# Patient Record
Sex: FEMALE | Race: WHITE | HISPANIC OR LATINO | URBAN - METROPOLITAN AREA
[De-identification: names, ages, dates, MRNs, and addresses within clinical notes are randomized per-mention and may not be internally consistent; named-entity substitution may affect disease eponyms.]

---

## 2016-10-12 RX ORDER — FUROSEMIDE 40 MG
2 TABLET ORAL
Qty: 60 | Refills: 0 | COMMUNITY
Start: 2016-10-12 | End: 2016-11-11

## 2017-01-04 ENCOUNTER — INPATIENT (INPATIENT)
Facility: HOSPITAL | Age: 72
LOS: 4 days | Discharge: HOME HEALTH | End: 2017-01-09
Attending: INTERNAL MEDICINE | Admitting: INTERNAL MEDICINE
Payer: MEDICARE

## 2017-01-04 VITALS — OXYGEN SATURATION: 100 % | HEART RATE: 84 BPM

## 2017-01-04 LAB
ALBUMIN SERPL ELPH-MCNC: 3.4 G/DL — SIGNIFICANT CHANGE UP (ref 3.3–5)
ALP SERPL-CCNC: 156 U/L — HIGH (ref 40–120)
ALT FLD-CCNC: 54 U/L — HIGH (ref 4–33)
AST SERPL-CCNC: 26 U/L — SIGNIFICANT CHANGE UP (ref 4–32)
B PERT DNA SPEC QL NAA+PROBE: SIGNIFICANT CHANGE UP
BASE EXCESS BLDV CALC-SCNC: 3.4 MMOL/L — SIGNIFICANT CHANGE UP
BASOPHILS # BLD AUTO: 0.01 K/UL — SIGNIFICANT CHANGE UP (ref 0–0.2)
BASOPHILS NFR BLD AUTO: 0.1 % — SIGNIFICANT CHANGE UP (ref 0–2)
BILIRUB SERPL-MCNC: 0.4 MG/DL — SIGNIFICANT CHANGE UP (ref 0.2–1.2)
BLOOD GAS VENOUS - CREATININE: 2.82 MG/DL — HIGH (ref 0.5–1.3)
BUN SERPL-MCNC: 58 MG/DL — HIGH (ref 7–23)
C PNEUM DNA SPEC QL NAA+PROBE: NOT DETECTED — SIGNIFICANT CHANGE UP
CALCIUM SERPL-MCNC: 8.4 MG/DL — SIGNIFICANT CHANGE UP (ref 8.4–10.5)
CHLORIDE BLDV-SCNC: 98 MMOL/L — SIGNIFICANT CHANGE UP (ref 96–108)
CHLORIDE SERPL-SCNC: 95 MMOL/L — LOW (ref 98–107)
CO2 SERPL-SCNC: 26 MMOL/L — SIGNIFICANT CHANGE UP (ref 22–31)
CREAT SERPL-MCNC: 2.68 MG/DL — HIGH (ref 0.5–1.3)
EOSINOPHIL # BLD AUTO: 0.04 K/UL — SIGNIFICANT CHANGE UP (ref 0–0.5)
EOSINOPHIL NFR BLD AUTO: 0.4 % — SIGNIFICANT CHANGE UP (ref 0–6)
FLUAV H1 2009 PAND RNA SPEC QL NAA+PROBE: NOT DETECTED — SIGNIFICANT CHANGE UP
FLUAV H1 RNA SPEC QL NAA+PROBE: NOT DETECTED — SIGNIFICANT CHANGE UP
FLUAV H3 RNA SPEC QL NAA+PROBE: NOT DETECTED — SIGNIFICANT CHANGE UP
FLUAV SUBTYP SPEC NAA+PROBE: SIGNIFICANT CHANGE UP
FLUBV RNA SPEC QL NAA+PROBE: NOT DETECTED — SIGNIFICANT CHANGE UP
GAS PNL BLDV: 135 MMOL/L — LOW (ref 136–146)
GLUCOSE BLDV-MCNC: 155 — HIGH (ref 70–99)
GLUCOSE SERPL-MCNC: 165 MG/DL — HIGH (ref 70–99)
HADV DNA SPEC QL NAA+PROBE: NOT DETECTED — SIGNIFICANT CHANGE UP
HCO3 BLDV-SCNC: 27 MMOL/L — SIGNIFICANT CHANGE UP (ref 20–27)
HCOV 229E RNA SPEC QL NAA+PROBE: NOT DETECTED — SIGNIFICANT CHANGE UP
HCOV HKU1 RNA SPEC QL NAA+PROBE: NOT DETECTED — SIGNIFICANT CHANGE UP
HCOV NL63 RNA SPEC QL NAA+PROBE: NOT DETECTED — SIGNIFICANT CHANGE UP
HCOV OC43 RNA SPEC QL NAA+PROBE: NOT DETECTED — SIGNIFICANT CHANGE UP
HCT VFR BLD CALC: 24.9 % — LOW (ref 34.5–45)
HCT VFR BLDV CALC: 23.1 % — LOW (ref 34.5–45)
HGB BLD-MCNC: 7.5 G/DL — LOW (ref 11.5–15.5)
HGB BLDV-MCNC: 7.4 G/DL — LOW (ref 11.5–15.5)
HMPV RNA SPEC QL NAA+PROBE: NOT DETECTED — SIGNIFICANT CHANGE UP
HPIV1 RNA SPEC QL NAA+PROBE: NOT DETECTED — SIGNIFICANT CHANGE UP
HPIV2 RNA SPEC QL NAA+PROBE: NOT DETECTED — SIGNIFICANT CHANGE UP
HPIV3 RNA SPEC QL NAA+PROBE: NOT DETECTED — SIGNIFICANT CHANGE UP
HPIV4 RNA SPEC QL NAA+PROBE: NOT DETECTED — SIGNIFICANT CHANGE UP
IMM GRANULOCYTES NFR BLD AUTO: 0.5 % — SIGNIFICANT CHANGE UP (ref 0–1.5)
LACTATE BLDV-MCNC: 0.9 MMOL/L — SIGNIFICANT CHANGE UP (ref 0.5–2)
LYMPHOCYTES # BLD AUTO: 0.82 K/UL — LOW (ref 1–3.3)
LYMPHOCYTES # BLD AUTO: 8.5 % — LOW (ref 13–44)
M PNEUMO DNA SPEC QL NAA+PROBE: NOT DETECTED — SIGNIFICANT CHANGE UP
MCHC RBC-ENTMCNC: 24.1 PG — LOW (ref 27–34)
MCHC RBC-ENTMCNC: 30.1 % — LOW (ref 32–36)
MCV RBC AUTO: 80.1 FL — SIGNIFICANT CHANGE UP (ref 80–100)
MONOCYTES # BLD AUTO: 0.4 K/UL — SIGNIFICANT CHANGE UP (ref 0–0.9)
MONOCYTES NFR BLD AUTO: 4.1 % — SIGNIFICANT CHANGE UP (ref 2–14)
NEUTROPHILS # BLD AUTO: 8.32 K/UL — HIGH (ref 1.8–7.4)
NEUTROPHILS NFR BLD AUTO: 86.4 % — HIGH (ref 43–77)
NT-PROBNP SERPL-SCNC: 9385 PG/ML — SIGNIFICANT CHANGE UP
PCO2 BLDV: 60 MMHG — HIGH (ref 41–51)
PH BLDV: 7.31 PH — LOW (ref 7.32–7.43)
PLATELET # BLD AUTO: 294 K/UL — SIGNIFICANT CHANGE UP (ref 150–400)
PMV BLD: 10 FL — SIGNIFICANT CHANGE UP (ref 7–13)
PO2 BLDV: 58 MMHG — HIGH (ref 35–40)
POTASSIUM BLDV-SCNC: 4 MMOL/L — SIGNIFICANT CHANGE UP (ref 3.4–4.5)
POTASSIUM SERPL-MCNC: 4.3 MMOL/L — SIGNIFICANT CHANGE UP (ref 3.5–5.3)
POTASSIUM SERPL-SCNC: 4.3 MMOL/L — SIGNIFICANT CHANGE UP (ref 3.5–5.3)
PROT SERPL-MCNC: 6.6 G/DL — SIGNIFICANT CHANGE UP (ref 6–8.3)
RBC # BLD: 3.11 M/UL — LOW (ref 3.8–5.2)
RBC # FLD: 16.5 % — HIGH (ref 10.3–14.5)
RSV RNA SPEC QL NAA+PROBE: NOT DETECTED — SIGNIFICANT CHANGE UP
RV+EV RNA SPEC QL NAA+PROBE: POSITIVE — HIGH
SAO2 % BLDV: 87.4 % — HIGH (ref 60–85)
SODIUM SERPL-SCNC: 138 MMOL/L — SIGNIFICANT CHANGE UP (ref 135–145)
TROPONIN T SERPL-MCNC: < 0.06 NG/ML — SIGNIFICANT CHANGE UP (ref 0–0.06)
WBC # BLD: 9.64 K/UL — SIGNIFICANT CHANGE UP (ref 3.8–10.5)
WBC # FLD AUTO: 9.64 K/UL — SIGNIFICANT CHANGE UP (ref 3.8–10.5)

## 2017-01-04 PROCEDURE — 71010: CPT | Mod: 26

## 2017-01-04 PROCEDURE — 99223 1ST HOSP IP/OBS HIGH 75: CPT | Mod: GC

## 2017-01-04 RX ORDER — VANCOMYCIN HCL 1 G
1000 VIAL (EA) INTRAVENOUS ONCE
Qty: 0 | Refills: 0 | Status: COMPLETED | OUTPATIENT
Start: 2017-01-04 | End: 2017-01-04

## 2017-01-04 RX ORDER — FUROSEMIDE 40 MG
40 TABLET ORAL ONCE
Qty: 0 | Refills: 0 | Status: COMPLETED | OUTPATIENT
Start: 2017-01-04 | End: 2017-01-04

## 2017-01-04 RX ORDER — IPRATROPIUM/ALBUTEROL SULFATE 18-103MCG
3 AEROSOL WITH ADAPTER (GRAM) INHALATION ONCE
Qty: 0 | Refills: 0 | Status: COMPLETED | OUTPATIENT
Start: 2017-01-04 | End: 2017-01-04

## 2017-01-04 RX ORDER — CEFEPIME 1 G/1
2000 INJECTION, POWDER, FOR SOLUTION INTRAMUSCULAR; INTRAVENOUS ONCE
Qty: 0 | Refills: 0 | Status: COMPLETED | OUTPATIENT
Start: 2017-01-04 | End: 2017-01-04

## 2017-01-04 RX ADMIN — Medication 3 MILLILITER(S): at 19:43

## 2017-01-04 RX ADMIN — Medication 125 MILLIGRAM(S): at 20:44

## 2017-01-04 RX ADMIN — Medication 3 MILLILITER(S): at 19:25

## 2017-01-04 RX ADMIN — Medication 3 MILLILITER(S): at 20:18

## 2017-01-04 RX ADMIN — CEFEPIME 100 MILLIGRAM(S): 1 INJECTION, POWDER, FOR SOLUTION INTRAMUSCULAR; INTRAVENOUS at 22:21

## 2017-01-04 RX ADMIN — Medication 40 MILLIGRAM(S): at 20:44

## 2017-01-04 RX ADMIN — Medication 250 MILLIGRAM(S): at 23:07

## 2017-01-04 NOTE — ED PROVIDER NOTE - OBJECTIVE STATEMENT
71-year-old female with a history of COPD (on 2 L home O2), CAD, DM II and HTN who presents to the ED with shortness of breath and cough/fevers x 2 days. Pt has been having increasing SOB x months, worsening over the last few weeks, recently went up on lasix dose. Pt also has cough, chills, x 2 days, daughter sick at home. Pt seen few days ago at [facility] ER for anemia, COPD exacerbation, started on prednisone. 71-year-old female with a history of COPD (on 2 L home O2), CAD, DM II and HTN who presents to the ED with shortness of breath and cough/fevers x 2 days. Pt has been having increasing SOB x months, worsening over the last few weeks, recently went up on lasix dose. Pt also has cough, chills, x 2 days, daughter sick at home. Pt seen few days ago at [facility] ER for anemia, COPD exacerbation, started on prednisone. Pt found to be sattin in 70s on presentation for EMS, put on cpap.

## 2017-01-04 NOTE — ED PROVIDER NOTE - PMH
Adult Idiopathic Generalized Osteoporosis    CAD (Coronary Artery Disease)    Chronic obstructive pulmonary disease, unspecified COPD type    DM (Diabetes Mellitus), Secondary    Hypertension

## 2017-01-04 NOTE — ED ADULT NURSE REASSESSMENT NOTE - NS ED NURSE REASSESS COMMENT FT1
Report received from previous shift RN. Pt A&Ox3, VS as noted. PT currently on BIPAP as ordered, respirations non labored.. Pt resting, states, she is breathing more comfortably. Medicated as ordered. Family at bedside, will continue to monitor for safety and comfort.

## 2017-01-04 NOTE — ED ADULT NURSE NOTE - OBJECTIVE STATEMENT
Patient received to trauma room A awake, alert, oriented x3, in respiratory distress.  Patient arrived on cpap machine, respiratory status tachypneic.  Vital signs recorded, MD at bedside evaluating patient.  Patient has bilateral +3 edema at ankles and feet.  Patient respirations labored and dyspneic.  Peripheral IV line in place, flushes well, no pain or erythema at site.  Family at bedside for emotional support.  CAll bell within reach, safety maintained, will continue to monitor.

## 2017-01-04 NOTE — ED PROVIDER NOTE - PROGRESS NOTE DETAILS
Pt stable. Vital signs stable. MICU c/s aprpeciated, rec PCU. Pulm fellow and MAR aware. Per pt, does not have pulmnologist.  Bubba Felton MD PGY-3

## 2017-01-05 DIAGNOSIS — E13.9 OTHER SPECIFIED DIABETES MELLITUS WITHOUT COMPLICATIONS: ICD-10-CM

## 2017-01-05 DIAGNOSIS — I10 ESSENTIAL (PRIMARY) HYPERTENSION: ICD-10-CM

## 2017-01-05 DIAGNOSIS — J44.9 CHRONIC OBSTRUCTIVE PULMONARY DISEASE, UNSPECIFIED: ICD-10-CM

## 2017-01-05 DIAGNOSIS — J18.9 PNEUMONIA, UNSPECIFIED ORGANISM: ICD-10-CM

## 2017-01-05 DIAGNOSIS — J44.1 CHRONIC OBSTRUCTIVE PULMONARY DISEASE WITH (ACUTE) EXACERBATION: ICD-10-CM

## 2017-01-05 DIAGNOSIS — Z29.9 ENCOUNTER FOR PROPHYLACTIC MEASURES, UNSPECIFIED: ICD-10-CM

## 2017-01-05 DIAGNOSIS — I25.10 ATHEROSCLEROTIC HEART DISEASE OF NATIVE CORONARY ARTERY WITHOUT ANGINA PECTORIS: ICD-10-CM

## 2017-01-05 LAB
BASE EXCESS BLDA CALC-SCNC: 2.4 MMOL/L — SIGNIFICANT CHANGE UP
BUN SERPL-MCNC: 64 MG/DL — HIGH (ref 7–23)
BUN SERPL-MCNC: 67 MG/DL — HIGH (ref 7–23)
CALCIUM SERPL-MCNC: 8.7 MG/DL — SIGNIFICANT CHANGE UP (ref 8.4–10.5)
CALCIUM SERPL-MCNC: 8.7 MG/DL — SIGNIFICANT CHANGE UP (ref 8.4–10.5)
CHLORIDE SERPL-SCNC: 95 MMOL/L — LOW (ref 98–107)
CHLORIDE SERPL-SCNC: 95 MMOL/L — LOW (ref 98–107)
CO2 SERPL-SCNC: 21 MMOL/L — LOW (ref 22–31)
CO2 SERPL-SCNC: 25 MMOL/L — SIGNIFICANT CHANGE UP (ref 22–31)
CREAT SERPL-MCNC: 2.9 MG/DL — HIGH (ref 0.5–1.3)
CREAT SERPL-MCNC: 2.93 MG/DL — HIGH (ref 0.5–1.3)
GLUCOSE BLDA-MCNC: 239 MG/DL — HIGH (ref 70–99)
GLUCOSE SERPL-MCNC: 269 MG/DL — HIGH (ref 70–99)
GLUCOSE SERPL-MCNC: 304 MG/DL — HIGH (ref 70–99)
HCO3 BLDA-SCNC: 26 MMOL/L — SIGNIFICANT CHANGE UP (ref 22–26)
HCT VFR BLD CALC: 25.2 % — LOW (ref 34.5–45)
HCT VFR BLDA CALC: 23 % — LOW (ref 34.5–46.5)
HGB BLD-MCNC: 7.7 G/DL — LOW (ref 11.5–15.5)
HGB BLDA-MCNC: 7.4 G/DL — LOW (ref 11.5–15.5)
MAGNESIUM SERPL-MCNC: 2 MG/DL — SIGNIFICANT CHANGE UP (ref 1.6–2.6)
MCHC RBC-ENTMCNC: 24.4 PG — LOW (ref 27–34)
MCHC RBC-ENTMCNC: 30.6 % — LOW (ref 32–36)
MCV RBC AUTO: 79.7 FL — LOW (ref 80–100)
PCO2 BLDA: 58 MMHG — HIGH (ref 32–48)
PH BLDA: 7.31 PH — LOW (ref 7.35–7.45)
PHOSPHATE SERPL-MCNC: 5.6 MG/DL — HIGH (ref 2.5–4.5)
PLATELET # BLD AUTO: 231 K/UL — SIGNIFICANT CHANGE UP (ref 150–400)
PMV BLD: 10.2 FL — SIGNIFICANT CHANGE UP (ref 7–13)
PO2 BLDA: 94 MMHG — SIGNIFICANT CHANGE UP (ref 83–108)
POTASSIUM BLDA-SCNC: 4.2 MMOL/L — SIGNIFICANT CHANGE UP (ref 3.4–4.5)
POTASSIUM SERPL-MCNC: 4.2 MMOL/L — SIGNIFICANT CHANGE UP (ref 3.5–5.3)
POTASSIUM SERPL-MCNC: 5.4 MMOL/L — HIGH (ref 3.5–5.3)
POTASSIUM SERPL-SCNC: 4.2 MMOL/L — SIGNIFICANT CHANGE UP (ref 3.5–5.3)
POTASSIUM SERPL-SCNC: 5.4 MMOL/L — HIGH (ref 3.5–5.3)
RBC # BLD: 3.16 M/UL — LOW (ref 3.8–5.2)
RBC # FLD: 16.3 % — HIGH (ref 10.3–14.5)
SAO2 % BLDA: 97.4 % — SIGNIFICANT CHANGE UP (ref 95–99)
SODIUM BLDA-SCNC: 132 MMOL/L — LOW (ref 136–146)
SODIUM SERPL-SCNC: 134 MMOL/L — LOW (ref 135–145)
SODIUM SERPL-SCNC: 135 MMOL/L — SIGNIFICANT CHANGE UP (ref 135–145)
SPECIMEN SOURCE: SIGNIFICANT CHANGE UP
SPECIMEN SOURCE: SIGNIFICANT CHANGE UP
WBC # BLD: 6.76 K/UL — SIGNIFICANT CHANGE UP (ref 3.8–10.5)
WBC # FLD AUTO: 6.76 K/UL — SIGNIFICANT CHANGE UP (ref 3.8–10.5)

## 2017-01-05 PROCEDURE — 99223 1ST HOSP IP/OBS HIGH 75: CPT | Mod: GC

## 2017-01-05 RX ORDER — CEFTRIAXONE 500 MG/1
1 INJECTION, POWDER, FOR SOLUTION INTRAMUSCULAR; INTRAVENOUS EVERY 24 HOURS
Qty: 0 | Refills: 0 | Status: DISCONTINUED | OUTPATIENT
Start: 2017-01-05 | End: 2017-01-05

## 2017-01-05 RX ORDER — ACETAMINOPHEN 500 MG
650 TABLET ORAL EVERY 6 HOURS
Qty: 0 | Refills: 0 | Status: DISCONTINUED | OUTPATIENT
Start: 2017-01-05 | End: 2017-01-09

## 2017-01-05 RX ORDER — ATORVASTATIN CALCIUM 80 MG/1
40 TABLET, FILM COATED ORAL AT BEDTIME
Qty: 0 | Refills: 0 | Status: DISCONTINUED | OUTPATIENT
Start: 2017-01-05 | End: 2017-01-09

## 2017-01-05 RX ORDER — GABAPENTIN 400 MG/1
300 CAPSULE ORAL
Qty: 0 | Refills: 0 | Status: DISCONTINUED | OUTPATIENT
Start: 2017-01-05 | End: 2017-01-06

## 2017-01-05 RX ORDER — SODIUM CHLORIDE 9 MG/ML
1000 INJECTION, SOLUTION INTRAVENOUS
Qty: 0 | Refills: 0 | Status: DISCONTINUED | OUTPATIENT
Start: 2017-01-05 | End: 2017-01-09

## 2017-01-05 RX ORDER — CARVEDILOL PHOSPHATE 80 MG/1
12.5 CAPSULE, EXTENDED RELEASE ORAL EVERY 12 HOURS
Qty: 0 | Refills: 0 | Status: DISCONTINUED | OUTPATIENT
Start: 2017-01-05 | End: 2017-01-09

## 2017-01-05 RX ORDER — AMLODIPINE BESYLATE 2.5 MG/1
10 TABLET ORAL DAILY
Qty: 0 | Refills: 0 | Status: DISCONTINUED | OUTPATIENT
Start: 2017-01-05 | End: 2017-01-09

## 2017-01-05 RX ORDER — HEPARIN SODIUM 5000 [USP'U]/ML
7500 INJECTION INTRAVENOUS; SUBCUTANEOUS EVERY 8 HOURS
Qty: 0 | Refills: 0 | Status: DISCONTINUED | OUTPATIENT
Start: 2017-01-05 | End: 2017-01-09

## 2017-01-05 RX ORDER — FUROSEMIDE 40 MG
40 TABLET ORAL EVERY 12 HOURS
Qty: 0 | Refills: 0 | Status: DISCONTINUED | OUTPATIENT
Start: 2017-01-05 | End: 2017-01-09

## 2017-01-05 RX ORDER — DEXTROSE 50 % IN WATER 50 %
25 SYRINGE (ML) INTRAVENOUS ONCE
Qty: 0 | Refills: 0 | Status: DISCONTINUED | OUTPATIENT
Start: 2017-01-05 | End: 2017-01-09

## 2017-01-05 RX ORDER — GLUCAGON INJECTION, SOLUTION 0.5 MG/.1ML
1 INJECTION, SOLUTION SUBCUTANEOUS ONCE
Qty: 0 | Refills: 0 | Status: DISCONTINUED | OUTPATIENT
Start: 2017-01-05 | End: 2017-01-09

## 2017-01-05 RX ORDER — INSULIN LISPRO 100/ML
VIAL (ML) SUBCUTANEOUS
Qty: 0 | Refills: 0 | Status: DISCONTINUED | OUTPATIENT
Start: 2017-01-05 | End: 2017-01-09

## 2017-01-05 RX ORDER — INSULIN GLARGINE 100 [IU]/ML
8 INJECTION, SOLUTION SUBCUTANEOUS AT BEDTIME
Qty: 0 | Refills: 0 | Status: DISCONTINUED | OUTPATIENT
Start: 2017-01-05 | End: 2017-01-07

## 2017-01-05 RX ORDER — IPRATROPIUM/ALBUTEROL SULFATE 18-103MCG
3 AEROSOL WITH ADAPTER (GRAM) INHALATION EVERY 6 HOURS
Qty: 0 | Refills: 0 | Status: DISCONTINUED | OUTPATIENT
Start: 2017-01-05 | End: 2017-01-09

## 2017-01-05 RX ORDER — INSULIN LISPRO 100/ML
VIAL (ML) SUBCUTANEOUS AT BEDTIME
Qty: 0 | Refills: 0 | Status: DISCONTINUED | OUTPATIENT
Start: 2017-01-05 | End: 2017-01-09

## 2017-01-05 RX ORDER — LOSARTAN POTASSIUM 100 MG/1
25 TABLET, FILM COATED ORAL DAILY
Qty: 0 | Refills: 0 | Status: DISCONTINUED | OUTPATIENT
Start: 2017-01-05 | End: 2017-01-09

## 2017-01-05 RX ORDER — FERROUS SULFATE 325(65) MG
325 TABLET ORAL DAILY
Qty: 0 | Refills: 0 | Status: DISCONTINUED | OUTPATIENT
Start: 2017-01-05 | End: 2017-01-09

## 2017-01-05 RX ORDER — DEXTROSE 50 % IN WATER 50 %
12.5 SYRINGE (ML) INTRAVENOUS ONCE
Qty: 0 | Refills: 0 | Status: DISCONTINUED | OUTPATIENT
Start: 2017-01-05 | End: 2017-01-09

## 2017-01-05 RX ORDER — CLOPIDOGREL BISULFATE 75 MG/1
75 TABLET, FILM COATED ORAL DAILY
Qty: 0 | Refills: 0 | Status: DISCONTINUED | OUTPATIENT
Start: 2017-01-05 | End: 2017-01-09

## 2017-01-05 RX ORDER — VANCOMYCIN HCL 1 G
1000 VIAL (EA) INTRAVENOUS EVERY 12 HOURS
Qty: 0 | Refills: 0 | Status: DISCONTINUED | OUTPATIENT
Start: 2017-01-05 | End: 2017-01-05

## 2017-01-05 RX ORDER — AZITHROMYCIN 500 MG/1
500 TABLET, FILM COATED ORAL EVERY 24 HOURS
Qty: 0 | Refills: 0 | Status: DISCONTINUED | OUTPATIENT
Start: 2017-01-05 | End: 2017-01-09

## 2017-01-05 RX ORDER — ASPIRIN/CALCIUM CARB/MAGNESIUM 324 MG
81 TABLET ORAL DAILY
Qty: 0 | Refills: 0 | Status: DISCONTINUED | OUTPATIENT
Start: 2017-01-05 | End: 2017-01-09

## 2017-01-05 RX ORDER — HYDRALAZINE HCL 50 MG
100 TABLET ORAL THREE TIMES A DAY
Qty: 0 | Refills: 0 | Status: DISCONTINUED | OUTPATIENT
Start: 2017-01-05 | End: 2017-01-09

## 2017-01-05 RX ORDER — INSULIN LISPRO 100/ML
VIAL (ML) SUBCUTANEOUS
Qty: 0 | Refills: 0 | Status: DISCONTINUED | OUTPATIENT
Start: 2017-01-05 | End: 2017-01-05

## 2017-01-05 RX ORDER — DEXTROSE 50 % IN WATER 50 %
1 SYRINGE (ML) INTRAVENOUS ONCE
Qty: 0 | Refills: 0 | Status: DISCONTINUED | OUTPATIENT
Start: 2017-01-05 | End: 2017-01-09

## 2017-01-05 RX ADMIN — Medication 4: at 09:33

## 2017-01-05 RX ADMIN — GABAPENTIN 300 MILLIGRAM(S): 400 CAPSULE ORAL at 07:47

## 2017-01-05 RX ADMIN — AMLODIPINE BESYLATE 10 MILLIGRAM(S): 2.5 TABLET ORAL at 07:50

## 2017-01-05 RX ADMIN — Medication 8: at 17:33

## 2017-01-05 RX ADMIN — CARVEDILOL PHOSPHATE 12.5 MILLIGRAM(S): 80 CAPSULE, EXTENDED RELEASE ORAL at 17:33

## 2017-01-05 RX ADMIN — HEPARIN SODIUM 7500 UNIT(S): 5000 INJECTION INTRAVENOUS; SUBCUTANEOUS at 13:33

## 2017-01-05 RX ADMIN — CARVEDILOL PHOSPHATE 12.5 MILLIGRAM(S): 80 CAPSULE, EXTENDED RELEASE ORAL at 07:50

## 2017-01-05 RX ADMIN — Medication 250 MILLIGRAM(S): at 07:44

## 2017-01-05 RX ADMIN — Medication 100 MILLIGRAM(S): at 07:45

## 2017-01-05 RX ADMIN — Medication 81 MILLIGRAM(S): at 13:14

## 2017-01-05 RX ADMIN — Medication 3 MILLILITER(S): at 17:39

## 2017-01-05 RX ADMIN — ATORVASTATIN CALCIUM 40 MILLIGRAM(S): 80 TABLET, FILM COATED ORAL at 21:25

## 2017-01-05 RX ADMIN — Medication 3 MILLILITER(S): at 21:49

## 2017-01-05 RX ADMIN — Medication 40 MILLIGRAM(S): at 17:33

## 2017-01-05 RX ADMIN — Medication 20 MILLIGRAM(S): at 13:33

## 2017-01-05 RX ADMIN — Medication 325 MILLIGRAM(S): at 13:15

## 2017-01-05 RX ADMIN — HEPARIN SODIUM 7500 UNIT(S): 5000 INJECTION INTRAVENOUS; SUBCUTANEOUS at 07:50

## 2017-01-05 RX ADMIN — Medication 4: at 13:15

## 2017-01-05 RX ADMIN — Medication 20 MILLIGRAM(S): at 21:27

## 2017-01-05 RX ADMIN — CEFTRIAXONE 100 GRAM(S): 500 INJECTION, POWDER, FOR SOLUTION INTRAMUSCULAR; INTRAVENOUS at 09:34

## 2017-01-05 RX ADMIN — Medication 3 MILLILITER(S): at 10:05

## 2017-01-05 RX ADMIN — LOSARTAN POTASSIUM 25 MILLIGRAM(S): 100 TABLET, FILM COATED ORAL at 07:45

## 2017-01-05 RX ADMIN — AZITHROMYCIN 250 MILLIGRAM(S): 500 TABLET, FILM COATED ORAL at 10:19

## 2017-01-05 RX ADMIN — Medication 3 MILLILITER(S): at 04:22

## 2017-01-05 RX ADMIN — Medication 20 MILLIGRAM(S): at 08:57

## 2017-01-05 RX ADMIN — INSULIN GLARGINE 8 UNIT(S): 100 INJECTION, SOLUTION SUBCUTANEOUS at 21:27

## 2017-01-05 RX ADMIN — Medication 100 MILLIGRAM(S): at 21:25

## 2017-01-05 RX ADMIN — HEPARIN SODIUM 7500 UNIT(S): 5000 INJECTION INTRAVENOUS; SUBCUTANEOUS at 21:25

## 2017-01-05 RX ADMIN — CLOPIDOGREL BISULFATE 75 MILLIGRAM(S): 75 TABLET, FILM COATED ORAL at 13:13

## 2017-01-05 RX ADMIN — Medication 40 MILLIGRAM(S): at 07:47

## 2017-01-05 RX ADMIN — Medication 100 MILLIGRAM(S): at 13:13

## 2017-01-05 RX ADMIN — GABAPENTIN 300 MILLIGRAM(S): 400 CAPSULE ORAL at 17:33

## 2017-01-05 NOTE — H&P ADULT. - ASSESSMENT
71-year-old female with a history of COPD (not on home O2), CAD, T2DM and HTN presents to the ED with acutely worsening shortness of breath and cough/fevers x 2 days.

## 2017-01-05 NOTE — H&P ADULT. - PROBLEM SELECTOR PLAN 1
- likely in setting of RSV  - as per MICU, will continue to cover with broad coverage abx (vanc, azithro, ceftriaxone) - likely in setting of RSV  - as per MICU, will continue to cover with broad coverage abx (vanc, azithro, ceftriaxone) until cultures return  - tylenol PRN  - VS q4  - albuterol 6  - hold off on fluids given patient increasing swelling and Hx of CHF

## 2017-01-05 NOTE — H&P ADULT. - PROBLEM SELECTOR PLAN 2
- azithro, ceftriaxone, vanc  - solumedrol 20 q8  - albuterol q6  - bipap overnight, reassess need in am  - ABG now

## 2017-01-05 NOTE — H&P ADULT. - HISTORY OF PRESENT ILLNESS
71-year-old female with a history of COPD (not on home O2), CAD, T2DM and HTN presents to the ED with shortness of breath and cough/fevers x 2 days.     Pt has been having increasing SOB x 1 months, particularly worsening over the last few weeks. Pt states that "everyone at home is sick with a virus."  She has been to  ED twice now in the last 2 weeks for similar complaints. Pt was started on prednisone 50 x 5 days and her lasix dose was increased from 40 to 80mg BID for increased swelling.     Pt now also has cough, chills, x 2 days and a temp amx of 100F.   She was supposed to start using home O2 but the prescription has "not gone through" as per daughter at the bedside. Today, when patient became acutely SOB, she was found to by hypoxic to the 70s on RA.    Pt then placed on NC with minimal improvement, s/p 3 albuterol treatments. Since patient's work of breathing was increasing she was placed on Bipap and MICU was consulted.    Pt was given 125mg of methylprednisolone 1 dose of each vanc, ceftriaxone and azithro in the ED.

## 2017-01-06 LAB
BUN SERPL-MCNC: 71 MG/DL — HIGH (ref 7–23)
CALCIUM SERPL-MCNC: 8.6 MG/DL — SIGNIFICANT CHANGE UP (ref 8.4–10.5)
CHLORIDE SERPL-SCNC: 96 MMOL/L — LOW (ref 98–107)
CO2 SERPL-SCNC: 26 MMOL/L — SIGNIFICANT CHANGE UP (ref 22–31)
CREAT SERPL-MCNC: 2.95 MG/DL — HIGH (ref 0.5–1.3)
FERRITIN SERPL-MCNC: 155.4 NG/ML — HIGH (ref 15–150)
GLUCOSE SERPL-MCNC: 238 MG/DL — HIGH (ref 70–99)
HCT VFR BLD CALC: 23 % — LOW (ref 34.5–45)
HGB BLD-MCNC: 7.2 G/DL — LOW (ref 11.5–15.5)
IRON SATN MFR SERPL: 18 UG/DL — LOW (ref 30–160)
IRON SATN MFR SERPL: 235 UG/DL — SIGNIFICANT CHANGE UP (ref 140–530)
MCHC RBC-ENTMCNC: 25.1 PG — LOW (ref 27–34)
MCHC RBC-ENTMCNC: 31.3 % — LOW (ref 32–36)
MCV RBC AUTO: 80.1 FL — SIGNIFICANT CHANGE UP (ref 80–100)
PHOSPHATE SERPL-MCNC: 5.8 MG/DL — HIGH (ref 2.5–4.5)
PLATELET # BLD AUTO: 236 K/UL — SIGNIFICANT CHANGE UP (ref 150–400)
PMV BLD: 10.3 FL — SIGNIFICANT CHANGE UP (ref 7–13)
POTASSIUM SERPL-MCNC: 4.7 MMOL/L — SIGNIFICANT CHANGE UP (ref 3.5–5.3)
POTASSIUM SERPL-SCNC: 4.7 MMOL/L — SIGNIFICANT CHANGE UP (ref 3.5–5.3)
PTH-INTACT SERPL-MCNC: 123.7 PG/ML — HIGH (ref 15–65)
RBC # BLD: 2.87 M/UL — LOW (ref 3.8–5.2)
RBC # FLD: 16.5 % — HIGH (ref 10.3–14.5)
SODIUM SERPL-SCNC: 137 MMOL/L — SIGNIFICANT CHANGE UP (ref 135–145)
UIBC SERPL-MCNC: 217 UG/DL — SIGNIFICANT CHANGE UP (ref 110–370)
WBC # BLD: 6.83 K/UL — SIGNIFICANT CHANGE UP (ref 3.8–10.5)
WBC # FLD AUTO: 6.83 K/UL — SIGNIFICANT CHANGE UP (ref 3.8–10.5)

## 2017-01-06 PROCEDURE — 99233 SBSQ HOSP IP/OBS HIGH 50: CPT | Mod: GC

## 2017-01-06 RX ORDER — SODIUM CHLORIDE 0.65 %
1 AEROSOL, SPRAY (ML) NASAL EVERY 6 HOURS
Qty: 0 | Refills: 0 | Status: DISCONTINUED | OUTPATIENT
Start: 2017-01-06 | End: 2017-01-09

## 2017-01-06 RX ORDER — IRON SUCROSE 20 MG/ML
100 INJECTION, SOLUTION INTRAVENOUS
Qty: 0 | Refills: 0 | Status: DISCONTINUED | OUTPATIENT
Start: 2017-01-06 | End: 2017-01-09

## 2017-01-06 RX ADMIN — Medication 6: at 13:05

## 2017-01-06 RX ADMIN — Medication 100 MILLIGRAM(S): at 05:39

## 2017-01-06 RX ADMIN — Medication 40 MILLIGRAM(S): at 18:12

## 2017-01-06 RX ADMIN — CARVEDILOL PHOSPHATE 12.5 MILLIGRAM(S): 80 CAPSULE, EXTENDED RELEASE ORAL at 05:38

## 2017-01-06 RX ADMIN — Medication 3 MILLILITER(S): at 22:32

## 2017-01-06 RX ADMIN — IRON SUCROSE 100 MILLIGRAM(S): 20 INJECTION, SOLUTION INTRAVENOUS at 18:12

## 2017-01-06 RX ADMIN — Medication 8: at 18:12

## 2017-01-06 RX ADMIN — INSULIN GLARGINE 8 UNIT(S): 100 INJECTION, SOLUTION SUBCUTANEOUS at 23:05

## 2017-01-06 RX ADMIN — HEPARIN SODIUM 7500 UNIT(S): 5000 INJECTION INTRAVENOUS; SUBCUTANEOUS at 23:06

## 2017-01-06 RX ADMIN — Medication 100 MILLIGRAM(S): at 13:06

## 2017-01-06 RX ADMIN — ATORVASTATIN CALCIUM 40 MILLIGRAM(S): 80 TABLET, FILM COATED ORAL at 23:07

## 2017-01-06 RX ADMIN — Medication 40 MILLIGRAM(S): at 05:38

## 2017-01-06 RX ADMIN — LOSARTAN POTASSIUM 25 MILLIGRAM(S): 100 TABLET, FILM COATED ORAL at 05:39

## 2017-01-06 RX ADMIN — Medication 4: at 08:56

## 2017-01-06 RX ADMIN — Medication 40 MILLIGRAM(S): at 12:38

## 2017-01-06 RX ADMIN — HEPARIN SODIUM 7500 UNIT(S): 5000 INJECTION INTRAVENOUS; SUBCUTANEOUS at 13:06

## 2017-01-06 RX ADMIN — CLOPIDOGREL BISULFATE 75 MILLIGRAM(S): 75 TABLET, FILM COATED ORAL at 12:38

## 2017-01-06 RX ADMIN — AZITHROMYCIN 250 MILLIGRAM(S): 500 TABLET, FILM COATED ORAL at 09:50

## 2017-01-06 RX ADMIN — Medication 3 MILLILITER(S): at 09:31

## 2017-01-06 RX ADMIN — Medication 81 MILLIGRAM(S): at 12:38

## 2017-01-06 RX ADMIN — Medication 20 MILLIGRAM(S): at 05:40

## 2017-01-06 RX ADMIN — CARVEDILOL PHOSPHATE 12.5 MILLIGRAM(S): 80 CAPSULE, EXTENDED RELEASE ORAL at 18:13

## 2017-01-06 RX ADMIN — Medication 325 MILLIGRAM(S): at 12:38

## 2017-01-06 RX ADMIN — Medication 3 MILLILITER(S): at 15:16

## 2017-01-06 RX ADMIN — GABAPENTIN 300 MILLIGRAM(S): 400 CAPSULE ORAL at 05:39

## 2017-01-06 RX ADMIN — Medication 100 MILLIGRAM(S): at 23:07

## 2017-01-06 RX ADMIN — Medication 3 MILLILITER(S): at 03:53

## 2017-01-06 RX ADMIN — Medication 3: at 23:05

## 2017-01-06 RX ADMIN — AMLODIPINE BESYLATE 10 MILLIGRAM(S): 2.5 TABLET ORAL at 05:38

## 2017-01-06 RX ADMIN — HEPARIN SODIUM 7500 UNIT(S): 5000 INJECTION INTRAVENOUS; SUBCUTANEOUS at 05:40

## 2017-01-06 NOTE — PHYSICAL THERAPY INITIAL EVALUATION ADULT - PERTINENT HX OF CURRENT PROBLEM, REHAB EVAL
Pt has been having increasing SOB x 1 months, particularly worsening over the last few weeks. Pt states that "everyone at home is sick with a virus."  She has been to  ED twice now in the last 2 weeks for similar complaints. Pt was started on prednisone 50 x 5 days and her lasix dose was increased from 40 to 80mg BID for increased swelling.

## 2017-01-07 LAB
BASOPHILS # BLD AUTO: 0.01 K/UL — SIGNIFICANT CHANGE UP (ref 0–0.2)
BASOPHILS NFR BLD AUTO: 0.2 % — SIGNIFICANT CHANGE UP (ref 0–2)
BUN SERPL-MCNC: 74 MG/DL — HIGH (ref 7–23)
CALCIUM SERPL-MCNC: 8.6 MG/DL — SIGNIFICANT CHANGE UP (ref 8.4–10.5)
CHLORIDE SERPL-SCNC: 94 MMOL/L — LOW (ref 98–107)
CO2 SERPL-SCNC: 24 MMOL/L — SIGNIFICANT CHANGE UP (ref 22–31)
CREAT SERPL-MCNC: 2.98 MG/DL — HIGH (ref 0.5–1.3)
EOSINOPHIL # BLD AUTO: 0 K/UL — SIGNIFICANT CHANGE UP (ref 0–0.5)
EOSINOPHIL NFR BLD AUTO: 0 % — SIGNIFICANT CHANGE UP (ref 0–6)
GLUCOSE SERPL-MCNC: 166 MG/DL — HIGH (ref 70–99)
HBA1C BLD-MCNC: 8.7 % — HIGH (ref 4–5.6)
HCT VFR BLD CALC: 23.2 % — LOW (ref 34.5–45)
HGB BLD-MCNC: 7.2 G/DL — LOW (ref 11.5–15.5)
IMM GRANULOCYTES NFR BLD AUTO: 0.6 % — SIGNIFICANT CHANGE UP (ref 0–1.5)
L PNEUMO AG UR QL: NEGATIVE — SIGNIFICANT CHANGE UP
LYMPHOCYTES # BLD AUTO: 0.67 K/UL — LOW (ref 1–3.3)
LYMPHOCYTES # BLD AUTO: 10.1 % — LOW (ref 13–44)
MAGNESIUM SERPL-MCNC: 2 MG/DL — SIGNIFICANT CHANGE UP (ref 1.6–2.6)
MCHC RBC-ENTMCNC: 24.7 PG — LOW (ref 27–34)
MCHC RBC-ENTMCNC: 31 % — LOW (ref 32–36)
MCV RBC AUTO: 79.5 FL — LOW (ref 80–100)
MONOCYTES # BLD AUTO: 0.6 K/UL — SIGNIFICANT CHANGE UP (ref 0–0.9)
MONOCYTES NFR BLD AUTO: 9 % — SIGNIFICANT CHANGE UP (ref 2–14)
NEUTROPHILS # BLD AUTO: 5.34 K/UL — SIGNIFICANT CHANGE UP (ref 1.8–7.4)
NEUTROPHILS NFR BLD AUTO: 80.1 % — HIGH (ref 43–77)
PHOSPHATE SERPL-MCNC: 4.8 MG/DL — HIGH (ref 2.5–4.5)
PLATELET # BLD AUTO: 241 K/UL — SIGNIFICANT CHANGE UP (ref 150–400)
PMV BLD: 10.5 FL — SIGNIFICANT CHANGE UP (ref 7–13)
POTASSIUM SERPL-MCNC: 4.2 MMOL/L — SIGNIFICANT CHANGE UP (ref 3.5–5.3)
POTASSIUM SERPL-SCNC: 4.2 MMOL/L — SIGNIFICANT CHANGE UP (ref 3.5–5.3)
RBC # BLD: 2.92 M/UL — LOW (ref 3.8–5.2)
RBC # FLD: 16.4 % — HIGH (ref 10.3–14.5)
SODIUM SERPL-SCNC: 136 MMOL/L — SIGNIFICANT CHANGE UP (ref 135–145)
WBC # BLD: 6.66 K/UL — SIGNIFICANT CHANGE UP (ref 3.8–10.5)
WBC # FLD AUTO: 6.66 K/UL — SIGNIFICANT CHANGE UP (ref 3.8–10.5)

## 2017-01-07 PROCEDURE — 99233 SBSQ HOSP IP/OBS HIGH 50: CPT | Mod: GC

## 2017-01-07 RX ORDER — INSULIN LISPRO 100/ML
2 VIAL (ML) SUBCUTANEOUS
Qty: 0 | Refills: 0 | Status: DISCONTINUED | OUTPATIENT
Start: 2017-01-07 | End: 2017-01-09

## 2017-01-07 RX ORDER — INSULIN GLARGINE 100 [IU]/ML
12 INJECTION, SOLUTION SUBCUTANEOUS AT BEDTIME
Qty: 0 | Refills: 0 | Status: DISCONTINUED | OUTPATIENT
Start: 2017-01-07 | End: 2017-01-09

## 2017-01-07 RX ADMIN — Medication 1 SPRAY(S): at 07:02

## 2017-01-07 RX ADMIN — AMLODIPINE BESYLATE 10 MILLIGRAM(S): 2.5 TABLET ORAL at 06:55

## 2017-01-07 RX ADMIN — CARVEDILOL PHOSPHATE 12.5 MILLIGRAM(S): 80 CAPSULE, EXTENDED RELEASE ORAL at 17:16

## 2017-01-07 RX ADMIN — Medication 325 MILLIGRAM(S): at 11:41

## 2017-01-07 RX ADMIN — Medication 8: at 13:01

## 2017-01-07 RX ADMIN — ATORVASTATIN CALCIUM 40 MILLIGRAM(S): 80 TABLET, FILM COATED ORAL at 22:22

## 2017-01-07 RX ADMIN — CARVEDILOL PHOSPHATE 12.5 MILLIGRAM(S): 80 CAPSULE, EXTENDED RELEASE ORAL at 06:55

## 2017-01-07 RX ADMIN — Medication 12: at 18:06

## 2017-01-07 RX ADMIN — INSULIN GLARGINE 12 UNIT(S): 100 INJECTION, SOLUTION SUBCUTANEOUS at 22:36

## 2017-01-07 RX ADMIN — LOSARTAN POTASSIUM 25 MILLIGRAM(S): 100 TABLET, FILM COATED ORAL at 06:55

## 2017-01-07 RX ADMIN — Medication 40 MILLIGRAM(S): at 06:54

## 2017-01-07 RX ADMIN — Medication 81 MILLIGRAM(S): at 11:40

## 2017-01-07 RX ADMIN — Medication 1 SPRAY(S): at 11:40

## 2017-01-07 RX ADMIN — Medication 1 SPRAY(S): at 17:15

## 2017-01-07 RX ADMIN — Medication 100 MILLIGRAM(S): at 22:22

## 2017-01-07 RX ADMIN — Medication 3 MILLILITER(S): at 03:41

## 2017-01-07 RX ADMIN — Medication 3 MILLILITER(S): at 21:56

## 2017-01-07 RX ADMIN — CLOPIDOGREL BISULFATE 75 MILLIGRAM(S): 75 TABLET, FILM COATED ORAL at 11:40

## 2017-01-07 RX ADMIN — Medication 3 MILLILITER(S): at 17:26

## 2017-01-07 RX ADMIN — Medication 2: at 08:55

## 2017-01-07 RX ADMIN — HEPARIN SODIUM 7500 UNIT(S): 5000 INJECTION INTRAVENOUS; SUBCUTANEOUS at 06:55

## 2017-01-07 RX ADMIN — Medication 40 MILLIGRAM(S): at 17:16

## 2017-01-07 RX ADMIN — Medication 100 MILLIGRAM(S): at 06:54

## 2017-01-07 RX ADMIN — HEPARIN SODIUM 7500 UNIT(S): 5000 INJECTION INTRAVENOUS; SUBCUTANEOUS at 22:21

## 2017-01-07 RX ADMIN — Medication 100 MILLIGRAM(S): at 13:01

## 2017-01-07 RX ADMIN — Medication 2: at 22:24

## 2017-01-07 RX ADMIN — AZITHROMYCIN 250 MILLIGRAM(S): 500 TABLET, FILM COATED ORAL at 09:59

## 2017-01-08 LAB
ALBUMIN SERPL ELPH-MCNC: 3.2 G/DL — LOW (ref 3.3–5)
ALP SERPL-CCNC: 130 U/L — HIGH (ref 40–120)
ALT FLD-CCNC: 40 U/L — HIGH (ref 4–33)
AST SERPL-CCNC: 21 U/L — SIGNIFICANT CHANGE UP (ref 4–32)
BASOPHILS # BLD AUTO: 0 K/UL — SIGNIFICANT CHANGE UP (ref 0–0.2)
BASOPHILS NFR BLD AUTO: 0 % — SIGNIFICANT CHANGE UP (ref 0–2)
BILIRUB SERPL-MCNC: 0.3 MG/DL — SIGNIFICANT CHANGE UP (ref 0.2–1.2)
BUN SERPL-MCNC: 77 MG/DL — HIGH (ref 7–23)
CALCIUM SERPL-MCNC: 9 MG/DL — SIGNIFICANT CHANGE UP (ref 8.4–10.5)
CHLORIDE SERPL-SCNC: 94 MMOL/L — LOW (ref 98–107)
CO2 SERPL-SCNC: 26 MMOL/L — SIGNIFICANT CHANGE UP (ref 22–31)
CREAT SERPL-MCNC: 2.94 MG/DL — HIGH (ref 0.5–1.3)
EOSINOPHIL # BLD AUTO: 0.01 K/UL — SIGNIFICANT CHANGE UP (ref 0–0.5)
EOSINOPHIL NFR BLD AUTO: 0.2 % — SIGNIFICANT CHANGE UP (ref 0–6)
GLUCOSE SERPL-MCNC: 148 MG/DL — HIGH (ref 70–99)
HCT VFR BLD CALC: 25 % — LOW (ref 34.5–45)
HGB BLD-MCNC: 7.7 G/DL — LOW (ref 11.5–15.5)
IMM GRANULOCYTES NFR BLD AUTO: 0.7 % — SIGNIFICANT CHANGE UP (ref 0–1.5)
LYMPHOCYTES # BLD AUTO: 0.95 K/UL — LOW (ref 1–3.3)
LYMPHOCYTES # BLD AUTO: 16.4 % — SIGNIFICANT CHANGE UP (ref 13–44)
MAGNESIUM SERPL-MCNC: 2 MG/DL — SIGNIFICANT CHANGE UP (ref 1.6–2.6)
MCHC RBC-ENTMCNC: 24.1 PG — LOW (ref 27–34)
MCHC RBC-ENTMCNC: 30.8 % — LOW (ref 32–36)
MCV RBC AUTO: 78.4 FL — LOW (ref 80–100)
MONOCYTES # BLD AUTO: 0.48 K/UL — SIGNIFICANT CHANGE UP (ref 0–0.9)
MONOCYTES NFR BLD AUTO: 8.3 % — SIGNIFICANT CHANGE UP (ref 2–14)
NEUTROPHILS # BLD AUTO: 4.3 K/UL — SIGNIFICANT CHANGE UP (ref 1.8–7.4)
NEUTROPHILS NFR BLD AUTO: 74.4 % — SIGNIFICANT CHANGE UP (ref 43–77)
PHOSPHATE SERPL-MCNC: 4.7 MG/DL — HIGH (ref 2.5–4.5)
PLATELET # BLD AUTO: 262 K/UL — SIGNIFICANT CHANGE UP (ref 150–400)
PMV BLD: 9.6 FL — SIGNIFICANT CHANGE UP (ref 7–13)
POTASSIUM SERPL-MCNC: 3.9 MMOL/L — SIGNIFICANT CHANGE UP (ref 3.5–5.3)
POTASSIUM SERPL-SCNC: 3.9 MMOL/L — SIGNIFICANT CHANGE UP (ref 3.5–5.3)
PROT SERPL-MCNC: 6.4 G/DL — SIGNIFICANT CHANGE UP (ref 6–8.3)
RBC # BLD: 3.19 M/UL — LOW (ref 3.8–5.2)
RBC # FLD: 16.3 % — HIGH (ref 10.3–14.5)
SODIUM SERPL-SCNC: 135 MMOL/L — SIGNIFICANT CHANGE UP (ref 135–145)
WBC # BLD: 5.78 K/UL — SIGNIFICANT CHANGE UP (ref 3.8–10.5)
WBC # FLD AUTO: 5.78 K/UL — SIGNIFICANT CHANGE UP (ref 3.8–10.5)

## 2017-01-08 PROCEDURE — 99233 SBSQ HOSP IP/OBS HIGH 50: CPT | Mod: GC

## 2017-01-08 RX ADMIN — CARVEDILOL PHOSPHATE 12.5 MILLIGRAM(S): 80 CAPSULE, EXTENDED RELEASE ORAL at 06:26

## 2017-01-08 RX ADMIN — HEPARIN SODIUM 7500 UNIT(S): 5000 INJECTION INTRAVENOUS; SUBCUTANEOUS at 13:51

## 2017-01-08 RX ADMIN — Medication 100 MILLIGRAM(S): at 06:26

## 2017-01-08 RX ADMIN — AZITHROMYCIN 250 MILLIGRAM(S): 500 TABLET, FILM COATED ORAL at 09:47

## 2017-01-08 RX ADMIN — Medication 1 SPRAY(S): at 23:19

## 2017-01-08 RX ADMIN — INSULIN GLARGINE 12 UNIT(S): 100 INJECTION, SOLUTION SUBCUTANEOUS at 23:18

## 2017-01-08 RX ADMIN — Medication 100 MILLIGRAM(S): at 23:07

## 2017-01-08 RX ADMIN — HEPARIN SODIUM 7500 UNIT(S): 5000 INJECTION INTRAVENOUS; SUBCUTANEOUS at 06:26

## 2017-01-08 RX ADMIN — Medication 1 SPRAY(S): at 18:24

## 2017-01-08 RX ADMIN — Medication 2: at 08:54

## 2017-01-08 RX ADMIN — Medication 3 MILLILITER(S): at 11:25

## 2017-01-08 RX ADMIN — LOSARTAN POTASSIUM 25 MILLIGRAM(S): 100 TABLET, FILM COATED ORAL at 06:26

## 2017-01-08 RX ADMIN — Medication 2 UNIT(S): at 08:54

## 2017-01-08 RX ADMIN — CARVEDILOL PHOSPHATE 12.5 MILLIGRAM(S): 80 CAPSULE, EXTENDED RELEASE ORAL at 18:24

## 2017-01-08 RX ADMIN — CLOPIDOGREL BISULFATE 75 MILLIGRAM(S): 75 TABLET, FILM COATED ORAL at 12:56

## 2017-01-08 RX ADMIN — Medication 3 MILLILITER(S): at 17:14

## 2017-01-08 RX ADMIN — Medication 1 SPRAY(S): at 00:13

## 2017-01-08 RX ADMIN — HEPARIN SODIUM 7500 UNIT(S): 5000 INJECTION INTRAVENOUS; SUBCUTANEOUS at 23:05

## 2017-01-08 RX ADMIN — Medication 40 MILLIGRAM(S): at 06:26

## 2017-01-08 RX ADMIN — Medication 1 SPRAY(S): at 06:25

## 2017-01-08 RX ADMIN — AMLODIPINE BESYLATE 10 MILLIGRAM(S): 2.5 TABLET ORAL at 06:26

## 2017-01-08 RX ADMIN — Medication 6: at 18:23

## 2017-01-08 RX ADMIN — ATORVASTATIN CALCIUM 40 MILLIGRAM(S): 80 TABLET, FILM COATED ORAL at 23:05

## 2017-01-08 RX ADMIN — Medication 1: at 23:06

## 2017-01-08 RX ADMIN — Medication 81 MILLIGRAM(S): at 12:56

## 2017-01-08 RX ADMIN — Medication 6: at 12:56

## 2017-01-08 RX ADMIN — Medication 100 MILLIGRAM(S): at 13:51

## 2017-01-08 RX ADMIN — Medication 325 MILLIGRAM(S): at 12:56

## 2017-01-08 RX ADMIN — Medication 3 MILLILITER(S): at 22:45

## 2017-01-08 RX ADMIN — Medication 40 MILLIGRAM(S): at 18:24

## 2017-01-08 RX ADMIN — Medication 2 UNIT(S): at 18:23

## 2017-01-08 RX ADMIN — Medication 2 UNIT(S): at 12:57

## 2017-01-08 RX ADMIN — Medication 3 MILLILITER(S): at 04:29

## 2017-01-08 RX ADMIN — Medication 1 SPRAY(S): at 12:56

## 2017-01-08 NOTE — DISCHARGE NOTE ADULT - CARE PROVIDER_API CALL
Lillian Sosa), Internal Medicine  [address] [place] [address]  Phone: [phone]  Fax: [phone]

## 2017-01-08 NOTE — DISCHARGE NOTE ADULT - ADDITIONAL INSTRUCTIONS
Follow up with your primary care provider in 1 week  Follow up with your pulmonologist in 1-2 weeks  Follow up with your endocrinologist in 1-2 weeks. Your Hgb A1C is 8.7

## 2017-01-08 NOTE — DISCHARGE NOTE ADULT - CARE PLAN
Principal Discharge DX:	RSV (respiratory syncytial virus infection)  Goal:	Infection will resolve  Instructions for follow-up, activity and diet:	[facility] diet  Utilize oxygen as directed  Secondary Diagnosis:	COPD exacerbation  Goal:	Maintain optimal respiratory status  Instructions for follow-up, activity and diet:	Follow up with a pulmonologist in 1-2 weeks  Complete course of zithromax  Taper prednisone as directed  Take medication as directed  Secondary Diagnosis:	DM (Diabetes Mellitus), Secondary  Goal:	Maintain blood sugar 100-180  Instructions for follow-up, activity and diet:	Check fingerstick glucose before meals and at bedtime  Follow up with and endocrinologist for management of your Diabetes  Secondary Diagnosis:	CAD (Coronary Artery Disease)  Instructions for follow-up, activity and diet:	Continue your medications for your blood pressure and coronary artery disease Principal Discharge DX:	RSV (respiratory syncytial virus infection)  Goal:	Infection will resolve  Instructions for follow-up, activity and diet:	[facility] diet  Utilize oxygen as directed  Secondary Diagnosis:	COPD exacerbation  Goal:	Maintain optimal respiratory status  Instructions for follow-up, activity and diet:	Follow up with a pulmonologist in 1-2 weeks  Complete course of zithromax  Taper prednisone as directed  Take medication as directed  Secondary Diagnosis:	DM (Diabetes Mellitus), Secondary  Goal:	Maintain blood sugar 100-180  Instructions for follow-up, activity and diet:	Check fingerstick glucose before meals and at bedtime  Follow up with and endocrinologist for management of your Diabetes  Secondary Diagnosis:	CAD (Coronary Artery Disease)  Instructions for follow-up, activity and diet:	Continue your medications for your blood pressure and coronary artery disease Principal Discharge DX:	RSV (respiratory syncytial virus infection)  Goal:	Infection will resolve  Instructions for follow-up, activity and diet:	[facility] diet  Utilize oxygen as directed  Secondary Diagnosis:	COPD exacerbation  Goal:	Maintain optimal respiratory status  Instructions for follow-up, activity and diet:	Follow up with a pulmonologist in 1-2 weeks  Complete course of zithromax  Taper prednisone as directed  Take medication as directed  Secondary Diagnosis:	DM (Diabetes Mellitus), Secondary  Goal:	Maintain blood sugar 100-180  Instructions for follow-up, activity and diet:	Check fingerstick glucose before meals and at bedtime  Follow up with and endocrinologist for management of your Diabetes  Secondary Diagnosis:	CAD (Coronary Artery Disease)  Instructions for follow-up, activity and diet:	Continue your medications for your blood pressure and coronary artery disease Principal Discharge DX:	RSV (respiratory syncytial virus infection)  Goal:	Infection will resolve  Instructions for follow-up, activity and diet:	[facility] diet  Please continue Prednisone for 5 days and follow up with PMD  Secondary Diagnosis:	COPD exacerbation  Goal:	Maintain optimal respiratory status  Instructions for follow-up, activity and diet:	Follow up with a pulmonologist in 1-2 weeks  Complete course of zithromax  Taper prednisone as directed  Take medication as directed  Secondary Diagnosis:	DM (Diabetes Mellitus), Secondary  Goal:	Maintain blood sugar 100-180  Instructions for follow-up, activity and diet:	Check fingerstick glucose before meals and at bedtime  Follow up with and endocrinologist for management of your Diabetes  Secondary Diagnosis:	CAD (Coronary Artery Disease)  Instructions for follow-up, activity and diet:	Continue your medications for your blood pressure and coronary artery disease

## 2017-01-08 NOTE — DISCHARGE NOTE ADULT - HOME CARE AGENCY
[facility] [phone] for Visiting Nurse Services, & the Nurse will evaluate for Home Physical Therapy. The 1st Visiting Nurse Visit is for Tuesday 1/10/17. The Nurse will call to arrange the Visit time.

## 2017-01-08 NOTE — DISCHARGE NOTE ADULT - HOSPITAL COURSE
71-year-old female with a history of COPD (not on home O2), CAD, T2DM and HTN presents to the ED with shortness of breath and cough/fevers x 2 days.  RSV+, Blood cultures and urine legionella were negative. Patient required BIPAP to maintain pulse ox greater than 85% initially. She is being discharged home on 3L nasal cannula oxygen, which is new for her. Rehab was recommended for her by PT but she refuses. Home PT will be arranged. Blood sugars were in the 300-400 range, but were managed with insulin as an inpatient. Her Hgb A1C is 8.7. Patient is advised to see her primary care provider within 1 week, and to see a pulmonologist and an endocrinologist in 1-2 weeks. She is stable for discharge. 71-year-old female with a history of COPD (not on home O2), CAD, T2DM and HTN presents to the ED with shortness of breath and cough/fevers x 2 days.  RSV+, Blood cultures and urine legionella were negative. Patient required BIPAP to maintain pulse ox greater than 85% initially. Patient improved on Prednisone and abx. Rehab was recommended for her by PT but she refuses. Home PT will be arranged. Blood sugars were in the 300-400 range, but were managed with insulin as an inpatient. Her Hgb A1C is 8.7. Patient is advised to see her primary care provider within 1 week, and to see a pulmonologist and an endocrinologist in 1-2 weeks. She is stable for discharge.

## 2017-01-08 NOTE — DISCHARGE NOTE ADULT - PLAN OF CARE
Infection will resolve [facility] diet  Utilize oxygen as directed Maintain optimal respiratory status Follow up with a pulmonologist in 1-2 weeks  Complete course of zithromax  Taper prednisone as directed  Take medication as directed Maintain blood sugar 100-180 Check fingerstick glucose before meals and at bedtime  Follow up with and endocrinologist for management of your Diabetes Continue your medications for your blood pressure and coronary artery disease [facility] diet  Please continue Prednisone for 5 days and follow up with PMD

## 2017-01-08 NOTE — DISCHARGE NOTE ADULT - MEDICATION SUMMARY - MEDICATIONS TO TAKE
I will START or STAY ON the medications listed below when I get home from the hospital:    predniSONE 20 mg oral tablet  -- 2 tab(s) by mouth once a day for 5 days then stop  -- Indication: For RSV (respiratory syncytial virus infection)    aspirin 81 mg oral tablet  -- 1 tab(s) by mouth once a day  -- Indication: For CAD (Coronary Artery Disease)    losartan 25 mg oral tablet  -- 1 tab(s) by mouth once a day  -- Indication: For Hypertension    gabapentin 300 mg oral tablet  -- 1 tab(s) by mouth 2 times a day  -- Indication: For Pain 2/2 diabetic    Levemir FlexPen 100 units/mL subcutaneous solution  -- 10 unit(s) subcutaneous once a day (at bedtime)  -- Indication: For DM (Diabetes Mellitus), Secondary    Lipitor 40 mg oral tablet  -- 1 tab(s) by mouth once a day  -- Indication: For HLD    Plavix 75 mg oral tablet  -- 1 tab(s) by mouth once a day  -- Indication: For CAD (Coronary Artery Disease)    carvedilol 12.5 mg oral tablet  -- 1 tab(s) by mouth every 12 hours  -- Indication: For Hypertension    Ventolin HFA 90 mcg/inh inhalation aerosol  -- 2 puff(s) inhaled 4 times a day, As Needed  -- Indication: For COPD exacerbation    amLODIPine 10 mg oral tablet  -- 1 tab(s) by mouth once a day  -- Indication: For Hypertension    Lasix 40 mg oral tablet  -- 2 tab(s) by mouth 2 times a day  -- Indication: For Hypertension    ferrous sulfate 325 mg (65 mg elemental iron) oral tablet  -- 1 tab(s) by mouth once a day  -- Indication: For Supplement    sodium chloride 0.65% nasal spray  -- 1 spray(s) into nose every 6 hours, As Needed  -- Indication: For nasal congestion    Restasis 0.05% ophthalmic emulsion  -- 1 drop(s) to each affected eye every 12 hours  -- Indication: For eye drops    Dexilant 60 mg oral delayed release capsule  -- 1 cap(s) by mouth once a day  -- Indication: For Prophylactic measure    hydrALAZINE 100 mg oral tablet  -- 1 tab(s) by mouth 3 times a day  -- Indication: For Hypertension

## 2017-01-09 VITALS — RESPIRATION RATE: 19 BRPM | OXYGEN SATURATION: 89 %

## 2017-01-09 LAB
BACTERIA BLD CULT: SIGNIFICANT CHANGE UP
BACTERIA BLD CULT: SIGNIFICANT CHANGE UP
BLD GP AB SCN SERPL QL: NEGATIVE — SIGNIFICANT CHANGE UP
BUN SERPL-MCNC: 77 MG/DL — HIGH (ref 7–23)
CALCIUM SERPL-MCNC: 9.1 MG/DL — SIGNIFICANT CHANGE UP (ref 8.4–10.5)
CHLORIDE SERPL-SCNC: 95 MMOL/L — LOW (ref 98–107)
CO2 SERPL-SCNC: 28 MMOL/L — SIGNIFICANT CHANGE UP (ref 22–31)
CREAT SERPL-MCNC: 2.84 MG/DL — HIGH (ref 0.5–1.3)
GLUCOSE SERPL-MCNC: 177 MG/DL — HIGH (ref 70–99)
HCT VFR BLD CALC: 26.4 % — LOW (ref 34.5–45)
HGB BLD-MCNC: 8.1 G/DL — LOW (ref 11.5–15.5)
MAGNESIUM SERPL-MCNC: 1.9 MG/DL — SIGNIFICANT CHANGE UP (ref 1.6–2.6)
MCHC RBC-ENTMCNC: 24.2 PG — LOW (ref 27–34)
MCHC RBC-ENTMCNC: 30.7 % — LOW (ref 32–36)
MCV RBC AUTO: 78.8 FL — LOW (ref 80–100)
PHOSPHATE SERPL-MCNC: 3.9 MG/DL — SIGNIFICANT CHANGE UP (ref 2.5–4.5)
PLATELET # BLD AUTO: 280 K/UL — SIGNIFICANT CHANGE UP (ref 150–400)
PMV BLD: 10.2 FL — SIGNIFICANT CHANGE UP (ref 7–13)
POTASSIUM SERPL-MCNC: 4.1 MMOL/L — SIGNIFICANT CHANGE UP (ref 3.5–5.3)
POTASSIUM SERPL-SCNC: 4.1 MMOL/L — SIGNIFICANT CHANGE UP (ref 3.5–5.3)
RBC # BLD: 3.35 M/UL — LOW (ref 3.8–5.2)
RBC # FLD: 16.1 % — HIGH (ref 10.3–14.5)
RH IG SCN BLD-IMP: POSITIVE — SIGNIFICANT CHANGE UP
SODIUM SERPL-SCNC: 137 MMOL/L — SIGNIFICANT CHANGE UP (ref 135–145)
WBC # BLD: 6.36 K/UL — SIGNIFICANT CHANGE UP (ref 3.8–10.5)
WBC # FLD AUTO: 6.36 K/UL — SIGNIFICANT CHANGE UP (ref 3.8–10.5)

## 2017-01-09 PROCEDURE — 99238 HOSP IP/OBS DSCHRG MGMT 30/<: CPT

## 2017-01-09 RX ORDER — SODIUM CHLORIDE 0.65 %
1 AEROSOL, SPRAY (ML) NASAL
Qty: 1 | Refills: 0 | OUTPATIENT
Start: 2017-01-09

## 2017-01-09 RX ORDER — ALBUTEROL 90 UG/1
2 AEROSOL, METERED ORAL
Qty: 1 | Refills: 0 | OUTPATIENT
Start: 2017-01-09

## 2017-01-09 RX ADMIN — Medication 2 UNIT(S): at 09:17

## 2017-01-09 RX ADMIN — CARVEDILOL PHOSPHATE 12.5 MILLIGRAM(S): 80 CAPSULE, EXTENDED RELEASE ORAL at 06:50

## 2017-01-09 RX ADMIN — LOSARTAN POTASSIUM 25 MILLIGRAM(S): 100 TABLET, FILM COATED ORAL at 06:50

## 2017-01-09 RX ADMIN — AZITHROMYCIN 250 MILLIGRAM(S): 500 TABLET, FILM COATED ORAL at 09:21

## 2017-01-09 RX ADMIN — Medication 1 SPRAY(S): at 11:54

## 2017-01-09 RX ADMIN — Medication 81 MILLIGRAM(S): at 11:54

## 2017-01-09 RX ADMIN — HEPARIN SODIUM 7500 UNIT(S): 5000 INJECTION INTRAVENOUS; SUBCUTANEOUS at 06:50

## 2017-01-09 RX ADMIN — Medication 325 MILLIGRAM(S): at 11:54

## 2017-01-09 RX ADMIN — Medication 100 MILLIGRAM(S): at 06:50

## 2017-01-09 RX ADMIN — Medication 8: at 13:08

## 2017-01-09 RX ADMIN — Medication 40 MILLIGRAM(S): at 06:50

## 2017-01-09 RX ADMIN — Medication 1 SPRAY(S): at 06:53

## 2017-01-09 RX ADMIN — CLOPIDOGREL BISULFATE 75 MILLIGRAM(S): 75 TABLET, FILM COATED ORAL at 11:54

## 2017-01-09 RX ADMIN — Medication 2 UNIT(S): at 13:08

## 2017-01-09 RX ADMIN — AMLODIPINE BESYLATE 10 MILLIGRAM(S): 2.5 TABLET ORAL at 06:50

## 2017-01-09 RX ADMIN — Medication 3 MILLILITER(S): at 03:48

## 2017-01-09 RX ADMIN — Medication 3 MILLILITER(S): at 10:56

## 2017-01-09 RX ADMIN — Medication 2: at 09:17

## 2018-10-30 ENCOUNTER — INPATIENT (INPATIENT)
Facility: HOSPITAL | Age: 73
LOS: 8 days | Discharge: HOME | DRG: 246 | End: 2018-11-08
Attending: INTERNAL MEDICINE | Admitting: INTERNAL MEDICINE
Payer: MEDICARE

## 2018-10-30 VITALS
HEART RATE: 61 BPM | RESPIRATION RATE: 20 BRPM | SYSTOLIC BLOOD PRESSURE: 123 MMHG | OXYGEN SATURATION: 100 % | DIASTOLIC BLOOD PRESSURE: 55 MMHG

## 2018-10-30 DIAGNOSIS — I50.9 HEART FAILURE, UNSPECIFIED: ICD-10-CM

## 2018-10-30 DIAGNOSIS — I10 ESSENTIAL (PRIMARY) HYPERTENSION: ICD-10-CM

## 2018-10-30 DIAGNOSIS — J44.1 CHRONIC OBSTRUCTIVE PULMONARY DISEASE WITH (ACUTE) EXACERBATION: ICD-10-CM

## 2018-10-30 DIAGNOSIS — J96.01 ACUTE RESPIRATORY FAILURE WITH HYPOXIA: ICD-10-CM

## 2018-10-30 DIAGNOSIS — N18.3 CHRONIC KIDNEY DISEASE, STAGE 3 (MODERATE): ICD-10-CM

## 2018-10-30 DIAGNOSIS — E11.9 TYPE 2 DIABETES MELLITUS WITHOUT COMPLICATIONS: ICD-10-CM

## 2018-10-30 DIAGNOSIS — F17.200 NICOTINE DEPENDENCE, UNSPECIFIED, UNCOMPLICATED: ICD-10-CM

## 2018-10-30 LAB
ALBUMIN SERPL ELPH-MCNC: 3.3 G/DL — SIGNIFICANT CHANGE UP (ref 3.3–5)
ALP SERPL-CCNC: 87 U/L — SIGNIFICANT CHANGE UP (ref 40–120)
ALT FLD-CCNC: 5 U/L — LOW (ref 10–45)
ANION GAP SERPL CALC-SCNC: 12 MMOL/L — SIGNIFICANT CHANGE UP (ref 5–17)
AST SERPL-CCNC: 6 U/L — LOW (ref 10–40)
BASOPHILS # BLD AUTO: 0 K/UL — SIGNIFICANT CHANGE UP (ref 0–0.2)
BASOPHILS NFR BLD AUTO: 0.3 % — SIGNIFICANT CHANGE UP (ref 0–2)
BILIRUB SERPL-MCNC: 0.4 MG/DL — SIGNIFICANT CHANGE UP (ref 0.2–1.2)
BUN SERPL-MCNC: 75 MG/DL — HIGH (ref 7–23)
CALCIUM SERPL-MCNC: 9 MG/DL — SIGNIFICANT CHANGE UP (ref 8.4–10.5)
CHLORIDE SERPL-SCNC: 97 MMOL/L — SIGNIFICANT CHANGE UP (ref 96–108)
CO2 SERPL-SCNC: 27 MMOL/L — SIGNIFICANT CHANGE UP (ref 22–31)
CREAT SERPL-MCNC: 3.93 MG/DL — HIGH (ref 0.5–1.3)
EOSINOPHIL # BLD AUTO: 0.3 K/UL — SIGNIFICANT CHANGE UP (ref 0–0.5)
EOSINOPHIL NFR BLD AUTO: 6.5 % — HIGH (ref 0–6)
GAS PNL BLDV: SIGNIFICANT CHANGE UP
GLUCOSE BLDC GLUCOMTR-MCNC: 97 MG/DL — SIGNIFICANT CHANGE UP (ref 70–99)
GLUCOSE SERPL-MCNC: 96 MG/DL — SIGNIFICANT CHANGE UP (ref 70–99)
HCT VFR BLD CALC: 27.4 % — LOW (ref 34.5–45)
HGB BLD-MCNC: 8.8 G/DL — LOW (ref 11.5–15.5)
LYMPHOCYTES # BLD AUTO: 0.5 K/UL — LOW (ref 1–3.3)
LYMPHOCYTES # BLD AUTO: 11.7 % — LOW (ref 13–44)
MCHC RBC-ENTMCNC: 27.8 PG — SIGNIFICANT CHANGE UP (ref 27–34)
MCHC RBC-ENTMCNC: 32.2 GM/DL — SIGNIFICANT CHANGE UP (ref 32–36)
MCV RBC AUTO: 86.4 FL — SIGNIFICANT CHANGE UP (ref 80–100)
MONOCYTES # BLD AUTO: 0.4 K/UL — SIGNIFICANT CHANGE UP (ref 0–0.9)
MONOCYTES NFR BLD AUTO: 8.1 % — SIGNIFICANT CHANGE UP (ref 2–14)
NEUTROPHILS # BLD AUTO: 3.4 K/UL — SIGNIFICANT CHANGE UP (ref 1.8–7.4)
NEUTROPHILS NFR BLD AUTO: 73.4 % — SIGNIFICANT CHANGE UP (ref 43–77)
NT-PROBNP SERPL-SCNC: HIGH PG/ML (ref 0–300)
PLATELET # BLD AUTO: 205 K/UL — SIGNIFICANT CHANGE UP (ref 150–400)
POTASSIUM SERPL-MCNC: 3.7 MMOL/L — SIGNIFICANT CHANGE UP (ref 3.5–5.3)
POTASSIUM SERPL-SCNC: 3.7 MMOL/L — SIGNIFICANT CHANGE UP (ref 3.5–5.3)
PROT SERPL-MCNC: 6.5 G/DL — SIGNIFICANT CHANGE UP (ref 6–8.3)
RAPID RVP RESULT: SIGNIFICANT CHANGE UP
RBC # BLD: 3.17 M/UL — LOW (ref 3.8–5.2)
RBC # FLD: 15.9 % — HIGH (ref 10.3–14.5)
SODIUM SERPL-SCNC: 136 MMOL/L — SIGNIFICANT CHANGE UP (ref 135–145)
WBC # BLD: 4.6 K/UL — SIGNIFICANT CHANGE UP (ref 3.8–10.5)
WBC # FLD AUTO: 4.6 K/UL — SIGNIFICANT CHANGE UP (ref 3.8–10.5)

## 2018-10-30 PROCEDURE — 93010 ELECTROCARDIOGRAM REPORT: CPT

## 2018-10-30 PROCEDURE — 99291 CRITICAL CARE FIRST HOUR: CPT

## 2018-10-30 PROCEDURE — 71045 X-RAY EXAM CHEST 1 VIEW: CPT | Mod: 26

## 2018-10-30 RX ORDER — HEPARIN SODIUM 5000 [USP'U]/ML
5000 INJECTION INTRAVENOUS; SUBCUTANEOUS EVERY 12 HOURS
Qty: 0 | Refills: 0 | Status: DISCONTINUED | OUTPATIENT
Start: 2018-10-30 | End: 2018-11-08

## 2018-10-30 RX ORDER — GLUCAGON INJECTION, SOLUTION 0.5 MG/.1ML
1 INJECTION, SOLUTION SUBCUTANEOUS ONCE
Qty: 0 | Refills: 0 | Status: DISCONTINUED | OUTPATIENT
Start: 2018-10-30 | End: 2018-11-08

## 2018-10-30 RX ORDER — ACETAMINOPHEN 500 MG
500 TABLET ORAL EVERY 4 HOURS
Qty: 0 | Refills: 0 | Status: DISCONTINUED | OUTPATIENT
Start: 2018-10-30 | End: 2018-11-08

## 2018-10-30 RX ORDER — DEXTROSE 50 % IN WATER 50 %
25 SYRINGE (ML) INTRAVENOUS ONCE
Qty: 0 | Refills: 0 | Status: DISCONTINUED | OUTPATIENT
Start: 2018-10-30 | End: 2018-11-08

## 2018-10-30 RX ORDER — INFLUENZA VIRUS VACCINE 15; 15; 15; 15 UG/.5ML; UG/.5ML; UG/.5ML; UG/.5ML
0.5 SUSPENSION INTRAMUSCULAR ONCE
Qty: 0 | Refills: 0 | Status: COMPLETED | OUTPATIENT
Start: 2018-10-30 | End: 2018-11-08

## 2018-10-30 RX ORDER — CARVEDILOL PHOSPHATE 80 MG/1
12.5 CAPSULE, EXTENDED RELEASE ORAL EVERY 12 HOURS
Qty: 0 | Refills: 0 | Status: DISCONTINUED | OUTPATIENT
Start: 2018-10-30 | End: 2018-11-08

## 2018-10-30 RX ORDER — SODIUM CHLORIDE 9 MG/ML
1000 INJECTION, SOLUTION INTRAVENOUS
Qty: 0 | Refills: 0 | Status: DISCONTINUED | OUTPATIENT
Start: 2018-10-30 | End: 2018-11-08

## 2018-10-30 RX ORDER — IPRATROPIUM/ALBUTEROL SULFATE 18-103MCG
3 AEROSOL WITH ADAPTER (GRAM) INHALATION EVERY 6 HOURS
Qty: 0 | Refills: 0 | Status: DISCONTINUED | OUTPATIENT
Start: 2018-10-30 | End: 2018-11-08

## 2018-10-30 RX ORDER — DEXTROSE 50 % IN WATER 50 %
15 SYRINGE (ML) INTRAVENOUS ONCE
Qty: 0 | Refills: 0 | Status: DISCONTINUED | OUTPATIENT
Start: 2018-10-30 | End: 2018-11-08

## 2018-10-30 RX ORDER — ATORVASTATIN CALCIUM 80 MG/1
40 TABLET, FILM COATED ORAL AT BEDTIME
Qty: 0 | Refills: 0 | Status: DISCONTINUED | OUTPATIENT
Start: 2018-10-30 | End: 2018-11-08

## 2018-10-30 RX ORDER — INSULIN GLARGINE 100 [IU]/ML
15 INJECTION, SOLUTION SUBCUTANEOUS AT BEDTIME
Qty: 0 | Refills: 0 | Status: DISCONTINUED | OUTPATIENT
Start: 2018-10-30 | End: 2018-10-31

## 2018-10-30 RX ORDER — ASPIRIN/CALCIUM CARB/MAGNESIUM 324 MG
81 TABLET ORAL DAILY
Qty: 0 | Refills: 0 | Status: DISCONTINUED | OUTPATIENT
Start: 2018-10-30 | End: 2018-11-08

## 2018-10-30 RX ORDER — FUROSEMIDE 40 MG
80 TABLET ORAL
Qty: 0 | Refills: 0 | Status: DISCONTINUED | OUTPATIENT
Start: 2018-10-30 | End: 2018-11-01

## 2018-10-30 RX ORDER — SODIUM CHLORIDE 0.65 %
1 AEROSOL, SPRAY (ML) NASAL
Qty: 0 | Refills: 0 | Status: DISCONTINUED | OUTPATIENT
Start: 2018-10-30 | End: 2018-11-08

## 2018-10-30 RX ORDER — ALBUTEROL 90 UG/1
2 AEROSOL, METERED ORAL
Qty: 0 | Refills: 0 | COMMUNITY

## 2018-10-30 RX ORDER — FUROSEMIDE 40 MG
40 TABLET ORAL DAILY
Qty: 0 | Refills: 0 | Status: DISCONTINUED | OUTPATIENT
Start: 2018-10-30 | End: 2018-10-30

## 2018-10-30 RX ORDER — IPRATROPIUM/ALBUTEROL SULFATE 18-103MCG
3 AEROSOL WITH ADAPTER (GRAM) INHALATION
Qty: 0 | Refills: 0 | Status: COMPLETED | OUTPATIENT
Start: 2018-10-30 | End: 2018-10-30

## 2018-10-30 RX ORDER — IPRATROPIUM/ALBUTEROL SULFATE 18-103MCG
3 AEROSOL WITH ADAPTER (GRAM) INHALATION
Qty: 0 | Refills: 0 | Status: DISCONTINUED | OUTPATIENT
Start: 2018-10-30 | End: 2018-11-08

## 2018-10-30 RX ORDER — INSULIN LISPRO 100/ML
VIAL (ML) SUBCUTANEOUS
Qty: 0 | Refills: 0 | Status: DISCONTINUED | OUTPATIENT
Start: 2018-10-30 | End: 2018-11-08

## 2018-10-30 RX ORDER — AMLODIPINE BESYLATE 2.5 MG/1
10 TABLET ORAL AT BEDTIME
Qty: 0 | Refills: 0 | Status: DISCONTINUED | OUTPATIENT
Start: 2018-10-30 | End: 2018-11-08

## 2018-10-30 RX ORDER — GABAPENTIN 400 MG/1
300 CAPSULE ORAL
Qty: 0 | Refills: 0 | Status: DISCONTINUED | OUTPATIENT
Start: 2018-10-30 | End: 2018-11-01

## 2018-10-30 RX ORDER — PANTOPRAZOLE SODIUM 20 MG/1
40 TABLET, DELAYED RELEASE ORAL
Qty: 0 | Refills: 0 | Status: DISCONTINUED | OUTPATIENT
Start: 2018-10-30 | End: 2018-11-08

## 2018-10-30 RX ORDER — HYDRALAZINE HCL 50 MG
100 TABLET ORAL THREE TIMES A DAY
Qty: 0 | Refills: 0 | Status: DISCONTINUED | OUTPATIENT
Start: 2018-10-30 | End: 2018-11-08

## 2018-10-30 RX ORDER — DEXTROSE 50 % IN WATER 50 %
12.5 SYRINGE (ML) INTRAVENOUS ONCE
Qty: 0 | Refills: 0 | Status: DISCONTINUED | OUTPATIENT
Start: 2018-10-30 | End: 2018-11-08

## 2018-10-30 RX ORDER — FERROUS SULFATE 325(65) MG
325 TABLET ORAL DAILY
Qty: 0 | Refills: 0 | Status: DISCONTINUED | OUTPATIENT
Start: 2018-10-30 | End: 2018-11-08

## 2018-10-30 RX ORDER — INSULIN LISPRO 100/ML
VIAL (ML) SUBCUTANEOUS AT BEDTIME
Qty: 0 | Refills: 0 | Status: DISCONTINUED | OUTPATIENT
Start: 2018-10-30 | End: 2018-11-08

## 2018-10-30 RX ORDER — CLOPIDOGREL BISULFATE 75 MG/1
75 TABLET, FILM COATED ORAL DAILY
Qty: 0 | Refills: 0 | Status: DISCONTINUED | OUTPATIENT
Start: 2018-10-30 | End: 2018-11-08

## 2018-10-30 RX ADMIN — Medication 3 MILLILITER(S): at 16:35

## 2018-10-30 RX ADMIN — INSULIN GLARGINE 15 UNIT(S): 100 INJECTION, SOLUTION SUBCUTANEOUS at 22:36

## 2018-10-30 RX ADMIN — Medication 100 MILLIGRAM(S): at 22:41

## 2018-10-30 RX ADMIN — Medication 3 MILLILITER(S): at 22:41

## 2018-10-30 RX ADMIN — AMLODIPINE BESYLATE 10 MILLIGRAM(S): 2.5 TABLET ORAL at 22:41

## 2018-10-30 RX ADMIN — ATORVASTATIN CALCIUM 40 MILLIGRAM(S): 80 TABLET, FILM COATED ORAL at 22:41

## 2018-10-30 RX ADMIN — Medication 3 MILLILITER(S): at 16:15

## 2018-10-30 RX ADMIN — Medication 40 MILLIGRAM(S): at 15:22

## 2018-10-30 RX ADMIN — CARVEDILOL PHOSPHATE 12.5 MILLIGRAM(S): 80 CAPSULE, EXTENDED RELEASE ORAL at 22:41

## 2018-10-30 RX ADMIN — Medication 3 MILLILITER(S): at 15:55

## 2018-10-30 NOTE — CONSULT NOTE ADULT - SUBJECTIVE AND OBJECTIVE BOX
HPI: Ms. Lane is a 73 year-old woman with history of multiple medical issues including hypertension, type 2 diabetes mellitus, COPD, diastolic congestive heart failure, and chronic kidney disease. She presented today to the [facility] ER with progressively worsening shortness of breath for 1 month. She had the flu ~2weeks ago with resultant shortness of breath, cough, and congestion. Despite now being rid of the flu, her symptoms worsened. Therefore, she presented to the ER.    Ms. Lane was noted on presentation to have a BUN of 75mg/dL and a creatinine of 3.93mg/dL. Therefore, a renal consultation was requested.        PAST MEDICAL & SURGICAL HISTORY:  Hemorrhoids  GERD (gastroesophageal reflux disease)  Morbid obesity  Cataract  Cerebral infarction  CKD (chronic kidney disease)  Anemia  Diabetes mellitus  CHF (congestive heart failure)  Hyperlipidemia  Hypertension  COPD (chronic obstructive pulmonary disease)  Adult Idiopathic Generalized Osteoporosis  CAD (Coronary Artery Disease)    Allergies  No Known Allergies    SOCIAL HISTORY:  Denies ETOh; (+)60pk year ex-smoker    FAMILY HISTORY:  No pertinent family history in first degree relatives    REVIEW OF SYSTEMS:  CONSTITUTIONAL: No weakness, fevers or chills  EYES/ENT: No visual changes;  No vertigo or throat pain; (+)congestion  NECK: No pain or stiffness  RESPIRATORY: (+)cough, (+)SOB  CARDIOVASCULAR: No chest pain or palpitations  GASTROINTESTINAL: No abdominal or epigastric pain. No nausea, vomiting, or hematemesis; No diarrhea or constipation. No melena or hematochezia.  GENITOURINARY: No dysuria, frequency or hematuria  NEUROLOGICAL: No numbness or weakness  SKIN: No itching, burning, rashes, or lesions   All other review of systems is negative unless indicated above.    VITAL:  T(C): , Max: 36.3 (10-30-18 @ 19:11)  T(F): , Max: 97.3 (10-30-18 @ 19:11)  HR: 64 (10-30-18 @ 19:11)  BP: 125/53 (10-30-18 @ 19:11)  RR: 20 (10-30-18 @ 19:11)  SpO2: 94% (10-30-18 @ 19:11)    PHYSICAL EXAM:  Constitutional: NAD, Alert  HEENT: NCAT, MMM  Neck: Supple, No JVD  Respiratory: CTA-b/l  Cardiovascular: RRR s1s2, no m/r/g  Gastrointestinal: BS+, soft, NT/ND  Extremities: No peripheral edema b/l  Neurological: no focal deficits; strength grossly intact  Back: no CVAT b/l  Skin: No rashes, no nevi    LABS:                        8.8    4.6   )-----------( 205      ( 30 Oct 2018 15:38 )             27.4     Na(136)/K(3.7)/Cl(97)/HCO3(27)/BUN(75)/Cr(3.93)Glu(96)/Ca(9.0)/Mg(--)/PO4(--); BNP 16344  10-30 @ 15:36  (1/9/17) - BUN 77, Creatinine 2.84    IMAGING:  < from: Xray Chest 1 View- PORTABLE-Urgent (10.30.18 @ 16:32) >  Cardiomegaly.  Pulmonary vascular congestive changes with small bilateral pleural effusions.  Bibasilar atelectasis and/or pneumonia.     < from: Transthoracic Echocardiogram (10.10.16 @ 14:28) >  1. Eccentric left ventricular hypertrophy (dilated left  ventricle with normal relative wall thickness).  2. Normal left ventricular systolic function. No segmental  wall motion abnormalities.  3. Moderate diastolic dysfunction (Stage II).  4. Normal right ventricular size and function.    < from: US Renal (10.10.16 @ 12:45) >  Right kidney:  10.5 cm. No renal mass, hydronephrosis or gross calculi.  Left kidney:  10.9 cm. No renal mass, hydronephrosis or gross calculi.  Small bilateral renal cysts and parenchymal renal disease.      ASSESSMENT:  (1)Renal - CKD IV-V - is the creatinine of 3.9mg/dL her baseline at this point?   (2)Lytes - highly acceptable for now  (3)Anemia - CKD-associated?  (4)CV - acute on chronic HFpEF, it appears    RECOMMEND:  (1)Increase Lasix - 80mg iv bid for now  (2)Dose new meds for GFR ~10-15ml/min (present dosing is acceptable)  (3)BMP daily  (4)Check PO4, PTH  (5)Check iron studies  (6)Check urine: protein, creatinine, urinalysis  (7)Will seek out recent past bloodwork to determine if her baseline creatinine is in fact in the high 3s    Thank you for involving [facility] in this patient's care.    With warm regards,    Hernandez Viera MD   [facility]  [phone]            phr HPI: Ms. Lane is a 73 year-old woman with history of multiple medical issues including hypertension, type 2 diabetes mellitus, COPD, diastolic congestive heart failure, and chronic kidney disease. She presented today to the [facility] ER with progressively worsening shortness of breath for 1 month. She had the flu ~2weeks ago with resultant shortness of breath, cough, and congestion. Despite now being rid of the flu, her symptoms worsened. Therefore, she presented to the ER.    Ms. Lane was noted on presentation to have a BUN of 75mg/dL and a creatinine of 3.93mg/dL. Therefore, a renal consultation was requested.    Ms. Lane takes low-dose Losartan at home.      PAST MEDICAL & SURGICAL HISTORY:  Hemorrhoids  GERD (gastroesophageal reflux disease)  Morbid obesity  Cataract  Cerebral infarction  CKD (chronic kidney disease)  Anemia  Diabetes mellitus  CHF (congestive heart failure)  Hyperlipidemia  Hypertension  COPD (chronic obstructive pulmonary disease)  Adult Idiopathic Generalized Osteoporosis  CAD (Coronary Artery Disease)    Allergies  No Known Allergies    SOCIAL HISTORY:  Denies ETOh; (+)60pk year ex-smoker    FAMILY HISTORY:  No pertinent family history in first degree relatives    REVIEW OF SYSTEMS:  CONSTITUTIONAL: No weakness, fevers or chills  EYES/ENT: No visual changes;  No vertigo or throat pain; (+)congestion  NECK: No pain or stiffness  RESPIRATORY: (+)cough, (+)SOB  CARDIOVASCULAR: No chest pain or palpitations  GASTROINTESTINAL: No abdominal or epigastric pain. No nausea, vomiting, or hematemesis; No diarrhea or constipation. No melena or hematochezia.  GENITOURINARY: No dysuria, frequency or hematuria  NEUROLOGICAL: No numbness or weakness  SKIN: No itching, burning, rashes, or lesions   All other review of systems is negative unless indicated above.    VITAL:  T(C): , Max: 36.3 (10-30-18 @ 19:11)  T(F): , Max: 97.3 (10-30-18 @ 19:11)  HR: 64 (10-30-18 @ 19:11)  BP: 125/53 (10-30-18 @ 19:11)  RR: 20 (10-30-18 @ 19:11)  SpO2: 94% (10-30-18 @ 19:11)    PHYSICAL EXAM:  Constitutional: NAD, Alert  HEENT: NCAT, MMM  Neck: Supple, No JVD  Respiratory: CTA-b/l  Cardiovascular: RRR s1s2, no m/r/g  Gastrointestinal: BS+, soft, NT/ND  Extremities: No peripheral edema b/l  Neurological: no focal deficits; strength grossly intact  Back: no CVAT b/l  Skin: No rashes, no nevi    LABS:                        8.8    4.6   )-----------( 205      ( 30 Oct 2018 15:38 )             27.4     Na(136)/K(3.7)/Cl(97)/HCO3(27)/BUN(75)/Cr(3.93)Glu(96)/Ca(9.0)/Mg(--)/PO4(--); BNP 36426  10-30 @ 15:36  (1/9/17) - BUN 77, Creatinine 2.84    IMAGING:  < from: Xray Chest 1 View- PORTABLE-Urgent (10.30.18 @ 16:32) >  Cardiomegaly.  Pulmonary vascular congestive changes with small bilateral pleural effusions.  Bibasilar atelectasis and/or pneumonia.     < from: Transthoracic Echocardiogram (10.10.16 @ 14:28) >  1. Eccentric left ventricular hypertrophy (dilated left  ventricle with normal relative wall thickness).  2. Normal left ventricular systolic function. No segmental  wall motion abnormalities.  3. Moderate diastolic dysfunction (Stage II).  4. Normal right ventricular size and function.    < from: US Renal (10.10.16 @ 12:45) >  Right kidney:  10.5 cm. No renal mass, hydronephrosis or gross calculi.  Left kidney:  10.9 cm. No renal mass, hydronephrosis or gross calculi.  Small bilateral renal cysts and parenchymal renal disease.      ASSESSMENT:  (1)Renal - CKD IV-V - is the creatinine of 3.9mg/dL her baseline at this point?   (2)Lytes - highly acceptable for now  (3)Anemia - CKD-associated?  (4)CV - acute on chronic HFpEF, it appears    RECOMMEND:  (1)Increase Lasix - 80mg iv bid for now  (2)Dose new meds for GFR ~10-15ml/min (present dosing is acceptable; if to be placed back on Neurontin, would not give more than 300mg/24h)  (3)No further ACEI/ARB  (4)BMP daily  (5)Check PO4, PTH  (6)Check iron studies  (7)Check urine: protein, creatinine, urinalysis  (8)Will seek out recent past bloodwork to determine if her baseline creatinine is in fact in the high 3s    Thank you for involving [facility] in this patient's care.    With warm regards,    Hernandez Viera MD   [facility]  [phone]            phr HPI: Ms. Lane is a 73 year-old woman with history of multiple medical issues including hypertension, type 2 diabetes mellitus, COPD, diastolic congestive heart failure, and chronic kidney disease. She presented today to the [facility] ER with progressively worsening shortness of breath for 1 month. She had the flu ~2weeks ago with resultant shortness of breath, cough, and congestion. Despite now being rid of the flu, her symptoms worsened. Therefore, she presented to the ER.    Ms. Lane was noted on presentation to have a BUN of 75mg/dL and a creatinine of 3.93mg/dL. Therefore, a renal consultation was requested.    Ms. Lane takes low-dose Losartan at home. She does not take NSAIDs. She has been voiding well of late. She states that her shortness of breath is worse when she lays flat.      PAST MEDICAL & SURGICAL HISTORY:  Hemorrhoids  GERD (gastroesophageal reflux disease)  Morbid obesity  Cataract  Cerebral infarction  CKD (chronic kidney disease)  Anemia  Diabetes mellitus  CHF (congestive heart failure)  Hyperlipidemia  Hypertension  COPD (chronic obstructive pulmonary disease)  Adult Idiopathic Generalized Osteoporosis  CAD (Coronary Artery Disease)    Allergies  No Known Allergies    SOCIAL HISTORY:  Denies ETOh; (+)60pk year ex-smoker    FAMILY HISTORY:  No pertinent family history in first degree relatives    REVIEW OF SYSTEMS:  CONSTITUTIONAL: No weakness, fevers or chills  EYES/ENT: No visual changes;  No vertigo or throat pain; (+)congestion  NECK: No pain or stiffness  RESPIRATORY: (+)cough, (+)SOB  CARDIOVASCULAR: No chest pain or palpitations  GASTROINTESTINAL: No abdominal or epigastric pain. No nausea, vomiting, or hematemesis; No diarrhea or constipation. No melena or hematochezia.  GENITOURINARY: No dysuria, frequency or hematuria  NEUROLOGICAL: No numbness or weakness  SKIN: No itching, burning, rashes, or lesions   All other review of systems is negative unless indicated above.    VITAL:  T(C): , Max: 36.3 (10-30-18 @ 19:11)  T(F): , Max: 97.3 (10-30-18 @ 19:11)  HR: 64 (10-30-18 @ 19:11)  BP: 125/53 (10-30-18 @ 19:11)  RR: 20 (10-30-18 @ 19:11)  SpO2: 94% (10-30-18 @ 19:11)    PHYSICAL EXAM:  Constitutional: NAD, Alert  HEENT: NCAT, MMM  Neck: Supple, No JVD  Respiratory: CTA-b/l  Cardiovascular: RRR s1s2, no m/r/g  Gastrointestinal: BS+, soft, NT/ND  Extremities: No peripheral edema b/l  Neurological: no focal deficits; strength grossly intact  Back: no CVAT b/l  Skin: No rashes, no nevi    LABS:                        8.8    4.6   )-----------( 205      ( 30 Oct 2018 15:38 )             27.4     Na(136)/K(3.7)/Cl(97)/HCO3(27)/BUN(75)/Cr(3.93)Glu(96)/Ca(9.0)/Mg(--)/PO4(--); BNP 82991  10-30 @ 15:36  (1/9/17) - BUN 77, Creatinine 2.84    IMAGING:  < from: Xray Chest 1 View- PORTABLE-Urgent (10.30.18 @ 16:32) >  Cardiomegaly.  Pulmonary vascular congestive changes with small bilateral pleural effusions.  Bibasilar atelectasis and/or pneumonia.     < from: Transthoracic Echocardiogram (10.10.16 @ 14:28) >  1. Eccentric left ventricular hypertrophy (dilated left  ventricle with normal relative wall thickness).  2. Normal left ventricular systolic function. No segmental  wall motion abnormalities.  3. Moderate diastolic dysfunction (Stage II).  4. Normal right ventricular size and function.    < from: US Renal (10.10.16 @ 12:45) >  Right kidney:  10.5 cm. No renal mass, hydronephrosis or gross calculi.  Left kidney:  10.9 cm. No renal mass, hydronephrosis or gross calculi.  Small bilateral renal cysts and parenchymal renal disease.      ASSESSMENT:  (1)Renal - CKD IV-V - is the creatinine of 3.9mg/dL her baseline at this point?   (2)Lytes - highly acceptable for now  (3)Anemia - CKD-associated?  (4)CV - acute on chronic HFpEF, it appears    RECOMMEND:  (1)Increase Lasix - 80mg iv bid for now  (2)Dose new meds for GFR ~10-15ml/min (present dosing is acceptable; if to be placed back on Neurontin, would not give more than 300mg/24h)  (3)No further ACEI/ARB  (4)BMP daily  (5)Check PO4, PTH  (6)Check iron studies  (7)Check urine: protein, creatinine, urinalysis  (8)Will seek out recent past bloodwork to determine if her baseline creatinine is in fact in the high 3s    Thank you for involving [facility] in this patient's care.    With warm regards,    Hernandez Viera MD   [facility]  [phone]            phr

## 2018-10-30 NOTE — ED PROVIDER NOTE - ATTENDING CONTRIBUTION TO CARE
72 y/o female with the above documented history and HPI who on exam appears comfortable. VSs noted, neck supple, lungs coarse c/ crackles, cardiac sounds s/ audible m/r/g, abdomen soft, NT/ND, extremities s/ asymmetry, skin c/ B/L LE edema and neurologically intact. Screening studies obtained. Presentation and findings appear most c/w an acute exacerbation of her CHF; for which, she will require admission.

## 2018-10-30 NOTE — H&P ADULT - HISTORY OF PRESENT ILLNESS
74 y/o F COPD, CHF, HTN, HLD, DM2, smoking x60+ years, presenting with shortness of breath worsening for one month. Patient was diagnosed with flu x2 weeks ago after her daughter had flu, and had significant shortness of breath with cough and congestion as well as shortness of breath at that time. She reports now that for the last week her shortness of breath is intermittent but has been worsening in severity. She states that she uses nebulizer once daily for her COPD, but yesterday had to use it multiple more times. Reports still feeling congested and intermittently coughing up mucous without any blood. She denies any fevers or chills. Went to her PMD today for increased shortness of breath and was told to come here concerned for fluid on the lungs. 72 y/o F COPD, CHF, HTN, HLD, DM2,Anemia ,CKD ,smoking x60+ years, presenting with shortness of breath worsening for one month. Patient was diagnosed with flu x2 weeks ago after her daughter had flu, and had significant shortness of breath with cough and congestion as well as shortness of breath at that time. She reports now that for the last week her shortness of breath is intermittent but has been worsening in severity. She states that she uses nebulizer once daily for her COPD, but yesterday had to use it multiple more times. Reports still feeling congested and intermittently coughing up mucous without any blood. She denies any fevers or chills. Went to her PMD today for increased shortness of breath and was told to come here concerned for fluid on the lungs.

## 2018-10-30 NOTE — ED PROVIDER NOTE - PHYSICAL EXAMINATION
GEN: Well Appearing, Nontoxic, NAD  HEENT: NC/AT, Symm Facies. PERRL, EOMI, MMM, posterior pharynx clear  CV: No JVD/Bruits or stridor;  +S1S2, RRR w/o m/g/r  RESP: diffuse wheezing, rhonchi and rales bilaterally  ABD: Soft, nt/nd, +BS. No guarding/rebound. No RUQ tender, no CVAT  EXT/MSK: No lower extremity edema or calf tenderness. WWP, palpable pulses. FROMx4  SKIN: No erythema, lesions or rash  Neuro: Grossly intact, AOX3 with normal speech, CN II-XII intact; Sensation intact, motor 5/5 throughout. Gait normal

## 2018-10-30 NOTE — H&P ADULT - GASTROINTESTINAL DETAILS
normal/no masses palpable/bowel sounds normal/nontender/no rebound tenderness/no distention/no bruit/soft

## 2018-10-30 NOTE — ED ADULT NURSE NOTE - CHPI ED NUR SYMPTOMS NEG
no chills/no back pain/no syncope/no vomiting/no chest pain/no diaphoresis/no fever/no nausea/no congestion/no dizziness

## 2018-10-30 NOTE — H&P ADULT - NEGATIVE GASTROINTESTINAL SYMPTOMS
no flatulence/no abdominal pain/no melena/no hematochezia/no steatorrhea/no diarrhea/no vomiting/no nausea/no constipation/no change in bowel habits

## 2018-10-30 NOTE — ED PROVIDER NOTE - OBJECTIVE STATEMENT
74 y/o F COPD, CHF, HTN, HLD, DM2, smoking x60+ years, presenting with shortness of breath worsening for one month. 72 y/o F COPD, CHF, HTN, HLD, DM2, smoking x60+ years, presenting with shortness of breath worsening for one month. Patient was diagnosed with flu x2 weeks ago after her daughter had flu, and had significant shortness of breath with cough and congestion as well as shortness of breath at that time. She reports now that for the last week her shortness of breath is intermittent but has been worsening in severity. She states that she uses nebulizer once daily for her COPD, but yesterday had to use it multiple more times. Reports still feeling congested and intermittently coughing up mucous without any blood. She denies any fevers or chills. Went to her PMD today for increased shortness of breath and was told to come here concerned for fluid on the lungs.

## 2018-10-30 NOTE — ED ADULT NURSE NOTE - OBJECTIVE STATEMENT
64 yr smoker, Hypoxic 88 % 2L CKD, COPD, Anemia, HTN,  fs, Diverticulitis, gerd  Medications ashlie, albuterol, almlo , aspirin, dexillant, Flucatisone, lasix 80 mg daily, insulin hydra,  keflex, plavix, 64 yr BIBEMS C/o of SOB. PMH smoker( for 60 years), CKD, COPD, Anemia, HTN, DM ll Diverticulitis, gerd. EMS reports being Hypoxic 88 % on 2L Sent to ED by PCP.  Medications taken include ashlie, albuterol, almlodipine , aspirin, dexillant, Flucatisone, lasix 80 mg daily, insulin hydrocortisone  keflex, plavix. Pt aaox4, +B/L wheeze on lungs. +b/l leg swelling. Denies Fever or chills. EKG performed. Chest  xray completed. 64 yr BIBEMS C/o of SOB. PMH smoker( for 60 years), CKD, COPD, Anemia, HTN, DM ll Diverticulitis, gerd. EMS reports being Hypoxic 88 % on 2L Sent to ED by PCP.  Medications taken include ashlie, albuterol, amlodipine , aspirin, Dexilant, Fluticasone, Lasix 80 mg daily, insulin hydrocortisone  keflex, plavix. Pt aaox4, +B/L wheeze on lungs. +b/l leg swelling. Denies Fever or chills. EKG performed. Chest  xray completed. Labs drawn and sent.

## 2018-10-30 NOTE — H&P ADULT - ASSESSMENT
74 y/o F COPD, CHF, HTN, HLD, DM2,Anemia ,CKD ,smoking x60+ years, presenting with shortness of breath worsening for one month. Patient was diagnosed with flu x2 weeks ago after her daughter had flu, and had significant shortness of breath with cough and congestion as well as shortness of breath at that time. She reports now that for the last week her shortness of breath is intermittent but has been worsening in severity. She states that she uses nebulizer once daily for her COPD, but yesterday had to use it multiple more times. Reports still feeling congested and intermittently coughing up mucous without any blood. She denies any fevers or chills. Went to her PMD today for increased shortness of breath and was told to come here concerned for fluid on the lungs.

## 2018-10-30 NOTE — ED PROVIDER NOTE - PMH
Anemia    Cataract    Cerebral infarction    CHF (congestive heart failure)    CKD (chronic kidney disease)    COPD (chronic obstructive pulmonary disease)    Diabetes mellitus    Diaphragmatic hernia    GERD (gastroesophageal reflux disease)    Hemorrhoids    Hyperlipidemia    Hypertension    Morbid obesity

## 2018-10-31 DIAGNOSIS — N18.9 CHRONIC KIDNEY DISEASE, UNSPECIFIED: ICD-10-CM

## 2018-10-31 DIAGNOSIS — N17.9 ACUTE KIDNEY FAILURE, UNSPECIFIED: ICD-10-CM

## 2018-10-31 DIAGNOSIS — E87.70 FLUID OVERLOAD, UNSPECIFIED: ICD-10-CM

## 2018-10-31 DIAGNOSIS — E83.39 OTHER DISORDERS OF PHOSPHORUS METABOLISM: ICD-10-CM

## 2018-10-31 LAB
ANION GAP SERPL CALC-SCNC: 12 MMOL/L — SIGNIFICANT CHANGE UP (ref 5–17)
BUN SERPL-MCNC: 72 MG/DL — HIGH (ref 7–23)
CALCIUM SERPL-MCNC: 8.6 MG/DL — SIGNIFICANT CHANGE UP (ref 8.4–10.5)
CALCIUM SERPL-MCNC: 8.7 MG/DL — SIGNIFICANT CHANGE UP (ref 8.4–10.5)
CHLORIDE SERPL-SCNC: 99 MMOL/L — SIGNIFICANT CHANGE UP (ref 96–108)
CO2 SERPL-SCNC: 28 MMOL/L — SIGNIFICANT CHANGE UP (ref 22–31)
CREAT SERPL-MCNC: 3.88 MG/DL — HIGH (ref 0.5–1.3)
FERRITIN SERPL-MCNC: 141 NG/ML — SIGNIFICANT CHANGE UP (ref 15–150)
GAS PNL BLDA: SIGNIFICANT CHANGE UP
GLUCOSE BLDC GLUCOMTR-MCNC: 169 MG/DL — HIGH (ref 70–99)
GLUCOSE BLDC GLUCOMTR-MCNC: 169 MG/DL — HIGH (ref 70–99)
GLUCOSE BLDC GLUCOMTR-MCNC: 75 MG/DL — SIGNIFICANT CHANGE UP (ref 70–99)
GLUCOSE BLDC GLUCOMTR-MCNC: 84 MG/DL — SIGNIFICANT CHANGE UP (ref 70–99)
GLUCOSE BLDC GLUCOMTR-MCNC: 98 MG/DL — SIGNIFICANT CHANGE UP (ref 70–99)
GLUCOSE SERPL-MCNC: 39 MG/DL — CRITICAL LOW (ref 70–99)
HBA1C BLD-MCNC: 5.6 % — SIGNIFICANT CHANGE UP (ref 4–5.6)
HCT VFR BLD CALC: 26.7 % — LOW (ref 34.5–45)
HGB BLD-MCNC: 8 G/DL — LOW (ref 11.5–15.5)
IRON SATN MFR SERPL: 13 % — LOW (ref 14–50)
IRON SATN MFR SERPL: 32 UG/DL — SIGNIFICANT CHANGE UP (ref 30–160)
MAGNESIUM SERPL-MCNC: 2.1 MG/DL — SIGNIFICANT CHANGE UP (ref 1.6–2.6)
MCHC RBC-ENTMCNC: 26.1 PG — LOW (ref 27–34)
MCHC RBC-ENTMCNC: 30 GM/DL — LOW (ref 32–36)
MCV RBC AUTO: 87 FL — SIGNIFICANT CHANGE UP (ref 80–100)
PHOSPHATE SERPL-MCNC: 5.4 MG/DL — HIGH (ref 2.5–4.5)
PLATELET # BLD AUTO: 253 K/UL — SIGNIFICANT CHANGE UP (ref 150–400)
POTASSIUM SERPL-MCNC: 3.8 MMOL/L — SIGNIFICANT CHANGE UP (ref 3.5–5.3)
POTASSIUM SERPL-SCNC: 3.8 MMOL/L — SIGNIFICANT CHANGE UP (ref 3.5–5.3)
PTH-INTACT FLD-MCNC: 269 PG/ML — HIGH (ref 15–65)
RBC # BLD: 3.07 M/UL — LOW (ref 3.8–5.2)
RBC # FLD: 17.2 % — HIGH (ref 10.3–14.5)
SODIUM SERPL-SCNC: 139 MMOL/L — SIGNIFICANT CHANGE UP (ref 135–145)
TIBC SERPL-MCNC: 244 UG/DL — SIGNIFICANT CHANGE UP (ref 220–430)
UIBC SERPL-MCNC: 212 UG/DL — SIGNIFICANT CHANGE UP (ref 110–370)
WBC # BLD: 5.09 K/UL — SIGNIFICANT CHANGE UP (ref 3.8–10.5)
WBC # FLD AUTO: 5.09 K/UL — SIGNIFICANT CHANGE UP (ref 3.8–10.5)

## 2018-10-31 PROCEDURE — 93306 TTE W/DOPPLER COMPLETE: CPT | Mod: 26

## 2018-10-31 PROCEDURE — 71250 CT THORAX DX C-: CPT | Mod: 26

## 2018-10-31 RX ORDER — NICOTINE POLACRILEX 2 MG
1 GUM BUCCAL
Qty: 0 | Refills: 0 | Status: DISCONTINUED | OUTPATIENT
Start: 2018-10-31 | End: 2018-11-08

## 2018-10-31 RX ORDER — BUDESONIDE AND FORMOTEROL FUMARATE DIHYDRATE 160; 4.5 UG/1; UG/1
2 AEROSOL RESPIRATORY (INHALATION)
Qty: 0 | Refills: 0 | Status: DISCONTINUED | OUTPATIENT
Start: 2018-10-31 | End: 2018-11-08

## 2018-10-31 RX ORDER — PETROLATUM,WHITE
1 JELLY (GRAM) TOPICAL
Qty: 0 | Refills: 0 | Status: DISCONTINUED | OUTPATIENT
Start: 2018-10-31 | End: 2018-11-08

## 2018-10-31 RX ORDER — NICOTINE POLACRILEX 2 MG
1 GUM BUCCAL DAILY
Qty: 0 | Refills: 0 | Status: DISCONTINUED | OUTPATIENT
Start: 2018-10-31 | End: 2018-11-08

## 2018-10-31 RX ADMIN — Medication 1 PATCH: at 18:04

## 2018-10-31 RX ADMIN — PANTOPRAZOLE SODIUM 40 MILLIGRAM(S): 20 TABLET, DELAYED RELEASE ORAL at 06:45

## 2018-10-31 RX ADMIN — CARVEDILOL PHOSPHATE 12.5 MILLIGRAM(S): 80 CAPSULE, EXTENDED RELEASE ORAL at 12:25

## 2018-10-31 RX ADMIN — ATORVASTATIN CALCIUM 40 MILLIGRAM(S): 80 TABLET, FILM COATED ORAL at 22:04

## 2018-10-31 RX ADMIN — GABAPENTIN 300 MILLIGRAM(S): 400 CAPSULE ORAL at 06:45

## 2018-10-31 RX ADMIN — CARVEDILOL PHOSPHATE 12.5 MILLIGRAM(S): 80 CAPSULE, EXTENDED RELEASE ORAL at 22:04

## 2018-10-31 RX ADMIN — Medication 2: at 18:02

## 2018-10-31 RX ADMIN — GABAPENTIN 300 MILLIGRAM(S): 400 CAPSULE ORAL at 18:03

## 2018-10-31 RX ADMIN — CLOPIDOGREL BISULFATE 75 MILLIGRAM(S): 75 TABLET, FILM COATED ORAL at 12:25

## 2018-10-31 RX ADMIN — HEPARIN SODIUM 5000 UNIT(S): 5000 INJECTION INTRAVENOUS; SUBCUTANEOUS at 06:46

## 2018-10-31 RX ADMIN — Medication 3 MILLILITER(S): at 22:04

## 2018-10-31 RX ADMIN — Medication 80 MILLIGRAM(S): at 18:03

## 2018-10-31 RX ADMIN — Medication 20 MILLIGRAM(S): at 15:29

## 2018-10-31 RX ADMIN — Medication 80 MILLIGRAM(S): at 06:45

## 2018-10-31 RX ADMIN — Medication 81 MILLIGRAM(S): at 12:25

## 2018-10-31 RX ADMIN — Medication 1 APPLICATION(S): at 18:02

## 2018-10-31 RX ADMIN — AMLODIPINE BESYLATE 10 MILLIGRAM(S): 2.5 TABLET ORAL at 22:04

## 2018-10-31 RX ADMIN — Medication 100 MILLIGRAM(S): at 22:04

## 2018-10-31 RX ADMIN — Medication 20 MILLIGRAM(S): at 22:04

## 2018-10-31 RX ADMIN — Medication 3 MILLILITER(S): at 06:45

## 2018-10-31 RX ADMIN — Medication 1 EACH: at 18:05

## 2018-10-31 RX ADMIN — HEPARIN SODIUM 5000 UNIT(S): 5000 INJECTION INTRAVENOUS; SUBCUTANEOUS at 18:03

## 2018-10-31 RX ADMIN — Medication 325 MILLIGRAM(S): at 12:25

## 2018-10-31 RX ADMIN — Medication 100 MILLIGRAM(S): at 06:45

## 2018-10-31 RX ADMIN — Medication 3 MILLILITER(S): at 12:25

## 2018-10-31 RX ADMIN — Medication 1 PATCH: at 19:19

## 2018-10-31 NOTE — CONSULT NOTE ADULT - PROBLEM SELECTOR RECOMMENDATION 9
PT with MELISSA on CKD  MELISSA likely sec to renal vasocongestion   Pt with Scr 3.9 on admission on 10/30   Renal function improved with diuretics  Continue lasix 80mg IV BID   Monitor BMP, strict I/O  Avoid nephrotoxics, NSAIDs RCA

## 2018-10-31 NOTE — CONSULT NOTE ADULT - ASSESSMENT
EkG : Likely SR with Atypical LBBB.    Echo < from: Transthoracic Echocardiogram (10.31.18 @ 09:40) >  1. Mitral annular calcification and calcified and tethered  mitral leaflets with normal diastolic opening. Moderate  mitral regurgitation.  2. Severely dilated left atrium.  LA volume index = 51  cc/m2.  3. Moderate left ventricular enlargement.  4. Mild global left ventricular systolic dysfunction.  5. The right ventricle is not well visualized; grossly  normal right ventricular systolic function.  6. Estimated pulmonary artery systolic pressure equals 20  mm Hg, assuming right atrial pressure equals 8  mm Hg,  consistent with normal pulmonary pressures.  7. Small circumferential pericardial effusion.        < end of copied text >    Assessement and Plan:    SOB: Likely a combination of COPD exacerbation 2/2 Flu and Volume up on exam, Echo showed Mild global LV dysfunction. Denies CP , C/W lasix 80 IV BID, Will do ischemic work up once resp status improved CT chest non con pending , Trend HST and repeat EKG     COPD: On nebs Pulm on board     MELISSA on CKD: Renal function worse than baseline , renal on board , monitor renal function with diuresis     DVT PPX heparin

## 2018-10-31 NOTE — PROGRESS NOTE ADULT - SUBJECTIVE AND OBJECTIVE BOX
INTERVAL HPI/OVERNIGHT EVENTS: Seen and examined . Daughter in room . Undergoing TTE. Feel better.   Vital Signs Last 24 Hrs  T(C): 36.7 (31 Oct 2018 04:12), Max: 36.7 (31 Oct 2018 04:12)  T(F): 98 (31 Oct 2018 04:12), Max: 98 (31 Oct 2018 04:12)  HR: 65 (31 Oct 2018 04:12) (61 - 72)  BP: 133/54 (31 Oct 2018 04:12) (123/55 - 134/61)  BP(mean): --  RR: 18 (31 Oct 2018 04:12) (18 - 20)  SpO2: 97% (31 Oct 2018 04:12) (93% - 100%)  I&O's Summary    30 Oct 2018 07:01  -  31 Oct 2018 07:00  --------------------------------------------------------  IN: 0 mL / OUT: 450 mL / NET: -450 mL    31 Oct 2018 07:01  -  31 Oct 2018 11:37  --------------------------------------------------------  IN: 360 mL / OUT: 0 mL / NET: 360 mL      MEDICATIONS  (STANDING):  ALBUTerol/ipratropium for Nebulization 3 milliLiter(s) Nebulizer every 6 hours  amLODIPine   Tablet 10 milliGRAM(s) Oral at bedtime  AQUAPHOR (petrolatum Ointment) 1 Application(s) Topical two times a day  aspirin enteric coated 81 milliGRAM(s) Oral daily  atorvastatin 40 milliGRAM(s) Oral at bedtime  buDESOnide 160 MICROgram(s)/formoterol 4.5 MICROgram(s) Inhaler 2 Puff(s) Inhalation two times a day  carvedilol 12.5 milliGRAM(s) Oral every 12 hours  clopidogrel Tablet 75 milliGRAM(s) Oral daily  dextrose 5%. 1000 milliLiter(s) (50 mL/Hr) IV Continuous <Continuous>  dextrose 50% Injectable 12.5 Gram(s) IV Push once  dextrose 50% Injectable 25 Gram(s) IV Push once  dextrose 50% Injectable 25 Gram(s) IV Push once  ferrous    sulfate 325 milliGRAM(s) Oral daily  furosemide   Injectable 80 milliGRAM(s) IV Push two times a day  gabapentin 300 milliGRAM(s) Oral two times a day  heparin  Injectable 5000 Unit(s) SubCutaneous every 12 hours  hydrALAZINE 100 milliGRAM(s) Oral three times a day  influenza   Vaccine 0.5 milliLiter(s) IntraMuscular once  insulin glargine Injectable (LANTUS) 15 Unit(s) SubCutaneous at bedtime  insulin lispro (HumaLOG) corrective regimen sliding scale   SubCutaneous three times a day before meals  insulin lispro (HumaLOG) corrective regimen sliding scale   SubCutaneous at bedtime  methylPREDNISolone sodium succinate Injectable 20 milliGRAM(s) IV Push every 8 hours  pantoprazole    Tablet 40 milliGRAM(s) Oral before breakfast    MEDICATIONS  (PRN):  acetaminophen   Tablet .. 500 milliGRAM(s) Oral every 4 hours PRN Temp greater or equal to 38.5C (101.3F), Moderate Pain (4 - 6)  ALBUTerol/ipratropium for Nebulization 3 milliLiter(s) Nebulizer every 2 hours PRN Shortness of Breath and/or Wheezing  dextrose 40% Gel 15 Gram(s) Oral once PRN Blood Glucose LESS THAN 70 milliGRAM(s)/deciliter  glucagon  Injectable 1 milliGRAM(s) IntraMuscular once PRN Glucose LESS THAN 70 milligrams/deciliter  sodium chloride 0.65% Nasal 1 Spray(s) Both Nostrils two times a day PRN Nasal Congestion    LABS:                        8.0    5.09  )-----------( 253      ( 31 Oct 2018 07:56 )             26.7     10-31    139  |  99  |  72<H>  ----------------------------<  39<LL>  3.8   |  28  |  3.88<H>    Ca    8.6      31 Oct 2018 06:00  Phos  5.4     10-31  Mg     2.1     10-31    TPro  6.5  /  Alb  3.3  /  TBili  0.4  /  DBili  x   /  AST  6<L>  /  ALT  5<L>  /  AlkPhos  87  10-30        CAPILLARY BLOOD GLUCOSE      POCT Blood Glucose.: 75 mg/dL (31 Oct 2018 11:31)  POCT Blood Glucose.: 98 mg/dL (31 Oct 2018 07:29)  POCT Blood Glucose.: 84 mg/dL (31 Oct 2018 07:02)  POCT Blood Glucose.: 97 mg/dL (30 Oct 2018 21:39)      ABG - ( 31 Oct 2018 10:11 )  pH, Arterial: 7.33  pH, Blood: x     /  pCO2: 59    /  pO2: 70    / HCO3: 30    / Base Excess: 3.8   /  SaO2: 93                  REVIEW OF SYSTEMS:  CONSTITUTIONAL: No fever, weight loss, or fatigue  EYES: No eye pain, visual disturbances, or discharge  ENMT:  No difficulty hearing, tinnitus, vertigo; No sinus or throat pain  NECK: No pain or stiffness  RESPIRATORY: No cough, wheezing, chills or hemoptysis; No shortness of breath  CARDIOVASCULAR: No chest pain, palpitations, dizziness, or leg swelling  GASTROINTESTINAL: No abdominal or epigastric pain. No nausea, vomiting, or hematemesis; No diarrhea or constipation. No melena or hematochezia.  GENITOURINARY: No dysuria, frequency, hematuria, or incontinence  NEUROLOGICAL: No headaches, memory loss, loss of strength, numbness, or tremors    Consultant(s) Notes Reviewed:  [x ] YES  [ ] NO    PHYSICAL EXAM:  GENERAL: NAD, well-groomed, well-developed, not in any distress ,  HEAD:  Atraumatic, Normocephalic  EYES: EOMI, PERRLA, conjunctiva and sclera clear  NECK: Supple, No JVD, Normal thyroid  NERVOUS SYSTEM:  Alert & Oriented X3, No focal deficit   CHEST/LUNG: Good air entry bilateral with   rales and  rhonchi,   HEART: Regular rate and rhythm; No murmurs, rubs, or gallops  ABDOMEN: Soft, Nontender, Nondistended; Bowel sounds present  EXTREMITIES:  2+ Peripheral Pulses, No clubbing, cyanosis,but ++ edema    Care Discussed with Consultants/Other Providers [ x] YES  [ ] NO

## 2018-10-31 NOTE — CONSULT NOTE ADULT - SUBJECTIVE AND OBJECTIVE BOX
[facility]   MD CHAPIN MCADAMS MD RUORU WONG, PA    TEL:  OFFICE: [phone]  DR ALCANTAR CELL: [phone]  YAMILET BOATENG CELL: [phone]  DR. ACOSTA CELL: [phone]    -- INITIAL RENAL CONSULT NOTE  --------------------------------------------------------------------------------  HPI:  74 y/o F COPD, CHF, HTN, HLD, DM2,Anemia ,CKD ,smoking x60+ years, presenting with shortness of breath worsening for one month. Nephrology consulted for elevated Scr. Pt with Scr 3.9 on admission on 10/30 improved to 3.8 today  PT has DM and HTN ~ 20 yrs. Scr in 2017 rangign between 2.8-2.9.      PAST HISTORY  --------------------------------------------------------------------------------  PAST MEDICAL & SURGICAL HISTORY:  Hemorrhoids  GERD (gastroesophageal reflux disease)  Morbid obesity  Cataract  Diaphragmatic hernia  Cerebral infarction  CKD (chronic kidney disease)  Anemia  Diabetes mellitus  CHF (congestive heart failure)  Hyperlipidemia  Hypertension  COPD (chronic obstructive pulmonary disease)  Chronic obstructive pulmonary disease, unspecified COPD type  Adult Idiopathic Generalized Osteoporosis  CAD (Coronary Artery Disease)  DM (Diabetes Mellitus), Secondary  Hypertension  No significant past surgical history  No significant past surgical history    FAMILY HISTORY:  No pertinent family history in first degree relatives  No pertinent family history in first degree relatives    PAST SOCIAL HISTORY:    ALLERGIES & MEDICATIONS  --------------------------------------------------------------------------------  Allergies    No Known Allergies    Intolerances      Standing Inpatient Medications  ALBUTerol/ipratropium for Nebulization 3 milliLiter(s) Nebulizer every 6 hours  amLODIPine   Tablet 10 milliGRAM(s) Oral at bedtime  AQUAPHOR (petrolatum Ointment) 1 Application(s) Topical two times a day  aspirin enteric coated 81 milliGRAM(s) Oral daily  atorvastatin 40 milliGRAM(s) Oral at bedtime  buDESOnide 160 MICROgram(s)/formoterol 4.5 MICROgram(s) Inhaler 2 Puff(s) Inhalation two times a day  carvedilol 12.5 milliGRAM(s) Oral every 12 hours  clopidogrel Tablet 75 milliGRAM(s) Oral daily  dextrose 5%. 1000 milliLiter(s) IV Continuous <Continuous>  dextrose 50% Injectable 12.5 Gram(s) IV Push once  dextrose 50% Injectable 25 Gram(s) IV Push once  dextrose 50% Injectable 25 Gram(s) IV Push once  ferrous    sulfate 325 milliGRAM(s) Oral daily  furosemide   Injectable 80 milliGRAM(s) IV Push two times a day  gabapentin 300 milliGRAM(s) Oral two times a day  heparin  Injectable 5000 Unit(s) SubCutaneous every 12 hours  hydrALAZINE 100 milliGRAM(s) Oral three times a day  influenza   Vaccine 0.5 milliLiter(s) IntraMuscular once  insulin glargine Injectable (LANTUS) 15 Unit(s) SubCutaneous at bedtime  insulin lispro (HumaLOG) corrective regimen sliding scale   SubCutaneous three times a day before meals  insulin lispro (HumaLOG) corrective regimen sliding scale   SubCutaneous at bedtime  methylPREDNISolone sodium succinate Injectable 20 milliGRAM(s) IV Push every 8 hours  pantoprazole    Tablet 40 milliGRAM(s) Oral before breakfast    PRN Inpatient Medications  acetaminophen   Tablet .. 500 milliGRAM(s) Oral every 4 hours PRN  ALBUTerol/ipratropium for Nebulization 3 milliLiter(s) Nebulizer every 2 hours PRN  dextrose 40% Gel 15 Gram(s) Oral once PRN  glucagon  Injectable 1 milliGRAM(s) IntraMuscular once PRN  sodium chloride 0.65% Nasal 1 Spray(s) Both Nostrils two times a day PRN      REVIEW OF SYSTEMS  --------------------------------------------------------------------------------  Gen: No fevers/chills  Skin: No rashes  Head/Eyes/Ears: Normal hearing,  Normal vision   Respiratory: + dyspnea, cough  CV: No chest pain  GI: No abdominal pain, diarrhea, constipation, nausea, vomiting  : No dysuria, hematuria  MSK: +  edema  Heme: No easy bruising or bleeding  Psych: No significant depression    All other systems were reviewed and are negative, except as noted.    VITALS/PHYSICAL EXAM  --------------------------------------------------------------------------------  T(C): 36.7 (10-31-18 @ 04:12), Max: 36.7 (10-31-18 @ 04:12)  HR: 65 (10-31-18 @ 04:12) (61 - 72)  BP: 133/54 (10-31-18 @ 04:12) (123/55 - 134/61)  RR: 18 (10-31-18 @ 04:12) (18 - 20)  SpO2: 97% (10-31-18 @ 04:12) (93% - 100%)  Wt(kg): --    Weight (kg): 109.8 (10-31-18 @ 04:12)      10-30-18 @ 07:01  -  10-31-18 @ 07:00  --------------------------------------------------------  IN: 0 mL / OUT: 450 mL / NET: -450 mL    10-31-18 @ 07:01  -  10-31-18 @ 11:10  --------------------------------------------------------  IN: 360 mL / OUT: 0 mL / NET: 360 mL      Physical Exam:  	Gen: NAD  	HEENT: MMM  	Pulm:  B/L wheezing/ crackles  	CV: S1S2  	Abd: Soft, +BS   	Ext: ++ LE edema B/L  	Neuro: Awake  	Skin: Warm and dry  	    LABS/STUDIES  --------------------------------------------------------------------------------              8.0    5.09  >-----------<  253      [10-31-18 @ 07:56]              26.7     139  |  99  |  72  ----------------------------<  39      [10-31-18 @ 06:00]  3.8   |  28  |  3.88        Ca     8.6     [10-31-18 @ 06:00]      Mg     2.1     [10-31-18 @ 06:00]      Phos  5.4     [10-31-18 @ 06:00]    TPro  6.5  /  Alb  3.3  /  TBili  0.4  /  DBili  x   /  AST  6   /  ALT  5   /  AlkPhos  87  [10-30-18 @ 15:36]          Creatinine Trend:  SCr 3.88 [10-31 @ 06:00]  SCr 3.93 [10-30 @ 15:36]        Iron 32, TIBC 244, %sat 13      [10-31-18 @ 09:21]  HbA1c 8.7      [01-07-17 @ 20:22]

## 2018-10-31 NOTE — CONSULT NOTE ADULT - SUBJECTIVE AND OBJECTIVE BOX
Patient is a 73y old  Female who presents with a chief complaint of SOB since 2 weeks (30 Oct 2018 18:02)      HPI:  74 y/o F COPD, CHF, HTN, HLD, DM2,Anemia ,CKD ,smoking x60+ years, presenting with shortness of breath worsening for one month. Patient was diagnosed with flu x2 weeks ago after her daughter had flu, and had significant shortness of breath with cough and congestion as well as shortness of breath at that time. She reports now that for the last week her shortness of breath is intermittent but has been worsening in severity. She states that she uses nebulizer once daily for her COPD, but yesterday had to use it multiple more times. Reports still feeling congested and intermittently coughing up mucous without any blood. She denies any fevers or chills. Went to her PMD today for increased shortness of breath and was told to come here concerned for fluid on the lungs. (30 Oct 2018 18:02)    pt is very sleepy at this time: The daughter is at bedside and she says she still smokes: She is not on any cpap at home and never been diagnosed with sleep apnea: She was not able to sleep last night and currently she is sleepy: She uses nebulizers at home and marily using any regular inhalers at home She has no pulmonologist!!      ?FOLLOWING PRESENT  [ x] Hx of PE/DVT, [y ] Hx COPD, [ x] Hx of Asthma, [x ] Hx of Hospitalization, [ x]  Hx of BiPAP/CPAP use, [x ] Hx of LIANNA    Allergies    No Known Allergies    Intolerances        PAST MEDICAL & SURGICAL HISTORY:  Hemorrhoids  GERD (gastroesophageal reflux disease)  Morbid obesity  Cataract  Diaphragmatic hernia  Cerebral infarction  CKD (chronic kidney disease)  Anemia  Diabetes mellitus  CHF (congestive heart failure)  Hyperlipidemia  Hypertension  COPD (chronic obstructive pulmonary disease)  Chronic obstructive pulmonary disease, unspecified COPD type  Adult Idiopathic Generalized Osteoporosis  CAD (Coronary Artery Disease)  DM (Diabetes Mellitus), Secondary  Hypertension  No significant past surgical history  No significant past surgical history      FAMILY HISTORY:  No pertinent family history in first degree relatives  No pertinent family history in first degree relatives      Social History: [> 60 years active  ] TOBACCO                  [ x ] ETOH                                 [x  ] IVDA/DRUGS    REVIEW OF SYSTEMS      General:	x    Skin/Breast:x  	  Ophthalmologic:x  	  ENMT:	x    Respiratory and Thorax: sob, hollis , wheezing   	  Cardiovascular:	x    Gastrointestinal:	x    Genitourinary:	x    Musculoskeletal:	 pedal edema    Neurological:	sleepy    Psychiatric:	x    Hematology/Lymphatics:	x    Endocrine:	x    Allergic/Immunologic:	x    MEDICATIONS  (STANDING):  ALBUTerol/ipratropium for Nebulization 3 milliLiter(s) Nebulizer every 6 hours  amLODIPine   Tablet 10 milliGRAM(s) Oral at bedtime  aspirin enteric coated 81 milliGRAM(s) Oral daily  atorvastatin 40 milliGRAM(s) Oral at bedtime  carvedilol 12.5 milliGRAM(s) Oral every 12 hours  clopidogrel Tablet 75 milliGRAM(s) Oral daily  dextrose 5%. 1000 milliLiter(s) (50 mL/Hr) IV Continuous <Continuous>  dextrose 50% Injectable 12.5 Gram(s) IV Push once  dextrose 50% Injectable 25 Gram(s) IV Push once  dextrose 50% Injectable 25 Gram(s) IV Push once  ferrous    sulfate 325 milliGRAM(s) Oral daily  furosemide   Injectable 80 milliGRAM(s) IV Push two times a day  gabapentin 300 milliGRAM(s) Oral two times a day  heparin  Injectable 5000 Unit(s) SubCutaneous every 12 hours  hydrALAZINE 100 milliGRAM(s) Oral three times a day  influenza   Vaccine 0.5 milliLiter(s) IntraMuscular once  insulin glargine Injectable (LANTUS) 15 Unit(s) SubCutaneous at bedtime  insulin lispro (HumaLOG) corrective regimen sliding scale   SubCutaneous three times a day before meals  insulin lispro (HumaLOG) corrective regimen sliding scale   SubCutaneous at bedtime  pantoprazole    Tablet 40 milliGRAM(s) Oral before breakfast    MEDICATIONS  (PRN):  acetaminophen   Tablet .. 500 milliGRAM(s) Oral every 4 hours PRN Temp greater or equal to 38.5C (101.3F), Moderate Pain (4 - 6)  ALBUTerol/ipratropium for Nebulization 3 milliLiter(s) Nebulizer every 2 hours PRN Shortness of Breath and/or Wheezing  dextrose 40% Gel 15 Gram(s) Oral once PRN Blood Glucose LESS THAN 70 milliGRAM(s)/deciliter  glucagon  Injectable 1 milliGRAM(s) IntraMuscular once PRN Glucose LESS THAN 70 milligrams/deciliter  sodium chloride 0.65% Nasal 1 Spray(s) Both Nostrils two times a day PRN Nasal Congestion       Vital Signs Last 24 Hrs  T(C): 36.7 (31 Oct 2018 04:12), Max: 36.7 (31 Oct 2018 04:12)  T(F): 98 (31 Oct 2018 04:12), Max: 98 (31 Oct 2018 04:12)  HR: 65 (31 Oct 2018 04:12) (61 - 72)  BP: 133/54 (31 Oct 2018 04:12) (123/55 - 134/61)  BP(mean): --  RR: 18 (31 Oct 2018 04:12) (18 - 20)  SpO2: 97% (31 Oct 2018 04:12) (93% - 100%)        I&O's Summary    30 Oct 2018 07:01  -  31 Oct 2018 07:00  --------------------------------------------------------  IN: 0 mL / OUT: 450 mL / NET: -450 mL        Physical Exam:   GENERAL: Morbidly obese  HEENT: BIRGIT/   Atraumatic, Normocephalic  ENMT: No tonsillar erythema, exudates, or enlargement; Moist mucous membranes, Good dentition, No lesions  NECK: Supple, No JVD, Normal thyroid  CHEST/LUNG: Clear to auscultation bilaterally; No rales, rhonchi, wheezing, or rubs  CVS: Regular rate and rhythm; No murmurs, rubs, or gallops  GI: : Soft, Nontender, Nondistended; Bowel sounds present  NERVOUS SYSTEM:  lethargic but open eyes and tries to respond  EXTREMITIES:  + edema  LYMPH: No lymphadenopathy noted  SKIN: No rashes or lesions  ENDOCRINOLOGY: No Thyromegaly  PSYCH:Lethargic    Labs:  Venous<55<4>>63<<7.365>>Venous<<3><<4><<5<<639>>                            8.0    5.09  )-----------( 253      ( 31 Oct 2018 07:56 )             26.7                         8.8    4.6   )-----------( 205      ( 30 Oct 2018 15:38 )             27.4     10-31    139  |  99  |  72<H>  ----------------------------<  39<LL>  3.8   |  28  |  3.88<H>  10-30    136  |  97  |  75<H>  ----------------------------<  96  3.7   |  27  |  3.93<H>    Ca    8.6      31 Oct 2018 06:00  Ca    9.0      30 Oct 2018 15:36  Phos  5.4     10-31  Mg     2.1     10-31    TPro  6.5  /  Alb  3.3  /  TBili  0.4  /  DBili  x   /  AST  6<L>  /  ALT  5<L>  /  AlkPhos  87  10-30    CAPILLARY BLOOD GLUCOSE  < from: Xray Chest 1 View- PORTABLE-Urgent (10.30.18 @ 16:32) >  CLINICAL INDICATION: 73-yo-female with cough, shortness of breath.    TECHNIQUE: portable chest.    FINDINGS:   There is moderate cardiomegaly.  Prominent bilateral perihilar reticulation/increased vascular marking   seen - consistent with pulmonary vascular congestion.  Small bilateral pleural effusions, left more than right - associated   bibasilar haziness may be due to atelectasis - but underlying pneumonia   not excluded in the right clinical setting.  No pneumothorax.  Degenerative changes of the thoracic spine.    IMPRESSION:   Cardiomegaly.  Pulmonary vascular congestive changes with small bilateral pleural   effusions.  Bibasilar atelectasis and/or pneumonia.   This report to ER via PACs system.                    SHIVANI LEVY M.D., ATTENDING RADIOLOGIST  This document has been electronically signed. Oct 30 2018  4:46PM    < end of copied text >      POCT Blood Glucose.: 98 mg/dL (31 Oct 2018 07:29)  POCT Blood Glucose.: 84 mg/dL (31 Oct 2018 07:02)  POCT Blood Glucose.: 97 mg/dL (30 Oct 2018 21:39)    LIVER FUNCTIONS - ( 30 Oct 2018 15:36 )  Alb: 3.3 g/dL / Pro: 6.5 g/dL / ALK PHOS: 87 U/L / ALT: 5 U/L / AST: 6 U/L / GGT: x               D DImer  Serum Pro-Brain Natriuretic Peptide: 42229 pg/mL (10-30 @ 15:36)      Studies  Chest X-RAY  CT SCAN Chest   CT Abdomen  Venous Dopplers: LE:   Others

## 2018-10-31 NOTE — CONSULT NOTE ADULT - NSPROBSELRECBLANK_6_GEN
The patient had a CT of the spine done that revealed acute on chronic moderate to severe compression fracture of the L3 vertebral body which has progressed compared to previous MRI from 02/23/2018.  Retropulsion is seen into the anterior spinal canal resulting in severe spinal canal stenosis at the L2-3 level.  Stable remote severe L1 vertebral body compression fracture.  Stable remote mild-to-moderate T7 vertebral body compression fracture.  CT of the pelvis was also done which showed complex, mildly displaced fracture of the right acetabulum involving the anterior and posterior columns and medial wall with extension along the superior pubic ramus.  Minimally displaced fracture of the right inferior pubic ramus.  Compression fracture of the L3 vertebral body, progressed when compared to the previous exam noting retropulsion of the posterior-superior corner that contributes to severe spinal canal stenosis.  Moderate bilateral femoroacetabular osteoarthritis.  The patient was seen by Orthopedic surgery and is not a surgical candidate.  They recommend weight bearing of the right lower extremity only.  PT/OT/Speech consulted and the patient should be non-weight bearing on the left with toe touch only for 6 weeks.  The patient was transitioned from IV pain medication to oral with current titration for desired effect.  Speech recommends a modified diet (soft with chopped meats) and assistance with all meals.  He should be given Tylenol preferentially for pain control and Norco 5mg only for severe pain.  The patient will return to NH care with therapy to continue.   DISPLAY PLAN FREE TEXT

## 2018-10-31 NOTE — CONSULT NOTE ADULT - PROBLEM SELECTOR RECOMMENDATION 2
CKD Stage 4/5  CKD sec to longstanding DM/HTN  Monitor BMP, strict I/O  Avoid nephrotoxics, NSAIDs RCA

## 2018-10-31 NOTE — PROGRESS NOTE ADULT - ASSESSMENT
74 y/o F COPD, CHF, HTN, HLD, DM2,Anemia ,CKD ,smoking x60+ years, presenting with shortness of breath worsening for one month. Patient was diagnosed with flu x2 weeks ago after her daughter had flu, and had significant shortness of breath with cough and congestion as well as shortness of breath at that time. She reports now that for the last week her shortness of breath is intermittent but has been worsening in severity. She states that she uses nebulizer once daily for her COPD, but yesterday had to use it multiple more times. Reports still feeling congested and intermittently coughing up mucous without any blood. She denies any fevers or chills. Went to her PMD today for increased shortness of breath and was told to come here concerned for fluid on the lungs.      Problem/Plan - 1:  ·  Problem: Acute respiratory failure with hypoxia.  Plan: Secondary to CHF as well COPD exacerbation. Oxygen to Keep Saturation 92% or above. Pulmonary helping.       Problem/Plan - 2:  ·  Problem: Chronic obstructive pulmonary disease with acute exacerbation.  Plan: Neb Rx and Oxygen . Advised to quit smoking . Pulmonary consult noted.       Problem/Plan - 3:  ·  Problem: Acute on chronic congestive heart failure, unspecified heart failure type.  Plan: IV Lasix and TTE results pending. CT chest pending. Cardiology consulted.      Problem/Plan - 4:  ·  Problem: Stage 4 chronic kidney disease.  Plan: Baseline Creatinine not known . Renal consult noted.      Problem/Plan - 5:  ·  Problem: Diabetes mellitus.  Plan: Lantus and SSI for now. Sugars low so Lantus DCD .      Problem/Plan - 6:  Problem: Hypertension. Plan: BP meds with parameters. BP readings fine.      Problem/Plan - 7:  ·  Problem: Anemia due to chronic kidney disease.  Plan: Chronic .  Watching CBC .      Problem/Plan - 8:  ·  Problem: Smoker.  Plan: Advised to quit.

## 2018-10-31 NOTE — PROVIDER CONTACT NOTE (CRITICAL VALUE NOTIFICATION) - ASSESSMENT
Patient alert and orientedx4. Vital signs stable. Denies SOB or chest pain. Fingerstick taken, glucose 84.

## 2018-10-31 NOTE — CONSULT NOTE ADULT - ASSESSMENT
74 y/o F COPD, CHF, HTN, HLD, DM2,Anemia ,CKD ,smoking x60+ years, presenting with shortness of breath worsening for one month.

## 2018-10-31 NOTE — CONSULT NOTE ADULT - PROBLEM SELECTOR RECOMMENDATION 2
She has chilango wheezing as well as is an active smoker: Start Symbicort as well as low dose steroids for a few days!

## 2018-10-31 NOTE — CONSULT NOTE ADULT - PROBLEM SELECTOR RECOMMENDATION 3
Fluid overload in setting of HF/RF  Follow up ECHO  COntinue lasix 80mg IV BID  Monitor daily weights  Follow up Cardiology

## 2018-10-31 NOTE — CONSULT NOTE ADULT - PROBLEM SELECTOR RECOMMENDATION 9
secondary to chf exacerbation: ABG pending today: She seems to have obesity hypoventilation syndrome. She likely has LIANNA too: Depending upon the ABG , she may need bipap in the daytime as well as in the night time. For now she is on IV lasix!! secondary to chf exacerbation: ABG pending today: She seems to have obesity hypoventilation syndrome. She likely has LIANNA too: Depending upon the ABG , she may need bipap in the daytime as well as in the night time. For now she is on IV lasix!! Do CT chest non Contrast!!

## 2018-10-31 NOTE — CONSULT NOTE ADULT - SUBJECTIVE AND OBJECTIVE BOX
Gregor Barrow MD  Interventional Cardiology   [place] Office : [phone] [place] [address] [place] [address]  Tel:   [place] Office : [phone] [facility] [place] [address]  Tel: [phone]  Cell : [phone]    HISTORY OF PRESENT ILLNESS:  74 y/o F COPD, CHF, HTN, HLD, DM2,Anemia ,CKD ,smoking x60+ years, presenting with shortness of breath worsening for one month. Patient was diagnosed with flu x2 weeks ago after her daughter had flu, and had significant shortness of breath with cough and congestion as well as shortness of breath at that time. She reports now that for the last week her shortness of breath is intermittent but has been worsening in severity. She states that she uses nebulizer once daily for her COPD, but yesterday had to use it multiple more times. Reports still feeling congested and intermittently coughing up mucous without any blood. She denies any fevers or chills. Went to her PMD today for increased shortness of breath and was told to come here concerned for fluid on the lungs.    PAST MEDICAL & SURGICAL HISTORY:  Hemorrhoids  GERD (gastroesophageal reflux disease)  Morbid obesity  Cataract  Diaphragmatic hernia  Cerebral infarction  CKD (chronic kidney disease)  Anemia  Diabetes mellitus  CHF (congestive heart failure)  Hyperlipidemia  Hypertension  COPD (chronic obstructive pulmonary disease)  Chronic obstructive pulmonary disease, unspecified COPD type  Adult Idiopathic Generalized Osteoporosis  CAD (Coronary Artery Disease)  DM (Diabetes Mellitus), Secondary  Hypertension  No significant past surgical history  No significant past surgical history    	    MEDICATIONS:  amLODIPine   Tablet 10 milliGRAM(s) Oral at bedtime  aspirin enteric coated 81 milliGRAM(s) Oral daily  carvedilol 12.5 milliGRAM(s) Oral every 12 hours  clopidogrel Tablet 75 milliGRAM(s) Oral daily  furosemide   Injectable 80 milliGRAM(s) IV Push two times a day  heparin  Injectable 5000 Unit(s) SubCutaneous every 12 hours  hydrALAZINE 100 milliGRAM(s) Oral three times a day      ALBUTerol/ipratropium for Nebulization 3 milliLiter(s) Nebulizer every 6 hours  ALBUTerol/ipratropium for Nebulization 3 milliLiter(s) Nebulizer every 2 hours PRN  buDESOnide 160 MICROgram(s)/formoterol 4.5 MICROgram(s) Inhaler 2 Puff(s) Inhalation two times a day    acetaminophen   Tablet .. 500 milliGRAM(s) Oral every 4 hours PRN  gabapentin 300 milliGRAM(s) Oral two times a day    pantoprazole    Tablet 40 milliGRAM(s) Oral before breakfast    atorvastatin 40 milliGRAM(s) Oral at bedtime  dextrose 40% Gel 15 Gram(s) Oral once PRN  dextrose 50% Injectable 12.5 Gram(s) IV Push once  dextrose 50% Injectable 25 Gram(s) IV Push once  dextrose 50% Injectable 25 Gram(s) IV Push once  glucagon  Injectable 1 milliGRAM(s) IntraMuscular once PRN  insulin lispro (HumaLOG) corrective regimen sliding scale   SubCutaneous three times a day before meals  insulin lispro (HumaLOG) corrective regimen sliding scale   SubCutaneous at bedtime  methylPREDNISolone sodium succinate Injectable 20 milliGRAM(s) IV Push every 8 hours    AQUAPHOR (petrolatum Ointment) 1 Application(s) Topical two times a day  dextrose 5%. 1000 milliLiter(s) IV Continuous <Continuous>  ferrous    sulfate 325 milliGRAM(s) Oral daily  influenza   Vaccine 0.5 milliLiter(s) IntraMuscular once  sodium chloride 0.65% Nasal 1 Spray(s) Both Nostrils two times a day PRN      FAMILY HISTORY:  No pertinent family history in first degree relatives  No pertinent family history in first degree relatives        Allergies    No Known Allergies    Intolerances    	      PHYSICAL EXAM:  T(C): 36.7 (10-31-18 @ 04:12), Max: 36.7 (10-31-18 @ 04:12)  HR: 69 (10-31-18 @ 12:25) (61 - 72)  BP: 125/64 (10-31-18 @ 12:25) (123/55 - 134/61)  RR: 18 (10-31-18 @ 04:12) (18 - 20)  SpO2: 97% (10-31-18 @ 04:12) (93% - 100%)  Wt(kg): --  I&O's Summary    30 Oct 2018 07:01  -  31 Oct 2018 07:00  --------------------------------------------------------  IN: 0 mL / OUT: 450 mL / NET: -450 mL    31 Oct 2018 07:01  -  31 Oct 2018 14:30  --------------------------------------------------------  IN: 360 mL / OUT: 0 mL / NET: 360 mL        Appearance: obese, lethargic    HEENT:   Normal oral mucosa,  Cardiovascular: Normal S1 S2, No JVD, No murmurs, No edema  Respiratory: Coarse ronchi B/L   Gastrointestinal:  Soft, Non-tender, + BS	  Extremities: Edema +   LABS:	 	    CARDIAC MARKERS:                                  8.0    5.09  )-----------( 253      ( 31 Oct 2018 07:56 )             26.7     10-31    139  |  99  |  72<H>  ----------------------------<  39<LL>  3.8   |  28  |  3.88<H>    Ca    8.6      31 Oct 2018 06:00  Phos  5.4     10-31  Mg     2.1     10-31    TPro  6.5  /  Alb  3.3  /  TBili  0.4  /  DBili  x   /  AST  6<L>  /  ALT  5<L>  /  AlkPhos  87  10-30    proBNP: Serum Pro-Brain Natriuretic Peptide: 40467 pg/mL (10-30 @ 15:36)    Lipid Profile:   HgA1c: Hemoglobin A1C, Whole Blood: 5.6 % (10-31 @ 07:56)    TSH:     ASSESSMENT/PLAN:

## 2018-11-01 DIAGNOSIS — D64.9 ANEMIA, UNSPECIFIED: ICD-10-CM

## 2018-11-01 LAB
ANION GAP SERPL CALC-SCNC: 14 MMOL/L — SIGNIFICANT CHANGE UP (ref 5–17)
APTT BLD: 33.7 SEC — SIGNIFICANT CHANGE UP (ref 27.5–36.3)
BUN SERPL-MCNC: 74 MG/DL — HIGH (ref 7–23)
CALCIUM SERPL-MCNC: 8.5 MG/DL — SIGNIFICANT CHANGE UP (ref 8.4–10.5)
CHLORIDE SERPL-SCNC: 95 MMOL/L — LOW (ref 96–108)
CO2 SERPL-SCNC: 27 MMOL/L — SIGNIFICANT CHANGE UP (ref 22–31)
CREAT SERPL-MCNC: 4.24 MG/DL — HIGH (ref 0.5–1.3)
FERRITIN SERPL-MCNC: 142 NG/ML — SIGNIFICANT CHANGE UP (ref 15–150)
GLUCOSE BLDC GLUCOMTR-MCNC: 233 MG/DL — HIGH (ref 70–99)
GLUCOSE BLDC GLUCOMTR-MCNC: 244 MG/DL — HIGH (ref 70–99)
GLUCOSE BLDC GLUCOMTR-MCNC: 246 MG/DL — HIGH (ref 70–99)
GLUCOSE BLDC GLUCOMTR-MCNC: 246 MG/DL — HIGH (ref 70–99)
GLUCOSE SERPL-MCNC: 195 MG/DL — HIGH (ref 70–99)
HAPTOGLOB SERPL-MCNC: 96 MG/DL — SIGNIFICANT CHANGE UP (ref 34–200)
HCT VFR BLD CALC: 26.3 % — LOW (ref 34.5–45)
HCT VFR BLD CALC: 26.4 % — LOW (ref 34.5–45)
HGB BLD-MCNC: 7.9 G/DL — LOW (ref 11.5–15.5)
HGB BLD-MCNC: 8.7 G/DL — LOW (ref 11.5–15.5)
INR BLD: 0.97 RATIO — SIGNIFICANT CHANGE UP (ref 0.88–1.16)
LDH SERPL L TO P-CCNC: 216 U/L — SIGNIFICANT CHANGE UP (ref 50–242)
MAGNESIUM SERPL-MCNC: 2.2 MG/DL — SIGNIFICANT CHANGE UP (ref 1.6–2.6)
MCHC RBC-ENTMCNC: 27.1 PG — SIGNIFICANT CHANGE UP (ref 27–34)
MCHC RBC-ENTMCNC: 28.6 PG — SIGNIFICANT CHANGE UP (ref 27–34)
MCHC RBC-ENTMCNC: 29.9 GM/DL — LOW (ref 32–36)
MCHC RBC-ENTMCNC: 32.9 GM/DL — SIGNIFICANT CHANGE UP (ref 32–36)
MCV RBC AUTO: 86.7 FL — SIGNIFICANT CHANGE UP (ref 80–100)
MCV RBC AUTO: 90.7 FL — SIGNIFICANT CHANGE UP (ref 80–100)
PHOSPHATE SERPL-MCNC: 6.4 MG/DL — HIGH (ref 2.5–4.5)
PLATELET # BLD AUTO: 184 K/UL — SIGNIFICANT CHANGE UP (ref 150–400)
PLATELET # BLD AUTO: 193 K/UL — SIGNIFICANT CHANGE UP (ref 150–400)
POTASSIUM SERPL-MCNC: 4.2 MMOL/L — SIGNIFICANT CHANGE UP (ref 3.5–5.3)
POTASSIUM SERPL-SCNC: 4.2 MMOL/L — SIGNIFICANT CHANGE UP (ref 3.5–5.3)
PROTHROM AB SERPL-ACNC: 10.8 SEC — SIGNIFICANT CHANGE UP (ref 10–13.1)
RBC # BLD: 2.91 M/UL — LOW (ref 3.8–5.2)
RBC # BLD: 3.04 M/UL — LOW (ref 3.8–5.2)
RBC # BLD: 3.06 M/UL — LOW (ref 3.8–5.2)
RBC # FLD: 15.3 % — HIGH (ref 10.3–14.5)
RBC # FLD: 16.4 % — HIGH (ref 10.3–14.5)
RETICS #: 105 K/UL — SIGNIFICANT CHANGE UP (ref 25–125)
RETICS/RBC NFR: 3.4 % — HIGH (ref 0.5–2.5)
SODIUM SERPL-SCNC: 136 MMOL/L — SIGNIFICANT CHANGE UP (ref 135–145)
WBC # BLD: 2.26 K/UL — LOW (ref 3.8–10.5)
WBC # BLD: 2.8 K/UL — LOW (ref 3.8–10.5)
WBC # FLD AUTO: 2.26 K/UL — LOW (ref 3.8–10.5)
WBC # FLD AUTO: 2.8 K/UL — LOW (ref 3.8–10.5)

## 2018-11-01 PROCEDURE — 99223 1ST HOSP IP/OBS HIGH 75: CPT

## 2018-11-01 RX ORDER — GABAPENTIN 400 MG/1
300 CAPSULE ORAL AT BEDTIME
Qty: 0 | Refills: 0 | Status: DISCONTINUED | OUTPATIENT
Start: 2018-11-02 | End: 2018-11-08

## 2018-11-01 RX ORDER — INSULIN GLARGINE 100 [IU]/ML
10 INJECTION, SOLUTION SUBCUTANEOUS AT BEDTIME
Qty: 0 | Refills: 0 | Status: DISCONTINUED | OUTPATIENT
Start: 2018-11-01 | End: 2018-11-08

## 2018-11-01 RX ORDER — CALCIUM ACETATE 667 MG
667 TABLET ORAL
Qty: 0 | Refills: 0 | Status: DISCONTINUED | OUTPATIENT
Start: 2018-11-01 | End: 2018-11-08

## 2018-11-01 RX ORDER — INSULIN LISPRO 100/ML
3 VIAL (ML) SUBCUTANEOUS
Qty: 0 | Refills: 0 | Status: DISCONTINUED | OUTPATIENT
Start: 2018-11-01 | End: 2018-11-08

## 2018-11-01 RX ADMIN — INSULIN GLARGINE 10 UNIT(S): 100 INJECTION, SOLUTION SUBCUTANEOUS at 22:26

## 2018-11-01 RX ADMIN — Medication 1 PATCH: at 18:34

## 2018-11-01 RX ADMIN — Medication 20 MILLIGRAM(S): at 06:19

## 2018-11-01 RX ADMIN — AMLODIPINE BESYLATE 10 MILLIGRAM(S): 2.5 TABLET ORAL at 22:26

## 2018-11-01 RX ADMIN — Medication 3 UNIT(S): at 18:37

## 2018-11-01 RX ADMIN — Medication 40 MILLIGRAM(S): at 11:44

## 2018-11-01 RX ADMIN — Medication 1 APPLICATION(S): at 06:18

## 2018-11-01 RX ADMIN — CLOPIDOGREL BISULFATE 75 MILLIGRAM(S): 75 TABLET, FILM COATED ORAL at 11:44

## 2018-11-01 RX ADMIN — Medication 81 MILLIGRAM(S): at 11:44

## 2018-11-01 RX ADMIN — Medication 4: at 11:44

## 2018-11-01 RX ADMIN — Medication 3 MILLILITER(S): at 12:49

## 2018-11-01 RX ADMIN — ATORVASTATIN CALCIUM 40 MILLIGRAM(S): 80 TABLET, FILM COATED ORAL at 22:26

## 2018-11-01 RX ADMIN — HEPARIN SODIUM 5000 UNIT(S): 5000 INJECTION INTRAVENOUS; SUBCUTANEOUS at 06:18

## 2018-11-01 RX ADMIN — Medication 500 MILLIGRAM(S): at 22:26

## 2018-11-01 RX ADMIN — Medication 1 PATCH: at 11:44

## 2018-11-01 RX ADMIN — Medication 100 MILLIGRAM(S): at 16:09

## 2018-11-01 RX ADMIN — Medication 500 MILLIGRAM(S): at 23:00

## 2018-11-01 RX ADMIN — Medication 100 MILLIGRAM(S): at 06:19

## 2018-11-01 RX ADMIN — Medication 4: at 18:38

## 2018-11-01 RX ADMIN — Medication 1 PATCH: at 07:55

## 2018-11-01 RX ADMIN — Medication 667 MILLIGRAM(S): at 18:37

## 2018-11-01 RX ADMIN — Medication 325 MILLIGRAM(S): at 11:44

## 2018-11-01 RX ADMIN — Medication 667 MILLIGRAM(S): at 11:44

## 2018-11-01 RX ADMIN — CARVEDILOL PHOSPHATE 12.5 MILLIGRAM(S): 80 CAPSULE, EXTENDED RELEASE ORAL at 11:44

## 2018-11-01 RX ADMIN — BUDESONIDE AND FORMOTEROL FUMARATE DIHYDRATE 2 PUFF(S): 160; 4.5 AEROSOL RESPIRATORY (INHALATION) at 06:19

## 2018-11-01 RX ADMIN — PANTOPRAZOLE SODIUM 40 MILLIGRAM(S): 20 TABLET, DELAYED RELEASE ORAL at 06:19

## 2018-11-01 RX ADMIN — Medication 3 MILLILITER(S): at 18:37

## 2018-11-01 RX ADMIN — Medication 1 APPLICATION(S): at 18:37

## 2018-11-01 RX ADMIN — GABAPENTIN 300 MILLIGRAM(S): 400 CAPSULE ORAL at 06:19

## 2018-11-01 RX ADMIN — HEPARIN SODIUM 5000 UNIT(S): 5000 INJECTION INTRAVENOUS; SUBCUTANEOUS at 18:40

## 2018-11-01 RX ADMIN — Medication 3 UNIT(S): at 11:44

## 2018-11-01 RX ADMIN — Medication 4: at 07:54

## 2018-11-01 RX ADMIN — BUDESONIDE AND FORMOTEROL FUMARATE DIHYDRATE 2 PUFF(S): 160; 4.5 AEROSOL RESPIRATORY (INHALATION) at 18:37

## 2018-11-01 RX ADMIN — Medication 3 MILLILITER(S): at 06:19

## 2018-11-01 RX ADMIN — Medication 80 MILLIGRAM(S): at 06:18

## 2018-11-01 RX ADMIN — CARVEDILOL PHOSPHATE 12.5 MILLIGRAM(S): 80 CAPSULE, EXTENDED RELEASE ORAL at 22:26

## 2018-11-01 RX ADMIN — Medication 100 MILLIGRAM(S): at 22:26

## 2018-11-01 RX ADMIN — Medication 3 MILLILITER(S): at 23:10

## 2018-11-01 NOTE — PROGRESS NOTE ADULT - SUBJECTIVE AND OBJECTIVE BOX
Patient is a 73y old  Female who presents with a chief complaint of SOB since 2 weeks (31 Oct 2018 14:29)      Any change in ROS: Doing ok : Much better today : She is alert and awake:       MEDICATIONS  (STANDING):  ALBUTerol/ipratropium for Nebulization 3 milliLiter(s) Nebulizer every 6 hours  amLODIPine   Tablet 10 milliGRAM(s) Oral at bedtime  AQUAPHOR (petrolatum Ointment) 1 Application(s) Topical two times a day  aspirin enteric coated 81 milliGRAM(s) Oral daily  atorvastatin 40 milliGRAM(s) Oral at bedtime  buDESOnide 160 MICROgram(s)/formoterol 4.5 MICROgram(s) Inhaler 2 Puff(s) Inhalation two times a day  carvedilol 12.5 milliGRAM(s) Oral every 12 hours  clopidogrel Tablet 75 milliGRAM(s) Oral daily  dextrose 5%. 1000 milliLiter(s) (50 mL/Hr) IV Continuous <Continuous>  dextrose 50% Injectable 12.5 Gram(s) IV Push once  dextrose 50% Injectable 25 Gram(s) IV Push once  dextrose 50% Injectable 25 Gram(s) IV Push once  ferrous    sulfate 325 milliGRAM(s) Oral daily  furosemide   Injectable 80 milliGRAM(s) IV Push two times a day  gabapentin 300 milliGRAM(s) Oral two times a day  heparin  Injectable 5000 Unit(s) SubCutaneous every 12 hours  hydrALAZINE 100 milliGRAM(s) Oral three times a day  influenza   Vaccine 0.5 milliLiter(s) IntraMuscular once  insulin lispro (HumaLOG) corrective regimen sliding scale   SubCutaneous three times a day before meals  insulin lispro (HumaLOG) corrective regimen sliding scale   SubCutaneous at bedtime  methylPREDNISolone sodium succinate Injectable 20 milliGRAM(s) IV Push every 8 hours  nicotine -  14 mG/24Hr(s) Patch 1 patch Transdermal daily  pantoprazole    Tablet 40 milliGRAM(s) Oral before breakfast    MEDICATIONS  (PRN):  acetaminophen   Tablet .. 500 milliGRAM(s) Oral every 4 hours PRN Temp greater or equal to 38.5C (101.3F), Moderate Pain (4 - 6)  ALBUTerol/ipratropium for Nebulization 3 milliLiter(s) Nebulizer every 2 hours PRN Shortness of Breath and/or Wheezing  dextrose 40% Gel 15 Gram(s) Oral once PRN Blood Glucose LESS THAN 70 milliGRAM(s)/deciliter  glucagon  Injectable 1 milliGRAM(s) IntraMuscular once PRN Glucose LESS THAN 70 milligrams/deciliter  nicotine - Inhaler 1 Each Inhalation every 1 hour PRN smoking cessation  sodium chloride 0.65% Nasal 1 Spray(s) Both Nostrils two times a day PRN Nasal Congestion    Vital Signs Last 24 Hrs  T(C): 36.9 (01 Nov 2018 04:22), Max: 36.9 (01 Nov 2018 04:22)  T(F): 98.5 (01 Nov 2018 04:22), Max: 98.5 (01 Nov 2018 04:22)  HR: 77 (01 Nov 2018 04:22) (55 - 77)  BP: 118/55 (01 Nov 2018 04:22) (114/54 - 127/53)  BP(mean): --  RR: 22 (01 Nov 2018 04:22) (19 - 22)  SpO2: 99% (01 Nov 2018 04:22) (95% - 99%)    I&O's Summary    31 Oct 2018 07:01  -  01 Nov 2018 07:00  --------------------------------------------------------  IN: 1020 mL / OUT: 850 mL / NET: 170 mL    01 Nov 2018 07:01  -  01 Nov 2018 10:27  --------------------------------------------------------  IN: 480 mL / OUT: 0 mL / NET: 480 mL          Physical Exam:   GENERAL: Morbidly Obese+  HEENT: BIRGIT/   Atraumatic, Normocephalic  ENMT: No tonsillar erythema, exudates, or enlargement; Moist mucous membranes, Good dentition, No lesions  NECK: Supple, No JVD, Normal thyroid  CHEST/LUNG: Clear to auscultaion  CVS: Regular rate and rhythm; No murmurs, rubs, or gallops  GI: : Soft, Nontender, Nondistended; Bowel sounds present  NERVOUS SYSTEM:  Alert & Oriented X3  EXTREMITIES:  Obese legs with trace edema  LYMPH: No lymphadenopathy noted  SKIN: No rashes or lesions  ENDOCRINOLOGY: No Thyromegaly  PSYCH: Appropriate    Labs:  ABG - ( 31 Oct 2018 10:11 )  pH, Arterial: 7.33  pH, Blood: x     /  pCO2: 59    /  pO2: 70    / HCO3: 30    / Base Excess: 3.8   /  SaO2: 93              30                            7.9    2.26  )-----------( 184      ( 01 Nov 2018 06:57 )             26.4                         8.0    5.09  )-----------( 253      ( 31 Oct 2018 07:56 )             26.7                         8.8    4.6   )-----------( 205      ( 30 Oct 2018 15:38 )             27.4     11-01    136  |  95<L>  |  74<H>  ----------------------------<  195<H>  4.2   |  27  |  4.24<H>  10-31    139  |  99  |  72<H>  ----------------------------<  39<LL>  3.8   |  28  |  3.88<H>  10-30    136  |  97  |  75<H>  ----------------------------<  96  3.7   |  27  |  3.93<H>    Ca    8.5      01 Nov 2018 05:51  Ca    8.6      31 Oct 2018 06:00  Ca    9.0      30 Oct 2018 15:36  Phos  6.4     11-01  Phos  5.4     10-31  Mg     2.2     11-01  Mg     2.1     10-31    TPro  6.5  /  Alb  3.3  /  TBili  0.4  /  DBili  x   /  AST  6<L>  /  ALT  5<L>  /  AlkPhos  87  10-30    CAPILLARY BLOOD GLUCOSE      POCT Blood Glucose.: 246 mg/dL (01 Nov 2018 07:31)  POCT Blood Glucose.: 169 mg/dL (31 Oct 2018 21:11)  POCT Blood Glucose.: 169 mg/dL (31 Oct 2018 17:36)  POCT Blood Glucose.: 75 mg/dL (31 Oct 2018 11:31)      LIVER FUNCTIONS - ( 30 Oct 2018 15:36 )  Alb: 3.3 g/dL / Pro: 6.5 g/dL / ALK PHOS: 87 U/L / ALT: 5 U/L / AST: 6 U/L / GGT: x           PT/INR - ( 01 Nov 2018 07:48 )   PT: 10.8 sec;   INR: 0.97 ratio         PTT - ( 01 Nov 2018 07:48 )  PTT:33.7 sec    Serum Pro-Brain Natriuretic Peptide: 34074 pg/mL (10-30 @ 15:36)        RECENT CULTURES:      < from: CT Chest No Cont (10.31.18 @ 12:07) >  URA: Small bilateral pleural effusions.  VESSELS: Atherosclerosis of the aorta and coronary arteries.  HEART: Multichamber cardiac enlargement. Small pericardial effusion.   Aortic valvular and mitral annular calcifications.  MEDIASTINUM AND SCOTT: Enlarged mediastinal lymph nodes measuring up to   3.1 x 2.1 cm in a right lower paratracheal node (3:60).  CHEST WALL AND LOWER NECK: Enlarged, heterogeneous thyroid.  VISUALIZED UPPER ABDOMEN: Nodular left adrenal thickening.  BONES: Degenerative changes.    IMPRESSION:     Interlobular septal thickening, bilateral diffuse groundglass opacities,   and small bilateral pleural effusions are consistent with pulmonary edema.    Left sided endobronchial nodularity likely reflect secretions. Follow-up   CT chest is recommended in 1 month to ensure resolution. The   endobronchial nodularity can also be reassessed at that time.                MEAGHAN VÁZQUEZ M.D., RADIOLOGY RESIDENT  This document has been electronically signed.  JORDAN COHEN M.D., ATTENDING RADIOLOGIST  This document has been electronically signed. Oct 31 2018  3:26PM    < end of copied text >    RESPIRATORY CULTURES:          Studies  Chest X-RAY  CT SCAN Chest   Venous Dopplers: LE:   CT Abdomen  Others

## 2018-11-01 NOTE — PROGRESS NOTE ADULT - ASSESSMENT
72 y/o F COPD, CHF, HTN, HLD, DM2,Anemia ,CKD ,smoking x60+ years, presenting with shortness of breath worsening for one month.

## 2018-11-01 NOTE — PROGRESS NOTE ADULT - SUBJECTIVE AND OBJECTIVE BOX
Dr. Morales  Office [phone]  Cell [phone]  Heidi SPAULDING  Cell [phone]      Patient is a 73y old  Female who presents with a chief complaint of SOB since 2 weeks (01 Nov 2018 18:10)      Patient seen and examined at bedside. No chest pain, sob is better    VITALS:  T(F): 98.7 (11-01-18 @ 14:50), Max: 98.7 (11-01-18 @ 14:50)  HR: 72 (11-01-18 @ 14:50)  BP: 157/58 (11-01-18 @ 14:50)  RR: 20 (11-01-18 @ 14:50)  SpO2: 92% (11-01-18 @ 14:50)  Wt(kg): --    10-31 @ 07:01  -  11-01 @ 07:00  --------------------------------------------------------  IN: 1020 mL / OUT: 850 mL / NET: 170 mL    11-01 @ 07:01  -  11-01 @ 18:40  --------------------------------------------------------  IN: 840 mL / OUT: 650 mL / NET: 190 mL          PHYSICAL EXAM:  Constitutional: NAD  Neck: No JVD  Respiratory: Bilateral basal crackles+  Cardiovascular: S1, S2, RRR  Gastrointestinal: BS+, soft, NT/ND  Extremities: 2+ peripheral edema    Hospital Medications:   MEDICATIONS  (STANDING):  ALBUTerol/ipratropium for Nebulization 3 milliLiter(s) Nebulizer every 6 hours  amLODIPine   Tablet 10 milliGRAM(s) Oral at bedtime  AQUAPHOR (petrolatum Ointment) 1 Application(s) Topical two times a day  aspirin enteric coated 81 milliGRAM(s) Oral daily  atorvastatin 40 milliGRAM(s) Oral at bedtime  buDESOnide 160 MICROgram(s)/formoterol 4.5 MICROgram(s) Inhaler 2 Puff(s) Inhalation two times a day  calcium acetate 667 milliGRAM(s) Oral three times a day with meals  carvedilol 12.5 milliGRAM(s) Oral every 12 hours  clopidogrel Tablet 75 milliGRAM(s) Oral daily  dextrose 5%. 1000 milliLiter(s) (50 mL/Hr) IV Continuous <Continuous>  dextrose 50% Injectable 12.5 Gram(s) IV Push once  dextrose 50% Injectable 25 Gram(s) IV Push once  dextrose 50% Injectable 25 Gram(s) IV Push once  ferrous    sulfate 325 milliGRAM(s) Oral daily  heparin  Injectable 5000 Unit(s) SubCutaneous every 12 hours  hydrALAZINE 100 milliGRAM(s) Oral three times a day  influenza   Vaccine 0.5 milliLiter(s) IntraMuscular once  insulin glargine Injectable (LANTUS) 10 Unit(s) SubCutaneous at bedtime  insulin lispro (HumaLOG) corrective regimen sliding scale   SubCutaneous three times a day before meals  insulin lispro (HumaLOG) corrective regimen sliding scale   SubCutaneous at bedtime  insulin lispro Injectable (HumaLOG) 3 Unit(s) SubCutaneous three times a day before meals  nicotine -  14 mG/24Hr(s) Patch 1 patch Transdermal daily  pantoprazole    Tablet 40 milliGRAM(s) Oral before breakfast  predniSONE   Tablet 40 milliGRAM(s) Oral daily      LABS:  11-01    136  |  95<L>  |  74<H>  ----------------------------<  195<H>  4.2   |  27  |  4.24<H>    Ca    8.5      01 Nov 2018 05:51  Phos  6.4     11-01  Mg     2.2     11-01      Creatinine Trend: 4.24 <--, 3.88 <--, 3.93 <--    Phosphorus Level, Serum: 6.4 mg/dL (11-01 @ 05:51)                              8.7    2.8   )-----------( 193      ( 01 Nov 2018 11:41 )             26.3     Urine Studies:      Iron 32, TIBC 244, %sat 13      [10-31-18 @ 09:21]  Ferritin 141      [10-31-18 @ 09:24]  PTH -- (Ca 8.7)      [10-31-18 @ 09:24]   269  HbA1c 5.6      [10-31-18 @ 07:56]        RADIOLOGY & ADDITIONAL STUDIES:

## 2018-11-01 NOTE — CONSULT NOTE ADULT - SUBJECTIVE AND OBJECTIVE BOX
VASCULAR SURGERY CONSULT NOTE  --------------------------------------------------------------------------------------------    HPI: 73 year old female with PMH of COPD, CHF, DM, HTN, and HLD admitted on 10/30/18 with shortness of breath now with worsening renal function. Shortness of breath was likely due to a combination of patient's COPD and CHF. Has been diuresed with improvement in respiratory status. Patient has not yet started dialysis but per nephrology will likely need in the near future. Vascular surgery consulted for AV fistula for HD access.      PAST MEDICAL & SURGICAL HISTORY:  Hemorrhoids  GERD (gastroesophageal reflux disease)  Morbid obesity  Cataract  Diaphragmatic hernia  Cerebral infarction  CKD (chronic kidney disease)  Anemia  Diabetes mellitus  CHF (congestive heart failure)  Hyperlipidemia  Hypertension  COPD (chronic obstructive pulmonary disease)  Chronic obstructive pulmonary disease, unspecified COPD type  Adult Idiopathic Generalized Osteoporosis  CAD (Coronary Artery Disease)  DM (Diabetes Mellitus), Secondary  Hypertension  No significant past surgical history  No significant past surgical history    FAMILY HISTORY:  No pertinent family history in first degree relatives  No pertinent family history in first degree relatives      ALLERGIES: No Known Allergies      CURRENT MEDICATIONS  MEDICATIONS (STANDING): ALBUTerol/ipratropium for Nebulization 3 milliLiter(s) Nebulizer every 6 hours  amLODIPine   Tablet 10 milliGRAM(s) Oral at bedtime  aspirin enteric coated 81 milliGRAM(s) Oral daily  atorvastatin 40 milliGRAM(s) Oral at bedtime  buDESOnide 160 MICROgram(s)/formoterol 4.5 MICROgram(s) Inhaler 2 Puff(s) Inhalation two times a day  calcium acetate 667 milliGRAM(s) Oral three times a day with meals  carvedilol 12.5 milliGRAM(s) Oral every 12 hours  clopidogrel Tablet 75 milliGRAM(s) Oral daily  dextrose 5%. 1000 milliLiter(s) IV Continuous <Continuous>  dextrose 50% Injectable 12.5 Gram(s) IV Push once  dextrose 50% Injectable 25 Gram(s) IV Push once  dextrose 50% Injectable 25 Gram(s) IV Push once  ferrous    sulfate 325 milliGRAM(s) Oral daily  heparin  Injectable 5000 Unit(s) SubCutaneous every 12 hours  hydrALAZINE 100 milliGRAM(s) Oral three times a day  influenza   Vaccine 0.5 milliLiter(s) IntraMuscular once  insulin glargine Injectable (LANTUS) 10 Unit(s) SubCutaneous at bedtime  insulin lispro (HumaLOG) corrective regimen sliding scale   SubCutaneous three times a day before meals  insulin lispro (HumaLOG) corrective regimen sliding scale   SubCutaneous at bedtime  insulin lispro Injectable (HumaLOG) 3 Unit(s) SubCutaneous three times a day before meals  nicotine -  14 mG/24Hr(s) Patch 1 patch Transdermal daily  pantoprazole    Tablet 40 milliGRAM(s) Oral before breakfast  predniSONE   Tablet 40 milliGRAM(s) Oral daily    MEDICATIONS (PRN):acetaminophen   Tablet .. 500 milliGRAM(s) Oral every 4 hours PRN Temp greater or equal to 38.5C (101.3F), Moderate Pain (4 - 6)  ALBUTerol/ipratropium for Nebulization 3 milliLiter(s) Nebulizer every 2 hours PRN Shortness of Breath and/or Wheezing  dextrose 40% Gel 15 Gram(s) Oral once PRN Blood Glucose LESS THAN 70 milliGRAM(s)/deciliter  glucagon  Injectable 1 milliGRAM(s) IntraMuscular once PRN Glucose LESS THAN 70 milligrams/deciliter  nicotine - Inhaler 1 Each Inhalation every 1 hour PRN smoking cessation    --------------------------------------------------------------------------------------------    Vitals:   T(C): 37.1 (11-01-18 @ 14:50), Max: 37.1 (11-01-18 @ 14:50)  HR: 72 (11-01-18 @ 14:50) (61 - 77)  BP: 157/58 (11-01-18 @ 14:50) (118/55 - 157/58)  RR: 20 (11-01-18 @ 14:50) (19 - 22)  SpO2: 92% (11-01-18 @ 14:50) (92% - 99%)  CAPILLARY BLOOD GLUCOSE      POCT Blood Glucose.: 233 mg/dL (01 Nov 2018 17:17)  POCT Blood Glucose.: 246 mg/dL (01 Nov 2018 11:29)  POCT Blood Glucose.: 246 mg/dL (01 Nov 2018 07:31)  POCT Blood Glucose.: 169 mg/dL (31 Oct 2018 21:11)      10-31 @ 07:01  -  11-01 @ 07:00  --------------------------------------------------------  IN:    Oral Fluid: 1020 mL  Total IN: 1020 mL    OUT:    Voided: 850 mL  Total OUT: 850 mL    Total NET: 170 mL      11-01 @ 07:01  -  11-01 @ 19:33  --------------------------------------------------------  IN:    Oral Fluid: 1320 mL  Total IN: 1320 mL    OUT:    Voided: 650 mL  Total OUT: 650 mL    Total NET: 670 mL      Weight (kg): 109.8 (10-31 @ 04:12)    PHYSICAL EXAM:  General: Alert, NAD  Neuro: A+Ox3  HEENT: NC/AT, no asymmetry, no scleral icterus  Cardio: RRR, adequately perfused  Resp: Airway patent, unlabored breathing  Thorax: No chest wall tenderness  GI/Abd: Soft, NT/ND  Vascular: All 4 extremities warm, B/l radial pulses palpable  --------------------------------------------------------------------------------------------    LABS  CBC (11-01 @ 11:41)                              8.7<L>                         2.8<L>  )----------------(  193        --    % Neutrophils, --    % Lymphocytes, ANC: --                                  26.3<L>  CBC (11-01 @ 06:57)                              7.9<L>                         2.26<L>  )----------------(  184        --    % Neutrophils, --    % Lymphocytes, ANC: --                                  26.4<L>    BMP (11-01 @ 05:51)             136     |  95<L>   |  74<H> 		Ca++ --      Ca 8.5                ---------------------------------( 195<H>		Mg 2.2                4.2     |  27      |  4.24<H>			Ph 6.4<H>      Coags (11-01 @ 07:48)  aPTT 33.7 / INR 0.97 / PT 10.8      ABG (10-31 @ 10:11)     7.33<L> / 59<H> / 70<L> / 30<H> / 3.8<H> / 93%     Lactate: VASCULAR SURGERY CONSULT NOTE  --------------------------------------------------------------------------------------------    HPI: 73 year old female with PMH of COPD, CHF, DM, HTN, and HLD admitted on 10/30/18 with shortness of breath now with worsening renal function. Shortness of breath was likely due to a combination of patient's COPD and CHF. Has been diuresed with improvement in respiratory status. Patient has not yet started dialysis but per nephrology will likely need in the near future. Vascular surgery consulted for AV fistula for HD access.    REVIEW OF SYSTEMS:  CONSTITUTIONAL: No weakness, fevers or chills  EYES/ENT: No visual changes;  No vertigo or throat pain   NECK: No pain or stiffness  RESPIRATORY: no shortness of breath  CARDIOVASCULAR: No chest pain or palpitations at present  GASTROINTESTINAL: No abdominal or epigastric pain. No nausea, vomiting, or hematemesis; No diarrhea or constipation. No melena or hematochezia.  GENITOURINARY: No dysuria, frequency, foamy urine, urinary urgency, incontinence or hematuria  NEUROLOGICAL: No numbness or weakness  SKIN: No itching, burning, rashes, or lesions   All other review of systems is negative unless indicated above.    PAST MEDICAL & SURGICAL HISTORY:  Hemorrhoids  GERD (gastroesophageal reflux disease)  Morbid obesity  Cataract  Diaphragmatic hernia  Cerebral infarction  CKD (chronic kidney disease)  Anemia  Diabetes mellitus  CHF (congestive heart failure)  Hyperlipidemia  Hypertension  COPD (chronic obstructive pulmonary disease)  Chronic obstructive pulmonary disease, unspecified COPD type  Adult Idiopathic Generalized Osteoporosis  CAD (Coronary Artery Disease)  DM (Diabetes Mellitus), Secondary  Hypertension  No significant past surgical history  No significant past surgical history    FAMILY HISTORY:  No pertinent family history in first degree relatives  No pertinent family history in first degree relatives      ALLERGIES: No Known Allergies      CURRENT MEDICATIONS  MEDICATIONS (STANDING): ALBUTerol/ipratropium for Nebulization 3 milliLiter(s) Nebulizer every 6 hours  amLODIPine   Tablet 10 milliGRAM(s) Oral at bedtime  aspirin enteric coated 81 milliGRAM(s) Oral daily  atorvastatin 40 milliGRAM(s) Oral at bedtime  buDESOnide 160 MICROgram(s)/formoterol 4.5 MICROgram(s) Inhaler 2 Puff(s) Inhalation two times a day  calcium acetate 667 milliGRAM(s) Oral three times a day with meals  carvedilol 12.5 milliGRAM(s) Oral every 12 hours  clopidogrel Tablet 75 milliGRAM(s) Oral daily  dextrose 5%. 1000 milliLiter(s) IV Continuous <Continuous>  dextrose 50% Injectable 12.5 Gram(s) IV Push once  dextrose 50% Injectable 25 Gram(s) IV Push once  dextrose 50% Injectable 25 Gram(s) IV Push once  ferrous    sulfate 325 milliGRAM(s) Oral daily  heparin  Injectable 5000 Unit(s) SubCutaneous every 12 hours  hydrALAZINE 100 milliGRAM(s) Oral three times a day  influenza   Vaccine 0.5 milliLiter(s) IntraMuscular once  insulin glargine Injectable (LANTUS) 10 Unit(s) SubCutaneous at bedtime  insulin lispro (HumaLOG) corrective regimen sliding scale   SubCutaneous three times a day before meals  insulin lispro (HumaLOG) corrective regimen sliding scale   SubCutaneous at bedtime  insulin lispro Injectable (HumaLOG) 3 Unit(s) SubCutaneous three times a day before meals  nicotine -  14 mG/24Hr(s) Patch 1 patch Transdermal daily  pantoprazole    Tablet 40 milliGRAM(s) Oral before breakfast  predniSONE   Tablet 40 milliGRAM(s) Oral daily    MEDICATIONS (PRN):acetaminophen   Tablet .. 500 milliGRAM(s) Oral every 4 hours PRN Temp greater or equal to 38.5C (101.3F), Moderate Pain (4 - 6)  ALBUTerol/ipratropium for Nebulization 3 milliLiter(s) Nebulizer every 2 hours PRN Shortness of Breath and/or Wheezing  dextrose 40% Gel 15 Gram(s) Oral once PRN Blood Glucose LESS THAN 70 milliGRAM(s)/deciliter  glucagon  Injectable 1 milliGRAM(s) IntraMuscular once PRN Glucose LESS THAN 70 milligrams/deciliter  nicotine - Inhaler 1 Each Inhalation every 1 hour PRN smoking cessation    --------------------------------------------------------------------------------------------    Vitals:   T(C): 37.1 (11-01-18 @ 14:50), Max: 37.1 (11-01-18 @ 14:50)  HR: 72 (11-01-18 @ 14:50) (61 - 77)  BP: 157/58 (11-01-18 @ 14:50) (118/55 - 157/58)  RR: 20 (11-01-18 @ 14:50) (19 - 22)  SpO2: 92% (11-01-18 @ 14:50) (92% - 99%)  CAPILLARY BLOOD GLUCOSE      POCT Blood Glucose.: 233 mg/dL (01 Nov 2018 17:17)  POCT Blood Glucose.: 246 mg/dL (01 Nov 2018 11:29)  POCT Blood Glucose.: 246 mg/dL (01 Nov 2018 07:31)  POCT Blood Glucose.: 169 mg/dL (31 Oct 2018 21:11)      10-31 @ 07:01  -  11-01 @ 07:00  --------------------------------------------------------  IN:    Oral Fluid: 1020 mL  Total IN: 1020 mL    OUT:    Voided: 850 mL  Total OUT: 850 mL    Total NET: 170 mL      11-01 @ 07:01 - 11-01 @ 19:33  --------------------------------------------------------  IN:    Oral Fluid: 1320 mL  Total IN: 1320 mL    OUT:    Voided: 650 mL  Total OUT: 650 mL    Total NET: 670 mL      Weight (kg): 109.8 (10-31 @ 04:12)    PHYSICAL EXAM:  General: Alert, NAD  Neuro: A+Ox3  HEENT: NC/AT, no asymmetry, no scleral icterus  Cardio: RRR, adequately perfused  Resp: Airway patent, unlabored breathing  Thorax: No chest wall tenderness  GI/Abd: Soft, NT/ND  Vascular: All 4 extremities warm, B/l radial pulses palpable  --------------------------------------------------------------------------------------------    LABS  CBC (11-01 @ 11:41)                              8.7<L>                         2.8<L>  )----------------(  193        --    % Neutrophils, --    % Lymphocytes, ANC: --                                  26.3<L>  CBC (11-01 @ 06:57)                              7.9<L>                         2.26<L>  )----------------(  184        --    % Neutrophils, --    % Lymphocytes, ANC: --                                  26.4<L>    BMP (11-01 @ 05:51)             136     |  95<L>   |  74<H> 		Ca++ --      Ca 8.5                ---------------------------------( 195<H>		Mg 2.2                4.2     |  27      |  4.24<H>			Ph 6.4<H>      Coags (11-01 @ 07:48)  aPTT 33.7 / INR 0.97 / PT 10.8      ABG (10-31 @ 10:11)     7.33<L> / 59<H> / 70<L> / 30<H> / 3.8<H> / 93%     Lactate:

## 2018-11-01 NOTE — PROGRESS NOTE ADULT - SUBJECTIVE AND OBJECTIVE BOX
Gregor Barrow MD  Interventional Cardiology  [place] Office : [phone] [place] [address] [place] [address]  Tel:   [place] Office : [phone] [facility] [place] [address]  Tel: [phone]  Cell : [phone]    Subjective : Pt lying in bed comfortable, not in distress, denies any chest pain or SOB  	  MEDICATIONS:  amLODIPine   Tablet 10 milliGRAM(s) Oral at bedtime  aspirin enteric coated 81 milliGRAM(s) Oral daily  carvedilol 12.5 milliGRAM(s) Oral every 12 hours  clopidogrel Tablet 75 milliGRAM(s) Oral daily  heparin  Injectable 5000 Unit(s) SubCutaneous every 12 hours  hydrALAZINE 100 milliGRAM(s) Oral three times a day      ALBUTerol/ipratropium for Nebulization 3 milliLiter(s) Nebulizer every 6 hours  ALBUTerol/ipratropium for Nebulization 3 milliLiter(s) Nebulizer every 2 hours PRN  buDESOnide 160 MICROgram(s)/formoterol 4.5 MICROgram(s) Inhaler 2 Puff(s) Inhalation two times a day    acetaminophen   Tablet .. 500 milliGRAM(s) Oral every 4 hours PRN    pantoprazole    Tablet 40 milliGRAM(s) Oral before breakfast    atorvastatin 40 milliGRAM(s) Oral at bedtime  dextrose 40% Gel 15 Gram(s) Oral once PRN  dextrose 50% Injectable 12.5 Gram(s) IV Push once  dextrose 50% Injectable 25 Gram(s) IV Push once  dextrose 50% Injectable 25 Gram(s) IV Push once  glucagon  Injectable 1 milliGRAM(s) IntraMuscular once PRN  insulin glargine Injectable (LANTUS) 10 Unit(s) SubCutaneous at bedtime  insulin lispro (HumaLOG) corrective regimen sliding scale   SubCutaneous three times a day before meals  insulin lispro (HumaLOG) corrective regimen sliding scale   SubCutaneous at bedtime  insulin lispro Injectable (HumaLOG) 3 Unit(s) SubCutaneous three times a day before meals  predniSONE   Tablet 40 milliGRAM(s) Oral daily    AQUAPHOR (petrolatum Ointment) 1 Application(s) Topical two times a day  calcium acetate 667 milliGRAM(s) Oral three times a day with meals  dextrose 5%. 1000 milliLiter(s) IV Continuous <Continuous>  ferrous    sulfate 325 milliGRAM(s) Oral daily  influenza   Vaccine 0.5 milliLiter(s) IntraMuscular once  sodium chloride 0.65% Nasal 1 Spray(s) Both Nostrils two times a day PRN      PHYSICAL EXAM:  T(C): 37.1 (11-01-18 @ 14:50), Max: 37.1 (11-01-18 @ 14:50)  HR: 72 (11-01-18 @ 14:50) (61 - 77)  BP: 157/58 (11-01-18 @ 14:50) (118/55 - 157/58)  RR: 20 (11-01-18 @ 14:50) (19 - 22)  SpO2: 92% (11-01-18 @ 14:50) (92% - 99%)  Wt(kg): --  I&O's Summary    31 Oct 2018 07:01  -  01 Nov 2018 07:00  --------------------------------------------------------  IN: 1020 mL / OUT: 850 mL / NET: 170 mL    01 Nov 2018 07:01  -  01 Nov 2018 18:11  --------------------------------------------------------  IN: 840 mL / OUT: 650 mL / NET: 190 mL            Appearance: obese, lethargic    HEENT:   Normal oral mucosa,  Cardiovascular: Normal S1 S2, No JVD, No murmurs, No edema  Respiratory: basal creps    Gastrointestinal:  Soft, Non-tender, + BS	  Extremities: Edema + (chroic)                                    8.7    2.8   )-----------( 193      ( 01 Nov 2018 11:41 )             26.3     11-01    136  |  95<L>  |  74<H>  ----------------------------<  195<H>  4.2   |  27  |  4.24<H>    Ca    8.5      01 Nov 2018 05:51  Phos  6.4     11-01  Mg     2.2     11-01      proBNP:   Lipid Profile:   HgA1c:   TSH:

## 2018-11-01 NOTE — CONSULT NOTE ADULT - ASSESSMENT
73F with worsening renal function, vascular surgery consulted for AV fistula for future dialysis.    - Vein mapping ordered  - Left arm precautions - please do not place IV or draw blood (patient is right handed)  - Will follow  - Patient seen with attending, Dr. Johnson    Vascular surgery  Pager [phone] 73F with worsening renal function, vascular surgery consulted for AV fistula for future dialysis.    - Vein mapping ordered  - Left arm precautions - please do not place IV or draw blood (patient is right handed)  - Will follow  - If patient is to be discharged, can follow up outpatient for surgery  - Patient seen with attending, Dr. Johnson    Vascular surgery  Pager [phone]

## 2018-11-01 NOTE — PROGRESS NOTE ADULT - SUBJECTIVE AND OBJECTIVE BOX
INTERVAL HPI/OVERNIGHT EVENTS: seen and examined with daughter in room . I am off Oxygen as feeling much better . I need black coffee.   Vital Signs Last 24 Hrs  T(C): 36.9 (01 Nov 2018 04:22), Max: 36.9 (01 Nov 2018 04:22)  T(F): 98.5 (01 Nov 2018 04:22), Max: 98.5 (01 Nov 2018 04:22)  HR: 77 (01 Nov 2018 04:22) (55 - 77)  BP: 118/55 (01 Nov 2018 04:22) (114/54 - 127/53)  BP(mean): --  RR: 22 (01 Nov 2018 04:22) (19 - 22)  SpO2: 99% (01 Nov 2018 04:22) (95% - 99%)  I&O's Summary    31 Oct 2018 07:01  -  01 Nov 2018 07:00  --------------------------------------------------------  IN: 1020 mL / OUT: 850 mL / NET: 170 mL    01 Nov 2018 07:01  -  01 Nov 2018 11:50  --------------------------------------------------------  IN: 480 mL / OUT: 0 mL / NET: 480 mL      MEDICATIONS  (STANDING):  ALBUTerol/ipratropium for Nebulization 3 milliLiter(s) Nebulizer every 6 hours  amLODIPine   Tablet 10 milliGRAM(s) Oral at bedtime  AQUAPHOR (petrolatum Ointment) 1 Application(s) Topical two times a day  aspirin enteric coated 81 milliGRAM(s) Oral daily  atorvastatin 40 milliGRAM(s) Oral at bedtime  buDESOnide 160 MICROgram(s)/formoterol 4.5 MICROgram(s) Inhaler 2 Puff(s) Inhalation two times a day  calcium acetate 667 milliGRAM(s) Oral three times a day with meals  carvedilol 12.5 milliGRAM(s) Oral every 12 hours  clopidogrel Tablet 75 milliGRAM(s) Oral daily  dextrose 5%. 1000 milliLiter(s) (50 mL/Hr) IV Continuous <Continuous>  dextrose 50% Injectable 12.5 Gram(s) IV Push once  dextrose 50% Injectable 25 Gram(s) IV Push once  dextrose 50% Injectable 25 Gram(s) IV Push once  ferrous    sulfate 325 milliGRAM(s) Oral daily  furosemide   Injectable 80 milliGRAM(s) IV Push two times a day  heparin  Injectable 5000 Unit(s) SubCutaneous every 12 hours  hydrALAZINE 100 milliGRAM(s) Oral three times a day  influenza   Vaccine 0.5 milliLiter(s) IntraMuscular once  insulin glargine Injectable (LANTUS) 10 Unit(s) SubCutaneous at bedtime  insulin lispro (HumaLOG) corrective regimen sliding scale   SubCutaneous three times a day before meals  insulin lispro (HumaLOG) corrective regimen sliding scale   SubCutaneous at bedtime  insulin lispro Injectable (HumaLOG) 3 Unit(s) SubCutaneous three times a day before meals  nicotine -  14 mG/24Hr(s) Patch 1 patch Transdermal daily  pantoprazole    Tablet 40 milliGRAM(s) Oral before breakfast  predniSONE   Tablet 40 milliGRAM(s) Oral daily    MEDICATIONS  (PRN):  acetaminophen   Tablet .. 500 milliGRAM(s) Oral every 4 hours PRN Temp greater or equal to 38.5C (101.3F), Moderate Pain (4 - 6)  ALBUTerol/ipratropium for Nebulization 3 milliLiter(s) Nebulizer every 2 hours PRN Shortness of Breath and/or Wheezing  dextrose 40% Gel 15 Gram(s) Oral once PRN Blood Glucose LESS THAN 70 milliGRAM(s)/deciliter  glucagon  Injectable 1 milliGRAM(s) IntraMuscular once PRN Glucose LESS THAN 70 milligrams/deciliter  nicotine - Inhaler 1 Each Inhalation every 1 hour PRN smoking cessation  sodium chloride 0.65% Nasal 1 Spray(s) Both Nostrils two times a day PRN Nasal Congestion    LABS:                        7.9    2.26  )-----------( 184      ( 01 Nov 2018 06:57 )             26.4     11-01    136  |  95<L>  |  74<H>  ----------------------------<  195<H>  4.2   |  27  |  4.24<H>    Ca    8.5      01 Nov 2018 05:51  Phos  6.4     11-01  Mg     2.2     11-01    TPro  6.5  /  Alb  3.3  /  TBili  0.4  /  DBili  x   /  AST  6<L>  /  ALT  5<L>  /  AlkPhos  87  10-30    PT/INR - ( 01 Nov 2018 07:48 )   PT: 10.8 sec;   INR: 0.97 ratio         PTT - ( 01 Nov 2018 07:48 )  PTT:33.7 sec    CAPILLARY BLOOD GLUCOSE      POCT Blood Glucose.: 246 mg/dL (01 Nov 2018 11:29)  POCT Blood Glucose.: 246 mg/dL (01 Nov 2018 07:31)  POCT Blood Glucose.: 169 mg/dL (31 Oct 2018 21:11)  POCT Blood Glucose.: 169 mg/dL (31 Oct 2018 17:36)      ABG - ( 31 Oct 2018 10:11 )  pH, Arterial: 7.33  pH, Blood: x     /  pCO2: 59    /  pO2: 70    / HCO3: 30    / Base Excess: 3.8   /  SaO2: 93                  REVIEW OF SYSTEMS:  CONSTITUTIONAL: No fever, weight loss, or fatigue  EYES: No eye pain, visual disturbances, or discharge  ENMT:  No difficulty hearing, tinnitus, vertigo; No sinus or throat pain  NECK: No pain or stiffness  BREASTS: No pain, masses, or nipple discharge  RESPIRATORY: No cough, wheezing, chills or hemoptysis; No shortness of breath  CARDIOVASCULAR: No chest pain, palpitations, dizziness, or leg swelling  GASTROINTESTINAL: No abdominal or epigastric pain. No nausea, vomiting, or hematemesis; No diarrhea or constipation. No melena or hematochezia.  GENITOURINARY: No dysuria, frequency, hematuria, or incontinence  NEUROLOGICAL: No headaches, memory loss, loss of strength, numbness, or tremors  SKIN: No itching, burning, rashes, or lesions   LYMPH NODES: No enlarged glands  ENDOCRINE: No heat or cold intolerance; No hair loss  MUSCULOSKELETAL: No joint pain or swelling; No muscle, back, or extremity pain  PSYCHIATRIC: No depression, anxiety, mood swings, or difficulty sleeping  HEME/LYMPH: No easy bruising, or bleeding gums  ALLERY AND IMMUNOLOGIC: No hives or eczema    RADIOLOGY & ADDITIONAL TESTS:    Consultant(s) Notes Reviewed:  [x ] YES  [ ] NO    PHYSICAL EXAM:  GENERAL: NAD, well-groomed, well-developed,not in any distress ,  HEAD:  Atraumatic, Normocephalic  EYES: EOMI, PERRLA, conjunctiva and sclera clear  ENMT: No tonsillar erythema, exudates, or enlargement; Moist mucous membranes, Good dentition, No lesions  NECK: Supple, No JVD, Normal thyroid  NERVOUS SYSTEM:  Alert & Oriented X3, No focal deficit   CHEST/LUNG: Good air entry bilateral with no  rales, rhonchi, wheezing, or rubs  HEART: Regular rate and rhythm; No murmurs, rubs, or gallops  ABDOMEN: Soft, Nontender, Nondistended; Bowel sounds present  EXTREMITIES:  2+ Peripheral Pulses, No clubbing, cyanosis, or edema  SKIN: No rashes or lesions    Care Discussed with Consultants/Other Providers [ x] YES  [ ] NO

## 2018-11-01 NOTE — PROGRESS NOTE ADULT - ASSESSMENT
EkG : Likely SR with Atypical LBBB.    Echo < from: Transthoracic Echocardiogram (10.31.18 @ 09:40) >  1. Mitral annular calcification and calcified and tethered  mitral leaflets with normal diastolic opening. Moderate  mitral regurgitation.  2. Severely dilated left atrium.  LA volume index = 51  cc/m2.  3. Moderate left ventricular enlargement.  4. Mild global left ventricular systolic dysfunction.  5. The right ventricle is not well visualized; grossly  normal right ventricular systolic function.  6. Estimated pulmonary artery systolic pressure equals 20  mm Hg, assuming right atrial pressure equals 8  mm Hg,  consistent with normal pulmonary pressures.  7. Small circumferential pericardial effusion.        < end of copied text >    Assessement and Plan:    SOB: Improved sating 97% on ambulation , D/C lasix and monitor  u/o creat and resp status     New onset LV dysfunction: Given MELISSA would recommend nuclear stress for Ischemic work up     COPD: On nebs Pulm on board     MELISSA on CKD: Renal function worse than baseline , renal on board , monitor renal function , hold diuresis for now      DVT PPX heparin

## 2018-11-01 NOTE — CONSULT NOTE ADULT - ATTENDING COMMENTS
Pt. was seen and examined. Agree with above. Plan d/w the team.  new onset ESRD now requiring HD  pt. needs HD access  will plan for AVF creation as outpt  protect L arm   vein mapping  please document medical clearance for procedure

## 2018-11-01 NOTE — PROGRESS NOTE ADULT - SUBJECTIVE AND OBJECTIVE BOX
INTERVAL HPI/OVERNIGHT EVENTS: I feel better an doff oxygen . Daughter in room.   Vital Signs Last 24 Hrs  T(C): 36.9 (01 Nov 2018 21:04), Max: 37.1 (01 Nov 2018 14:50)  T(F): 98.5 (01 Nov 2018 21:04), Max: 98.7 (01 Nov 2018 14:50)  HR: 78 (01 Nov 2018 21:04) (72 - 78)  BP: 147/62 (01 Nov 2018 21:04) (118/55 - 157/58)  BP(mean): --  RR: 18 (01 Nov 2018 21:04) (18 - 22)  SpO2: 91% (01 Nov 2018 21:04) (91% - 99%)  I&O's Summary    31 Oct 2018 07:01  -  01 Nov 2018 07:00  --------------------------------------------------------  IN: 1020 mL / OUT: 850 mL / NET: 170 mL    01 Nov 2018 07:01  -  01 Nov 2018 21:39  --------------------------------------------------------  IN: 1320 mL / OUT: 650 mL / NET: 670 mL      MEDICATIONS  (STANDING):  ALBUTerol/ipratropium for Nebulization 3 milliLiter(s) Nebulizer every 6 hours  amLODIPine   Tablet 10 milliGRAM(s) Oral at bedtime  AQUAPHOR (petrolatum Ointment) 1 Application(s) Topical two times a day  aspirin enteric coated 81 milliGRAM(s) Oral daily  atorvastatin 40 milliGRAM(s) Oral at bedtime  buDESOnide 160 MICROgram(s)/formoterol 4.5 MICROgram(s) Inhaler 2 Puff(s) Inhalation two times a day  calcium acetate 667 milliGRAM(s) Oral three times a day with meals  carvedilol 12.5 milliGRAM(s) Oral every 12 hours  clopidogrel Tablet 75 milliGRAM(s) Oral daily  dextrose 5%. 1000 milliLiter(s) (50 mL/Hr) IV Continuous <Continuous>  dextrose 50% Injectable 12.5 Gram(s) IV Push once  dextrose 50% Injectable 25 Gram(s) IV Push once  dextrose 50% Injectable 25 Gram(s) IV Push once  ferrous    sulfate 325 milliGRAM(s) Oral daily  heparin  Injectable 5000 Unit(s) SubCutaneous every 12 hours  hydrALAZINE 100 milliGRAM(s) Oral three times a day  influenza   Vaccine 0.5 milliLiter(s) IntraMuscular once  insulin glargine Injectable (LANTUS) 10 Unit(s) SubCutaneous at bedtime  insulin lispro (HumaLOG) corrective regimen sliding scale   SubCutaneous three times a day before meals  insulin lispro (HumaLOG) corrective regimen sliding scale   SubCutaneous at bedtime  insulin lispro Injectable (HumaLOG) 3 Unit(s) SubCutaneous three times a day before meals  nicotine -  14 mG/24Hr(s) Patch 1 patch Transdermal daily  pantoprazole    Tablet 40 milliGRAM(s) Oral before breakfast  predniSONE   Tablet 40 milliGRAM(s) Oral daily    MEDICATIONS  (PRN):  acetaminophen   Tablet .. 500 milliGRAM(s) Oral every 4 hours PRN Temp greater or equal to 38.5C (101.3F), Moderate Pain (4 - 6)  ALBUTerol/ipratropium for Nebulization 3 milliLiter(s) Nebulizer every 2 hours PRN Shortness of Breath and/or Wheezing  dextrose 40% Gel 15 Gram(s) Oral once PRN Blood Glucose LESS THAN 70 milliGRAM(s)/deciliter  glucagon  Injectable 1 milliGRAM(s) IntraMuscular once PRN Glucose LESS THAN 70 milligrams/deciliter  nicotine - Inhaler 1 Each Inhalation every 1 hour PRN smoking cessation  sodium chloride 0.65% Nasal 1 Spray(s) Both Nostrils two times a day PRN Nasal Congestion    LABS:                        8.7    2.8   )-----------( 193      ( 01 Nov 2018 11:41 )             26.3     11-01    136  |  95<L>  |  74<H>  ----------------------------<  195<H>  4.2   |  27  |  4.24<H>    Ca    8.5      01 Nov 2018 05:51  Phos  6.4     11-01  Mg     2.2     11-01      PT/INR - ( 01 Nov 2018 07:48 )   PT: 10.8 sec;   INR: 0.97 ratio         PTT - ( 01 Nov 2018 07:48 )  PTT:33.7 sec    CAPILLARY BLOOD GLUCOSE      POCT Blood Glucose.: 233 mg/dL (01 Nov 2018 17:17)  POCT Blood Glucose.: 246 mg/dL (01 Nov 2018 11:29)  POCT Blood Glucose.: 246 mg/dL (01 Nov 2018 07:31)      ABG - ( 31 Oct 2018 10:11 )  pH, Arterial: 7.33  pH, Blood: x     /  pCO2: 59    /  pO2: 70    / HCO3: 30    / Base Excess: 3.8   /  SaO2: 93                  REVIEW OF SYSTEMS:  CONSTITUTIONAL: No fever, weight loss, or fatigue  EYES: No eye pain, visual disturbances, or discharge  ENMT:  No difficulty hearing, tinnitus, vertigo; No sinus or throat pain  NECK: No pain or stiffness  RESPIRATORY: No cough, wheezing, chills or hemoptysis; No shortness of breath  CARDIOVASCULAR: No chest pain, palpitations, dizziness, or leg swelling  GASTROINTESTINAL: No abdominal or epigastric pain. No nausea, vomiting, or hematemesis; No diarrhea or constipation. No melena or hematochezia.  GENITOURINARY: No dysuria, frequency, hematuria, or incontinence  NEUROLOGICAL: No headaches, memory loss, loss of strength, numbness, or tremors    Consultant(s) Notes Reviewed:  [x ] YES  [ ] NO    PHYSICAL EXAM:  GENERAL: NAD, Obese ,not in any distress ,pale looking   HEAD:  Atraumatic, Normocephalic  EYES: EOMI, PERRLA, conjunctiva and sclera clear  ENMT: No tonsillar erythema, exudates, or enlargement; Moist mucous membranes, Good dentition, No lesions  NECK: Supple, No JVD, Normal thyroid  NERVOUS SYSTEM:  Alert & Oriented X3, No focal deficit   CHEST/LUNG: Good air entry bilateral with B/L  rales,   HEART: Regular rate and rhythm; No murmurs, rubs, or gallops  ABDOMEN: Soft, Nontender, Nondistended; Bowel sounds present  EXTREMITIES:  2+ Peripheral Pulses, No clubbing, cyanosis, or edema    Care Discussed with Consultants/Other Providers [ x] YES  [ ] NO

## 2018-11-01 NOTE — PROGRESS NOTE ADULT - ASSESSMENT
74 y/o F COPD, CHF, HTN, HLD, DM2,Anemia ,CKD ,smoking x60+ years, presenting with shortness of breath worsening for one month. Patient was diagnosed with flu x2 weeks ago after her daughter had flu, and had significant shortness of breath with cough and congestion as well as shortness of breath at that time. She reports now that for the last week her shortness of breath is intermittent but has been worsening in severity. She states that she uses nebulizer once daily for her COPD, but yesterday had to use it multiple more times. Reports still feeling congested and intermittently coughing up mucous without any blood. She denies any fevers or chills. Went to her PMD today for increased shortness of breath and was told to come here concerned for fluid on the lungs.      Problem/Plan - 1:  ·  Problem: Acute respiratory failure with hypoxia.  Plan: Secondary to CHF as well COPD exacerbation. Oxygen to Keep Saturation 92% or above. Pulmonary helping.       Problem/Plan - 2:  ·  Problem: Chronic obstructive pulmonary disease with acute exacerbation.  Plan: Neb Rx and Oxygen . Advised to quit smoking . Pulmonary consult noted.       Problem/Plan - 3:  ·  Problem: Acute on chronic congestive heart failure, unspecified heart failure type.  Plan: IV Lasix and TTE results pending. CT chest pending. Cardiology consulted.      Problem/Plan - 4:  ·  Problem: Stage 4 chronic kidney disease with MELISSA .  Plan: Renal helping and may need HD so getting Outpt AVF.      Problem/Plan - 5:  ·  Problem: Diabetes mellitus.  Plan: Lantus and SSI for now. Sugars are high so Lantus added and also on steroid.      Problem/Plan - 6:  Problem: Hypertension. Plan: BP meds with parameters. BP readings fine.      Problem/Plan - 7:  ·  Problem: Anemia due to chronic kidney disease.  Plan: Chronic .  Watching CBC . Will  check B12 and folate also.       Problem/Plan - 8:  ·  Problem: Smoker.  Plan: Advised to quit.

## 2018-11-02 LAB
ANION GAP SERPL CALC-SCNC: 12 MMOL/L — SIGNIFICANT CHANGE UP (ref 5–17)
BASOPHILS # BLD AUTO: 0 K/UL — SIGNIFICANT CHANGE UP (ref 0–0.2)
BASOPHILS NFR BLD AUTO: 0 % — SIGNIFICANT CHANGE UP (ref 0–2)
BUN SERPL-MCNC: 86 MG/DL — HIGH (ref 7–23)
CALCIUM SERPL-MCNC: 8.7 MG/DL — SIGNIFICANT CHANGE UP (ref 8.4–10.5)
CHLORIDE SERPL-SCNC: 94 MMOL/L — LOW (ref 96–108)
CO2 SERPL-SCNC: 27 MMOL/L — SIGNIFICANT CHANGE UP (ref 22–31)
CREAT SERPL-MCNC: 4.35 MG/DL — HIGH (ref 0.5–1.3)
EOSINOPHIL # BLD AUTO: 0 K/UL — SIGNIFICANT CHANGE UP (ref 0–0.5)
EOSINOPHIL NFR BLD AUTO: 0 % — SIGNIFICANT CHANGE UP (ref 0–6)
FOLATE SERPL-MCNC: 3.7 NG/ML — LOW
GLUCOSE BLDC GLUCOMTR-MCNC: 136 MG/DL — HIGH (ref 70–99)
GLUCOSE BLDC GLUCOMTR-MCNC: 141 MG/DL — HIGH (ref 70–99)
GLUCOSE BLDC GLUCOMTR-MCNC: 247 MG/DL — HIGH (ref 70–99)
GLUCOSE BLDC GLUCOMTR-MCNC: 329 MG/DL — HIGH (ref 70–99)
GLUCOSE SERPL-MCNC: 126 MG/DL — HIGH (ref 70–99)
HCT VFR BLD CALC: 24.2 % — LOW (ref 34.5–45)
HGB BLD-MCNC: 7.7 G/DL — LOW (ref 11.5–15.5)
LYMPHOCYTES # BLD AUTO: 0.43 K/UL — LOW (ref 1–3.3)
LYMPHOCYTES # BLD AUTO: 11.9 % — LOW (ref 13–44)
MCHC RBC-ENTMCNC: 27.4 PG — SIGNIFICANT CHANGE UP (ref 27–34)
MCHC RBC-ENTMCNC: 31.8 GM/DL — LOW (ref 32–36)
MCV RBC AUTO: 86.1 FL — SIGNIFICANT CHANGE UP (ref 80–100)
MONOCYTES # BLD AUTO: 0.12 K/UL — SIGNIFICANT CHANGE UP (ref 0–0.9)
MONOCYTES NFR BLD AUTO: 3.4 % — SIGNIFICANT CHANGE UP (ref 2–14)
NEUTROPHILS # BLD AUTO: 3.05 K/UL — SIGNIFICANT CHANGE UP (ref 1.8–7.4)
NEUTROPHILS NFR BLD AUTO: 84.7 % — HIGH (ref 43–77)
PHOSPHATE SERPL-MCNC: 5.4 MG/DL — HIGH (ref 2.5–4.5)
PLATELET # BLD AUTO: 201 K/UL — SIGNIFICANT CHANGE UP (ref 150–400)
POTASSIUM SERPL-MCNC: 4.5 MMOL/L — SIGNIFICANT CHANGE UP (ref 3.5–5.3)
POTASSIUM SERPL-SCNC: 4.5 MMOL/L — SIGNIFICANT CHANGE UP (ref 3.5–5.3)
RBC # BLD: 2.81 M/UL — LOW (ref 3.8–5.2)
RBC # FLD: 16.8 % — HIGH (ref 10.3–14.5)
SODIUM SERPL-SCNC: 133 MMOL/L — LOW (ref 135–145)
TSH SERPL-MCNC: 0.58 UIU/ML — SIGNIFICANT CHANGE UP (ref 0.27–4.2)
VIT B12 SERPL-MCNC: 345 PG/ML — SIGNIFICANT CHANGE UP (ref 232–1245)
WBC # BLD: 3.6 K/UL — LOW (ref 3.8–10.5)
WBC # FLD AUTO: 3.6 K/UL — LOW (ref 3.8–10.5)

## 2018-11-02 PROCEDURE — 93970 EXTREMITY STUDY: CPT | Mod: 26

## 2018-11-02 RX ORDER — FUROSEMIDE 40 MG
80 TABLET ORAL
Qty: 0 | Refills: 0 | Status: DISCONTINUED | OUTPATIENT
Start: 2018-11-02 | End: 2018-11-08

## 2018-11-02 RX ORDER — ERYTHROPOIETIN 10000 [IU]/ML
10000 INJECTION, SOLUTION INTRAVENOUS; SUBCUTANEOUS
Qty: 0 | Refills: 0 | Status: DISCONTINUED | OUTPATIENT
Start: 2018-11-02 | End: 2018-11-08

## 2018-11-02 RX ORDER — IRON SUCROSE 20 MG/ML
100 INJECTION, SOLUTION INTRAVENOUS EVERY 24 HOURS
Qty: 0 | Refills: 0 | Status: COMPLETED | OUTPATIENT
Start: 2018-11-02 | End: 2018-11-06

## 2018-11-02 RX ADMIN — Medication 100 MILLIGRAM(S): at 05:26

## 2018-11-02 RX ADMIN — PANTOPRAZOLE SODIUM 40 MILLIGRAM(S): 20 TABLET, DELAYED RELEASE ORAL at 05:26

## 2018-11-02 RX ADMIN — Medication 667 MILLIGRAM(S): at 17:22

## 2018-11-02 RX ADMIN — Medication 4: at 12:09

## 2018-11-02 RX ADMIN — Medication 3 MILLILITER(S): at 23:09

## 2018-11-02 RX ADMIN — Medication 667 MILLIGRAM(S): at 08:36

## 2018-11-02 RX ADMIN — Medication 3 UNIT(S): at 08:36

## 2018-11-02 RX ADMIN — IRON SUCROSE 210 MILLIGRAM(S): 20 INJECTION, SOLUTION INTRAVENOUS at 15:48

## 2018-11-02 RX ADMIN — Medication 667 MILLIGRAM(S): at 12:10

## 2018-11-02 RX ADMIN — Medication 1 PATCH: at 08:36

## 2018-11-02 RX ADMIN — GABAPENTIN 300 MILLIGRAM(S): 400 CAPSULE ORAL at 21:33

## 2018-11-02 RX ADMIN — ERYTHROPOIETIN 10000 UNIT(S): 10000 INJECTION, SOLUTION INTRAVENOUS; SUBCUTANEOUS at 15:48

## 2018-11-02 RX ADMIN — Medication 1 APPLICATION(S): at 17:27

## 2018-11-02 RX ADMIN — HEPARIN SODIUM 5000 UNIT(S): 5000 INJECTION INTRAVENOUS; SUBCUTANEOUS at 05:26

## 2018-11-02 RX ADMIN — CARVEDILOL PHOSPHATE 12.5 MILLIGRAM(S): 80 CAPSULE, EXTENDED RELEASE ORAL at 12:11

## 2018-11-02 RX ADMIN — Medication 3 UNIT(S): at 12:09

## 2018-11-02 RX ADMIN — Medication 80 MILLIGRAM(S): at 17:22

## 2018-11-02 RX ADMIN — Medication 2: at 21:57

## 2018-11-02 RX ADMIN — HEPARIN SODIUM 5000 UNIT(S): 5000 INJECTION INTRAVENOUS; SUBCUTANEOUS at 17:22

## 2018-11-02 RX ADMIN — BUDESONIDE AND FORMOTEROL FUMARATE DIHYDRATE 2 PUFF(S): 160; 4.5 AEROSOL RESPIRATORY (INHALATION) at 17:22

## 2018-11-02 RX ADMIN — Medication 3 MILLILITER(S): at 12:10

## 2018-11-02 RX ADMIN — Medication 100 MILLIGRAM(S): at 21:33

## 2018-11-02 RX ADMIN — Medication 3 UNIT(S): at 17:22

## 2018-11-02 RX ADMIN — CARVEDILOL PHOSPHATE 12.5 MILLIGRAM(S): 80 CAPSULE, EXTENDED RELEASE ORAL at 21:33

## 2018-11-02 RX ADMIN — Medication 1 APPLICATION(S): at 05:26

## 2018-11-02 RX ADMIN — ATORVASTATIN CALCIUM 40 MILLIGRAM(S): 80 TABLET, FILM COATED ORAL at 21:33

## 2018-11-02 RX ADMIN — Medication 3 MILLILITER(S): at 05:25

## 2018-11-02 RX ADMIN — Medication 325 MILLIGRAM(S): at 12:10

## 2018-11-02 RX ADMIN — AMLODIPINE BESYLATE 10 MILLIGRAM(S): 2.5 TABLET ORAL at 21:33

## 2018-11-02 RX ADMIN — Medication 40 MILLIGRAM(S): at 05:26

## 2018-11-02 RX ADMIN — BUDESONIDE AND FORMOTEROL FUMARATE DIHYDRATE 2 PUFF(S): 160; 4.5 AEROSOL RESPIRATORY (INHALATION) at 05:26

## 2018-11-02 RX ADMIN — Medication 1 PATCH: at 12:12

## 2018-11-02 RX ADMIN — Medication 81 MILLIGRAM(S): at 12:10

## 2018-11-02 RX ADMIN — Medication 100 MILLIGRAM(S): at 15:48

## 2018-11-02 RX ADMIN — INSULIN GLARGINE 10 UNIT(S): 100 INJECTION, SOLUTION SUBCUTANEOUS at 21:57

## 2018-11-02 RX ADMIN — Medication 3 MILLILITER(S): at 17:22

## 2018-11-02 RX ADMIN — CLOPIDOGREL BISULFATE 75 MILLIGRAM(S): 75 TABLET, FILM COATED ORAL at 12:09

## 2018-11-02 NOTE — DIETITIAN INITIAL EVALUATION ADULT. - PERTINENT MEDS FT
Epogen, Lasix, Venofer IVPB, Plavix, Heparin, Phoslo, Lantus, HumaLOG, Deltasone, Norvasc Lipitor, Coreg, ferrous sulfate, apresoline, Protonix.

## 2018-11-02 NOTE — DIETITIAN INITIAL EVALUATION ADULT. - SIGNS/SYMPTOMS
Pt reports of not wanting to limit diet, pt consuming excess fluid Pt reports of not wanting to limit diet, pt consuming excess fluid, pt unaware of any diet PTA reports of reduced po intake and lack of appetite over past month

## 2018-11-02 NOTE — PROGRESS NOTE ADULT - ASSESSMENT
74 y/o F COPD, CHF, HTN, HLD, DM2,Anemia ,CKD ,smoking x60+ years, presenting with shortness of breath worsening for one month. Patient was diagnosed with flu x2 weeks ago after her daughter had flu, and had significant shortness of breath with cough and congestion as well as shortness of breath at that time. She reports now that for the last week her shortness of breath is intermittent but has been worsening in severity. She states that she uses nebulizer once daily for her COPD, but yesterday had to use it multiple more times. Reports still feeling congested and intermittently coughing up mucous without any blood. She denies any fevers or chills. Went to her PMD today for increased shortness of breath and was told to come here concerned for fluid on the lungs.      Problem/Plan - 1:  ·  Problem: Acute respiratory failure with hypoxia.  Plan: Secondary to CHF as well COPD exacerbation. Oxygen to Keep Saturation 92% or above. Pulmonary helping.       Problem/Plan - 2:  ·  Problem: Chronic obstructive pulmonary disease with acute exacerbation.  Plan: Neb Rx and Oxygen . Advised to quit smoking . Pulmonary helping.       Problem/Plan - 3:  ·  Problem: Acute on chronic Systolic congestive heart failure .  Plan: IV Lasix and TTE noted... < from: Transthoracic Echocardiogram (10.31.18 @ 09:40) >  Conclusions:  1. Mitral annular calcification and calcified and tethered  mitral leaflets with normal diastolic opening. Moderate  mitral regurgitation.  2. Severely dilated left atrium.  LA volume index = 51  cc/m2.  3. Moderate left ventricular enlargement.  4. Mild global left ventricular systolic dysfunction.  5. The right ventricle is not well visualized; grossly  normal right ventricular systolic function.  6. Estimated pulmonary artery systolic pressure equals 20  mm Hg, assuming right atrial pressure equals 8  mm Hg,  consistent with normal pulmonary pressures.  7. Small circumferential pericardial effusion.  *** No previous Echo exam.    < end of copied text >  CT chest ... < from: CT Chest No Cont (10.31.18 @ 12:07) >  IMPRESSION:     Interlobular septal thickening, bilateral diffuse groundglass opacities,   and small bilateral pleural effusions are consistent with pulmonary edema.    Left sided endobronchial nodularity likely reflect secretions. Follow-up   CT chest is recommended in 1 month to ensure resolution. The   endobronchial nodularity can also be reassessed at that time.    < end of copied text >  Cardiology helping and planning NST.      Problem/Plan - 4:  ·  Problem: Stage 4 chronic kidney disease with MELISSA .  Plan: Renal helping and may need HD so getting Outpt AVF.      Problem/Plan - 5:  ·  Problem: Diabetes mellitus.  Plan: Lantus and SSI for now. Sugars are high so Lantus added and also on steroid.      Problem/Plan - 6:  Problem: Hypertension. Plan: BP meds with parameters. BP readings fine.      Problem/Plan - 7:  ·  Problem: Anemia due to chronic kidney disease.  Plan: Chronic .  Watching CBC . On Epogen .      Problem/Plan - 8:  ·  Problem: Smoker.  Plan: Advised to quit.

## 2018-11-02 NOTE — DIETITIAN INITIAL EVALUATION ADULT. - PERTINENT LABORATORY DATA
POCT (11/1/18) @ 7:31 246, @11:29 246, @17:17 233, @21:50 244. (11/2/18) @7:43 141. @11:41 247, Glucose (11/1/18) 195, (11/2/18) 126..

## 2018-11-02 NOTE — DIETITIAN INITIAL EVALUATION ADULT. - NS AS NUTRI DX KNOWLEDGE BELIEFS
Food - and nutrition - related knowledge deficit/Not ready for diet/lifestyle change/Undesirable food choices Food - and nutrition - related knowledge deficit/Undesirable food choices/Not ready for diet/lifestyle change/Limited adherence to nutrition - related recommendations Food - and nutrition - related knowledge deficit

## 2018-11-02 NOTE — DIETITIAN INITIAL EVALUATION ADULT. - ORAL INTAKE PTA
Pt lives with daughter. Daughter reports pt with poor appetite and po intake. Dietary recall obtained and daughter reports pt will consume a few bites at each meal and then states she is full. Daughter suspects it may be secondary to fluid retention./poor

## 2018-11-02 NOTE — PHYSICAL THERAPY INITIAL EVALUATION ADULT - NAME OF DISCHARGE PLANNER
Appointment given to discuss.  
Patients mom calling as the patient has not gotten a period since she has been off of the birth control (3 to 4 months ago). Mom is wondering if she is at the stage where a period needs to be forced. Mom also states the patient is starting to lose more hair than normal. This started about a month ago. Is there any correlation?    Please call to discuss    Thank you   
Anabell (covering)

## 2018-11-02 NOTE — PROGRESS NOTE ADULT - SUBJECTIVE AND OBJECTIVE BOX
[facility]   MD CHAPIN MCADAMS MD RUORU WONG, PA    TEL:  OFFICE: [phone]  DR ALCANTAR CELL: [phone]  YAMILET BOATENG CELL: [phone]  DR. ACOSTA CELL: [phone]    RENAL FOLLOW UP NOTE  --------------------------------------------------------------------------------  HPI:      Pt seen and examined at bedside.   NO chest pain   + SOB    PAST HISTORY  --------------------------------------------------------------------------------  No significant changes to PMH, PSH, FHx, SHx, unless otherwise noted    ALLERGIES & MEDICATIONS  --------------------------------------------------------------------------------  Allergies    No Known Allergies    Intolerances      Standing Inpatient Medications  ALBUTerol/ipratropium for Nebulization 3 milliLiter(s) Nebulizer every 6 hours  amLODIPine   Tablet 10 milliGRAM(s) Oral at bedtime  AQUAPHOR (petrolatum Ointment) 1 Application(s) Topical two times a day  aspirin enteric coated 81 milliGRAM(s) Oral daily  atorvastatin 40 milliGRAM(s) Oral at bedtime  buDESOnide 160 MICROgram(s)/formoterol 4.5 MICROgram(s) Inhaler 2 Puff(s) Inhalation two times a day  calcium acetate 667 milliGRAM(s) Oral three times a day with meals  carvedilol 12.5 milliGRAM(s) Oral every 12 hours  clopidogrel Tablet 75 milliGRAM(s) Oral daily  dextrose 5%. 1000 milliLiter(s) IV Continuous <Continuous>  dextrose 50% Injectable 12.5 Gram(s) IV Push once  dextrose 50% Injectable 25 Gram(s) IV Push once  dextrose 50% Injectable 25 Gram(s) IV Push once  ferrous    sulfate 325 milliGRAM(s) Oral daily  gabapentin 300 milliGRAM(s) Oral at bedtime  heparin  Injectable 5000 Unit(s) SubCutaneous every 12 hours  hydrALAZINE 100 milliGRAM(s) Oral three times a day  influenza   Vaccine 0.5 milliLiter(s) IntraMuscular once  insulin glargine Injectable (LANTUS) 10 Unit(s) SubCutaneous at bedtime  insulin lispro (HumaLOG) corrective regimen sliding scale   SubCutaneous three times a day before meals  insulin lispro (HumaLOG) corrective regimen sliding scale   SubCutaneous at bedtime  insulin lispro Injectable (HumaLOG) 3 Unit(s) SubCutaneous three times a day before meals  nicotine -  14 mG/24Hr(s) Patch 1 patch Transdermal daily  pantoprazole    Tablet 40 milliGRAM(s) Oral before breakfast  predniSONE   Tablet 40 milliGRAM(s) Oral daily    PRN Inpatient Medications  acetaminophen   Tablet .. 500 milliGRAM(s) Oral every 4 hours PRN  ALBUTerol/ipratropium for Nebulization 3 milliLiter(s) Nebulizer every 2 hours PRN  dextrose 40% Gel 15 Gram(s) Oral once PRN  glucagon  Injectable 1 milliGRAM(s) IntraMuscular once PRN  nicotine - Inhaler 1 Each Inhalation every 1 hour PRN  sodium chloride 0.65% Nasal 1 Spray(s) Both Nostrils two times a day PRN      REVIEW OF SYSTEMS  --------------------------------------------------------------------------------  General: no fever  CVS: no chest pain  RESP: improving  sob, no cough  ABD: no abdominal pain  : no dysuria,  MSK: ++ edema     VITALS/PHYSICAL EXAM  --------------------------------------------------------------------------------  T(C): 36.9 (11-02-18 @ 04:09), Max: 37.1 (11-01-18 @ 14:50)  HR: 74 (11-02-18 @ 12:08) (70 - 78)  BP: 148/64 (11-02-18 @ 12:08) (138/67 - 157/58)  RR: 18 (11-02-18 @ 04:09) (18 - 20)  SpO2: 100% (11-02-18 @ 04:09) (91% - 100%)  Wt(kg): --  Height (cm): 157.48 (11-01-18 @ 20:19)      11-01-18 @ 07:01  -  11-02-18 @ 07:00  --------------------------------------------------------  IN: 1320 mL / OUT: 850 mL / NET: 470 mL      Physical Exam:  	Gen: NAD  	HEENT: MMM  	Pulm: Crackles B/L  	CV: S1S2  	Abd: Soft, +BS  	Ext: ++ LE edema B/L                      Neuro: Awake   	Skin: Warm and Dry   	  LABS/STUDIES  --------------------------------------------------------------------------------              7.7    3.60  >-----------<  201      [11-02-18 @ 07:46]              24.2     133  |  94  |  86  ----------------------------<  126      [11-02-18 @ 05:42]  4.5   |  27  |  4.35        Ca     8.7     [11-02-18 @ 05:42]      Mg     2.2     [11-01-18 @ 05:51]      Phos  5.4     [11-02-18 @ 05:42]      PT/INR: PT 10.8 , INR 0.97       [11-01-18 @ 07:48]  PTT: 33.7       [11-01-18 @ 07:48]          [11-01-18 @ 11:41]    Creatinine Trend:  SCr 4.35 [11-02 @ 05:42]  SCr 4.24 [11-01 @ 05:51]  SCr 3.88 [10-31 @ 06:00]  SCr 3.93 [10-30 @ 15:36]        Iron 32, TIBC 244, %sat 13      [10-31-18 @ 09:21]  Ferritin 142      [11-01-18 @ 13:52]  PTH -- (Ca 8.7)      [10-31-18 @ 09:24]   269  HbA1c 5.6      [10-31-18 @ 07:56]

## 2018-11-02 NOTE — DIETITIAN INITIAL EVALUATION ADULT. - SOURCE
patient/Electronic Medical Record Electronic Medical Record, daughter at bedside/patient/other (specify)

## 2018-11-02 NOTE — DIETITIAN INITIAL EVALUATION ADULT. - NS AS NUTRI INTERV ED CONTENT
Discussed with pt the importance of a diet low in phosphorus, potassium, and sodium due to advancing CKD. Educated pt on appropriate food choices and gave pt and daugter handout on foods low in phosphorus, potassium and sodium. Pt was not very open to change however, pt lives with daughter and daughter cooks at home and she was very open./Nutrition relationship to health/disease

## 2018-11-02 NOTE — DIETITIAN INITIAL EVALUATION ADULT. - NS AS NUTRI INTERV MEDICAL AND FOOD SUPPLEMENTS
Due to pt's poor po intake, discussed with pt recommend adding Chocolate Nepro. Will add to tray BID./Commercial beverage

## 2018-11-02 NOTE — DIETITIAN INITIAL EVALUATION ADULT. - NS AS NUTRI INTERV MEDICAL AND FOOD SUPPLEMENTS3
Commercial beverage/Due to pt's poor po intake, discussed with pt recommend adding Nepro. Will add to tray BID.

## 2018-11-02 NOTE — DIETITIAN INITIAL EVALUATION ADULT. - NS AS NUTRI INTERV COLLABORAT
Collaboration with other nutrition professionals/Discussed with NP about pt consuming excess fluid. Recommended possible fluid retention. Discussed with NP about pt consuming excess fluid. Recommended possible fluid retention. Changed diet to low sodium and low phosphorus./Collaboration with other nutrition professionals

## 2018-11-02 NOTE — DIETITIAN INITIAL EVALUATION ADULT. - NUTRITION INTERVENTION
Medical Food Supplements/Collaboration and Referral of Nutrition Care/Nutrition Education Nutrition Education/Collaboration and Referral of Nutrition Care Medical Food Supplements

## 2018-11-02 NOTE — DIETITIAN INITIAL EVALUATION ADULT. - OTHER INFO
Pt seen by nutrition for consult for nutrition assessment. Pt reports poor appetite and po intake with about 35% of meal tray. Poor po intake due to pt's reports of not liking the institutions food and due to recent chronic lack of appetite. Pt reports excess consumption of water. Pt endorses recent weight loss and reports recently going down 2 pant sizes. Since admission, pt's wt have been fluctuation due to weight retention (10/31 242 pounds, 11/2 253 pounds). Pt reports usual weight as 248 pounds but does not check regularly due to report of "not believing in scales." Pt denies N/V, D/V (last bowel movement 11/1) and no issues swallowing. Pt is missing dentition and chooses soft foods to chew. NKFA. Pt seen by nutrition for consult for nutrition assessment. Pt reports poor appetite and po intake with about 35% of meal tray. Poor po intake due to pt's reports of not liking the institutions food and due to recent chronic lack of appetite. Pt reports consuming a lot of water. Pt endorses recent weight loss and reports recently going down 2 pant sizes. Since admission, pt's wt have been fluctuation due to weight retention (10/31 242 pounds, 11/2 253 pounds). Pt reports usual weight as 248 pounds but does not check regularly due to report of "not believing in scales." Pt denies N/V, D/V (last bowel movement 11/1) and no issues swallowing. Pt is missing dentition and chooses soft foods to chew. NKFA.

## 2018-11-02 NOTE — PROGRESS NOTE ADULT - SUBJECTIVE AND OBJECTIVE BOX
Patient is a 73y old  Female who presents with a chief complaint of SOB since 2 weeks (01 Nov 2018 21:39)      Any change in ROS: Doing ok : no SOB     MEDICATIONS  (STANDING):  ALBUTerol/ipratropium for Nebulization 3 milliLiter(s) Nebulizer every 6 hours  amLODIPine   Tablet 10 milliGRAM(s) Oral at bedtime  AQUAPHOR (petrolatum Ointment) 1 Application(s) Topical two times a day  aspirin enteric coated 81 milliGRAM(s) Oral daily  atorvastatin 40 milliGRAM(s) Oral at bedtime  buDESOnide 160 MICROgram(s)/formoterol 4.5 MICROgram(s) Inhaler 2 Puff(s) Inhalation two times a day  calcium acetate 667 milliGRAM(s) Oral three times a day with meals  carvedilol 12.5 milliGRAM(s) Oral every 12 hours  clopidogrel Tablet 75 milliGRAM(s) Oral daily  dextrose 5%. 1000 milliLiter(s) (50 mL/Hr) IV Continuous <Continuous>  dextrose 50% Injectable 12.5 Gram(s) IV Push once  dextrose 50% Injectable 25 Gram(s) IV Push once  dextrose 50% Injectable 25 Gram(s) IV Push once  ferrous    sulfate 325 milliGRAM(s) Oral daily  gabapentin 300 milliGRAM(s) Oral at bedtime  heparin  Injectable 5000 Unit(s) SubCutaneous every 12 hours  hydrALAZINE 100 milliGRAM(s) Oral three times a day  influenza   Vaccine 0.5 milliLiter(s) IntraMuscular once  insulin glargine Injectable (LANTUS) 10 Unit(s) SubCutaneous at bedtime  insulin lispro (HumaLOG) corrective regimen sliding scale   SubCutaneous three times a day before meals  insulin lispro (HumaLOG) corrective regimen sliding scale   SubCutaneous at bedtime  insulin lispro Injectable (HumaLOG) 3 Unit(s) SubCutaneous three times a day before meals  nicotine -  14 mG/24Hr(s) Patch 1 patch Transdermal daily  pantoprazole    Tablet 40 milliGRAM(s) Oral before breakfast  predniSONE   Tablet 40 milliGRAM(s) Oral daily    MEDICATIONS  (PRN):  acetaminophen   Tablet .. 500 milliGRAM(s) Oral every 4 hours PRN Temp greater or equal to 38.5C (101.3F), Moderate Pain (4 - 6)  ALBUTerol/ipratropium for Nebulization 3 milliLiter(s) Nebulizer every 2 hours PRN Shortness of Breath and/or Wheezing  dextrose 40% Gel 15 Gram(s) Oral once PRN Blood Glucose LESS THAN 70 milliGRAM(s)/deciliter  glucagon  Injectable 1 milliGRAM(s) IntraMuscular once PRN Glucose LESS THAN 70 milligrams/deciliter  nicotine - Inhaler 1 Each Inhalation every 1 hour PRN smoking cessation  sodium chloride 0.65% Nasal 1 Spray(s) Both Nostrils two times a day PRN Nasal Congestion    Vital Signs Last 24 Hrs  T(C): 36.9 (02 Nov 2018 04:09), Max: 37.1 (01 Nov 2018 14:50)  T(F): 98.5 (02 Nov 2018 04:09), Max: 98.7 (01 Nov 2018 14:50)  HR: 70 (02 Nov 2018 04:09) (70 - 78)  BP: 138/67 (02 Nov 2018 04:09) (138/67 - 157/58)  BP(mean): --  RR: 18 (02 Nov 2018 04:09) (18 - 20)  SpO2: 100% (02 Nov 2018 04:09) (91% - 100%)    I&O's Summary    01 Nov 2018 07:01  -  02 Nov 2018 07:00  --------------------------------------------------------  IN: 1320 mL / OUT: 850 mL / NET: 470 mL          Physical Exam:   GENERAL: Obese  HEENT: BIRGIT/   Atraumatic, Normocephalic  ENMT: No tonsillar erythema, exudates, or enlargement; Moist mucous membranes, Good dentition, No lesions  NECK: Supple, No JVD, Normal thyroid  CHEST/LUNG: Clear to auscultaion, ; No rales, rhonchi, wheezing, or rubs  CVS: Regular rate and rhythm; No murmurs, rubs, or gallops  GI: : Soft, Nontender, Nondistended; Bowel sounds present  NERVOUS SYSTEM:  Alert & Oriented X3  EXTREMITIES:  Trace edema  LYMPH: No lymphadenopathy noted  SKIN: No rashes or lesions  ENDOCRINOLOGY: No Thyromegaly  PSYCH: Appropriate    Labs:  30                            7.7    3.60  )-----------( 201      ( 02 Nov 2018 07:46 )             24.2                         8.7    2.8   )-----------( 193      ( 01 Nov 2018 11:41 )             26.3                         7.9    2.26  )-----------( 184      ( 01 Nov 2018 06:57 )             26.4                         8.0    5.09  )-----------( 253      ( 31 Oct 2018 07:56 )             26.7                         8.8    4.6   )-----------( 205      ( 30 Oct 2018 15:38 )             27.4     11-02    133<L>  |  94<L>  |  86<H>  ----------------------------<  126<H>  4.5   |  27  |  4.35<H>  11-01    136  |  95<L>  |  74<H>  ----------------------------<  195<H>  4.2   |  27  |  4.24<H>  10-31    139  |  99  |  72<H>  ----------------------------<  39<LL>  3.8   |  28  |  3.88<H>  10-30    136  |  97  |  75<H>  ----------------------------<  96  3.7   |  27  |  3.93<H>    Ca    8.7      02 Nov 2018 05:42  Ca    8.5      01 Nov 2018 05:51  Phos  5.4     11-02  Phos  6.4     11-01  Mg     2.2     11-01    TPro  6.5  /  Alb  3.3  /  TBili  0.4  /  DBili  x   /  AST  6<L>  /  ALT  5<L>  /  AlkPhos  87  10-30    CAPILLARY BLOOD GLUCOSE      POCT Blood Glucose.: 141 mg/dL (02 Nov 2018 07:43)  POCT Blood Glucose.: 244 mg/dL (01 Nov 2018 21:50)  POCT Blood Glucose.: 233 mg/dL (01 Nov 2018 17:17)  POCT Blood Glucose.: 246 mg/dL (01 Nov 2018 11:29)        PT/INR - ( 01 Nov 2018 07:48 )   PT: 10.8 sec;   INR: 0.97 ratio         PTT - ( 01 Nov 2018 07:48 )  PTT:33.7 sec    Serum Pro-Brain Natriuretic Peptide: 44509 pg/mL (10-30 @ 15:36)        RECENT CULTURES:        RESPIRATORY CULTURES:      < from: CT Chest No Cont (10.31.18 @ 12:07) >  ification of the bibasilar distal airways. Diffuse bilateral   groundglass opacities. Interlobular septal thickening. Bibasilar   segmental and subsegmental atelectasis. Peripheral geographic nodular   focus in the right apex is suggestive of a focal area of atelectasis.  Right middle lobe and lingular linear atelectasis. 3 mm right apical   pulmonary nodule (3:32).  PLEURA: Small bilateral pleural effusions.  VESSELS: Atherosclerosis of the aorta and coronary arteries.  HEART: Multichamber cardiac enlargement. Small pericardial effusion.   Aortic valvular and mitral annular calcifications.  MEDIASTINUM AND SCOTT: Enlarged mediastinal lymph nodes measuring up to   3.1 x 2.1 cm in a right lower paratracheal node (3:60).  CHEST WALL AND LOWER NECK: Enlarged, heterogeneous thyroid.  VISUALIZED UPPER ABDOMEN: Nodular left adrenal thickening.  BONES: Degenerative changes.    IMPRESSION:     Interlobular septal thickening, bilateral diffuse groundglass opacities,   and small bilateral pleural effusions are consistent with pulmonary edema.    Left sided endobronchial nodularity likely reflect secretions. Follow-up   CT chest is recommended in 1 month to ensure resolution. The   endobronchial nodularity can also be reassessed at that time.                MEAGHAN VÁZQUEZ M.D., RADIOLOGY RESIDENT  This document has been electronically signed.  JORDAN COHEN M.D., ATTENDING RADIOLOGIST  This document has been electronically signed. Oct 31 2018  3:26PM        < end of copied text >      Studies  Chest X-RAY  CT SCAN Chest   Venous Dopplers: LE:   CT Abdomen  Others

## 2018-11-02 NOTE — PROGRESS NOTE ADULT - SUBJECTIVE AND OBJECTIVE BOX
INTERVAL HPI/OVERNIGHT EVENTS: i feel fine . Daughter in room.   Vital Signs Last 24 Hrs  T(C): 36.7 (02 Nov 2018 12:08), Max: 36.9 (01 Nov 2018 21:04)  T(F): 98 (02 Nov 2018 12:08), Max: 98.5 (01 Nov 2018 21:04)  HR: 74 (02 Nov 2018 12:08) (70 - 78)  BP: 148/64 (02 Nov 2018 12:08) (138/67 - 148/64)  BP(mean): --  RR: 20 (02 Nov 2018 12:08) (18 - 20)  SpO2: 98% (02 Nov 2018 12:08) (91% - 100%)  I&O's Summary    01 Nov 2018 07:01  -  02 Nov 2018 07:00  --------------------------------------------------------  IN: 1320 mL / OUT: 850 mL / NET: 470 mL    02 Nov 2018 07:01  -  02 Nov 2018 15:03  --------------------------------------------------------  IN: 600 mL / OUT: 400 mL / NET: 200 mL      MEDICATIONS  (STANDING):  ALBUTerol/ipratropium for Nebulization 3 milliLiter(s) Nebulizer every 6 hours  amLODIPine   Tablet 10 milliGRAM(s) Oral at bedtime  AQUAPHOR (petrolatum Ointment) 1 Application(s) Topical two times a day  aspirin enteric coated 81 milliGRAM(s) Oral daily  atorvastatin 40 milliGRAM(s) Oral at bedtime  buDESOnide 160 MICROgram(s)/formoterol 4.5 MICROgram(s) Inhaler 2 Puff(s) Inhalation two times a day  calcium acetate 667 milliGRAM(s) Oral three times a day with meals  carvedilol 12.5 milliGRAM(s) Oral every 12 hours  clopidogrel Tablet 75 milliGRAM(s) Oral daily  dextrose 5%. 1000 milliLiter(s) (50 mL/Hr) IV Continuous <Continuous>  dextrose 50% Injectable 12.5 Gram(s) IV Push once  dextrose 50% Injectable 25 Gram(s) IV Push once  dextrose 50% Injectable 25 Gram(s) IV Push once  epoetin terence Injectable 79480 Unit(s) SubCutaneous <User Schedule>  ferrous    sulfate 325 milliGRAM(s) Oral daily  furosemide   Injectable 80 milliGRAM(s) IV Push two times a day  gabapentin 300 milliGRAM(s) Oral at bedtime  heparin  Injectable 5000 Unit(s) SubCutaneous every 12 hours  hydrALAZINE 100 milliGRAM(s) Oral three times a day  influenza   Vaccine 0.5 milliLiter(s) IntraMuscular once  insulin glargine Injectable (LANTUS) 10 Unit(s) SubCutaneous at bedtime  insulin lispro (HumaLOG) corrective regimen sliding scale   SubCutaneous three times a day before meals  insulin lispro (HumaLOG) corrective regimen sliding scale   SubCutaneous at bedtime  insulin lispro Injectable (HumaLOG) 3 Unit(s) SubCutaneous three times a day before meals  iron sucrose IVPB 100 milliGRAM(s) IV Intermittent every 24 hours  nicotine -  14 mG/24Hr(s) Patch 1 patch Transdermal daily  pantoprazole    Tablet 40 milliGRAM(s) Oral before breakfast  predniSONE   Tablet 40 milliGRAM(s) Oral daily    MEDICATIONS  (PRN):  acetaminophen   Tablet .. 500 milliGRAM(s) Oral every 4 hours PRN Temp greater or equal to 38.5C (101.3F), Moderate Pain (4 - 6)  ALBUTerol/ipratropium for Nebulization 3 milliLiter(s) Nebulizer every 2 hours PRN Shortness of Breath and/or Wheezing  dextrose 40% Gel 15 Gram(s) Oral once PRN Blood Glucose LESS THAN 70 milliGRAM(s)/deciliter  glucagon  Injectable 1 milliGRAM(s) IntraMuscular once PRN Glucose LESS THAN 70 milligrams/deciliter  nicotine - Inhaler 1 Each Inhalation every 1 hour PRN smoking cessation  sodium chloride 0.65% Nasal 1 Spray(s) Both Nostrils two times a day PRN Nasal Congestion    LABS:                        7.7    3.60  )-----------( 201      ( 02 Nov 2018 07:46 )             24.2     11-02    133<L>  |  94<L>  |  86<H>  ----------------------------<  126<H>  4.5   |  27  |  4.35<H>    Ca    8.7      02 Nov 2018 05:42  Phos  5.4     11-02  Mg     2.2     11-01      PT/INR - ( 01 Nov 2018 07:48 )   PT: 10.8 sec;   INR: 0.97 ratio         PTT - ( 01 Nov 2018 07:48 )  PTT:33.7 sec    CAPILLARY BLOOD GLUCOSE      POCT Blood Glucose.: 247 mg/dL (02 Nov 2018 11:41)  POCT Blood Glucose.: 141 mg/dL (02 Nov 2018 07:43)  POCT Blood Glucose.: 244 mg/dL (01 Nov 2018 21:50)  POCT Blood Glucose.: 233 mg/dL (01 Nov 2018 17:17)          REVIEW OF SYSTEMS:  CONSTITUTIONAL: No fever, weight loss, or fatigue  EYES: No eye pain, visual disturbances, or discharge  ENMT:  No difficulty hearing, tinnitus, vertigo; No sinus or throat pain  NECK: No pain or stiffness  RESPIRATORY: No cough, wheezing, chills or hemoptysis; No shortness of breath  CARDIOVASCULAR: No chest pain, palpitations, dizziness, or leg swelling  GASTROINTESTINAL: No abdominal or epigastric pain. No nausea, vomiting, or hematemesis; No diarrhea or constipation. No melena or hematochezia.  GENITOURINARY: No dysuria, frequency, hematuria, or incontinence  NEUROLOGICAL: No headaches, memory loss, loss of strength, numbness, or tremors      Consultant(s) Notes Reviewed:  [x ] YES  [ ] NO    PHYSICAL EXAM:  GENERAL: NAD, Obese ,not in any distress ,  HEAD:  Atraumatic, Normocephalic  EYES: EOMI, PERRLA, conjunctiva and sclera clear  ENMT: No tonsillar erythema, exudates, or enlargement; Moist mucous membranes, Good dentition, No lesions  NECK: Supple, No JVD, Normal thyroid  NERVOUS SYSTEM:  Alert & Oriented X3, No focal deficit   CHEST/LUNG: Good air entry bilateral with basal  rales,   HEART: Regular rate and rhythm; No murmurs, rubs, or gallops  ABDOMEN: Soft, Nontender, Nondistended; Bowel sounds present  EXTREMITIES:  2+ Peripheral Pulses, No clubbing, cyanosis, or edema  SKIN: No rashes or lesions    Care Discussed with Consultants/Other Providers [ x] YES  [ ] NO

## 2018-11-02 NOTE — PHYSICAL THERAPY INITIAL EVALUATION ADULT - ADDITIONAL COMMENTS
Pt lives in a pvt house w/ 6 steps to enter and 1 flt to bedroom. Pt uses a st cane for all ADLs and has commode to use on main level. Pt assisted on stairs by dtrs and during outdoor excursions

## 2018-11-02 NOTE — DIETITIAN INITIAL EVALUATION ADULT. - ETIOLOGY
Components of knowledge deficit and lack of desire to change early satiety and anorexia and personal food preferences

## 2018-11-02 NOTE — PROGRESS NOTE ADULT - ASSESSMENT
72 y/o F COPD, CHF, HTN, HLD, DM2,Anemia ,CKD ,smoking x60+ years, presenting with shortness of breath worsening for one month. Patient was diagnosed with flu x2 weeks ago after her daughter had flu, and had significant shortness of breath with cough and congestion as well as shortness of breath at that time. She reports now that for the last week her shortness of breath is intermittent but has been worsening in severity. She states that she uses nebulizer once daily for her COPD, but yesterday had to use it multiple more times. Reports still feeling congested and intermittently coughing up mucous without any blood. She denies any fevers or chills. Went to her PMD today for increased shortness of breath and was told to come here concerned for fluid on the lungs.

## 2018-11-02 NOTE — PROGRESS NOTE ADULT - SUBJECTIVE AND OBJECTIVE BOX
Gregor Barrow MD  Interventional Cardiology  [place] Office : [phone] [place] [address] [place] [address]  Tel:   [place] Office : [phone] [facility] [place] [address]  Tel: [phone]  Cell : [phone]    Subjective : Pt lying in bed comfortable, not in distress, denies any chest pain or SOB  	  MEDICATIONS:  amLODIPine   Tablet 10 milliGRAM(s) Oral at bedtime  aspirin enteric coated 81 milliGRAM(s) Oral daily  carvedilol 12.5 milliGRAM(s) Oral every 12 hours  clopidogrel Tablet 75 milliGRAM(s) Oral daily  furosemide   Injectable 80 milliGRAM(s) IV Push two times a day  heparin  Injectable 5000 Unit(s) SubCutaneous every 12 hours  hydrALAZINE 100 milliGRAM(s) Oral three times a day      ALBUTerol/ipratropium for Nebulization 3 milliLiter(s) Nebulizer every 6 hours  ALBUTerol/ipratropium for Nebulization 3 milliLiter(s) Nebulizer every 2 hours PRN  buDESOnide 160 MICROgram(s)/formoterol 4.5 MICROgram(s) Inhaler 2 Puff(s) Inhalation two times a day    acetaminophen   Tablet .. 500 milliGRAM(s) Oral every 4 hours PRN  gabapentin 300 milliGRAM(s) Oral at bedtime    pantoprazole    Tablet 40 milliGRAM(s) Oral before breakfast    atorvastatin 40 milliGRAM(s) Oral at bedtime  dextrose 40% Gel 15 Gram(s) Oral once PRN  dextrose 50% Injectable 12.5 Gram(s) IV Push once  dextrose 50% Injectable 25 Gram(s) IV Push once  dextrose 50% Injectable 25 Gram(s) IV Push once  glucagon  Injectable 1 milliGRAM(s) IntraMuscular once PRN  insulin glargine Injectable (LANTUS) 10 Unit(s) SubCutaneous at bedtime  insulin lispro (HumaLOG) corrective regimen sliding scale   SubCutaneous three times a day before meals  insulin lispro (HumaLOG) corrective regimen sliding scale   SubCutaneous at bedtime  insulin lispro Injectable (HumaLOG) 3 Unit(s) SubCutaneous three times a day before meals  predniSONE   Tablet 40 milliGRAM(s) Oral daily    AQUAPHOR (petrolatum Ointment) 1 Application(s) Topical two times a day  calcium acetate 667 milliGRAM(s) Oral three times a day with meals  dextrose 5%. 1000 milliLiter(s) IV Continuous <Continuous>  epoetin terence Injectable 40847 Unit(s) SubCutaneous <User Schedule>  ferrous    sulfate 325 milliGRAM(s) Oral daily  influenza   Vaccine 0.5 milliLiter(s) IntraMuscular once  iron sucrose IVPB 100 milliGRAM(s) IV Intermittent every 24 hours  sodium chloride 0.65% Nasal 1 Spray(s) Both Nostrils two times a day PRN      PHYSICAL EXAM:  T(C): 36.7 (11-02-18 @ 12:08), Max: 36.9 (11-02-18 @ 04:09)  HR: 86 (11-02-18 @ 17:21) (70 - 86)  BP: 130/56 (11-02-18 @ 17:21) (130/56 - 148/64)  RR: 20 (11-02-18 @ 12:08) (18 - 20)  SpO2: 98% (11-02-18 @ 12:08) (92% - 100%)  Wt(kg): --  I&O's Summary    01 Nov 2018 07:01  -  02 Nov 2018 07:00  --------------------------------------------------------  IN: 1320 mL / OUT: 850 mL / NET: 470 mL    02 Nov 2018 07:01  -  03 Nov 2018 00:48  --------------------------------------------------------  IN: 1060 mL / OUT: 600 mL / NET: 460 mL      Appearance: obese,    HEENT:   Normal oral mucosa,  Cardiovascular: Normal S1 S2, No JVD, No murmurs, No edema  Respiratory: basal creps    Gastrointestinal:  Soft, Non-tender, + BS	  Extremities: Edema + (chroic)                                         7.7    3.60  )-----------( 201      ( 02 Nov 2018 07:46 )             24.2     11-02    133<L>  |  94<L>  |  86<H>  ----------------------------<  126<H>  4.5   |  27  |  4.35<H>    Ca    8.7      02 Nov 2018 05:42  Phos  5.4     11-02  Mg     2.2     11-01      proBNP:   Lipid Profile:   HgA1c:   TSH: Thyroid Stimulating Hormone, Serum: 0.58 uIU/mL (11-02 @ 08:02)

## 2018-11-02 NOTE — DIETITIAN INITIAL EVALUATION ADULT. - ENERGY NEEDS
Ht: 5'2", Wt: 253 pounds, BMI: 46.3, IBW: 110 pounds, %IBW: 230%  Pertinent Information: 74 y/o female with PMH of COPD, CHF, HTN, HLD, DM2, anemia, CKD, smoking x60+ years, morbid obesity nd cerebral infarction. Pt admitted for SOB x2 weeks. As per adult vascular surgery note on 11/1/18 pt with new onset ESRD, requiring HD. As per nephrology note on 11/1/18, pt with fluid overload, recommend continuing lasix and monitor daily weights. As per flow sheet, edema 4+ bilateral feet and no noted pressure injuries at this time. Ht: 5'2", Wt: 253 pounds, BMI: 46.3, IBW: 110 pounds, %IBW: 230%  Pertinent Information: 72 y/o female with PMH of COPD, CHF, HTN, HLD, DM2, anemia, CKD, smoking x60+ years, morbid obesity nd cerebral infarction. Pt admitted for SOB x2 weeks. As per adult vascular surgery note on 11/1/18 pt with new onset ESRD, requiring HD. As per nephrology note on 11/1/18, pt with fluid overload, recommend continuing lasix and monitoring daily weights. As per flow sheet, edema 4+ bilateral feet and no noted pressure injuries at this time. Ht: 5'2", Wt: 253 pounds, BMI: 46.3, IBW: 110 pounds, %IBW: 230%  Pertinent Information: 74 y/o female with PMH of COPD, CHF, HTN, HLD, DM2, anemia, CKD, smoking x60+ years, morbid obesity and cerebral infarction. Pt admitted for SOB x2 weeks. As per adult vascular surgery note on 11/1/18 pt with new onset ESRD, requiring HD. As per nephrology note on 11/1/18, pt with fluid overload, recommend continuing lasix and monitoring daily weights. As per flow sheet, edema 4+ bilateral feet and no noted pressure injuries at this time.

## 2018-11-02 NOTE — PROGRESS NOTE ADULT - ASSESSMENT
EkG : Likely SR with Atypical LBBB.    Echo < from: Transthoracic Echocardiogram (10.31.18 @ 09:40) >  1. Mitral annular calcification and calcified and tethered  mitral leaflets with normal diastolic opening. Moderate  mitral regurgitation.  2. Severely dilated left atrium.  LA volume index = 51  cc/m2.  3. Moderate left ventricular enlargement.  4. Mild global left ventricular systolic dysfunction.  5. The right ventricle is not well visualized; grossly  normal right ventricular systolic function.  6. Estimated pulmonary artery systolic pressure equals 20  mm Hg, assuming right atrial pressure equals 8  mm Hg,  consistent with normal pulmonary pressures.  7. Small circumferential pericardial effusion.        < end of copied text >    Assessement and Plan:    SOB: volume up on exam resume lasix IV and monitor  u/o creat and resp status     New onset LV dysfunction: Given MELISSA would recommend nuclear stress for Ischemic work up     COPD: On nebs Pulm on board     CKD:  renal on board , monitor renal function, planned for AVF , will get NST     DVT PPX heparin

## 2018-11-02 NOTE — PHYSICAL THERAPY INITIAL EVALUATION ADULT - PLANNED THERAPY INTERVENTIONS, PT EVAL
gait training/bed mobility training/1. LTG Pt will be able to neg 12 steps using HR, st cane and step to gait w/in 3 wks

## 2018-11-02 NOTE — PHYSICAL THERAPY INITIAL EVALUATION ADULT - PERTINENT HX OF CURRENT PROBLEM, REHAB EVAL
72 y/o F COPD, CHF, HTN, HLD, DM2,Anemia ,CKD ,smoking x60+ years, p/w SOB worsening x 1 mos. Pt dx;d w/ flu x2 wks; CTC = + pulm edema (10/31), CKD Stage 4/5; SOB found to be likely from a combination of pt's COPD and CHF.Pt found to have Acute on chronic hypercarbic resp failure. VASC c/s for AVF- pending placement this admission

## 2018-11-03 LAB
ANION GAP SERPL CALC-SCNC: 16 MMOL/L — SIGNIFICANT CHANGE UP (ref 5–17)
BUN SERPL-MCNC: 88 MG/DL — HIGH (ref 7–23)
CALCIUM SERPL-MCNC: 8.5 MG/DL — SIGNIFICANT CHANGE UP (ref 8.4–10.5)
CHLORIDE SERPL-SCNC: 93 MMOL/L — LOW (ref 96–108)
CO2 SERPL-SCNC: 24 MMOL/L — SIGNIFICANT CHANGE UP (ref 22–31)
CREAT SERPL-MCNC: 4.33 MG/DL — HIGH (ref 0.5–1.3)
GLUCOSE BLDC GLUCOMTR-MCNC: 155 MG/DL — HIGH (ref 70–99)
GLUCOSE BLDC GLUCOMTR-MCNC: 200 MG/DL — HIGH (ref 70–99)
GLUCOSE BLDC GLUCOMTR-MCNC: 213 MG/DL — HIGH (ref 70–99)
GLUCOSE BLDC GLUCOMTR-MCNC: 218 MG/DL — HIGH (ref 70–99)
GLUCOSE SERPL-MCNC: 111 MG/DL — HIGH (ref 70–99)
HCT VFR BLD CALC: 25.1 % — LOW (ref 34.5–45)
HGB BLD-MCNC: 7.6 G/DL — LOW (ref 11.5–15.5)
MCHC RBC-ENTMCNC: 26.9 PG — LOW (ref 27–34)
MCHC RBC-ENTMCNC: 30.3 GM/DL — LOW (ref 32–36)
MCV RBC AUTO: 88.7 FL — SIGNIFICANT CHANGE UP (ref 80–100)
PLATELET # BLD AUTO: 203 K/UL — SIGNIFICANT CHANGE UP (ref 150–400)
POTASSIUM SERPL-MCNC: 4.7 MMOL/L — SIGNIFICANT CHANGE UP (ref 3.5–5.3)
POTASSIUM SERPL-SCNC: 4.7 MMOL/L — SIGNIFICANT CHANGE UP (ref 3.5–5.3)
RBC # BLD: 2.83 M/UL — LOW (ref 3.8–5.2)
RBC # FLD: 16.6 % — HIGH (ref 10.3–14.5)
SODIUM SERPL-SCNC: 133 MMOL/L — LOW (ref 135–145)
WBC # BLD: 4.61 K/UL — SIGNIFICANT CHANGE UP (ref 3.8–10.5)
WBC # FLD AUTO: 4.61 K/UL — SIGNIFICANT CHANGE UP (ref 3.8–10.5)

## 2018-11-03 RX ORDER — MONTELUKAST 4 MG/1
10 TABLET, CHEWABLE ORAL DAILY
Qty: 0 | Refills: 0 | Status: DISCONTINUED | OUTPATIENT
Start: 2018-11-03 | End: 2018-11-08

## 2018-11-03 RX ADMIN — BUDESONIDE AND FORMOTEROL FUMARATE DIHYDRATE 2 PUFF(S): 160; 4.5 AEROSOL RESPIRATORY (INHALATION) at 17:21

## 2018-11-03 RX ADMIN — Medication 40 MILLIGRAM(S): at 05:55

## 2018-11-03 RX ADMIN — Medication 3 UNIT(S): at 17:20

## 2018-11-03 RX ADMIN — BUDESONIDE AND FORMOTEROL FUMARATE DIHYDRATE 2 PUFF(S): 160; 4.5 AEROSOL RESPIRATORY (INHALATION) at 05:55

## 2018-11-03 RX ADMIN — Medication 1 PATCH: at 12:00

## 2018-11-03 RX ADMIN — Medication 3 UNIT(S): at 08:09

## 2018-11-03 RX ADMIN — Medication 667 MILLIGRAM(S): at 17:21

## 2018-11-03 RX ADMIN — Medication 1 APPLICATION(S): at 05:55

## 2018-11-03 RX ADMIN — PANTOPRAZOLE SODIUM 40 MILLIGRAM(S): 20 TABLET, DELAYED RELEASE ORAL at 05:54

## 2018-11-03 RX ADMIN — MONTELUKAST 10 MILLIGRAM(S): 4 TABLET, CHEWABLE ORAL at 17:20

## 2018-11-03 RX ADMIN — Medication 667 MILLIGRAM(S): at 11:52

## 2018-11-03 RX ADMIN — CLOPIDOGREL BISULFATE 75 MILLIGRAM(S): 75 TABLET, FILM COATED ORAL at 11:52

## 2018-11-03 RX ADMIN — Medication 325 MILLIGRAM(S): at 11:56

## 2018-11-03 RX ADMIN — Medication 100 MILLIGRAM(S): at 14:32

## 2018-11-03 RX ADMIN — Medication 100 MILLIGRAM(S): at 21:54

## 2018-11-03 RX ADMIN — Medication 1 PATCH: at 19:33

## 2018-11-03 RX ADMIN — Medication 500 MILLIGRAM(S): at 05:00

## 2018-11-03 RX ADMIN — AMLODIPINE BESYLATE 10 MILLIGRAM(S): 2.5 TABLET ORAL at 21:54

## 2018-11-03 RX ADMIN — Medication 3 MILLILITER(S): at 11:52

## 2018-11-03 RX ADMIN — HEPARIN SODIUM 5000 UNIT(S): 5000 INJECTION INTRAVENOUS; SUBCUTANEOUS at 17:20

## 2018-11-03 RX ADMIN — Medication 81 MILLIGRAM(S): at 11:52

## 2018-11-03 RX ADMIN — Medication 667 MILLIGRAM(S): at 08:09

## 2018-11-03 RX ADMIN — Medication 80 MILLIGRAM(S): at 05:55

## 2018-11-03 RX ADMIN — Medication 2: at 11:51

## 2018-11-03 RX ADMIN — CARVEDILOL PHOSPHATE 12.5 MILLIGRAM(S): 80 CAPSULE, EXTENDED RELEASE ORAL at 11:52

## 2018-11-03 RX ADMIN — Medication 3 MILLILITER(S): at 17:21

## 2018-11-03 RX ADMIN — CARVEDILOL PHOSPHATE 12.5 MILLIGRAM(S): 80 CAPSULE, EXTENDED RELEASE ORAL at 21:54

## 2018-11-03 RX ADMIN — Medication 3 UNIT(S): at 11:52

## 2018-11-03 RX ADMIN — ATORVASTATIN CALCIUM 40 MILLIGRAM(S): 80 TABLET, FILM COATED ORAL at 21:54

## 2018-11-03 RX ADMIN — Medication 3 MILLILITER(S): at 21:55

## 2018-11-03 RX ADMIN — IRON SUCROSE 210 MILLIGRAM(S): 20 INJECTION, SOLUTION INTRAVENOUS at 15:21

## 2018-11-03 RX ADMIN — GABAPENTIN 300 MILLIGRAM(S): 400 CAPSULE ORAL at 21:54

## 2018-11-03 RX ADMIN — Medication 80 MILLIGRAM(S): at 17:21

## 2018-11-03 RX ADMIN — Medication 500 MILLIGRAM(S): at 04:09

## 2018-11-03 RX ADMIN — Medication 100 MILLIGRAM(S): at 05:55

## 2018-11-03 RX ADMIN — HEPARIN SODIUM 5000 UNIT(S): 5000 INJECTION INTRAVENOUS; SUBCUTANEOUS at 05:55

## 2018-11-03 RX ADMIN — Medication 1 APPLICATION(S): at 17:21

## 2018-11-03 RX ADMIN — INSULIN GLARGINE 10 UNIT(S): 100 INJECTION, SOLUTION SUBCUTANEOUS at 21:54

## 2018-11-03 RX ADMIN — Medication 4: at 17:20

## 2018-11-03 RX ADMIN — Medication 3 MILLILITER(S): at 05:54

## 2018-11-03 RX ADMIN — Medication 2: at 08:09

## 2018-11-03 NOTE — PROGRESS NOTE ADULT - SUBJECTIVE AND OBJECTIVE BOX
INTERVAL HPI/OVERNIGHT EVENTS: I want to go home. Daughter in room . Seen earlier.   Vital Signs Last 24 Hrs  T(C): 36.8 (03 Nov 2018 14:21), Max: 36.9 (03 Nov 2018 05:05)  T(F): 98.2 (03 Nov 2018 14:21), Max: 98.4 (03 Nov 2018 05:05)  HR: 67 (03 Nov 2018 14:21) (67 - 86)  BP: 152/67 (03 Nov 2018 14:21) (130/56 - 153/73)  BP(mean): --  RR: 20 (03 Nov 2018 14:21) (20 - 20)  SpO2: 92% (03 Nov 2018 14:21) (92% - 92%)  I&O's Summary    02 Nov 2018 07:01  -  03 Nov 2018 07:00  --------------------------------------------------------  IN: 1060 mL / OUT: 750 mL / NET: 310 mL    03 Nov 2018 08:01  -  03 Nov 2018 14:56  --------------------------------------------------------  IN: 360 mL / OUT: 0 mL / NET: 360 mL      MEDICATIONS  (STANDING):  ALBUTerol/ipratropium for Nebulization 3 milliLiter(s) Nebulizer every 6 hours  amLODIPine   Tablet 10 milliGRAM(s) Oral at bedtime  AQUAPHOR (petrolatum Ointment) 1 Application(s) Topical two times a day  aspirin enteric coated 81 milliGRAM(s) Oral daily  atorvastatin 40 milliGRAM(s) Oral at bedtime  buDESOnide 160 MICROgram(s)/formoterol 4.5 MICROgram(s) Inhaler 2 Puff(s) Inhalation two times a day  calcium acetate 667 milliGRAM(s) Oral three times a day with meals  carvedilol 12.5 milliGRAM(s) Oral every 12 hours  clopidogrel Tablet 75 milliGRAM(s) Oral daily  dextrose 5%. 1000 milliLiter(s) (50 mL/Hr) IV Continuous <Continuous>  dextrose 50% Injectable 12.5 Gram(s) IV Push once  dextrose 50% Injectable 25 Gram(s) IV Push once  dextrose 50% Injectable 25 Gram(s) IV Push once  epoetin terence Injectable 14513 Unit(s) SubCutaneous <User Schedule>  ferrous    sulfate 325 milliGRAM(s) Oral daily  furosemide   Injectable 80 milliGRAM(s) IV Push two times a day  gabapentin 300 milliGRAM(s) Oral at bedtime  heparin  Injectable 5000 Unit(s) SubCutaneous every 12 hours  hydrALAZINE 100 milliGRAM(s) Oral three times a day  influenza   Vaccine 0.5 milliLiter(s) IntraMuscular once  insulin glargine Injectable (LANTUS) 10 Unit(s) SubCutaneous at bedtime  insulin lispro (HumaLOG) corrective regimen sliding scale   SubCutaneous three times a day before meals  insulin lispro (HumaLOG) corrective regimen sliding scale   SubCutaneous at bedtime  insulin lispro Injectable (HumaLOG) 3 Unit(s) SubCutaneous three times a day before meals  iron sucrose IVPB 100 milliGRAM(s) IV Intermittent every 24 hours  montelukast 10 milliGRAM(s) Oral daily  nicotine -  14 mG/24Hr(s) Patch 1 patch Transdermal daily  pantoprazole    Tablet 40 milliGRAM(s) Oral before breakfast  predniSONE   Tablet 40 milliGRAM(s) Oral daily    MEDICATIONS  (PRN):  acetaminophen   Tablet .. 500 milliGRAM(s) Oral every 4 hours PRN Temp greater or equal to 38.5C (101.3F), Moderate Pain (4 - 6)  ALBUTerol/ipratropium for Nebulization 3 milliLiter(s) Nebulizer every 2 hours PRN Shortness of Breath and/or Wheezing  dextrose 40% Gel 15 Gram(s) Oral once PRN Blood Glucose LESS THAN 70 milliGRAM(s)/deciliter  glucagon  Injectable 1 milliGRAM(s) IntraMuscular once PRN Glucose LESS THAN 70 milligrams/deciliter  nicotine - Inhaler 1 Each Inhalation every 1 hour PRN smoking cessation  sodium chloride 0.65% Nasal 1 Spray(s) Both Nostrils two times a day PRN Nasal Congestion    LABS:                        7.6    4.61  )-----------( 203      ( 03 Nov 2018 08:35 )             25.1     11-03    133<L>  |  93<L>  |  88<H>  ----------------------------<  111<H>  4.7   |  24  |  4.33<H>    Ca    8.5      03 Nov 2018 06:20  Phos  5.4     11-02          CAPILLARY BLOOD GLUCOSE      POCT Blood Glucose.: 200 mg/dL (03 Nov 2018 11:30)  POCT Blood Glucose.: 155 mg/dL (03 Nov 2018 07:49)  POCT Blood Glucose.: 329 mg/dL (02 Nov 2018 21:48)  POCT Blood Glucose.: 136 mg/dL (02 Nov 2018 17:10)          REVIEW OF SYSTEMS:  CONSTITUTIONAL: No fever, weight loss, or fatigue  EYES: No eye pain, visual disturbances, or discharge  ENMT:  No difficulty hearing, tinnitus, vertigo; No sinus or throat pain  NECK: No pain or stiffness  RESPIRATORY: No cough, wheezing, chills or hemoptysis; No shortness of breath  CARDIOVASCULAR: No chest pain, palpitations, dizziness, or leg swelling  GASTROINTESTINAL: No abdominal or epigastric pain. No nausea, vomiting, or hematemesis; No diarrhea or constipation. No melena or hematochezia.  GENITOURINARY: No dysuria, frequency, hematuria, or incontinence  NEUROLOGICAL: No headaches, memory loss, loss of strength, numbness, or tremors      Consultant(s) Notes Reviewed:  [x ] YES  [ ] NO    PHYSICAL EXAM:  GENERAL: NAD, well-groomed, well-developed ,not in any distress ,  HEAD:  Atraumatic, Normocephalic  EYES: EOMI, PERRLA, conjunctiva and sclera clear  ENMT: No tonsillar erythema, exudates, or enlargement; Moist mucous membranes, Good dentition, No lesions  NECK: Supple, No JVD, Normal thyroid  NERVOUS SYSTEM:  Alert & Oriented X3, No focal deficit   CHEST/LUNG: Good air entry bilateral with  rhonchi, wheezing,   HEART: Regular rate and rhythm; No murmurs, rubs, or gallops  ABDOMEN: Soft, Nontender, Nondistended; Bowel sounds present  EXTREMITIES:  2+ Peripheral Pulses, No clubbing, cyanosis, or edema    Care Discussed with Consultants/Other Providers [ x] YES  [ ] NO

## 2018-11-03 NOTE — PROGRESS NOTE ADULT - SUBJECTIVE AND OBJECTIVE BOX
Gregor Barrow MD  Interventional Cardiology  [place] Office : [phone] [place] [address] [place]. [address]  Tel:   [place] Office : [address] [facility] [place] [address]  Tel: [phone]  Cell : [phone]    Subjective : Pt lying in bed comfortable, not in distress, denies any chest pain or SOB  	  MEDICATIONS:  amLODIPine   Tablet 10 milliGRAM(s) Oral at bedtime  aspirin enteric coated 81 milliGRAM(s) Oral daily  carvedilol 12.5 milliGRAM(s) Oral every 12 hours  clopidogrel Tablet 75 milliGRAM(s) Oral daily  furosemide   Injectable 80 milliGRAM(s) IV Push two times a day  heparin  Injectable 5000 Unit(s) SubCutaneous every 12 hours  hydrALAZINE 100 milliGRAM(s) Oral three times a day      ALBUTerol/ipratropium for Nebulization 3 milliLiter(s) Nebulizer every 6 hours  ALBUTerol/ipratropium for Nebulization 3 milliLiter(s) Nebulizer every 2 hours PRN  buDESOnide 160 MICROgram(s)/formoterol 4.5 MICROgram(s) Inhaler 2 Puff(s) Inhalation two times a day  montelukast 10 milliGRAM(s) Oral daily    acetaminophen   Tablet .. 500 milliGRAM(s) Oral every 4 hours PRN  gabapentin 300 milliGRAM(s) Oral at bedtime    pantoprazole    Tablet 40 milliGRAM(s) Oral before breakfast    atorvastatin 40 milliGRAM(s) Oral at bedtime  dextrose 40% Gel 15 Gram(s) Oral once PRN  dextrose 50% Injectable 12.5 Gram(s) IV Push once  dextrose 50% Injectable 25 Gram(s) IV Push once  dextrose 50% Injectable 25 Gram(s) IV Push once  glucagon  Injectable 1 milliGRAM(s) IntraMuscular once PRN  insulin glargine Injectable (LANTUS) 10 Unit(s) SubCutaneous at bedtime  insulin lispro (HumaLOG) corrective regimen sliding scale   SubCutaneous three times a day before meals  insulin lispro (HumaLOG) corrective regimen sliding scale   SubCutaneous at bedtime  insulin lispro Injectable (HumaLOG) 3 Unit(s) SubCutaneous three times a day before meals  predniSONE   Tablet 40 milliGRAM(s) Oral daily    AQUAPHOR (petrolatum Ointment) 1 Application(s) Topical two times a day  calcium acetate 667 milliGRAM(s) Oral three times a day with meals  dextrose 5%. 1000 milliLiter(s) IV Continuous <Continuous>  epoetin terence Injectable 94637 Unit(s) SubCutaneous <User Schedule>  ferrous    sulfate 325 milliGRAM(s) Oral daily  influenza   Vaccine 0.5 milliLiter(s) IntraMuscular once  iron sucrose IVPB 100 milliGRAM(s) IV Intermittent every 24 hours  sodium chloride 0.65% Nasal 1 Spray(s) Both Nostrils two times a day PRN      PHYSICAL EXAM:  T(C): 36.8 (11-03-18 @ 14:21), Max: 36.9 (11-03-18 @ 05:05)  HR: 77 (11-03-18 @ 17:19) (67 - 77)  BP: 153/67 (11-03-18 @ 17:19) (132/65 - 153/73)  RR: 20 (11-03-18 @ 14:21) (20 - 20)  SpO2: 92% (11-03-18 @ 14:21) (92% - 92%)  Wt(kg): --  I&O's Summary    02 Nov 2018 07:01  -  03 Nov 2018 07:00  --------------------------------------------------------  IN: 1060 mL / OUT: 750 mL / NET: 310 mL    03 Nov 2018 08:01  -  03 Nov 2018 20:05  --------------------------------------------------------  IN: 360 mL / OUT: 600 mL / NET: -240 mL          Appearance: Normal	  HEENT:   Normal oral mucosa, PERRL, EOMI	  Cardiovascular: Normal S1 S2, No JVD, No murmurs, No edema  Respiratory: Lb/l basal creps 	  Gastrointestinal:  Soft, Non-tender, + BS	  Extremities: No clubbing, cyanosis or edema                                    7.6    4.61  )-----------( 203      ( 03 Nov 2018 08:35 )             25.1     11-03    133<L>  |  93<L>  |  88<H>  ----------------------------<  111<H>  4.7   |  24  |  4.33<H>    Ca    8.5      03 Nov 2018 06:20  Phos  5.4     11-02      proBNP:   Lipid Profile:   HgA1c:   TSH:

## 2018-11-03 NOTE — PROGRESS NOTE ADULT - ASSESSMENT
EkG : Likely SR with Atypical LBBB.    Echo < from: Transthoracic Echocardiogram (10.31.18 @ 09:40) >  1. Mitral annular calcification and calcified and tethered  mitral leaflets with normal diastolic opening. Moderate  mitral regurgitation.  2. Severely dilated left atrium.  LA volume index = 51  cc/m2.  3. Moderate left ventricular enlargement.  4. Mild global left ventricular systolic dysfunction.  5. The right ventricle is not well visualized; grossly  normal right ventricular systolic function.  6. Estimated pulmonary artery systolic pressure equals 20  mm Hg, assuming right atrial pressure equals 8  mm Hg,  consistent with normal pulmonary pressures.  7. Small circumferential pericardial effusion.        < end of copied text >    Assessement and Plan:    SOB: volume up on exam resume lasix IV and monitor  u/o creat and resp status     New onset LV dysfunction: Given MELISSA pending nuclear stress for Ischemic work up     COPD: On nebs Pulm on board     CKD:  renal on board , monitor renal function, planned for AVF , will get NST     DVT PPX heparin

## 2018-11-03 NOTE — PROGRESS NOTE ADULT - ASSESSMENT
72 y/o F COPD, CHF, HTN, HLD, DM2,Anemia ,CKD ,smoking x60+ years, presenting with shortness of breath worsening for one month. Patient was diagnosed with flu x2 weeks ago after her daughter had flu, and had significant shortness of breath with cough and congestion as well as shortness of breath at that time. She reports now that for the last week her shortness of breath is intermittent but has been worsening in severity. She states that she uses nebulizer once daily for her COPD, but yesterday had to use it multiple more times. Reports still feeling congested and intermittently coughing up mucous without any blood. She denies any fevers or chills. Went to her PMD today for increased shortness of breath and was told to come here concerned for fluid on the lungs.      Problem/Plan - 1:  ·  Problem: Acute respiratory failure with hypoxia and Hypercapnia .  Plan: Secondary to CHF as well COPD exacerbation. Oxygen to Keep Saturation 92% or above. Pulmonary helping.       Problem/Plan - 2:  ·  Problem: Chronic obstructive pulmonary disease with acute exacerbation.  Plan: Steroid  ,Neb Rx and Oxygen . Advised to quit smoking . Pulmonary helping.  Added Singulair      Problem/Plan - 3:  ·  Problem: Acute on chronic Systolic congestive heart failure .  Plan: IV Lasix and TTE noted... < from: Transthoracic Echocardiogram (10.31.18 @ 09:40) >  Conclusions:  1. Mitral annular calcification and calcified and tethered  mitral leaflets with normal diastolic opening. Moderate  mitral regurgitation.  2. Severely dilated left atrium.  LA volume index = 51  cc/m2.  3. Moderate left ventricular enlargement.  4. Mild global left ventricular systolic dysfunction.  5. The right ventricle is not well visualized; grossly  normal right ventricular systolic function.  6. Estimated pulmonary artery systolic pressure equals 20  mm Hg, assuming right atrial pressure equals 8  mm Hg,  consistent with normal pulmonary pressures.  7. Small circumferential pericardial effusion.  *** No previous Echo exam.    < end of copied text >  CT chest ... < from: CT Chest No Cont (10.31.18 @ 12:07) >  IMPRESSION:     Interlobular septal thickening, bilateral diffuse groundglass opacities,   and small bilateral pleural effusions are consistent with pulmonary edema.    Left sided endobronchial nodularity likely reflect secretions. Follow-up   CT chest is recommended in 1 month to ensure resolution. The   endobronchial nodularity can also be reassessed at that time.    < end of copied text >  Cardiology helping and awaiting NST.      Problem/Plan - 4:  ·  Problem: Stage 4 chronic kidney disease with MELISSA .  Plan: Renal helping and may need HD so getting Outpt AVF.      Problem/Plan - 5:  ·  Problem: Diabetes mellitus.  Plan: Lantus and SSI for now. Sugars were  high so Lantus added and also on steroid.      Problem/Plan - 6:  Problem: Hypertension. Plan: BP meds with parameters. BP readings fine.      Problem/Plan - 7:  ·  Problem: Anemia due to chronic kidney disease.  Plan: Chronic .  Watching CBC . On Epogen .      Problem/Plan - 8:  ·  Problem: Smoker.  Plan: Advised to quit.

## 2018-11-03 NOTE — PROGRESS NOTE ADULT - SUBJECTIVE AND OBJECTIVE BOX
Patient is a 73y old  Female who presents with a chief complaint of SOB since 2 weeks (03 Nov 2018 13:07)      Any change in ROS: Sleepy: Wheezing today     MEDICATIONS  (STANDING):  ALBUTerol/ipratropium for Nebulization 3 milliLiter(s) Nebulizer every 6 hours  amLODIPine   Tablet 10 milliGRAM(s) Oral at bedtime  AQUAPHOR (petrolatum Ointment) 1 Application(s) Topical two times a day  aspirin enteric coated 81 milliGRAM(s) Oral daily  atorvastatin 40 milliGRAM(s) Oral at bedtime  buDESOnide 160 MICROgram(s)/formoterol 4.5 MICROgram(s) Inhaler 2 Puff(s) Inhalation two times a day  calcium acetate 667 milliGRAM(s) Oral three times a day with meals  carvedilol 12.5 milliGRAM(s) Oral every 12 hours  clopidogrel Tablet 75 milliGRAM(s) Oral daily  dextrose 5%. 1000 milliLiter(s) (50 mL/Hr) IV Continuous <Continuous>  dextrose 50% Injectable 12.5 Gram(s) IV Push once  dextrose 50% Injectable 25 Gram(s) IV Push once  dextrose 50% Injectable 25 Gram(s) IV Push once  epoetin terence Injectable 77304 Unit(s) SubCutaneous <User Schedule>  ferrous    sulfate 325 milliGRAM(s) Oral daily  furosemide   Injectable 80 milliGRAM(s) IV Push two times a day  gabapentin 300 milliGRAM(s) Oral at bedtime  heparin  Injectable 5000 Unit(s) SubCutaneous every 12 hours  hydrALAZINE 100 milliGRAM(s) Oral three times a day  influenza   Vaccine 0.5 milliLiter(s) IntraMuscular once  insulin glargine Injectable (LANTUS) 10 Unit(s) SubCutaneous at bedtime  insulin lispro (HumaLOG) corrective regimen sliding scale   SubCutaneous three times a day before meals  insulin lispro (HumaLOG) corrective regimen sliding scale   SubCutaneous at bedtime  insulin lispro Injectable (HumaLOG) 3 Unit(s) SubCutaneous three times a day before meals  iron sucrose IVPB 100 milliGRAM(s) IV Intermittent every 24 hours  nicotine -  14 mG/24Hr(s) Patch 1 patch Transdermal daily  pantoprazole    Tablet 40 milliGRAM(s) Oral before breakfast  predniSONE   Tablet 40 milliGRAM(s) Oral daily    MEDICATIONS  (PRN):  acetaminophen   Tablet .. 500 milliGRAM(s) Oral every 4 hours PRN Temp greater or equal to 38.5C (101.3F), Moderate Pain (4 - 6)  ALBUTerol/ipratropium for Nebulization 3 milliLiter(s) Nebulizer every 2 hours PRN Shortness of Breath and/or Wheezing  dextrose 40% Gel 15 Gram(s) Oral once PRN Blood Glucose LESS THAN 70 milliGRAM(s)/deciliter  glucagon  Injectable 1 milliGRAM(s) IntraMuscular once PRN Glucose LESS THAN 70 milligrams/deciliter  nicotine - Inhaler 1 Each Inhalation every 1 hour PRN smoking cessation  sodium chloride 0.65% Nasal 1 Spray(s) Both Nostrils two times a day PRN Nasal Congestion    Vital Signs Last 24 Hrs  T(C): 36.9 (03 Nov 2018 05:05), Max: 36.9 (03 Nov 2018 05:05)  T(F): 98.4 (03 Nov 2018 05:05), Max: 98.4 (03 Nov 2018 05:05)  HR: 76 (03 Nov 2018 11:50) (68 - 86)  BP: 153/73 (03 Nov 2018 11:50) (130/56 - 153/73)  BP(mean): --  RR: 20 (03 Nov 2018 05:05) (20 - 20)  SpO2: 92% (03 Nov 2018 05:05) (92% - 92%)    I&O's Summary    02 Nov 2018 07:01  -  03 Nov 2018 07:00  --------------------------------------------------------  IN: 1060 mL / OUT: 750 mL / NET: 310 mL    03 Nov 2018 08:01  -  03 Nov 2018 13:33  --------------------------------------------------------  IN: 360 mL / OUT: 0 mL / NET: 360 mL          Physical Exam:   GENERAL: NAD, well-groomed, well-developed  HEENT: BIRGIT/   Atraumatic, Normocephalic  ENMT: No tonsillar erythema, exudates, or enlargement; Moist mucous membranes, Good dentition, No lesions  NECK: Supple, No JVD, Normal thyroid  CHEST/LUNG: Mild wheezing today   CVS: Regular rate and rhythm; No murmurs, rubs, or gallops  GI: : Soft, Nontender, Nondistended; Bowel sounds present  NERVOUS SYSTEM:  Alert & Oriented X3  EXTREMITIES:  Trace  edema  LYMPH: No lymphadenopathy noted  SKIN: No rashes or lesions  ENDOCRINOLOGY: No Thyromegaly  PSYCH: Appropriate    Labs:  30                            7.6    4.61  )-----------( 203      ( 03 Nov 2018 08:35 )             25.1                         7.7    3.60  )-----------( 201      ( 02 Nov 2018 07:46 )             24.2                         8.7    2.8   )-----------( 193      ( 01 Nov 2018 11:41 )             26.3                         7.9    2.26  )-----------( 184      ( 01 Nov 2018 06:57 )             26.4                         8.0    5.09  )-----------( 253      ( 31 Oct 2018 07:56 )             26.7                         8.8    4.6   )-----------( 205      ( 30 Oct 2018 15:38 )             27.4     11-03    133<L>  |  93<L>  |  88<H>  ----------------------------<  111<H>  4.7   |  24  |  4.33<H>  11-02    133<L>  |  94<L>  |  86<H>  ----------------------------<  126<H>  4.5   |  27  |  4.35<H>  11-01    136  |  95<L>  |  74<H>  ----------------------------<  195<H>  4.2   |  27  |  4.24<H>  10-31    139  |  99  |  72<H>  ----------------------------<  39<LL>  3.8   |  28  |  3.88<H>  10-30    136  |  97  |  75<H>  ----------------------------<  96  3.7   |  27  |  3.93<H>    Ca    8.5      03 Nov 2018 06:20  Ca    8.7      02 Nov 2018 05:42  Phos  5.4     11-02    TPro  6.5  /  Alb  3.3  /  TBili  0.4  /  DBili  x   /  AST  6<L>  /  ALT  5<L>  /  AlkPhos  87  10-30    CAPILLARY BLOOD GLUCOSE      POCT Blood Glucose.: 200 mg/dL (03 Nov 2018 11:30)  POCT Blood Glucose.: 155 mg/dL (03 Nov 2018 07:49)  POCT Blood Glucose.: 329 mg/dL (02 Nov 2018 21:48)  POCT Blood Glucose.: 136 mg/dL (02 Nov 2018 17:10)      < from: [facility] Duplex Upper Ext Vein Scan, Bilat (11.02.18 @ 14:14) >  Antecubital fossa 0.52 cm  Forearm proximal 0.32 cm  Forearm mid 0.27 cm  Forearm distal 0.23 cm    Left upper extremity    Basilic vein  Upper arm proximal 0.82 cm  Upper arm mid 0.55 cm  Upper arm distal 0.40 cm  Antecubital fossa 0.30 cm  Forearm proximal 0.12 cm  Forearm mid not visualized    Cephalic vein  Upper arm not visualized  Antecubital fossa 0.64 cm  Forearm proximal 0.34 cm  Forearm mid 0.36 cm  Forearm distal 0.18 cm    Impression: No duplex evidence of DVT in either upper extremity.    Superficial vein mapping as above.                    BHARATH SWARTZ M.D., ATTENDING RADIOLOGIST  This document has been electronically signed. Nov 2 2018  2:30PM        < end of copied text >              RECENT CULTURES:        RESPIRATORY CULTURES:          Studies  Chest X-RAY  CT SCAN Chest   Venous Dopplers: LE:   CT Abdomen  Others

## 2018-11-04 LAB
ANION GAP SERPL CALC-SCNC: 15 MMOL/L — SIGNIFICANT CHANGE UP (ref 5–17)
APPEARANCE UR: CLEAR — SIGNIFICANT CHANGE UP
BILIRUB UR-MCNC: NEGATIVE — SIGNIFICANT CHANGE UP
BUN SERPL-MCNC: 94 MG/DL — HIGH (ref 7–23)
CALCIUM SERPL-MCNC: 8.8 MG/DL — SIGNIFICANT CHANGE UP (ref 8.4–10.5)
CHLORIDE SERPL-SCNC: 93 MMOL/L — LOW (ref 96–108)
CO2 SERPL-SCNC: 24 MMOL/L — SIGNIFICANT CHANGE UP (ref 22–31)
COLOR SPEC: SIGNIFICANT CHANGE UP
CREAT SERPL-MCNC: 4.11 MG/DL — HIGH (ref 0.5–1.3)
DIFF PNL FLD: NEGATIVE — SIGNIFICANT CHANGE UP
GLUCOSE BLDC GLUCOMTR-MCNC: 105 MG/DL — HIGH (ref 70–99)
GLUCOSE BLDC GLUCOMTR-MCNC: 204 MG/DL — HIGH (ref 70–99)
GLUCOSE BLDC GLUCOMTR-MCNC: 228 MG/DL — HIGH (ref 70–99)
GLUCOSE BLDC GLUCOMTR-MCNC: 286 MG/DL — HIGH (ref 70–99)
GLUCOSE SERPL-MCNC: 100 MG/DL — HIGH (ref 70–99)
GLUCOSE UR QL: ABNORMAL
HCT VFR BLD CALC: 26.6 % — LOW (ref 34.5–45)
HGB BLD-MCNC: 8.4 G/DL — LOW (ref 11.5–15.5)
KETONES UR-MCNC: NEGATIVE — SIGNIFICANT CHANGE UP
LEUKOCYTE ESTERASE UR-ACNC: NEGATIVE — SIGNIFICANT CHANGE UP
MCHC RBC-ENTMCNC: 27.6 PG — SIGNIFICANT CHANGE UP (ref 27–34)
MCHC RBC-ENTMCNC: 31.6 GM/DL — LOW (ref 32–36)
MCV RBC AUTO: 87.5 FL — SIGNIFICANT CHANGE UP (ref 80–100)
NITRITE UR-MCNC: NEGATIVE — SIGNIFICANT CHANGE UP
PH UR: 6 — SIGNIFICANT CHANGE UP (ref 5–8)
PLATELET # BLD AUTO: 230 K/UL — SIGNIFICANT CHANGE UP (ref 150–400)
POTASSIUM SERPL-MCNC: 4.5 MMOL/L — SIGNIFICANT CHANGE UP (ref 3.5–5.3)
POTASSIUM SERPL-SCNC: 4.5 MMOL/L — SIGNIFICANT CHANGE UP (ref 3.5–5.3)
PROT UR-MCNC: ABNORMAL
RBC # BLD: 3.04 M/UL — LOW (ref 3.8–5.2)
RBC # FLD: 16.6 % — HIGH (ref 10.3–14.5)
SODIUM SERPL-SCNC: 132 MMOL/L — LOW (ref 135–145)
SP GR SPEC: 1.01 — SIGNIFICANT CHANGE UP (ref 1.01–1.02)
UROBILINOGEN FLD QL: NEGATIVE — SIGNIFICANT CHANGE UP
WBC # BLD: 6.01 K/UL — SIGNIFICANT CHANGE UP (ref 3.8–10.5)
WBC # FLD AUTO: 6.01 K/UL — SIGNIFICANT CHANGE UP (ref 3.8–10.5)

## 2018-11-04 RX ADMIN — Medication 667 MILLIGRAM(S): at 08:09

## 2018-11-04 RX ADMIN — ATORVASTATIN CALCIUM 40 MILLIGRAM(S): 80 TABLET, FILM COATED ORAL at 22:20

## 2018-11-04 RX ADMIN — Medication 3 MILLILITER(S): at 06:43

## 2018-11-04 RX ADMIN — Medication 100 MILLIGRAM(S): at 12:04

## 2018-11-04 RX ADMIN — CARVEDILOL PHOSPHATE 12.5 MILLIGRAM(S): 80 CAPSULE, EXTENDED RELEASE ORAL at 22:19

## 2018-11-04 RX ADMIN — BUDESONIDE AND FORMOTEROL FUMARATE DIHYDRATE 2 PUFF(S): 160; 4.5 AEROSOL RESPIRATORY (INHALATION) at 17:50

## 2018-11-04 RX ADMIN — Medication 667 MILLIGRAM(S): at 17:49

## 2018-11-04 RX ADMIN — HEPARIN SODIUM 5000 UNIT(S): 5000 INJECTION INTRAVENOUS; SUBCUTANEOUS at 17:50

## 2018-11-04 RX ADMIN — HEPARIN SODIUM 5000 UNIT(S): 5000 INJECTION INTRAVENOUS; SUBCUTANEOUS at 06:20

## 2018-11-04 RX ADMIN — MONTELUKAST 10 MILLIGRAM(S): 4 TABLET, CHEWABLE ORAL at 12:02

## 2018-11-04 RX ADMIN — Medication 1 PATCH: at 12:00

## 2018-11-04 RX ADMIN — Medication 80 MILLIGRAM(S): at 06:19

## 2018-11-04 RX ADMIN — Medication 4: at 12:02

## 2018-11-04 RX ADMIN — CARVEDILOL PHOSPHATE 12.5 MILLIGRAM(S): 80 CAPSULE, EXTENDED RELEASE ORAL at 12:01

## 2018-11-04 RX ADMIN — GABAPENTIN 300 MILLIGRAM(S): 400 CAPSULE ORAL at 22:20

## 2018-11-04 RX ADMIN — Medication 1 PATCH: at 07:00

## 2018-11-04 RX ADMIN — Medication 40 MILLIGRAM(S): at 06:20

## 2018-11-04 RX ADMIN — Medication 325 MILLIGRAM(S): at 12:01

## 2018-11-04 RX ADMIN — INSULIN GLARGINE 10 UNIT(S): 100 INJECTION, SOLUTION SUBCUTANEOUS at 22:21

## 2018-11-04 RX ADMIN — PANTOPRAZOLE SODIUM 40 MILLIGRAM(S): 20 TABLET, DELAYED RELEASE ORAL at 06:19

## 2018-11-04 RX ADMIN — Medication 3 MILLILITER(S): at 22:19

## 2018-11-04 RX ADMIN — Medication 6: at 17:49

## 2018-11-04 RX ADMIN — Medication 667 MILLIGRAM(S): at 12:00

## 2018-11-04 RX ADMIN — Medication 3 MILLILITER(S): at 12:01

## 2018-11-04 RX ADMIN — Medication 100 MILLIGRAM(S): at 22:20

## 2018-11-04 RX ADMIN — Medication 81 MILLIGRAM(S): at 12:01

## 2018-11-04 RX ADMIN — Medication 3 UNIT(S): at 12:03

## 2018-11-04 RX ADMIN — Medication 3 UNIT(S): at 08:09

## 2018-11-04 RX ADMIN — Medication 3 UNIT(S): at 17:49

## 2018-11-04 RX ADMIN — Medication 3 MILLILITER(S): at 17:50

## 2018-11-04 RX ADMIN — CLOPIDOGREL BISULFATE 75 MILLIGRAM(S): 75 TABLET, FILM COATED ORAL at 12:01

## 2018-11-04 RX ADMIN — IRON SUCROSE 210 MILLIGRAM(S): 20 INJECTION, SOLUTION INTRAVENOUS at 12:04

## 2018-11-04 RX ADMIN — AMLODIPINE BESYLATE 10 MILLIGRAM(S): 2.5 TABLET ORAL at 22:20

## 2018-11-04 RX ADMIN — Medication 1 PATCH: at 18:21

## 2018-11-04 RX ADMIN — Medication 100 MILLIGRAM(S): at 06:19

## 2018-11-04 RX ADMIN — Medication 80 MILLIGRAM(S): at 17:49

## 2018-11-04 RX ADMIN — BUDESONIDE AND FORMOTEROL FUMARATE DIHYDRATE 2 PUFF(S): 160; 4.5 AEROSOL RESPIRATORY (INHALATION) at 06:19

## 2018-11-04 NOTE — PROGRESS NOTE ADULT - SUBJECTIVE AND OBJECTIVE BOX
INTERVAL HPI/OVERNIGHT EVENTS: Seen and examined with daughter in room.   Vital Signs Last 24 Hrs  T(C): 37 (04 Nov 2018 05:00), Max: 37 (04 Nov 2018 05:00)  T(F): 98.6 (04 Nov 2018 05:00), Max: 98.6 (04 Nov 2018 05:00)  HR: 75 (04 Nov 2018 05:00) (67 - 77)  BP: 148/63 (04 Nov 2018 05:00) (148/63 - 158/83)  BP(mean): --  RR: 20 (04 Nov 2018 05:00) (20 - 20)  SpO2: 93% (04 Nov 2018 05:00) (92% - 98%)  I&O's Summary    03 Nov 2018 08:01  -  04 Nov 2018 07:00  --------------------------------------------------------  IN: 700 mL / OUT: 1500 mL / NET: -800 mL      MEDICATIONS  (STANDING):  ALBUTerol/ipratropium for Nebulization 3 milliLiter(s) Nebulizer every 6 hours  amLODIPine   Tablet 10 milliGRAM(s) Oral at bedtime  AQUAPHOR (petrolatum Ointment) 1 Application(s) Topical two times a day  aspirin enteric coated 81 milliGRAM(s) Oral daily  atorvastatin 40 milliGRAM(s) Oral at bedtime  buDESOnide 160 MICROgram(s)/formoterol 4.5 MICROgram(s) Inhaler 2 Puff(s) Inhalation two times a day  calcium acetate 667 milliGRAM(s) Oral three times a day with meals  carvedilol 12.5 milliGRAM(s) Oral every 12 hours  clopidogrel Tablet 75 milliGRAM(s) Oral daily  dextrose 5%. 1000 milliLiter(s) (50 mL/Hr) IV Continuous <Continuous>  dextrose 50% Injectable 12.5 Gram(s) IV Push once  dextrose 50% Injectable 25 Gram(s) IV Push once  dextrose 50% Injectable 25 Gram(s) IV Push once  epoetin terence Injectable 56382 Unit(s) SubCutaneous <User Schedule>  ferrous    sulfate 325 milliGRAM(s) Oral daily  furosemide   Injectable 80 milliGRAM(s) IV Push two times a day  gabapentin 300 milliGRAM(s) Oral at bedtime  heparin  Injectable 5000 Unit(s) SubCutaneous every 12 hours  hydrALAZINE 100 milliGRAM(s) Oral three times a day  influenza   Vaccine 0.5 milliLiter(s) IntraMuscular once  insulin glargine Injectable (LANTUS) 10 Unit(s) SubCutaneous at bedtime  insulin lispro (HumaLOG) corrective regimen sliding scale   SubCutaneous three times a day before meals  insulin lispro (HumaLOG) corrective regimen sliding scale   SubCutaneous at bedtime  insulin lispro Injectable (HumaLOG) 3 Unit(s) SubCutaneous three times a day before meals  iron sucrose IVPB 100 milliGRAM(s) IV Intermittent every 24 hours  montelukast 10 milliGRAM(s) Oral daily  nicotine -  14 mG/24Hr(s) Patch 1 patch Transdermal daily  pantoprazole    Tablet 40 milliGRAM(s) Oral before breakfast  predniSONE   Tablet 40 milliGRAM(s) Oral daily    MEDICATIONS  (PRN):  acetaminophen   Tablet .. 500 milliGRAM(s) Oral every 4 hours PRN Temp greater or equal to 38.5C (101.3F), Moderate Pain (4 - 6)  ALBUTerol/ipratropium for Nebulization 3 milliLiter(s) Nebulizer every 2 hours PRN Shortness of Breath and/or Wheezing  dextrose 40% Gel 15 Gram(s) Oral once PRN Blood Glucose LESS THAN 70 milliGRAM(s)/deciliter  glucagon  Injectable 1 milliGRAM(s) IntraMuscular once PRN Glucose LESS THAN 70 milligrams/deciliter  nicotine - Inhaler 1 Each Inhalation every 1 hour PRN smoking cessation  sodium chloride 0.65% Nasal 1 Spray(s) Both Nostrils two times a day PRN Nasal Congestion    LABS:                        8.4    6.01  )-----------( 230      ( 04 Nov 2018 08:06 )             26.6     11-04    132<L>  |  93<L>  |  94<H>  ----------------------------<  100<H>  4.5   |  24  |  4.11<H>    Ca    8.8      04 Nov 2018 07:04          CAPILLARY BLOOD GLUCOSE      POCT Blood Glucose.: 105 mg/dL (04 Nov 2018 07:37)  POCT Blood Glucose.: 218 mg/dL (03 Nov 2018 21:25)  POCT Blood Glucose.: 213 mg/dL (03 Nov 2018 17:04)  POCT Blood Glucose.: 200 mg/dL (03 Nov 2018 11:30)          REVIEW OF SYSTEMS:  CONSTITUTIONAL: No fever, weight loss, or fatigue  EYES: No eye pain, visual disturbances, or discharge  ENMT:  No difficulty hearing, tinnitus, vertigo; No sinus or throat pain  NECK: No pain or stiffness  RESPIRATORY: No cough, wheezing, chills or hemoptysis; No shortness of breath  CARDIOVASCULAR: No chest pain, palpitations, dizziness, or leg swelling  GASTROINTESTINAL: No abdominal or epigastric pain. No nausea, vomiting, or hematemesis; No diarrhea or constipation. No melena or hematochezia.  GENITOURINARY: No dysuria, frequency, hematuria, or incontinence  NEUROLOGICAL: No headaches, memory loss, loss of strength, numbness, or tremors      Consultant(s) Notes Reviewed:  [x ] YES  [ ] NO    PHYSICAL EXAM:  GENERAL: NAD, Obese not in any distress ,  HEAD:  Atraumatic, Normocephalic  EYES: EOMI, PERRLA, conjunctiva and sclera clear  ENMT: No tonsillar erythema, exudates, or enlargement; Moist mucous membranes, Good dentition, No lesions  NECK: Supple, No JVD, Normal thyroid  NERVOUS SYSTEM:  Alert & Oriented X3, No focal deficit   CHEST/LUNG: Good air entry bilateral with , rhonchi,   HEART: Regular rate and rhythm; No murmurs, rubs, or gallops  ABDOMEN: Soft, Nontender, Nondistended; Bowel sounds present  EXTREMITIES:  2+ Peripheral Pulses, No clubbing, cyanosis, or edema    Care Discussed with Consultants/Other Providers [ x] YES  [ ] NO

## 2018-11-04 NOTE — PROGRESS NOTE ADULT - ASSESSMENT
72 y/o F COPD, CHF, HTN, HLD, DM2,Anemia ,CKD ,smoking x60+ years, presenting with shortness of breath worsening for one month. Patient was diagnosed with flu x2 weeks ago after her daughter had flu, and had significant shortness of breath with cough and congestion as well as shortness of breath at that time. She reports now that for the last week her shortness of breath is intermittent but has been worsening in severity. She states that she uses nebulizer once daily for her COPD, but yesterday had to use it multiple more times. Reports still feeling congested and intermittently coughing up mucous without any blood. She denies any fevers or chills. Went to her PMD today for increased shortness of breath and was told to come here concerned for fluid on the lungs.      Problem/Plan - 1:  ·  Problem: Acute respiratory failure with hypoxia and Hypercapnia .  Plan: Secondary to CHF as well COPD exacerbation. Oxygen to Keep Saturation 92% or above. Pulmonary helping.       Problem/Plan - 2:  ·  Problem: Chronic obstructive pulmonary disease with acute exacerbation.  Plan: Steroid  ,Neb Rx and Oxygen . Advised to quit smoking . Pulmonary helping.  Added Singulair      Problem/Plan - 3:  ·  Problem: Acute on chronic Systolic congestive heart failure .  Plan: IV Lasix and TTE noted... < from: Transthoracic Echocardiogram (10.31.18 @ 09:40) >  Conclusions:  1. Mitral annular calcification and calcified and tethered  mitral leaflets with normal diastolic opening. Moderate  mitral regurgitation.  2. Severely dilated left atrium.  LA volume index = 51  cc/m2.  3. Moderate left ventricular enlargement.  4. Mild global left ventricular systolic dysfunction.  5. The right ventricle is not well visualized; grossly  normal right ventricular systolic function.  6. Estimated pulmonary artery systolic pressure equals 20  mm Hg, assuming right atrial pressure equals 8  mm Hg,  consistent with normal pulmonary pressures.  7. Small circumferential pericardial effusion.  *** No previous Echo exam.    < end of copied text >  CT chest ... < from: CT Chest No Cont (10.31.18 @ 12:07) >  IMPRESSION:     Interlobular septal thickening, bilateral diffuse groundglass opacities,   and small bilateral pleural effusions are consistent with pulmonary edema.    Left sided endobronchial nodularity likely reflect secretions. Follow-up   CT chest is recommended in 1 month to ensure resolution. The   endobronchial nodularity can also be reassessed at that time.    < end of copied text >  Cardiology helping and awaiting NST.      Problem/Plan - 4:  ·  Problem: Stage 4 chronic kidney disease with MELISSA .  Plan: Renal helping and may need HD so getting Outpt AVF.      Problem/Plan - 5:  ·  Problem: Diabetes mellitus.  Plan: Lantus and SSI for now. Sugars were  high so Lantus added and also on steroid.      Problem/Plan - 6:  Problem: Hypertension. Plan: BP meds with parameters. BP readings fine.      Problem/Plan - 7:  ·  Problem: Anemia due to chronic kidney disease.  Plan: Chronic .  Watching CBC . On Epogen .      Problem/Plan - 8:  ·  Problem: Smoker.  Plan: Advised to quit.     Disposition : DC planning home if stress test is Normal.

## 2018-11-04 NOTE — PROGRESS NOTE ADULT - SUBJECTIVE AND OBJECTIVE BOX
Gregor Barrow MD  Interventional Cardiology  [place] Office : [phone] [place] [address] [place]. [address]  Tel:   [place] Office : [address] [facility] [place] [address]  Tel: [phone]  Cell : [phone]    Subjective : Pt lying in bed comfortable, not in distress, denies any chest pain or SOB  	  MEDICATIONS:  amLODIPine   Tablet 10 milliGRAM(s) Oral at bedtime  aspirin enteric coated 81 milliGRAM(s) Oral daily  carvedilol 12.5 milliGRAM(s) Oral every 12 hours  clopidogrel Tablet 75 milliGRAM(s) Oral daily  furosemide   Injectable 80 milliGRAM(s) IV Push two times a day  heparin  Injectable 5000 Unit(s) SubCutaneous every 12 hours  hydrALAZINE 100 milliGRAM(s) Oral three times a day      ALBUTerol/ipratropium for Nebulization 3 milliLiter(s) Nebulizer every 6 hours  ALBUTerol/ipratropium for Nebulization 3 milliLiter(s) Nebulizer every 2 hours PRN  buDESOnide 160 MICROgram(s)/formoterol 4.5 MICROgram(s) Inhaler 2 Puff(s) Inhalation two times a day  montelukast 10 milliGRAM(s) Oral daily    acetaminophen   Tablet .. 500 milliGRAM(s) Oral every 4 hours PRN  gabapentin 300 milliGRAM(s) Oral at bedtime    pantoprazole    Tablet 40 milliGRAM(s) Oral before breakfast    atorvastatin 40 milliGRAM(s) Oral at bedtime  dextrose 40% Gel 15 Gram(s) Oral once PRN  dextrose 50% Injectable 12.5 Gram(s) IV Push once  dextrose 50% Injectable 25 Gram(s) IV Push once  dextrose 50% Injectable 25 Gram(s) IV Push once  glucagon  Injectable 1 milliGRAM(s) IntraMuscular once PRN  insulin glargine Injectable (LANTUS) 10 Unit(s) SubCutaneous at bedtime  insulin lispro (HumaLOG) corrective regimen sliding scale   SubCutaneous three times a day before meals  insulin lispro (HumaLOG) corrective regimen sliding scale   SubCutaneous at bedtime  insulin lispro Injectable (HumaLOG) 3 Unit(s) SubCutaneous three times a day before meals  predniSONE   Tablet 40 milliGRAM(s) Oral daily    AQUAPHOR (petrolatum Ointment) 1 Application(s) Topical two times a day  calcium acetate 667 milliGRAM(s) Oral three times a day with meals  dextrose 5%. 1000 milliLiter(s) IV Continuous <Continuous>  epoetin terence Injectable 05770 Unit(s) SubCutaneous <User Schedule>  ferrous    sulfate 325 milliGRAM(s) Oral daily  influenza   Vaccine 0.5 milliLiter(s) IntraMuscular once  iron sucrose IVPB 100 milliGRAM(s) IV Intermittent every 24 hours  sodium chloride 0.65% Nasal 1 Spray(s) Both Nostrils two times a day PRN      PHYSICAL EXAM:  T(C): 36.6 (11-04-18 @ 11:59), Max: 37 (11-04-18 @ 05:00)  HR: 75 (11-04-18 @ 11:59) (67 - 77)  BP: 164/64 (11-04-18 @ 11:59) (148/63 - 164/64)  RR: 16 (11-04-18 @ 11:59) (16 - 20)  SpO2: 93% (11-04-18 @ 11:59) (92% - 98%)  Wt(kg): --  I&O's Summary    03 Nov 2018 08:01  -  04 Nov 2018 07:00  --------------------------------------------------------  IN: 700 mL / OUT: 1500 mL / NET: -800 mL    04 Nov 2018 07:01  -  04 Nov 2018 13:14  --------------------------------------------------------  IN: 240 mL / OUT: 0 mL / NET: 240 mL          Appearance: Normal	  HEENT:   Normal oral mucosa, PERRL, EOMI	  Cardiovascular: Normal S1 S2, No JVD, No murmurs, No edema  Respiratory: occasional wheezing 	  Gastrointestinal:  Soft, Non-tender, + BS	  Extremities:No clubbing, cyanosis or edema                                    8.4    6.01  )-----------( 230      ( 04 Nov 2018 08:06 )             26.6     11-04    132<L>  |  93<L>  |  94<H>  ----------------------------<  100<H>  4.5   |  24  |  4.11<H>    Ca    8.8      04 Nov 2018 07:04      proBNP:   Lipid Profile:   HgA1c:   TSH:

## 2018-11-04 NOTE — PROGRESS NOTE ADULT - SUBJECTIVE AND OBJECTIVE BOX
TATIANA TEMPLE  73y  Patient is a 73y old  Female who presents with a chief complaint of SOB since 2 weeks (2018 14:56)    HPI:  72 y/o F COPD, CHF, HTN, HLD, DM2,Anemia ,CKD ,smoking x60+ years, presenting with shortness of breath worsening for one month.She has CKD 4 and is prepared for HD. She feels better, voided well and her breathing has improved.     HEALTH ISSUES - PROBLEM Dx:  Anemia, unspecified type: Anemia, unspecified type  Hyperphosphatemia: Hyperphosphatemia  Volume overload: Volume overload  Chronic kidney disease: Chronic kidney disease  Smoker: Smoker  Anemia due to stage 3 chronic kidney disease: Anemia due to stage 3 chronic kidney disease  Hypertension: Hypertension  Diabetes mellitus: Diabetes mellitus  Stage 3 chronic kidney disease: Stage 3 chronic kidney disease  Acute on chronic congestive heart failure, unspecified heart failure type: Acute on chronic congestive heart failure, unspecified heart failure type  Chronic obstructive pulmonary disease with acute exacerbation: Chronic obstructive pulmonary disease with acute exacerbation  Acute respiratory failure with hypoxia: Acute respiratory failure with hypoxia        MEDICATIONS  (STANDING):  ALBUTerol/ipratropium for Nebulization 3 milliLiter(s) Nebulizer every 6 hours  amLODIPine   Tablet 10 milliGRAM(s) Oral at bedtime  AQUAPHOR (petrolatum Ointment) 1 Application(s) Topical two times a day  aspirin enteric coated 81 milliGRAM(s) Oral daily  atorvastatin 40 milliGRAM(s) Oral at bedtime  buDESOnide 160 MICROgram(s)/formoterol 4.5 MICROgram(s) Inhaler 2 Puff(s) Inhalation two times a day  calcium acetate 667 milliGRAM(s) Oral three times a day with meals  carvedilol 12.5 milliGRAM(s) Oral every 12 hours  clopidogrel Tablet 75 milliGRAM(s) Oral daily  dextrose 5%. 1000 milliLiter(s) (50 mL/Hr) IV Continuous <Continuous>  dextrose 50% Injectable 12.5 Gram(s) IV Push once  dextrose 50% Injectable 25 Gram(s) IV Push once  dextrose 50% Injectable 25 Gram(s) IV Push once  epoetin terence Injectable 80803 Unit(s) SubCutaneous <User Schedule>  ferrous    sulfate 325 milliGRAM(s) Oral daily  furosemide   Injectable 80 milliGRAM(s) IV Push two times a day  gabapentin 300 milliGRAM(s) Oral at bedtime  heparin  Injectable 5000 Unit(s) SubCutaneous every 12 hours  hydrALAZINE 100 milliGRAM(s) Oral three times a day  influenza   Vaccine 0.5 milliLiter(s) IntraMuscular once  insulin glargine Injectable (LANTUS) 10 Unit(s) SubCutaneous at bedtime  insulin lispro (HumaLOG) corrective regimen sliding scale   SubCutaneous three times a day before meals  insulin lispro (HumaLOG) corrective regimen sliding scale   SubCutaneous at bedtime  insulin lispro Injectable (HumaLOG) 3 Unit(s) SubCutaneous three times a day before meals  iron sucrose IVPB 100 milliGRAM(s) IV Intermittent every 24 hours  montelukast 10 milliGRAM(s) Oral daily  nicotine -  14 mG/24Hr(s) Patch 1 patch Transdermal daily  pantoprazole    Tablet 40 milliGRAM(s) Oral before breakfast  predniSONE   Tablet 40 milliGRAM(s) Oral daily    MEDICATIONS  (PRN):  acetaminophen   Tablet .. 500 milliGRAM(s) Oral every 4 hours PRN Temp greater or equal to 38.5C (101.3F), Moderate Pain (4 - 6)  ALBUTerol/ipratropium for Nebulization 3 milliLiter(s) Nebulizer every 2 hours PRN Shortness of Breath and/or Wheezing  dextrose 40% Gel 15 Gram(s) Oral once PRN Blood Glucose LESS THAN 70 milliGRAM(s)/deciliter  glucagon  Injectable 1 milliGRAM(s) IntraMuscular once PRN Glucose LESS THAN 70 milligrams/deciliter  nicotine - Inhaler 1 Each Inhalation every 1 hour PRN smoking cessation  sodium chloride 0.65% Nasal 1 Spray(s) Both Nostrils two times a day PRN Nasal Congestion    Vital Signs Last 24 Hrs  T(C): 37 (2018 05:00), Max: 37 (2018 05:00)  T(F): 98.6 (2018 05:00), Max: 98.6 (2018 05:00)  HR: 75 (2018 05:00) (67 - 77)  BP: 148/63 (2018 05:00) (148/63 - 158/83)  BP(mean): --  RR: 20 (2018 05:00) (20 - 20)  SpO2: 93% (2018 05:00) (92% - 98%)  Daily     Daily Weight in k.2 (2018 20:35)    PHYSICAL EXAM:  Constitutional: She  appears comfortable and not distressed.     Respiratory: The lungs are clear to auscultation. No dullness and expansion is normal.    Cardiovascular: S1 and S2 are normal. No mummurs, rubs or gallops are present.    Gastrointestinal: The abdomen is soft. No tenderness is present. No masses are present. Bowel sounds are normal.    Genitourinary: The bladder is not distended. No CVA tenderness is present.    Extremities: Chronic edema is noted. No deformities are present.    Neurological: Cognition is normal. Tone, power and sensation are normal. Gait is steady.                              8.4    6.01  )-----------( 230      ( 2018 08:06 )             26.6     11-04    132<L>  |  93<L>  |  94<H>  ----------------------------<  100<H>  4.5   |  24  |  4.11<H>    Ca    8.8      2018 07:04    Phosphorus Level, Serum: 5.4 mg/dL (11.02.18 @ 05:42)    Iron with Total Binding Capacity in AM (10.31.18 @ 09:21)    Iron - Total Binding Capacity.: 244 ug/dL    % Saturation, Iron: 13 %    Iron Total, Serum: 32 ug/dL    Unsaturated Iron Binding Capacity: 212 ug/dL

## 2018-11-04 NOTE — PROGRESS NOTE ADULT - ASSESSMENT
1.	CHF decompensation.  2.	CKD 5, for fistula creation.  3.	COPD.  4.	Anemia of CKD with low Fe stores.    PLAN:  1.	Continue lasix as ordered, adjusting to volume status.  2.	Follow up BMP.  3.	PO4 levels.  4.	Fistula creation as planned.  5.	EPO weekly for anemia.  6.	Venofer as ordered for CLIFT[place]

## 2018-11-05 LAB
ANION GAP SERPL CALC-SCNC: 14 MMOL/L — SIGNIFICANT CHANGE UP (ref 5–17)
BASE EXCESS BLDA CALC-SCNC: 3.1 MMOL/L — HIGH (ref -2–2)
BUN SERPL-MCNC: 94 MG/DL — HIGH (ref 7–23)
CALCIUM SERPL-MCNC: 8.8 MG/DL — SIGNIFICANT CHANGE UP (ref 8.4–10.5)
CHLORIDE SERPL-SCNC: 92 MMOL/L — LOW (ref 96–108)
CO2 BLDA-SCNC: 29 MMOL/L — SIGNIFICANT CHANGE UP (ref 22–30)
CO2 SERPL-SCNC: 26 MMOL/L — SIGNIFICANT CHANGE UP (ref 22–31)
CREAT SERPL-MCNC: 4.16 MG/DL — HIGH (ref 0.5–1.3)
GAS PNL BLDA: SIGNIFICANT CHANGE UP
GLUCOSE BLDC GLUCOMTR-MCNC: 115 MG/DL — HIGH (ref 70–99)
GLUCOSE BLDC GLUCOMTR-MCNC: 181 MG/DL — HIGH (ref 70–99)
GLUCOSE BLDC GLUCOMTR-MCNC: 204 MG/DL — HIGH (ref 70–99)
GLUCOSE BLDC GLUCOMTR-MCNC: 271 MG/DL — HIGH (ref 70–99)
GLUCOSE SERPL-MCNC: 142 MG/DL — HIGH (ref 70–99)
HCO3 BLDA-SCNC: 28 MMOL/L — SIGNIFICANT CHANGE UP (ref 21–29)
HCT VFR BLD CALC: 25 % — LOW (ref 34.5–45)
HGB BLD-MCNC: 8 G/DL — LOW (ref 11.5–15.5)
HOROWITZ INDEX BLDA+IHG-RTO: 21 — SIGNIFICANT CHANGE UP
MCHC RBC-ENTMCNC: 27.6 PG — SIGNIFICANT CHANGE UP (ref 27–34)
MCHC RBC-ENTMCNC: 32 GM/DL — SIGNIFICANT CHANGE UP (ref 32–36)
MCV RBC AUTO: 86.2 FL — SIGNIFICANT CHANGE UP (ref 80–100)
PCO2 BLDA: 46 MMHG — SIGNIFICANT CHANGE UP (ref 32–46)
PH BLDA: 7.4 — SIGNIFICANT CHANGE UP (ref 7.35–7.45)
PLATELET # BLD AUTO: 228 K/UL — SIGNIFICANT CHANGE UP (ref 150–400)
PO2 BLDA: 61 MMHG — LOW (ref 74–108)
POTASSIUM SERPL-MCNC: 4.5 MMOL/L — SIGNIFICANT CHANGE UP (ref 3.5–5.3)
POTASSIUM SERPL-SCNC: 4.5 MMOL/L — SIGNIFICANT CHANGE UP (ref 3.5–5.3)
RBC # BLD: 2.9 M/UL — LOW (ref 3.8–5.2)
RBC # FLD: 16.6 % — HIGH (ref 10.3–14.5)
SAO2 % BLDA: 92 % — SIGNIFICANT CHANGE UP (ref 92–96)
SODIUM SERPL-SCNC: 132 MMOL/L — LOW (ref 135–145)
WBC # BLD: 6.89 K/UL — SIGNIFICANT CHANGE UP (ref 3.8–10.5)
WBC # FLD AUTO: 6.89 K/UL — SIGNIFICANT CHANGE UP (ref 3.8–10.5)

## 2018-11-05 RX ADMIN — Medication 2: at 12:48

## 2018-11-05 RX ADMIN — CARVEDILOL PHOSPHATE 12.5 MILLIGRAM(S): 80 CAPSULE, EXTENDED RELEASE ORAL at 12:51

## 2018-11-05 RX ADMIN — INSULIN GLARGINE 10 UNIT(S): 100 INJECTION, SOLUTION SUBCUTANEOUS at 21:45

## 2018-11-05 RX ADMIN — AMLODIPINE BESYLATE 10 MILLIGRAM(S): 2.5 TABLET ORAL at 21:21

## 2018-11-05 RX ADMIN — BUDESONIDE AND FORMOTEROL FUMARATE DIHYDRATE 2 PUFF(S): 160; 4.5 AEROSOL RESPIRATORY (INHALATION) at 05:07

## 2018-11-05 RX ADMIN — MONTELUKAST 10 MILLIGRAM(S): 4 TABLET, CHEWABLE ORAL at 12:50

## 2018-11-05 RX ADMIN — Medication 1 PATCH: at 12:00

## 2018-11-05 RX ADMIN — HEPARIN SODIUM 5000 UNIT(S): 5000 INJECTION INTRAVENOUS; SUBCUTANEOUS at 05:07

## 2018-11-05 RX ADMIN — Medication 1 PATCH: at 18:49

## 2018-11-05 RX ADMIN — Medication 3 UNIT(S): at 12:49

## 2018-11-05 RX ADMIN — Medication 80 MILLIGRAM(S): at 05:08

## 2018-11-05 RX ADMIN — Medication 100 MILLIGRAM(S): at 12:51

## 2018-11-05 RX ADMIN — GABAPENTIN 300 MILLIGRAM(S): 400 CAPSULE ORAL at 21:21

## 2018-11-05 RX ADMIN — Medication 1 PATCH: at 12:47

## 2018-11-05 RX ADMIN — Medication 3 UNIT(S): at 08:22

## 2018-11-05 RX ADMIN — Medication 81 MILLIGRAM(S): at 12:50

## 2018-11-05 RX ADMIN — Medication 3 MILLILITER(S): at 23:35

## 2018-11-05 RX ADMIN — PANTOPRAZOLE SODIUM 40 MILLIGRAM(S): 20 TABLET, DELAYED RELEASE ORAL at 05:07

## 2018-11-05 RX ADMIN — Medication 667 MILLIGRAM(S): at 08:22

## 2018-11-05 RX ADMIN — Medication 3 UNIT(S): at 17:24

## 2018-11-05 RX ADMIN — CLOPIDOGREL BISULFATE 75 MILLIGRAM(S): 75 TABLET, FILM COATED ORAL at 12:49

## 2018-11-05 RX ADMIN — Medication 6: at 17:23

## 2018-11-05 RX ADMIN — CARVEDILOL PHOSPHATE 12.5 MILLIGRAM(S): 80 CAPSULE, EXTENDED RELEASE ORAL at 21:21

## 2018-11-05 RX ADMIN — BUDESONIDE AND FORMOTEROL FUMARATE DIHYDRATE 2 PUFF(S): 160; 4.5 AEROSOL RESPIRATORY (INHALATION) at 17:34

## 2018-11-05 RX ADMIN — HEPARIN SODIUM 5000 UNIT(S): 5000 INJECTION INTRAVENOUS; SUBCUTANEOUS at 17:34

## 2018-11-05 RX ADMIN — Medication 100 MILLIGRAM(S): at 21:21

## 2018-11-05 RX ADMIN — Medication 100 MILLIGRAM(S): at 05:07

## 2018-11-05 RX ADMIN — Medication 80 MILLIGRAM(S): at 17:34

## 2018-11-05 RX ADMIN — Medication 40 MILLIGRAM(S): at 05:07

## 2018-11-05 RX ADMIN — IRON SUCROSE 210 MILLIGRAM(S): 20 INJECTION, SOLUTION INTRAVENOUS at 17:25

## 2018-11-05 RX ADMIN — Medication 3 MILLILITER(S): at 05:07

## 2018-11-05 RX ADMIN — Medication 667 MILLIGRAM(S): at 12:50

## 2018-11-05 RX ADMIN — Medication 667 MILLIGRAM(S): at 17:26

## 2018-11-05 RX ADMIN — ATORVASTATIN CALCIUM 40 MILLIGRAM(S): 80 TABLET, FILM COATED ORAL at 21:21

## 2018-11-05 RX ADMIN — ERYTHROPOIETIN 10000 UNIT(S): 10000 INJECTION, SOLUTION INTRAVENOUS; SUBCUTANEOUS at 17:33

## 2018-11-05 RX ADMIN — Medication 3 MILLILITER(S): at 17:34

## 2018-11-05 RX ADMIN — Medication 3 MILLILITER(S): at 12:49

## 2018-11-05 RX ADMIN — Medication 325 MILLIGRAM(S): at 12:49

## 2018-11-05 NOTE — PROGRESS NOTE ADULT - ASSESSMENT
EkG : Likely SR with Atypical LBBB.    Echo < from: Transthoracic Echocardiogram (10.31.18 @ 09:40) >  1. Mitral annular calcification and calcified and tethered  mitral leaflets with normal diastolic opening. Moderate  mitral regurgitation.  2. Severely dilated left atrium.  LA volume index = 51  cc/m2.  3. Moderate left ventricular enlargement.  4. Mild global left ventricular systolic dysfunction.  5. The right ventricle is not well visualized; grossly  normal right ventricular systolic function.  6. Estimated pulmonary artery systolic pressure equals 20  mm Hg, assuming right atrial pressure equals 8  mm Hg,  consistent with normal pulmonary pressures.  7. Small circumferential pericardial effusion.        < end of copied text >    Assessement and Plan:    SOB: volume up on exam resume lasix IV and monitor  u/o creat and resp status     New onset LV dysfunction: Given MELISAS pending nuclear stress for Ischemic work up     COPD: On nebs Pulm on board     CKD:  renal on board , monitor renal function, planned for AVF , will get NST     DVT PPX heparin

## 2018-11-05 NOTE — PROGRESS NOTE ADULT - SUBJECTIVE AND OBJECTIVE BOX
[facility]   MD CHAPIN MCADAMS MD RUORU WONG, PA    TEL:  OFFICE: [phone]  DR ALCANTAR CELL: [phone]  YAMILET BOATENG CELL: [phone]  DR. ACOSTA CELL: [phone]    RENAL FOLLOW UP NOTE  --------------------------------------------------------------------------------  HPI:      Pt seen and examined at bedside.       PAST HISTORY  --------------------------------------------------------------------------------  No significant changes to PMH, PSH, FHx, SHx, unless otherwise noted    ALLERGIES & MEDICATIONS  --------------------------------------------------------------------------------  Allergies    No Known Allergies    Intolerances      Standing Inpatient Medications  ALBUTerol/ipratropium for Nebulization 3 milliLiter(s) Nebulizer every 6 hours  amLODIPine   Tablet 10 milliGRAM(s) Oral at bedtime  AQUAPHOR (petrolatum Ointment) 1 Application(s) Topical two times a day  aspirin enteric coated 81 milliGRAM(s) Oral daily  atorvastatin 40 milliGRAM(s) Oral at bedtime  buDESOnide 160 MICROgram(s)/formoterol 4.5 MICROgram(s) Inhaler 2 Puff(s) Inhalation two times a day  calcium acetate 667 milliGRAM(s) Oral three times a day with meals  carvedilol 12.5 milliGRAM(s) Oral every 12 hours  clopidogrel Tablet 75 milliGRAM(s) Oral daily  dextrose 5%. 1000 milliLiter(s) IV Continuous <Continuous>  dextrose 50% Injectable 12.5 Gram(s) IV Push once  dextrose 50% Injectable 25 Gram(s) IV Push once  dextrose 50% Injectable 25 Gram(s) IV Push once  epoetin terence Injectable 76496 Unit(s) SubCutaneous <User Schedule>  ferrous    sulfate 325 milliGRAM(s) Oral daily  furosemide   Injectable 80 milliGRAM(s) IV Push two times a day  gabapentin 300 milliGRAM(s) Oral at bedtime  heparin  Injectable 5000 Unit(s) SubCutaneous every 12 hours  hydrALAZINE 100 milliGRAM(s) Oral three times a day  influenza   Vaccine 0.5 milliLiter(s) IntraMuscular once  insulin glargine Injectable (LANTUS) 10 Unit(s) SubCutaneous at bedtime  insulin lispro (HumaLOG) corrective regimen sliding scale   SubCutaneous three times a day before meals  insulin lispro (HumaLOG) corrective regimen sliding scale   SubCutaneous at bedtime  insulin lispro Injectable (HumaLOG) 3 Unit(s) SubCutaneous three times a day before meals  iron sucrose IVPB 100 milliGRAM(s) IV Intermittent every 24 hours  metolazone 2.5 milliGRAM(s) Oral daily  montelukast 10 milliGRAM(s) Oral daily  nicotine -  14 mG/24Hr(s) Patch 1 patch Transdermal daily  pantoprazole    Tablet 40 milliGRAM(s) Oral before breakfast  predniSONE   Tablet 40 milliGRAM(s) Oral daily    PRN Inpatient Medications  acetaminophen   Tablet .. 500 milliGRAM(s) Oral every 4 hours PRN  ALBUTerol/ipratropium for Nebulization 3 milliLiter(s) Nebulizer every 2 hours PRN  dextrose 40% Gel 15 Gram(s) Oral once PRN  glucagon  Injectable 1 milliGRAM(s) IntraMuscular once PRN  nicotine - Inhaler 1 Each Inhalation every 1 hour PRN  sodium chloride 0.65% Nasal 1 Spray(s) Both Nostrils two times a day PRN      REVIEW OF SYSTEMS  --------------------------------------------------------------------------------  General: no fever  CVS: no chest pain  RESP: intermittent sob, no cough  ABD: no abdominal pain  : no dysuria,  MSK: + edema     VITALS/PHYSICAL EXAM  --------------------------------------------------------------------------------  T(C): 36.4 (11-05-18 @ 04:34), Max: 36.6 (11-04-18 @ 11:59)  HR: 76 (11-05-18 @ 04:34) (75 - 78)  BP: 147/67 (11-05-18 @ 04:34) (147/67 - 176/71)  RR: 18 (11-05-18 @ 04:34) (16 - 18)  SpO2: 93% (11-05-18 @ 04:34) (93% - 93%)  Wt(kg): --        11-04-18 @ 07:01  -  11-05-18 @ 07:00  --------------------------------------------------------  IN: 880 mL / OUT: 2825 mL / NET: -1945 mL    11-05-18 @ 07:01  -  11-05-18 @ 10:15  --------------------------------------------------------  IN: 360 mL / OUT: 0 mL / NET: 360 mL      Physical Exam:  	Gen: NAD  	HEENT: MMM  	Pulm: Coarse breath sounds B/L  	CV: S1S2  	Abd: Soft, +BS  	Ext: + LE edema B/L                      Neuro: Awake   	Skin: Warm and Dry   	Vascular access:    LABS/STUDIES  --------------------------------------------------------------------------------              8.0    6.89  >-----------<  228      [11-05-18 @ 08:27]              25.0     132  |  92  |  94  ----------------------------<  142      [11-05-18 @ 07:00]  4.5   |  26  |  4.16        Ca     8.8     [11-05-18 @ 07:00]            Creatinine Trend:  SCr 4.16 [11-05 @ 07:00]  SCr 4.11 [11-04 @ 07:04]  SCr 4.33 [11-03 @ 06:20]  SCr 4.35 [11-02 @ 05:42]  SCr 4.24 [11-01 @ 05:51]    Urinalysis - [11-04-18 @ 21:33]      Color Light Yellow / Appearance Clear / SG 1.011 / pH 6.0      Gluc Trace / Ketone Negative  / Bili Negative / Urobili Negative       Blood Negative / Protein 30 mg/dL / Leuk Est Negative / Nitrite Negative      RBC 1 / WBC 0 / Hyaline 1 / Gran  / Sq Epi  / Non Sq Epi 1 / Bacteria Negative      Iron 32, TIBC 244, %sat 13      [10-31-18 @ 09:21]  Ferritin 142      [11-01-18 @ 13:52]  PTH -- (Ca 8.7)      [10-31-18 @ 09:24]   269  HbA1c 5.6      [10-31-18 @ 07:56]  TSH 0.58      [11-02-18 @ 08:02]

## 2018-11-05 NOTE — PHARMACOTHERAPY INTERVENTION NOTE - COMMENTS
Patient started on calcium acetate 667 mg TID on 11/1 for hyperphosphatemia. Last phosphorus level 5.4 on 11/2, recommended repeat level with morning labs tomorrow before discharge.
Patient on gabapentin 300 mg BID, CrCl ~14 mL/min, recommended reduce to 300 mg once daily.

## 2018-11-05 NOTE — PROGRESS NOTE ADULT - ASSESSMENT
74 y/o F COPD, CHF, HTN, HLD, DM2,Anemia ,CKD ,smoking x60+ years, presenting with shortness of breath worsening for one month. Patient was diagnosed with flu x2 weeks ago after her daughter had flu, and had significant shortness of breath with cough and congestion as well as shortness of breath at that time. She reports now that for the last week her shortness of breath is intermittent but has been worsening in severity. She states that she uses nebulizer once daily for her COPD, but yesterday had to use it multiple more times. Reports still feeling congested and intermittently coughing up mucous without any blood. She denies any fevers or chills. Went to her PMD today for increased shortness of breath and was told to come here concerned for fluid on the lungs.      Problem/Plan - 1:  ·  Problem: Acute respiratory failure with hypoxia and Hypercapnia .  Plan: Secondary to CHF as well COPD exacerbation. Oxygen to Keep Saturation 92% or above. Pulmonary helping.       Problem/Plan - 2:  ·  Problem: Chronic obstructive pulmonary disease with acute exacerbation.  Plan: Improving.  Steroid  ,Neb Rx and Oxygen . Advised to quit smoking . Pulmonary helping.  Added Singulair      Problem/Plan - 3:  ·  Problem: Acute on chronic Systolic congestive heart failure .  Plan: IV Lasix and TTE noted... < from: Transthoracic Echocardiogram (10.31.18 @ 09:40) >  Conclusions:  1. Mitral annular calcification and calcified and tethered  mitral leaflets with normal diastolic opening. Moderate  mitral regurgitation.  2. Severely dilated left atrium.  LA volume index = 51  cc/m2.  3. Moderate left ventricular enlargement.  4. Mild global left ventricular systolic dysfunction.  5. The right ventricle is not well visualized; grossly  normal right ventricular systolic function.  6. Estimated pulmonary artery systolic pressure equals 20  mm Hg, assuming right atrial pressure equals 8  mm Hg,  consistent with normal pulmonary pressures.  7. Small circumferential pericardial effusion.  *** No previous Echo exam.    < end of copied text >  CT chest ... < from: CT Chest No Cont (10.31.18 @ 12:07) >  IMPRESSION:     Interlobular septal thickening, bilateral diffuse groundglass opacities,   and small bilateral pleural effusions are consistent with pulmonary edema.    Left sided endobronchial nodularity likely reflect secretions. Follow-up   CT chest is recommended in 1 month to ensure resolution. The   endobronchial nodularity can also be reassessed at that time.    < end of copied text >  Cardiology helping and awaiting NST.      Problem/Plan - 4:  ·  Problem: Stage 4 chronic kidney disease with MELISSA .  Plan: Renal helping and may need HD so getting Outpt AVF.      Problem/Plan - 5:  ·  Problem: Diabetes mellitus.  Plan: Lantus and SSI for now. Sugars were  high so Lantus added and also on steroid.      Problem/Plan - 6:  Problem: Hypertension. Plan: BP meds with parameters. BP readings fine.      Problem/Plan - 7:  ·  Problem: Anemia due to chronic kidney disease.  Plan: Chronic .  Watching CBC . On Epogen .      Problem/Plan - 8:  ·  Problem: Smoker.  Plan: Advised to quit.     Disposition : DC planning home if stress test is Normal.

## 2018-11-05 NOTE — PROGRESS NOTE ADULT - SUBJECTIVE AND OBJECTIVE BOX
Gregor Barrow MD  Interventional Cardiology  [place] Office : [address] [place] [address] [place]. [address]  Tel:   [place] Office : [address] [facility] [place] [address]  Tel: [phone]  Cell : [phone]    Subjective : Pt lying in bed comfortable, not in distress, denies any chest pain or SOB, ate chocolate in the am   	  MEDICATIONS:  amLODIPine   Tablet 10 milliGRAM(s) Oral at bedtime  aspirin enteric coated 81 milliGRAM(s) Oral daily  carvedilol 12.5 milliGRAM(s) Oral every 12 hours  clopidogrel Tablet 75 milliGRAM(s) Oral daily  furosemide   Injectable 80 milliGRAM(s) IV Push two times a day  heparin  Injectable 5000 Unit(s) SubCutaneous every 12 hours  hydrALAZINE 100 milliGRAM(s) Oral three times a day  metolazone 2.5 milliGRAM(s) Oral daily      ALBUTerol/ipratropium for Nebulization 3 milliLiter(s) Nebulizer every 6 hours  ALBUTerol/ipratropium for Nebulization 3 milliLiter(s) Nebulizer every 2 hours PRN  buDESOnide 160 MICROgram(s)/formoterol 4.5 MICROgram(s) Inhaler 2 Puff(s) Inhalation two times a day  montelukast 10 milliGRAM(s) Oral daily    acetaminophen   Tablet .. 500 milliGRAM(s) Oral every 4 hours PRN  gabapentin 300 milliGRAM(s) Oral at bedtime    pantoprazole    Tablet 40 milliGRAM(s) Oral before breakfast    atorvastatin 40 milliGRAM(s) Oral at bedtime  dextrose 40% Gel 15 Gram(s) Oral once PRN  dextrose 50% Injectable 12.5 Gram(s) IV Push once  dextrose 50% Injectable 25 Gram(s) IV Push once  dextrose 50% Injectable 25 Gram(s) IV Push once  glucagon  Injectable 1 milliGRAM(s) IntraMuscular once PRN  insulin glargine Injectable (LANTUS) 10 Unit(s) SubCutaneous at bedtime  insulin lispro (HumaLOG) corrective regimen sliding scale   SubCutaneous three times a day before meals  insulin lispro (HumaLOG) corrective regimen sliding scale   SubCutaneous at bedtime  insulin lispro Injectable (HumaLOG) 3 Unit(s) SubCutaneous three times a day before meals  predniSONE   Tablet 40 milliGRAM(s) Oral daily    AQUAPHOR (petrolatum Ointment) 1 Application(s) Topical two times a day  calcium acetate 667 milliGRAM(s) Oral three times a day with meals  dextrose 5%. 1000 milliLiter(s) IV Continuous <Continuous>  epoetin terence Injectable 30188 Unit(s) SubCutaneous <User Schedule>  ferrous    sulfate 325 milliGRAM(s) Oral daily  influenza   Vaccine 0.5 milliLiter(s) IntraMuscular once  iron sucrose IVPB 100 milliGRAM(s) IV Intermittent every 24 hours  sodium chloride 0.65% Nasal 1 Spray(s) Both Nostrils two times a day PRN      PHYSICAL EXAM:  T(C): 37.1 (11-05-18 @ 21:15), Max: 37.1 (11-05-18 @ 21:15)  HR: 80 (11-05-18 @ 21:20) (72 - 83)  BP: 177/62 (11-05-18 @ 21:20) (147/67 - 177/62)  RR: 20 (11-05-18 @ 21:15) (18 - 20)  SpO2: 93% (11-05-18 @ 21:15) (93% - 93%)  Wt(kg): --  I&O's Summary    04 Nov 2018 07:01  -  05 Nov 2018 07:00  --------------------------------------------------------  IN: 880 mL / OUT: 2825 mL / NET: -1945 mL    05 Nov 2018 07:01  -  05 Nov 2018 22:19  --------------------------------------------------------  IN: 1200 mL / OUT: 800 mL / NET: 400 mL        Appearance: Normal	  HEENT:   Normal oral mucosa, PERRL, EOMI	  Cardiovascular: Normal S1 S2, No JVD, No murmurs, No edema  Respiratory: occasional wheezing 	  Gastrointestinal:  Soft, Non-tender, + BS	  Extremities:No clubbing, cyanosis or edema                                      8.0    6.89  )-----------( 228      ( 05 Nov 2018 08:27 )             25.0     11-05    132<L>  |  92<L>  |  94<H>  ----------------------------<  142<H>  4.5   |  26  |  4.16<H>    Ca    8.8      05 Nov 2018 07:00      proBNP:   Lipid Profile:   HgA1c:   TSH:

## 2018-11-05 NOTE — PROGRESS NOTE ADULT - SUBJECTIVE AND OBJECTIVE BOX
Patient is a 73y old  Female who presents with a chief complaint of SOB since 2 weeks (2018 13:14)      Any change in ROS: Doing OK: no SOB  no cough     MEDICATIONS  (STANDING):  ALBUTerol/ipratropium for Nebulization 3 milliLiter(s) Nebulizer every 6 hours  amLODIPine   Tablet 10 milliGRAM(s) Oral at bedtime  AQUAPHOR (petrolatum Ointment) 1 Application(s) Topical two times a day  aspirin enteric coated 81 milliGRAM(s) Oral daily  atorvastatin 40 milliGRAM(s) Oral at bedtime  buDESOnide 160 MICROgram(s)/formoterol 4.5 MICROgram(s) Inhaler 2 Puff(s) Inhalation two times a day  calcium acetate 667 milliGRAM(s) Oral three times a day with meals  carvedilol 12.5 milliGRAM(s) Oral every 12 hours  clopidogrel Tablet 75 milliGRAM(s) Oral daily  dextrose 5%. 1000 milliLiter(s) (50 mL/Hr) IV Continuous <Continuous>  dextrose 50% Injectable 12.5 Gram(s) IV Push once  dextrose 50% Injectable 25 Gram(s) IV Push once  dextrose 50% Injectable 25 Gram(s) IV Push once  epoetin terence Injectable 70960 Unit(s) SubCutaneous <User Schedule>  ferrous    sulfate 325 milliGRAM(s) Oral daily  furosemide   Injectable 80 milliGRAM(s) IV Push two times a day  gabapentin 300 milliGRAM(s) Oral at bedtime  heparin  Injectable 5000 Unit(s) SubCutaneous every 12 hours  hydrALAZINE 100 milliGRAM(s) Oral three times a day  influenza   Vaccine 0.5 milliLiter(s) IntraMuscular once  insulin glargine Injectable (LANTUS) 10 Unit(s) SubCutaneous at bedtime  insulin lispro (HumaLOG) corrective regimen sliding scale   SubCutaneous three times a day before meals  insulin lispro (HumaLOG) corrective regimen sliding scale   SubCutaneous at bedtime  insulin lispro Injectable (HumaLOG) 3 Unit(s) SubCutaneous three times a day before meals  iron sucrose IVPB 100 milliGRAM(s) IV Intermittent every 24 hours  montelukast 10 milliGRAM(s) Oral daily  nicotine -  14 mG/24Hr(s) Patch 1 patch Transdermal daily  pantoprazole    Tablet 40 milliGRAM(s) Oral before breakfast  predniSONE   Tablet 40 milliGRAM(s) Oral daily    MEDICATIONS  (PRN):  acetaminophen   Tablet .. 500 milliGRAM(s) Oral every 4 hours PRN Temp greater or equal to 38.5C (101.3F), Moderate Pain (4 - 6)  ALBUTerol/ipratropium for Nebulization 3 milliLiter(s) Nebulizer every 2 hours PRN Shortness of Breath and/or Wheezing  dextrose 40% Gel 15 Gram(s) Oral once PRN Blood Glucose LESS THAN 70 milliGRAM(s)/deciliter  glucagon  Injectable 1 milliGRAM(s) IntraMuscular once PRN Glucose LESS THAN 70 milligrams/deciliter  nicotine - Inhaler 1 Each Inhalation every 1 hour PRN smoking cessation  sodium chloride 0.65% Nasal 1 Spray(s) Both Nostrils two times a day PRN Nasal Congestion    Vital Signs Last 24 Hrs  T(C): 36.4 (2018 04:34), Max: 36.6 (2018 11:59)  T(F): 97.6 (2018 04:34), Max: 97.8 (2018 11:59)  HR: 76 (2018 04:34) (75 - 78)  BP: 147/67 (2018 04:34) (147/67 - 176/71)  BP(mean): --  RR: 18 (2018 04:34) (16 - 18)  SpO2: 93% (2018 04:34) (93% - 93%)    I&O's Summary    2018 07:  -  2018 07:00  --------------------------------------------------------  IN: 880 mL / OUT: 2825 mL / NET: -1945 mL    2018 07:  -  2018 09:53  --------------------------------------------------------  IN: 360 mL / OUT: 0 mL / NET: 360 mL          Physical Exam:   GENERAL: NAD, well-groomed, well-developed  HEENT: BIRGIT/   Atraumatic, Normocephalic  ENMT: No tonsillar erythema, exudates, or enlargement; Moist mucous membranes, Good dentition, No lesions  NECK: Supple, No JVD, Normal thyroid  CHEST/LUNG: Clear to auscultaion: Mostly  CVS: Regular rate and rhythm; No murmurs, rubs, or gallops  GI: : Soft, Nontender, Nondistended; Bowel sounds present  NERVOUS SYSTEM:  Alert & Oriented X3  EXTREMITIES: + edema  LYMPH: No lymphadenopathy noted  SKIN: No rashes or lesions  ENDOCRINOLOGY: No Thyromegaly  PSYCH: Appropriate    Labs:  ABG - ( 2018 05:32 )  pH, Arterial: 7.40  pH, Blood: x     /  pCO2: 46    /  pO2: 61    / HCO3: 28    / Base Excess: 3.1   /  SaO2: 92              30                            8.0    6.89  )-----------( 228      ( 2018 08:27 )             25.0                         8.4    6.01  )-----------( 230      ( 2018 08:06 )             26.6                         7.6    4.61  )-----------( 203      ( 2018 08:35 )             25.1                         7.7    3.60  )-----------( 201      ( 2018 07:46 )             24.2                         8.7    2.8   )-----------( 193      ( 2018 11:41 )             26.3     11    132<L>  |  92<L>  |  94<H>  ----------------------------<  142<H>  4.5   |  26  |  4.16<H>  11    132<L>  |  93<L>  |  94<H>  ----------------------------<  100<H>  4.5   |  24  |  4.11<H>  11    133<L>  |  93<L>  |  88<H>  ----------------------------<  111<H>  4.7   |  24  |  4.33<H>  11-02    133<L>  |  94<L>  |  86<H>  ----------------------------<  126<H>  4.5   |  27  |  4.35<H>    Ca    8.8      2018 07:00  Ca    8.8      2018 07:04      CAPILLARY BLOOD GLUCOSE      POCT Blood Glucose.: 115 mg/dL (2018 07:34)  POCT Blood Glucose.: 228 mg/dL (2018 22:05)  POCT Blood Glucose.: 286 mg/dL (2018 17:14)  POCT Blood Glucose.: 204 mg/dL (2018 11:56)          Urinalysis Basic - ( 2018 21:33 )    Color: Light Yellow / Appearance: Clear / S.011 / pH: x  Gluc: x / Ketone: Negative  / Bili: Negative / Urobili: Negative   Blood: x / Protein: 30 mg/dL / Nitrite: Negative   Leuk Esterase: Negative / RBC: 1 /hpf / WBC 0 /hpf   Sq Epi: x / Non Sq Epi: 1 /hpf / Bacteria: Negative        < from: VA Duplex Upper Ext Vein Scan, Bilat (18 @ 14:14) >  er arm mid 0.55 cm  Upper arm distal 0.40 cm  Antecubital fossa 0.30 cm  Forearm proximal 0.12 cm  Forearm mid not visualized    Cephalic vein  Upper arm not visualized  Antecubital fossa 0.64 cm  Forearm proximal 0.34 cm  Forearm mid 0.36 cm  Forearm distal 0.18 cm    Impression: No duplex evidence of DVT in either upper extremity.    Superficial vein mapping as above.          < from: CT Chest No Cont (10.31.18 @ 12:07) >  MEDIASTINUM AND SCOTT: Enlarged mediastinal lymph nodes measuring up to   3.1 x 2.1 cm in a right lower paratracheal node (3:60).  CHEST WALL AND LOWER NECK: Enlarged, heterogeneous thyroid.  VISUALIZED UPPER ABDOMEN: Nodular left adrenal thickening.  BONES: Degenerative changes.    IMPRESSION:     Interlobular septal thickening, bilateral diffuse groundglass opacities,   and small bilateral pleural effusions are consistent with pulmonary edema.    Left sided endobronchial nodularity likely reflect secretions. Follow-up   CT chest is recommended in 1 month to ensure resolution. The   endobronchial nodularity can also be reassessed at that time.                MEAGHAN VÁZQUEZ M.D., RADIOLOGY RESIDENT  This document has been electronically signed.  JORDAN COHEN M.D., ATTENDING RADIOLOGIST  This document has been electronically signed. Oct 31 2018  3:26PM    < end of copied text >            BHARATH SWARTZ M.D., ATTENDING RADIOLOGIST  This document has been electronically signed. 2018  2:30PM              < end of copied text >      RECENT CULTURES:        RESPIRATORY CULTURES:          Studies  Chest X-RAY  CT SCAN Chest   Venous Dopplers: LE:   CT Abdomen  Others

## 2018-11-05 NOTE — PROGRESS NOTE ADULT - ASSESSMENT
[address], [address] [place] 
[phone] Patient: Bety Jennings MRN: [id number] [id number] Visit Information Date & Time Provider Department Dept. Phone Encounter #  
 1/30/2018  9:00 AM [facility] [phone] [id number] Your Appointments 1/31/2018 11:00 AM  
ESTABLISHED PATIENT with PHD [facility] ([facility]) Appt Note:   
 [address] [place]  
[phone] [address] [place] [address],[phone]  
  
    
 2/7/2018 11:00 AM  
ESTABLISHED PATIENT with PHD [facility] ([facility]) Appt Note: fu  
 [address] [place]  
[phone]  
  
    
 2/14/2018 11:00 AM  
ESTABLISHED PATIENT with PHD [facility]) Appt Note: fu  
 [address] [place]  
[phone]  
  
    
 2/21/2018 11:00 AM  
ESTABLISHED PATIENT with PHD [facility]) Appt Note: fu  
 [address] [place]  
[phone]  
  
    
 2/28/2018 11:00 AM  
ESTABLISHED PATIENT with PHD [facility]) Appt Note: fu  
 [address] [place]  
[phone] Upcoming Health Maintenance Date Due  
 HPV AGE 9Y-34Y (1 of 2 - Female 2 Dose Series) 5/13/2019 MCV through Age 25 (1 of 2) 5/13/2019 DTaP/Tdap/Td series (6 - Tdap) 5/13/2019 Allergies as of 1/30/2018  Review Complete On: 1/30/2018 By: Marialuisa Hendricks NP Severity Noted Reaction Type Reactions Latex Medium 01/14/2011    Swelling Facial swelling Omnicef [Cefdinir]  03/08/2011    Nausea and Vomiting Penicillins  03/08/2011    Nausea and Vomiting Current Immunizations  Reviewed on 1/31/2017 Name Date DTaP 8/9/2012, 8/9/2012, 1/11/2010, 1/11/2010, 3/5/2009, 2008, 2008, 2008, 2008 ODnN-Eeb-QOQ 3/6/2009 Hep A Vaccine 8/9/2012, 1/11/2010 Hep B Vaccine 3/5/2009, 2008, 2008 Hib 1/11/2010, 3/5/2009, 2008, 2008 IPV 8/9/2012, 3/5/2009, 2008, 2008 Influenza Vaccine 9/18/2013, 10/16/2009, 2008 Influenza Vaccine (Quad) PF 10/2/2017, 10/31/2016, 10/16/2015 11:11 AM, 9/30/2014 MMR 8/9/2012, 1/11/2010 Pneumococcal Conjugate (PCV-7) 3/5/2009, 2008, 2008 Pneumococcal Polysaccharide (PPSV-23) 2/20/2014 Pneumococcal Vaccine (Unspecified Type) 1/11/2010, 3/5/2009, 2008, 2008 Poliovirus vaccine 8/9/2012, 3/5/2009, 2008, 2008 Rotavirus Vaccine 2008, 2008 Varicella Virus Vaccine 8/9/2012, 1/11/2010 Not reviewed this visit You Were Diagnosed With   
  
 Codes Comments Uncomplicated asthma, unspecified asthma severity, unspecified whether persistent    -  Primary ICD-10-CM: J45.909 ICD-9-CM: 493.90 Vitals BP Pulse Temp Resp Height(growth percentile) 109/58 (79 %/ 44 %)* (BP 1 Location: Right arm, BP Patient Position: Sitting) 85 98.3 °F (36.8 °C) (Oral) 24 (!) 4' 4.56\" (1.335 m) (32 %, Z= -0.46) Weight(growth percentile) SpO2 BMI OB Status Smoking Status 70 lb 12.3 oz (32.1 kg) (52 %, Z= 0.06) 98% 18.01 kg/m2 (70 %, Z= 0.53) Premenarcheal Passive Smoke Exposure - Never Smoker *BP percentiles are based on NHBPEP's 4th Report Growth percentiles are based on [facility] 2-20 Years data. Vitals History BMI and BSA Data Body Mass Index Body Surface Area 18.01 kg/m 2 1.09 m 2 Preferred Pharmacy Pharmacy Name Phone [facility], [address] [place] [address] [place] [address] [phone] Your Updated Medication List  
  
   
This list is accurate as of: 1/30/18 10:34 AM.  Always use your most recent med list.  
  
  
  
  
 * albuterol 90 mcg/actuation inhaler Commonly known as:  PROVENTIL HFA, VENTOLIN HFA, PROAIR HFA Take 2 puffs every 4 hours as needed for cough and wheeze. With spacer. * albuterol 2.5 mg /3 mL (0.083 %) nebulizer solution Commonly known as:  PROVENTIL VENTOLIN  
3 mL by Nebulization route every four (4) hours as needed for Wheezing. albuterol-ipratropium 2.5 mg-0.5 mg/3 ml Nebu Commonly known as:  DUO-NEB  
3 mL by Nebulization route every four (4) hours as needed. AQUAPHOR HEALING 41 % ointment Generic drug:  pantothenic ac-min oil-pet,hyd  
APPLY TO AFFECTED AREA PRN FOR DRY SKIN  
  
 budesonide-formoterol 80-4.5 mcg/actuation Hfaa Commonly known as:  SYMBICORT Take 2 puffs twice a day with spacer  
  
 clotrimazole 1 % topical cream  
Commonly known as:  Yazmin Browner Apply  to affected area two (2) times a day. desonide 0.05 % cream  
Commonly known as:  Fredrick Laity Apply  to affected area two (2) times a day. fluticasone 50 mcg/actuation nasal spray Commonly known as:  Brendan Hernandez Take 1 spray each nostril once a da y * Lactobacillus reuteri 200 million cell Chew Commonly known as:  Seamus Khan Take 1 Tab by mouth daily. * BIOGAIA 100 million cell Chew Generic drug:  Lactobacillus reuteri  
CSW 1 T PO D  
  
 loratadine 5 mg/5 mL syrup Commonly known as:  Sara Vincent Take 10 mL by mouth daily. mineral oil-hydrophil petrolat ointment Commonly known as:  AQUAPHOR Apply  to affected area as needed for Dry Skin. * sodium chloride 0.9 % Nebu 2.5 mL by Nebulization route as needed. Neb 1 vial via neb every 4 hours as needed * sodium chloride 0.9 % Nebu 74 y/o F COPD, CHF, HTN, HLD, DM2,Anemia ,CKD ,smoking x60+ years, presenting with shortness of breath worsening for one month. Patient was diagnosed with flu x2 weeks ago after her daughter had flu, and had significant shortness of breath with cough and congestion as well as shortness of breath at that time. She reports now that for the last week her shortness of breath is intermittent but has been worsening in severity. She states that she uses nebulizer once daily for her COPD, but yesterday had to use it multiple more times. Reports still feeling congested and intermittently coughing up mucous without any blood. She denies any fevers or chills. Went to her PMD today for increased shortness of breath and was told to come here concerned for fluid on the lungs. 3 mL by Nebulization route as needed. Take one neb in the am and every 4 hours as needed ZITHROMAX 200 mg/5 mL suspension Generic drug:  azithromycin Take 4 mL by mouth. Daily * Notice: This list has 6 medication(s) that are the same as other medications prescribed for you. Read the directions carefully, and ask your doctor or other care provider to review them with you. To-Do List   
 01/30/2018 PFT:  PULMONARY FUNCTION TEST Patient Instructions   
symbicort  She looks good Wonderful PFT Keep all the same Angela is gong to be better  
 
flonae sensa  Might like it   Once a dahy Introducing [facility]! Dear Parent or Guardian, Thank you for requesting a [employer] account for your child. With [employer], you can view your childs hospital or ER discharge instructions, current allergies, immunizations and much more. In order to access your childs information, we require a signed consent on file. Please see the [facility] department or call [phone] for instructions on completing a [employer] Proxy request.   
Additional Information If you have questions, please visit the Frequently Asked Questions section of the [employer] website at [email or web address] Remember, [employer] is NOT to be used for urgent needs. For medical emergencies, dial 911. Now available from your iPhone and Android! Please provide this summary of care documentation to your next provider. Your primary care clinician is listed as [facility]. If you have any questions after today's visit, please call [phone].

## 2018-11-05 NOTE — PROGRESS NOTE ADULT - SUBJECTIVE AND OBJECTIVE BOX
INTERVAL HPI/OVERNIGHT EVENTS: Had eaten chocolate this am so couldn't do NST.   Vital Signs Last 24 Hrs  T(C): 36.4 (2018 04:34), Max: 36.6 (2018 11:59)  T(F): 97.6 (2018 04:34), Max: 97.8 (2018 11:59)  HR: 76 (2018 04:34) (75 - 78)  BP: 147/67 (2018 04:34) (147/67 - 176/71)  BP(mean): --  RR: 18 (2018 04:34) (16 - 18)  SpO2: 93% (2018 04:34) (93% - 93%)  I&O's Summary    2018 07:01  -  2018 07:00  --------------------------------------------------------  IN: 880 mL / OUT: 2825 mL / NET: -1945 mL    2018 07:01  -  2018 11:09  --------------------------------------------------------  IN: 360 mL / OUT: 0 mL / NET: 360 mL      MEDICATIONS  (STANDING):  ALBUTerol/ipratropium for Nebulization 3 milliLiter(s) Nebulizer every 6 hours  amLODIPine   Tablet 10 milliGRAM(s) Oral at bedtime  AQUAPHOR (petrolatum Ointment) 1 Application(s) Topical two times a day  aspirin enteric coated 81 milliGRAM(s) Oral daily  atorvastatin 40 milliGRAM(s) Oral at bedtime  buDESOnide 160 MICROgram(s)/formoterol 4.5 MICROgram(s) Inhaler 2 Puff(s) Inhalation two times a day  calcium acetate 667 milliGRAM(s) Oral three times a day with meals  carvedilol 12.5 milliGRAM(s) Oral every 12 hours  clopidogrel Tablet 75 milliGRAM(s) Oral daily  dextrose 5%. 1000 milliLiter(s) (50 mL/Hr) IV Continuous <Continuous>  dextrose 50% Injectable 12.5 Gram(s) IV Push once  dextrose 50% Injectable 25 Gram(s) IV Push once  dextrose 50% Injectable 25 Gram(s) IV Push once  epoetin terence Injectable 66502 Unit(s) SubCutaneous <User Schedule>  ferrous    sulfate 325 milliGRAM(s) Oral daily  furosemide   Injectable 80 milliGRAM(s) IV Push two times a day  gabapentin 300 milliGRAM(s) Oral at bedtime  heparin  Injectable 5000 Unit(s) SubCutaneous every 12 hours  hydrALAZINE 100 milliGRAM(s) Oral three times a day  influenza   Vaccine 0.5 milliLiter(s) IntraMuscular once  insulin glargine Injectable (LANTUS) 10 Unit(s) SubCutaneous at bedtime  insulin lispro (HumaLOG) corrective regimen sliding scale   SubCutaneous three times a day before meals  insulin lispro (HumaLOG) corrective regimen sliding scale   SubCutaneous at bedtime  insulin lispro Injectable (HumaLOG) 3 Unit(s) SubCutaneous three times a day before meals  iron sucrose IVPB 100 milliGRAM(s) IV Intermittent every 24 hours  metolazone 2.5 milliGRAM(s) Oral daily  montelukast 10 milliGRAM(s) Oral daily  nicotine -  14 mG/24Hr(s) Patch 1 patch Transdermal daily  pantoprazole    Tablet 40 milliGRAM(s) Oral before breakfast  predniSONE   Tablet 40 milliGRAM(s) Oral daily    MEDICATIONS  (PRN):  acetaminophen   Tablet .. 500 milliGRAM(s) Oral every 4 hours PRN Temp greater or equal to 38.5C (101.3F), Moderate Pain (4 - 6)  ALBUTerol/ipratropium for Nebulization 3 milliLiter(s) Nebulizer every 2 hours PRN Shortness of Breath and/or Wheezing  dextrose 40% Gel 15 Gram(s) Oral once PRN Blood Glucose LESS THAN 70 milliGRAM(s)/deciliter  glucagon  Injectable 1 milliGRAM(s) IntraMuscular once PRN Glucose LESS THAN 70 milligrams/deciliter  nicotine - Inhaler 1 Each Inhalation every 1 hour PRN smoking cessation  sodium chloride 0.65% Nasal 1 Spray(s) Both Nostrils two times a day PRN Nasal Congestion    LABS:                        8.0    6.89  )-----------( 228      ( 2018 08:27 )             25.0     11-05    132<L>  |  92<L>  |  94<H>  ----------------------------<  142<H>  4.5   |  26  |  4.16<H>    Ca    8.8      2018 07:00        Urinalysis Basic - ( 2018 21:33 )    Color: Light Yellow / Appearance: Clear / S.011 / pH: x  Gluc: x / Ketone: Negative  / Bili: Negative / Urobili: Negative   Blood: x / Protein: 30 mg/dL / Nitrite: Negative   Leuk Esterase: Negative / RBC: 1 /hpf / WBC 0 /hpf   Sq Epi: x / Non Sq Epi: 1 /hpf / Bacteria: Negative      CAPILLARY BLOOD GLUCOSE      POCT Blood Glucose.: 115 mg/dL (2018 07:34)  POCT Blood Glucose.: 228 mg/dL (2018 22:05)  POCT Blood Glucose.: 286 mg/dL (2018 17:14)  POCT Blood Glucose.: 204 mg/dL (2018 11:56)      ABG - ( 2018 05:32 )  pH, Arterial: 7.40  pH, Blood: x     /  pCO2: 46    /  pO2: 61    / HCO3: 28    / Base Excess: 3.1   /  SaO2: 92                Urinalysis Basic - ( 2018 21:33 )    Color: Light Yellow / Appearance: Clear / S.011 / pH: x  Gluc: x / Ketone: Negative  / Bili: Negative / Urobili: Negative   Blood: x / Protein: 30 mg/dL / Nitrite: Negative   Leuk Esterase: Negative / RBC: 1 /hpf / WBC 0 /hpf   Sq Epi: x / Non Sq Epi: 1 /hpf / Bacteria: Negative      REVIEW OF SYSTEMS:  CONSTITUTIONAL: No fever, weight loss, or fatigue  EYES: No eye pain, visual disturbances, or discharge  ENMT:  No difficulty hearing, tinnitus, vertigo; No sinus or throat pain  NECK: No pain or stiffness  RESPIRATORY: No cough, wheezing, chills or hemoptysis; No shortness of breath  CARDIOVASCULAR: No chest pain, palpitations, dizziness, or leg swelling  GASTROINTESTINAL: No abdominal or epigastric pain. No nausea, vomiting, or hematemesis; No diarrhea or constipation. No melena or hematochezia.  GENITOURINARY: No dysuria, frequency, hematuria, or incontinence  NEUROLOGICAL: No headaches, memory loss, loss of strength, numbness, or tremors      Consultant(s) Notes Reviewed:  [x ] YES  [ ] NO    PHYSICAL EXAM:  GENERAL: NAD, well-groomed, well-developed, not in any distress ,  HEAD:  Atraumatic, Normocephalic  EYES: EOMI, PERRLA, conjunctiva and sclera clear  NECK: Supple, No JVD, Normal thyroid  NERVOUS SYSTEM:  Alert & Oriented X3, No focal deficit   CHEST/LUNG: Good air entry bilateral with no  rales, rhonchi, wheezing, or rubs  HEART: Regular rate and rhythm; No murmurs, rubs, or gallops  ABDOMEN: Soft, Nontender, Nondistended; Bowel sounds present  EXTREMITIES:  2+ Peripheral Pulses, No clubbing, cyanosis, or edema    Care Discussed with Consultants/Other Providers [ x] YES  [ ] NO

## 2018-11-06 ENCOUNTER — TRANSCRIPTION ENCOUNTER (OUTPATIENT)
Age: 73
End: 2018-11-06

## 2018-11-06 LAB
ANION GAP SERPL CALC-SCNC: 12 MMOL/L — SIGNIFICANT CHANGE UP (ref 5–17)
BUN SERPL-MCNC: 100 MG/DL — HIGH (ref 7–23)
CALCIUM SERPL-MCNC: 9.1 MG/DL — SIGNIFICANT CHANGE UP (ref 8.4–10.5)
CHLORIDE SERPL-SCNC: 92 MMOL/L — LOW (ref 96–108)
CO2 SERPL-SCNC: 28 MMOL/L — SIGNIFICANT CHANGE UP (ref 22–31)
CREAT SERPL-MCNC: 4.21 MG/DL — HIGH (ref 0.5–1.3)
GLUCOSE BLDC GLUCOMTR-MCNC: 102 MG/DL — HIGH (ref 70–99)
GLUCOSE BLDC GLUCOMTR-MCNC: 151 MG/DL — HIGH (ref 70–99)
GLUCOSE BLDC GLUCOMTR-MCNC: 223 MG/DL — HIGH (ref 70–99)
GLUCOSE BLDC GLUCOMTR-MCNC: 308 MG/DL — HIGH (ref 70–99)
GLUCOSE SERPL-MCNC: 88 MG/DL — SIGNIFICANT CHANGE UP (ref 70–99)
HCT VFR BLD CALC: 26.2 % — LOW (ref 34.5–45)
HGB BLD-MCNC: 8.2 G/DL — LOW (ref 11.5–15.5)
MCHC RBC-ENTMCNC: 27.2 PG — SIGNIFICANT CHANGE UP (ref 27–34)
MCHC RBC-ENTMCNC: 31.3 GM/DL — LOW (ref 32–36)
MCV RBC AUTO: 87 FL — SIGNIFICANT CHANGE UP (ref 80–100)
PHOSPHATE SERPL-MCNC: 3.7 MG/DL — SIGNIFICANT CHANGE UP (ref 2.5–4.5)
PLATELET # BLD AUTO: 237 K/UL — SIGNIFICANT CHANGE UP (ref 150–400)
POTASSIUM SERPL-MCNC: 4.4 MMOL/L — SIGNIFICANT CHANGE UP (ref 3.5–5.3)
POTASSIUM SERPL-SCNC: 4.4 MMOL/L — SIGNIFICANT CHANGE UP (ref 3.5–5.3)
RBC # BLD: 3.01 M/UL — LOW (ref 3.8–5.2)
RBC # FLD: 16.4 % — HIGH (ref 10.3–14.5)
SODIUM SERPL-SCNC: 132 MMOL/L — LOW (ref 135–145)
WBC # BLD: 7.97 K/UL — SIGNIFICANT CHANGE UP (ref 3.8–10.5)
WBC # FLD AUTO: 7.97 K/UL — SIGNIFICANT CHANGE UP (ref 3.8–10.5)

## 2018-11-06 PROCEDURE — 78452 HT MUSCLE IMAGE SPECT MULT: CPT | Mod: 26

## 2018-11-06 PROCEDURE — 71045 X-RAY EXAM CHEST 1 VIEW: CPT | Mod: 26

## 2018-11-06 RX ADMIN — Medication 3 UNIT(S): at 11:42

## 2018-11-06 RX ADMIN — Medication 1 PATCH: at 11:30

## 2018-11-06 RX ADMIN — GABAPENTIN 300 MILLIGRAM(S): 400 CAPSULE ORAL at 21:15

## 2018-11-06 RX ADMIN — Medication 100 MILLIGRAM(S): at 05:19

## 2018-11-06 RX ADMIN — HEPARIN SODIUM 5000 UNIT(S): 5000 INJECTION INTRAVENOUS; SUBCUTANEOUS at 17:27

## 2018-11-06 RX ADMIN — Medication 3 MILLILITER(S): at 05:19

## 2018-11-06 RX ADMIN — Medication 325 MILLIGRAM(S): at 11:34

## 2018-11-06 RX ADMIN — HEPARIN SODIUM 5000 UNIT(S): 5000 INJECTION INTRAVENOUS; SUBCUTANEOUS at 05:19

## 2018-11-06 RX ADMIN — CARVEDILOL PHOSPHATE 12.5 MILLIGRAM(S): 80 CAPSULE, EXTENDED RELEASE ORAL at 11:29

## 2018-11-06 RX ADMIN — CLOPIDOGREL BISULFATE 75 MILLIGRAM(S): 75 TABLET, FILM COATED ORAL at 11:30

## 2018-11-06 RX ADMIN — CARVEDILOL PHOSPHATE 12.5 MILLIGRAM(S): 80 CAPSULE, EXTENDED RELEASE ORAL at 21:15

## 2018-11-06 RX ADMIN — Medication 2: at 11:43

## 2018-11-06 RX ADMIN — AMLODIPINE BESYLATE 10 MILLIGRAM(S): 2.5 TABLET ORAL at 21:14

## 2018-11-06 RX ADMIN — Medication 3 UNIT(S): at 17:31

## 2018-11-06 RX ADMIN — BUDESONIDE AND FORMOTEROL FUMARATE DIHYDRATE 2 PUFF(S): 160; 4.5 AEROSOL RESPIRATORY (INHALATION) at 05:19

## 2018-11-06 RX ADMIN — Medication 4: at 17:31

## 2018-11-06 RX ADMIN — BUDESONIDE AND FORMOTEROL FUMARATE DIHYDRATE 2 PUFF(S): 160; 4.5 AEROSOL RESPIRATORY (INHALATION) at 17:35

## 2018-11-06 RX ADMIN — Medication 667 MILLIGRAM(S): at 17:30

## 2018-11-06 RX ADMIN — Medication 100 MILLIGRAM(S): at 17:26

## 2018-11-06 RX ADMIN — Medication 3 MILLILITER(S): at 17:25

## 2018-11-06 RX ADMIN — Medication 2: at 22:01

## 2018-11-06 RX ADMIN — Medication 100 MILLIGRAM(S): at 21:15

## 2018-11-06 RX ADMIN — Medication 40 MILLIGRAM(S): at 05:19

## 2018-11-06 RX ADMIN — IRON SUCROSE 210 MILLIGRAM(S): 20 INJECTION, SOLUTION INTRAVENOUS at 19:20

## 2018-11-06 RX ADMIN — Medication 81 MILLIGRAM(S): at 11:31

## 2018-11-06 RX ADMIN — MONTELUKAST 10 MILLIGRAM(S): 4 TABLET, CHEWABLE ORAL at 11:32

## 2018-11-06 RX ADMIN — Medication 667 MILLIGRAM(S): at 14:26

## 2018-11-06 RX ADMIN — Medication 1 APPLICATION(S): at 05:33

## 2018-11-06 RX ADMIN — INSULIN GLARGINE 10 UNIT(S): 100 INJECTION, SOLUTION SUBCUTANEOUS at 22:01

## 2018-11-06 RX ADMIN — ATORVASTATIN CALCIUM 40 MILLIGRAM(S): 80 TABLET, FILM COATED ORAL at 21:14

## 2018-11-06 RX ADMIN — Medication 80 MILLIGRAM(S): at 17:35

## 2018-11-06 RX ADMIN — PANTOPRAZOLE SODIUM 40 MILLIGRAM(S): 20 TABLET, DELAYED RELEASE ORAL at 05:19

## 2018-11-06 RX ADMIN — Medication 1 APPLICATION(S): at 17:27

## 2018-11-06 RX ADMIN — Medication 3 MILLILITER(S): at 11:29

## 2018-11-06 RX ADMIN — Medication 80 MILLIGRAM(S): at 05:19

## 2018-11-06 NOTE — DISCHARGE NOTE ADULT - HOME CARE AGENCY
Home Care [facility]- visiting nurse & PT. [phone]. They will call you to set up 1st appointment, after insurance authorization is obtained, requested for 11/9.

## 2018-11-06 NOTE — PROGRESS NOTE ADULT - SUBJECTIVE AND OBJECTIVE BOX
[facility]   MD CHAPIN MCADAMS MD RUORU WONG, PA    TEL:  OFFICE: [phone]  DR ALCANTAR CELL: [phone]  YAMILET BOATENG CELL: [phone]  DR. ACOSTA CELL: [phone]    RENAL FOLLOW UP NOTE  --------------------------------------------------------------------------------  HPI:      Pt seen and examined at bedside.   +  SOB, no chest pain     PAST HISTORY  --------------------------------------------------------------------------------  No significant changes to PMH, PSH, FHx, SHx, unless otherwise noted    ALLERGIES & MEDICATIONS  --------------------------------------------------------------------------------  Allergies    No Known Allergies    Intolerances      Standing Inpatient Medications  ALBUTerol/ipratropium for Nebulization 3 milliLiter(s) Nebulizer every 6 hours  amLODIPine   Tablet 10 milliGRAM(s) Oral at bedtime  AQUAPHOR (petrolatum Ointment) 1 Application(s) Topical two times a day  aspirin enteric coated 81 milliGRAM(s) Oral daily  atorvastatin 40 milliGRAM(s) Oral at bedtime  buDESOnide 160 MICROgram(s)/formoterol 4.5 MICROgram(s) Inhaler 2 Puff(s) Inhalation two times a day  calcium acetate 667 milliGRAM(s) Oral three times a day with meals  carvedilol 12.5 milliGRAM(s) Oral every 12 hours  clopidogrel Tablet 75 milliGRAM(s) Oral daily  dextrose 5%. 1000 milliLiter(s) IV Continuous <Continuous>  dextrose 50% Injectable 12.5 Gram(s) IV Push once  dextrose 50% Injectable 25 Gram(s) IV Push once  dextrose 50% Injectable 25 Gram(s) IV Push once  epoetin terence Injectable 24929 Unit(s) SubCutaneous <User Schedule>  ferrous    sulfate 325 milliGRAM(s) Oral daily  furosemide   Injectable 80 milliGRAM(s) IV Push two times a day  gabapentin 300 milliGRAM(s) Oral at bedtime  heparin  Injectable 5000 Unit(s) SubCutaneous every 12 hours  hydrALAZINE 100 milliGRAM(s) Oral three times a day  influenza   Vaccine 0.5 milliLiter(s) IntraMuscular once  insulin glargine Injectable (LANTUS) 10 Unit(s) SubCutaneous at bedtime  insulin lispro (HumaLOG) corrective regimen sliding scale   SubCutaneous three times a day before meals  insulin lispro (HumaLOG) corrective regimen sliding scale   SubCutaneous at bedtime  insulin lispro Injectable (HumaLOG) 3 Unit(s) SubCutaneous three times a day before meals  iron sucrose IVPB 100 milliGRAM(s) IV Intermittent every 24 hours  metolazone 2.5 milliGRAM(s) Oral daily  montelukast 10 milliGRAM(s) Oral daily  nicotine -  14 mG/24Hr(s) Patch 1 patch Transdermal daily  pantoprazole    Tablet 40 milliGRAM(s) Oral before breakfast    PRN Inpatient Medications  acetaminophen   Tablet .. 500 milliGRAM(s) Oral every 4 hours PRN  ALBUTerol/ipratropium for Nebulization 3 milliLiter(s) Nebulizer every 2 hours PRN  dextrose 40% Gel 15 Gram(s) Oral once PRN  glucagon  Injectable 1 milliGRAM(s) IntraMuscular once PRN  nicotine - Inhaler 1 Each Inhalation every 1 hour PRN  sodium chloride 0.65% Nasal 1 Spray(s) Both Nostrils two times a day PRN      REVIEW OF SYSTEMS  --------------------------------------------------------------------------------  General: no fever  CVS: no chest pain  RESP: + sob, no cough  ABD: no abdominal pain      VITALS/PHYSICAL EXAM  --------------------------------------------------------------------------------  T(C): 36.7 (11-06-18 @ 04:59), Max: 37.1 (11-05-18 @ 21:15)  HR: 75 (11-06-18 @ 04:59) (72 - 83)  BP: 137/65 (11-06-18 @ 04:59) (137/65 - 177/62)  RR: 20 (11-06-18 @ 04:59) (20 - 20)  SpO2: 90% (11-06-18 @ 04:59) (90% - 93%)  Wt(kg): --        11-05-18 @ 07:01  -  11-06-18 @ 07:00  --------------------------------------------------------  IN: 1200 mL / OUT: 1850 mL / NET: -650 mL      Physical Exam:  	Gen: NAD  	HEENT: MMM  	Pulm: CTA B/L  	CV: S1S2  	Abd: Soft, +BS  	Ext: + LE edema B/L                      Neuro: Awake   	Skin: Warm and Dry       LABS/STUDIES  --------------------------------------------------------------------------------              8.2    7.97  >-----------<  237      [11-06-18 @ 08:10]              26.2     132  |  92  |  100  ----------------------------<  88      [11-06-18 @ 06:40]  4.4   |  28  |  4.21        Ca     9.1     [11-06-18 @ 06:40]      Phos  3.7     [11-06-18 @ 06:40]            Creatinine Trend:  SCr 4.21 [11-06 @ 06:40]  SCr 4.16 [11-05 @ 07:00]  SCr 4.11 [11-04 @ 07:04]  SCr 4.33 [11-03 @ 06:20]  SCr 4.35 [11-02 @ 05:42]    Urinalysis - [11-04-18 @ 21:33]      Color Light Yellow / Appearance Clear / SG 1.011 / pH 6.0      Gluc Trace / Ketone Negative  / Bili Negative / Urobili Negative       Blood Negative / Protein 30 mg/dL / Leuk Est Negative / Nitrite Negative      RBC 1 / WBC 0 / Hyaline 1 / Gran  / Sq Epi  / Non Sq Epi 1 / Bacteria Negative      Iron 32, TIBC 244, %sat 13      [10-31-18 @ 09:21]  Ferritin 142      [11-01-18 @ 13:52]  PTH -- (Ca 8.7)      [10-31-18 @ 09:24]   269  HbA1c 5.6      [10-31-18 @ 07:56]  TSH 0.58      [11-02-18 @ 08:02]

## 2018-11-06 NOTE — PROGRESS NOTE ADULT - SUBJECTIVE AND OBJECTIVE BOX
Patient is a 73y old  Female who presents with a chief complaint of SOB since 2 weeks (2018 11:19)      Any change in ROS: Pt is doing ok     MEDICATIONS  (STANDING):  ALBUTerol/ipratropium for Nebulization 3 milliLiter(s) Nebulizer every 6 hours  amLODIPine   Tablet 10 milliGRAM(s) Oral at bedtime  AQUAPHOR (petrolatum Ointment) 1 Application(s) Topical two times a day  aspirin enteric coated 81 milliGRAM(s) Oral daily  atorvastatin 40 milliGRAM(s) Oral at bedtime  buDESOnide 160 MICROgram(s)/formoterol 4.5 MICROgram(s) Inhaler 2 Puff(s) Inhalation two times a day  calcium acetate 667 milliGRAM(s) Oral three times a day with meals  carvedilol 12.5 milliGRAM(s) Oral every 12 hours  clopidogrel Tablet 75 milliGRAM(s) Oral daily  dextrose 5%. 1000 milliLiter(s) (50 mL/Hr) IV Continuous <Continuous>  dextrose 50% Injectable 12.5 Gram(s) IV Push once  dextrose 50% Injectable 25 Gram(s) IV Push once  dextrose 50% Injectable 25 Gram(s) IV Push once  epoetin terence Injectable 87603 Unit(s) SubCutaneous <User Schedule>  ferrous    sulfate 325 milliGRAM(s) Oral daily  furosemide   Injectable 80 milliGRAM(s) IV Push two times a day  gabapentin 300 milliGRAM(s) Oral at bedtime  heparin  Injectable 5000 Unit(s) SubCutaneous every 12 hours  hydrALAZINE 100 milliGRAM(s) Oral three times a day  influenza   Vaccine 0.5 milliLiter(s) IntraMuscular once  insulin glargine Injectable (LANTUS) 10 Unit(s) SubCutaneous at bedtime  insulin lispro (HumaLOG) corrective regimen sliding scale   SubCutaneous three times a day before meals  insulin lispro (HumaLOG) corrective regimen sliding scale   SubCutaneous at bedtime  insulin lispro Injectable (HumaLOG) 3 Unit(s) SubCutaneous three times a day before meals  iron sucrose IVPB 100 milliGRAM(s) IV Intermittent every 24 hours  metolazone 2.5 milliGRAM(s) Oral daily  montelukast 10 milliGRAM(s) Oral daily  nicotine -  14 mG/24Hr(s) Patch 1 patch Transdermal daily  pantoprazole    Tablet 40 milliGRAM(s) Oral before breakfast    MEDICATIONS  (PRN):  acetaminophen   Tablet .. 500 milliGRAM(s) Oral every 4 hours PRN Temp greater or equal to 38.5C (101.3F), Moderate Pain (4 - 6)  ALBUTerol/ipratropium for Nebulization 3 milliLiter(s) Nebulizer every 2 hours PRN Shortness of Breath and/or Wheezing  dextrose 40% Gel 15 Gram(s) Oral once PRN Blood Glucose LESS THAN 70 milliGRAM(s)/deciliter  glucagon  Injectable 1 milliGRAM(s) IntraMuscular once PRN Glucose LESS THAN 70 milligrams/deciliter  nicotine - Inhaler 1 Each Inhalation every 1 hour PRN smoking cessation  sodium chloride 0.65% Nasal 1 Spray(s) Both Nostrils two times a day PRN Nasal Congestion    Vital Signs Last 24 Hrs  T(C): 36.7 (2018 04:59), Max: 37.1 (2018 21:15)  T(F): 98 (2018 04:59), Max: 98.7 (2018 21:15)  HR: 75 (2018 04:59) (72 - 83)  BP: 137/65 (2018 04:59) (137/65 - 177/62)  BP(mean): --  RR: 20 (2018 04:59) (20 - 20)  SpO2: 90% (2018 04:59) (90% - 93%)    I&O's Summary    2018 07:01  -  2018 07:00  --------------------------------------------------------  IN: 1200 mL / OUT: 1850 mL / NET: -650 mL          Physical Exam:   GENERAL: NAD, well-groomed, well-developed  HEENT: BIRGIT/   Atraumatic, Normocephalic  ENMT: No tonsillar erythema, exudates, or enlargement; Moist mucous membranes, Good dentition, No lesions  NECK: Supple, No JVD, Normal thyroid  CHEST/LUNG: Clear to auscultaion,  CVS: Regular rate and rhythm; No murmurs, rubs, or gallops  GI: : Soft, Nontender, Nondistended; Bowel sounds present  NERVOUS SYSTEM:  Alert & Oriented X3  EXTREMITIES:  2Mild edema  LYMPH: No lymphadenopathy noted  SKIN: No rashes or lesions  ENDOCRINOLOGY: No Thyromegaly  PSYCH: Appropriate    Labs:  ABG - ( 2018 05:32 )  pH, Arterial: 7.40  pH, Blood: x     /  pCO2: 46    /  pO2: 61    / HCO3: 28    / Base Excess: 3.1   /  SaO2: 92              30                            8.2    7.97  )-----------( 237      ( 2018 08:10 )             26.2                         8.0    6.89  )-----------( 228      ( 2018 08:27 )             25.0                         8.4    6.01  )-----------( 230      ( 2018 08:06 )             26.6                         7.6    4.61  )-----------( 203      ( 2018 08:35 )             25.1     11-06    132<L>  |  92<L>  |  100<H>  ----------------------------<  88  4.4   |  28  |  4.21<H>  11    132<L>  |  92<L>  |  94<H>  ----------------------------<  142<H>  4.5   |  26  |  4.16<H>      132<L>  |  93<L>  |  94<H>  ----------------------------<  100<H>  4.5   |  24  |  4.11<H>  11    133<L>  |  93<L>  |  88<H>  ----------------------------<  111<H>  4.7   |  24  |  4.33<H>    Ca    9.1      2018 06:40  Ca    8.8      2018 07:00  Phos  3.7     11-06      CAPILLARY BLOOD GLUCOSE      POCT Blood Glucose.: 102 mg/dL (2018 08:01)  POCT Blood Glucose.: 204 mg/dL (2018 21:32)  POCT Blood Glucose.: 271 mg/dL (2018 17:05)  POCT Blood Glucose.: 181 mg/dL (2018 12:42)          Urinalysis Basic - ( 2018 21:33 )    Color: Light Yellow / Appearance: Clear / S.011 / pH: x  Gluc: x / Ketone: Negative  / Bili: Negative / Urobili: Negative   Blood: x / Protein: 30 mg/dL / Nitrite: Negative   Leuk Esterase: Negative / RBC: 1 /hpf / WBC 0 /hpf   Sq Epi: x / Non Sq Epi: 1 /hpf / Bacteria: Negative      < from: CT Chest No Cont (10.31.18 @ 12:07) >  Right middle lobe and lingular linear atelectasis. 3 mm right apical   pulmonary nodule (3:32).  PLEURA: Small bilateral pleural effusions.  VESSELS: Atherosclerosis of the aorta and coronary arteries.  HEART: Multichamber cardiac enlargement. Small pericardial effusion.   Aortic valvular and mitral annular calcifications.  MEDIASTINUM AND SCOTT: Enlarged mediastinal lymph nodes measuring up to   3.1 x 2.1 cm in a right lower paratracheal node (3:60).  CHEST WALL AND LOWER NECK: Enlarged, heterogeneous thyroid.  VISUALIZED UPPER ABDOMEN: Nodular left adrenal thickening.  BONES: Degenerative changes.    IMPRESSION:     Interlobular septal thickening, bilateral diffuse groundglass opacities,   and small bilateral pleural effusions are consistent with pulmonary edema.    Left sided endobronchial nodularity likely reflect secretions. Follow-up   CT chest is recommended in 1 month to ensure resolution. The   endobronchial nodularity can also be reassessed at that time.                MEAGHAN VÁZQUEZ M.D., RADIOLOGY RESIDENT  This document has been electronically signed.  JORDAN COHEN M.D., ATTENDING RADIOLOGIST  This document has been electronically signed. Oct 31 2018  3:26PM        < end of copied text >        RECENT CULTURES:        RESPIRATORY CULTURES:          Studies  Chest X-RAY  CT SCAN Chest   Venous Dopplers: LE:   CT Abdomen  Others

## 2018-11-06 NOTE — PROGRESS NOTE ADULT - ASSESSMENT
EkG : Likely SR with Atypical LBBB.    Echo < from: Transthoracic Echocardiogram (10.31.18 @ 09:40) >  1. Mitral annular calcification and calcified and tethered  mitral leaflets with normal diastolic opening. Moderate  mitral regurgitation.  2. Severely dilated left atrium.  LA volume index = 51  cc/m2.  3. Moderate left ventricular enlargement.  4. Mild global left ventricular systolic dysfunction.  5. The right ventricle is not well visualized; grossly  normal right ventricular systolic function.  6. Estimated pulmonary artery systolic pressure equals 20  mm Hg, assuming right atrial pressure equals 8  mm Hg,  consistent with normal pulmonary pressures.  7. Small circumferential pericardial effusion.    < end of copied text >    Nuclear Scan at rest: < from: Nuclear Myocardial-Rest Study (11.06.18 @ 10:58) >  The left ventricle was enlarged.  * There are large, severe defects in the inferolateral,  basal to mid inferior, and basal inferoseptal walls.  * There is a small, moderate to severe defect in the apex.  * There are medium-sized, mild defects in the basal  anterior, basal to mid anterolateral, and basal  anteroseptal walls.    < end of copied text >        Assessement and Plan:    SOB: volume up on exam resume lasix IV and monitor  u/o creat and resp status     New onset LV dysfunction: , nuclear stress as above , will plan for Cardiac cath tomorrow to define coronary anatomy     COPD: On nebs Pulm on board     CKD:  renal on board , monitor renal function, planned for AVF ,     DVT PPX heparin

## 2018-11-06 NOTE — DISCHARGE NOTE ADULT - PATIENT PORTAL LINK FT
You can access the [facility] Patient Portal, offered by [facility], by registering with the following website: [email or web address]

## 2018-11-06 NOTE — PROGRESS NOTE ADULT - SUBJECTIVE AND OBJECTIVE BOX
Gregor Barrow MD  Interventional Cardiology  [place] Office : [phone] [place] [address] [place]. [address]  Tel:   [place] Office : [address] [facility] [place] [address]  Tel: [phone]  Cell : [phone]    Subjective : Pt lying in bed comfortable, not in distress, denies any chest pain or SOB  	  MEDICATIONS:  amLODIPine   Tablet 10 milliGRAM(s) Oral at bedtime  aspirin enteric coated 81 milliGRAM(s) Oral daily  carvedilol 12.5 milliGRAM(s) Oral every 12 hours  clopidogrel Tablet 75 milliGRAM(s) Oral daily  furosemide   Injectable 80 milliGRAM(s) IV Push two times a day  heparin  Injectable 5000 Unit(s) SubCutaneous every 12 hours  hydrALAZINE 100 milliGRAM(s) Oral three times a day  metolazone 2.5 milliGRAM(s) Oral <User Schedule>      ALBUTerol/ipratropium for Nebulization 3 milliLiter(s) Nebulizer every 6 hours  ALBUTerol/ipratropium for Nebulization 3 milliLiter(s) Nebulizer every 2 hours PRN  buDESOnide 160 MICROgram(s)/formoterol 4.5 MICROgram(s) Inhaler 2 Puff(s) Inhalation two times a day  montelukast 10 milliGRAM(s) Oral daily    acetaminophen   Tablet .. 500 milliGRAM(s) Oral every 4 hours PRN  gabapentin 300 milliGRAM(s) Oral at bedtime    pantoprazole    Tablet 40 milliGRAM(s) Oral before breakfast    atorvastatin 40 milliGRAM(s) Oral at bedtime  dextrose 40% Gel 15 Gram(s) Oral once PRN  dextrose 50% Injectable 12.5 Gram(s) IV Push once  dextrose 50% Injectable 25 Gram(s) IV Push once  dextrose 50% Injectable 25 Gram(s) IV Push once  glucagon  Injectable 1 milliGRAM(s) IntraMuscular once PRN  insulin glargine Injectable (LANTUS) 10 Unit(s) SubCutaneous at bedtime  insulin lispro (HumaLOG) corrective regimen sliding scale   SubCutaneous three times a day before meals  insulin lispro (HumaLOG) corrective regimen sliding scale   SubCutaneous at bedtime  insulin lispro Injectable (HumaLOG) 3 Unit(s) SubCutaneous three times a day before meals    AQUAPHOR (petrolatum Ointment) 1 Application(s) Topical two times a day  calcium acetate 667 milliGRAM(s) Oral three times a day with meals  dextrose 5%. 1000 milliLiter(s) IV Continuous <Continuous>  epoetin terence Injectable 29586 Unit(s) SubCutaneous <User Schedule>  ferrous    sulfate 325 milliGRAM(s) Oral daily  influenza   Vaccine 0.5 milliLiter(s) IntraMuscular once  sodium chloride 0.65% Nasal 1 Spray(s) Both Nostrils two times a day PRN      PHYSICAL EXAM:  T(C): 36.7 (11-06-18 @ 21:13), Max: 36.7 (11-06-18 @ 04:59)  HR: 80 (11-06-18 @ 21:13) (67 - 80)  BP: 169/72 (11-06-18 @ 21:13) (137/65 - 169/72)  RR: 20 (11-06-18 @ 21:13) (20 - 20)  SpO2: 91% (11-06-18 @ 21:13) (90% - 93%)  Wt(kg): --  I&O's Summary    05 Nov 2018 07:01  -  06 Nov 2018 07:00  --------------------------------------------------------  IN: 1200 mL / OUT: 1850 mL / NET: -650 mL    06 Nov 2018 07:01  -  07 Nov 2018 00:04  --------------------------------------------------------  IN: 900 mL / OUT: 1050 mL / NET: -150 mL        Weight (kg): 118.2 (11-06 @ 09:00)            Appearance: Normal	  HEENT:   Normal oral mucosa, PERRL, EOMI	  Cardiovascular: Normal S1 S2, No JVD, No murmurs, No edema  Respiratory: occasional wheezing 	  Gastrointestinal:  Soft, Non-tender, + BS	  Extremities:No clubbing, cyanosis or edema                            8.2    7.97  )-----------( 237      ( 06 Nov 2018 08:10 )             26.2     11-06    132<L>  |  92<L>  |  100<H>  ----------------------------<  88  4.4   |  28  |  4.21<H>    Ca    9.1      06 Nov 2018 06:40  Phos  3.7     11-06      proBNP:   Lipid Profile:   HgA1c:   TSH:

## 2018-11-06 NOTE — DISCHARGE NOTE ADULT - HOSPITAL COURSE
74 y/o F COPD, CHF, HTN, HLD, DM2,Anemia ,CKD ,smoking x60+ years, presenting with shortness of breath worsening for one month. Patient was diagnosed with flu x2 weeks ago after her daughter had flu, and had significant shortness of breath with cough and congestion as well as shortness of breath at that time. She reports now that for the last week her shortness of breath is intermittent but has been worsening in severity. She states that she uses nebulizer once daily for her COPD, but yesterday had to use it multiple more times. Reports still feeling congested and intermittently coughing up mucous without any blood. She denies any fevers or chills. Went to her PMD today for increased shortness of breath and was told to come here concerned for fluid on the lungs.   ·  Problem: Acute respiratory failure with hypoxia and Hypercapnia .  Plan: Secondary to CHF as well COPD exacerbation. Oxygen to Keep Saturation 92% or above. Pulmonary helping.    ·  Problem: Chronic obstructive pulmonary disease with acute exacerbation.  Plan: Improving.  Steroid  ,Neb Rx and Oxygen . Advised to quit smoking . Pulmonary helping.  Added Singulair   ·  Problem: Acute on chronic Systolic congestive heart failure .  Plan: IV Lasix and TTE noted... < from: Transthoracic Echocardiogram (10.31.18 @ 09:40) >  Conclusions:  1. Mitral annular calcification and calcified and tethered  mitral leaflets with normal diastolic opening. Moderate  mitral regurgitation.  2. Severely dilated left atrium.  LA volume index = 51  cc/m2.  3. Moderate left ventricular enlargement.  4. Mild global left ventricular systolic dysfunction.  5. The right ventricle is not well visualized; grossly  normal right ventricular systolic function.  6. Estimated pulmonary artery systolic pressure equals 20  mm Hg, assuming right atrial pressure equals 8  mm Hg,  consistent with normal pulmonary pressures.  7. Small circumferential pericardial effusion.  *** No previous Echo exam.    < end of copied text >  CT chest ... < from: CT Chest No Cont (10.31.18 @ 12:07) >  IMPRESSION:     Interlobular septal thickening, bilateral diffuse groundglass opacities,   and small bilateral pleural effusions are consistent with pulmonary edema.  Left sided endobronchial nodularity likely reflect secretions. Follow-up   CT chest is recommended in 1 month to ensure resolution. The   endobronchial nodularity can also be reassessed at that time.  Cardiology helping and S/P Cardiac cath with Stent .   Problem: Stage 4 chronic kidney disease with MELISSA .  Plan: S/P Cardiac cath so watching BMP closely . Renal helping and may need HD so getting Outpt AVF. D/W renal attending and Okay to go home an dF/U outpt.   · Problem: Diabetes mellitus.  Plan: Lantus and SSI for now. Sugars in good range.   Problem: Hypertension. Plan: BP meds with parameters. BP readings fine.

## 2018-11-06 NOTE — DISCHARGE NOTE ADULT - CARE PROVIDER_API CALL
Gregor Barrow), Cardiovascular Disease; Internal Medicine; Nuclear Cardiology  [address] [place] [id number]  Phone: [phone]  Fax: [phone]    Demetrius Eid), Critical Care Medicine; Internal Medicine; Pulmonary Disease  [id number] [place] [address]  Phone: [phone]  Fax: [phone]

## 2018-11-06 NOTE — DISCHARGE NOTE ADULT - SECONDARY DIAGNOSIS.
Chronic obstructive pulmonary disease, unspecified COPD type Coronary artery disease with other form of angina pectoris DM (diabetes mellitus), secondary Essential hypertension

## 2018-11-06 NOTE — PROGRESS NOTE ADULT - SUBJECTIVE AND OBJECTIVE BOX
INTERVAL HPI/OVERNIGHT EVENTS: No new concerns.  I feel better and lost some weight also .  Vital Signs Last 24 Hrs  T(C): 36.7 (2018 04:59), Max: 37.1 (2018 21:15)  T(F): 98 (2018 04:59), Max: 98.7 (2018 21:15)  HR: 75 (2018 04:59) (72 - 83)  BP: 137/65 (2018 04:59) (137/65 - 177/62)  BP(mean): --  RR: 20 (2018 04:59) (20 - 20)  SpO2: 90% (2018 04:59) (90% - 93%)  I&O's Summary    2018 07:01  -  2018 07:00  --------------------------------------------------------  IN: 1200 mL / OUT: 1850 mL / NET: -650 mL      MEDICATIONS  (STANDING):  ALBUTerol/ipratropium for Nebulization 3 milliLiter(s) Nebulizer every 6 hours  amLODIPine   Tablet 10 milliGRAM(s) Oral at bedtime  AQUAPHOR (petrolatum Ointment) 1 Application(s) Topical two times a day  aspirin enteric coated 81 milliGRAM(s) Oral daily  atorvastatin 40 milliGRAM(s) Oral at bedtime  buDESOnide 160 MICROgram(s)/formoterol 4.5 MICROgram(s) Inhaler 2 Puff(s) Inhalation two times a day  calcium acetate 667 milliGRAM(s) Oral three times a day with meals  carvedilol 12.5 milliGRAM(s) Oral every 12 hours  clopidogrel Tablet 75 milliGRAM(s) Oral daily  dextrose 5%. 1000 milliLiter(s) (50 mL/Hr) IV Continuous <Continuous>  dextrose 50% Injectable 12.5 Gram(s) IV Push once  dextrose 50% Injectable 25 Gram(s) IV Push once  dextrose 50% Injectable 25 Gram(s) IV Push once  epoetin terence Injectable 58623 Unit(s) SubCutaneous <User Schedule>  ferrous    sulfate 325 milliGRAM(s) Oral daily  furosemide   Injectable 80 milliGRAM(s) IV Push two times a day  gabapentin 300 milliGRAM(s) Oral at bedtime  heparin  Injectable 5000 Unit(s) SubCutaneous every 12 hours  hydrALAZINE 100 milliGRAM(s) Oral three times a day  influenza   Vaccine 0.5 milliLiter(s) IntraMuscular once  insulin glargine Injectable (LANTUS) 10 Unit(s) SubCutaneous at bedtime  insulin lispro (HumaLOG) corrective regimen sliding scale   SubCutaneous three times a day before meals  insulin lispro (HumaLOG) corrective regimen sliding scale   SubCutaneous at bedtime  insulin lispro Injectable (HumaLOG) 3 Unit(s) SubCutaneous three times a day before meals  iron sucrose IVPB 100 milliGRAM(s) IV Intermittent every 24 hours  metolazone 2.5 milliGRAM(s) Oral daily  montelukast 10 milliGRAM(s) Oral daily  nicotine -  14 mG/24Hr(s) Patch 1 patch Transdermal daily  pantoprazole    Tablet 40 milliGRAM(s) Oral before breakfast    MEDICATIONS  (PRN):  acetaminophen   Tablet .. 500 milliGRAM(s) Oral every 4 hours PRN Temp greater or equal to 38.5C (101.3F), Moderate Pain (4 - 6)  ALBUTerol/ipratropium for Nebulization 3 milliLiter(s) Nebulizer every 2 hours PRN Shortness of Breath and/or Wheezing  dextrose 40% Gel 15 Gram(s) Oral once PRN Blood Glucose LESS THAN 70 milliGRAM(s)/deciliter  glucagon  Injectable 1 milliGRAM(s) IntraMuscular once PRN Glucose LESS THAN 70 milligrams/deciliter  nicotine - Inhaler 1 Each Inhalation every 1 hour PRN smoking cessation  sodium chloride 0.65% Nasal 1 Spray(s) Both Nostrils two times a day PRN Nasal Congestion    LABS:                        8.2    7.97  )-----------( 237      ( 2018 08:10 )             26.2     11-06    132<L>  |  92<L>  |  100<H>  ----------------------------<  88  4.4   |  28  |  4.21<H>    Ca    9.1      2018 06:40  Phos  3.7     11-06        Urinalysis Basic - ( 2018 21:33 )    Color: Light Yellow / Appearance: Clear / S.011 / pH: x  Gluc: x / Ketone: Negative  / Bili: Negative / Urobili: Negative   Blood: x / Protein: 30 mg/dL / Nitrite: Negative   Leuk Esterase: Negative / RBC: 1 /hpf / WBC 0 /hpf   Sq Epi: x / Non Sq Epi: 1 /hpf / Bacteria: Negative      CAPILLARY BLOOD GLUCOSE      POCT Blood Glucose.: 102 mg/dL (2018 08:01)  POCT Blood Glucose.: 204 mg/dL (2018 21:32)  POCT Blood Glucose.: 271 mg/dL (2018 17:05)  POCT Blood Glucose.: 181 mg/dL (2018 12:42)      ABG - ( 2018 05:32 )  pH, Arterial: 7.40  pH, Blood: x     /  pCO2: 46    /  pO2: 61    / HCO3: 28    / Base Excess: 3.1   /  SaO2: 92                Urinalysis Basic - ( 2018 21:33 )    Color: Light Yellow / Appearance: Clear / S.011 / pH: x  Gluc: x / Ketone: Negative  / Bili: Negative / Urobili: Negative   Blood: x / Protein: 30 mg/dL / Nitrite: Negative   Leuk Esterase: Negative / RBC: 1 /hpf / WBC 0 /hpf   Sq Epi: x / Non Sq Epi: 1 /hpf / Bacteria: Negative      REVIEW OF SYSTEMS:  CONSTITUTIONAL: No fever, weight loss, or fatigue  EYES: No eye pain, visual disturbances, or discharge  ENMT:  No difficulty hearing, tinnitus, vertigo; No sinus or throat pain  NECK: No pain or stiffness  RESPIRATORY: No cough, wheezing, chills or hemoptysis; No shortness of breath  CARDIOVASCULAR: No chest pain, palpitations, dizziness, or leg swelling  GASTROINTESTINAL: No abdominal or epigastric pain. No nausea, vomiting, or hematemesis; No diarrhea or constipation. No melena or hematochezia.  GENITOURINARY: No dysuria, frequency, hematuria, or incontinence  NEUROLOGICAL: No headaches, memory loss, loss of strength, numbness, or tremors:    Consultant(s) Notes Reviewed:  [x ] YES  [ ] NO    PHYSICAL EXAM:  GENERAL: NAD, well-groomed, well-developed, not in any distress ,  HEAD:  Atraumatic, Normocephalic  EYES: EOMI, PERRLA, conjunctiva and sclera clear  ENMT: No tonsillar erythema, exudates, or enlargement; Moist mucous membranes, Good dentition, No lesions  NECK: Supple, No JVD, Normal thyroid  NERVOUS SYSTEM:  Alert & Oriented X3, No focal deficit   CHEST/LUNG: Good air entry bilateral with no  rales, rhonchi, wheezing, or rubs  HEART: Regular rate and rhythm; No murmurs, rubs, or gallops  ABDOMEN: Soft, Nontender, Nondistended; Bowel sounds present  EXTREMITIES:  2+ Peripheral Pulses, No clubbing, cyanosis, or edema    Care Discussed with Consultants/Other Providers [ x] YES  [ ] NO

## 2018-11-06 NOTE — DISCHARGE NOTE ADULT - MEDICATION SUMMARY - MEDICATIONS TO TAKE
I will START or STAY ON the medications listed below when I get home from the hospital:    aspirin 81 mg oral tablet  -- 1 tab(s) by mouth once a day  -- Indication: For Prophylactic     Tylenol Extra Strength 500 mg oral tablet  -- 2 tab(s) by mouth every 4 hours, As Needed  -- Indication: For pain    gabapentin 300 mg oral capsule  -- 1 cap(s) by mouth once a day  -- Indication: For Pain    Levemir FlexPen 100 units/mL subcutaneous solution  -- 10 unit(s) subcutaneous once a day (at bedtime)  -- Indication: For Diabetes mellitus    atorvastatin 40 mg oral tablet  -- 1 tab(s) by mouth once a day (at bedtime)  -- Indication: For Hyperlipidemia    clopidogrel 75 mg oral tablet  -- 1 tab(s) by mouth once a day  -- Indication: For CaD    carvedilol 12.5 mg oral tablet  -- 1 tab(s) by mouth every 12 hours  -- Indication: For Hypertension    albuterol 2.5 mg/3 mL (0.083%) inhalation solution  -- 3 milliliter(s) inhaled 3 times a day, As Needed  -- Indication: For Chronic obstructive pulmonary disease with acute exacerbation    Symbicort 160 mcg-4.5 mcg/inh inhalation aerosol  -- 2 puff(s) inhaled , As Needed  -- Indication: For Chronic obstructive pulmonary disease with acute exacerbation    Ventolin HFA 90 mcg/inh inhalation aerosol  -- 2 puff(s) inhaled 4 times a day, As Needed  -- Indication: For Chronic obstructive pulmonary disease with acute exacerbation    ProAir HFA 90 mcg/inh inhalation aerosol  -- 2 puff(s) inhaled , As Needed  -- Indication: For Chronic obstructive pulmonary disease with acute exacerbation    amLODIPine 10 mg oral tablet  -- 1 tab(s) by mouth once a day  -- Indication: For Hypertension    Salonpas  -- Apply on skin to affected area , As Needed  -- Indication: For RASH     metOLazone 2.5 mg oral tablet  -- 1 tab(s) by mouth once a day , 30 mins prior to am lasix dose   -- Indication: For Congestive heart failure    furosemide 40 mg oral tablet  -- 2 tab(s) by mouth 2 times a day  -- Indication: For CHF (congestive heart failure)    ferrous gluconate 324 mg (37.5 mg elemental iron) oral tablet  -- 1 tab(s) by mouth once a day  -- Indication: For Anemia, unspecified type    montelukast 10 mg oral tablet  -- 1 tab(s) by mouth once a day  -- Indication: For ALLERGY    Afrin  -- 1 spray(s) into nose , As Needed  -- Indication: For NASAL SPRAY     Saline Nasal Mist  -- 1 spray(s) into nose , As Needed  -- Indication: For NASAL SPRAY     sodium chloride 0.65% nasal spray  -- 1 spray(s) into nose every 6 hours, As Needed  -- Indication: For NASAL SPRAY     Artificial Tears ophthalmic solution  -- 1 drop(s) to each affected eye , As Needed  -- Indication: For DRY EYES     calcium acetate 667 mg oral tablet  -- 1 tab(s) by mouth 3 times a day   -- Indication: For ESRD     Dexilant 60 mg oral delayed release capsule  -- 1 cap(s) by mouth once a day  -- Indication: For GERD (gastroesophageal reflux disease)    nicotine 14 mg/24 hr transdermal film, extended release  -- 1 patch by transdermal patch once a day   -- Indication: For Smoker    guaifenesin-dextromethorphan 100 mg-10 mg/5 mL oral liquid  -- 1 dose(s) by mouth , As Needed  -- Indication: For CoUGH    hydrALAZINE 100 mg oral tablet  -- 1 tab(s) by mouth 3 times a day  -- Indication: For Hypertension I will START or STAY ON the medications listed below when I get home from the hospital:    Patient :Samantha Cohn has been hospitalized from October 31st 2018- November 8th 2018, Patient is at [facility]. If you have any questions you may contact me at .  -- Indication: For No significant past surgical history    aspirin 81 mg oral tablet  -- 1 tab(s) by mouth once a day  -- Indication: For Prophylactic     Tylenol Extra Strength 500 mg oral tablet  -- 2 tab(s) by mouth every 4 hours, As Needed  -- Indication: For pain    gabapentin 300 mg oral capsule  -- 1 cap(s) by mouth once a day  -- Indication: For Pain    Levemir FlexPen 100 units/mL subcutaneous solution  -- 10 unit(s) subcutaneous once a day (at bedtime)  -- Indication: For Diabetes mellitus    atorvastatin 40 mg oral tablet  -- 1 tab(s) by mouth once a day (at bedtime)  -- Indication: For Hyperlipidemia    clopidogrel 75 mg oral tablet  -- 1 tab(s) by mouth once a day  -- Indication: For CaD    carvedilol 12.5 mg oral tablet  -- 1 tab(s) by mouth every 12 hours  -- Indication: For Hypertension    albuterol 2.5 mg/3 mL (0.083%) inhalation solution  -- 3 milliliter(s) inhaled 3 times a day, As Needed  -- Indication: For Chronic obstructive pulmonary disease with acute exacerbation    Symbicort 160 mcg-4.5 mcg/inh inhalation aerosol  -- 2 puff(s) inhaled , As Needed  -- Indication: For Chronic obstructive pulmonary disease with acute exacerbation    Ventolin HFA 90 mcg/inh inhalation aerosol  -- 2 puff(s) inhaled 4 times a day, As Needed  -- Indication: For Chronic obstructive pulmonary disease with acute exacerbation    ProAir HFA 90 mcg/inh inhalation aerosol  -- 2 puff(s) inhaled , As Needed  -- Indication: For Chronic obstructive pulmonary disease with acute exacerbation    amLODIPine 10 mg oral tablet  -- 1 tab(s) by mouth once a day  -- Indication: For Hypertension    Salonpas  -- Apply on skin to affected area , As Needed  -- Indication: For RASH     metOLazone 2.5 mg oral tablet  -- 1 tab(s) by mouth once a day , 30 mins prior to am lasix dose   -- Indication: For CHF (congestive heart failure)    furosemide 40 mg oral tablet  -- 2 tab(s) by mouth 2 times a day  -- Indication: For CHF (congestive heart failure)    ferrous gluconate 324 mg (37.5 mg elemental iron) oral tablet  -- 1 tab(s) by mouth once a day  -- Indication: For Anemia, unspecified type    montelukast 10 mg oral tablet  -- 1 tab(s) by mouth once a day  -- Indication: For Allergy    Afrin  -- 1 spray(s) into nose , As Needed  -- Indication: For NASAL SPRAY     Saline Nasal Mist  -- 1 spray(s) into nose , As Needed  -- Indication: For NASAL SPRAY     sodium chloride 0.65% nasal spray  -- 1 spray(s) into nose every 6 hours, As Needed  -- Indication: For NASAL SPRAY     Artificial Tears ophthalmic solution  -- 1 drop(s) to each affected eye , As Needed  -- Indication: For DRY EYES     calcium acetate 667 mg oral tablet  -- 1 tab(s) by mouth 3 times a day   -- Indication: For ESRD     Dexilant 60 mg oral delayed release capsule  -- 1 cap(s) by mouth once a day  -- Indication: For GERD (gastroesophageal reflux disease)    nicotine 14 mg/24 hr transdermal film, extended release  -- 1 patch by transdermal patch once a day   -- Indication: For Smoker    guaifenesin-dextromethorphan 100 mg-10 mg/5 mL oral liquid  -- 1 dose(s) by mouth , As Needed  -- Indication: For CoUGH    hydrALAZINE 100 mg oral tablet  -- 1 tab(s) by mouth 3 times a day  -- Indication: For Hypertension

## 2018-11-06 NOTE — DISCHARGE NOTE ADULT - ADDITIONAL INSTRUCTIONS
Follow up with cardiology/ Renal / vascular/ Pulmonary in 1 week   Vascular will decide about Out patient fistula

## 2018-11-06 NOTE — DISCHARGE NOTE ADULT - CARE PLAN
Principal Discharge DX:	Other congestive heart failure  Goal:	Symptoms improved  Assessment and plan of treatment:	Weigh yourself daily.  If you gain 3lbs in 3 days, or 5lbs in a week call your Health Care Provider.  Do not eat or drink foods containing more than 2000mg of salt (sodium) in your diet every day.  Call your Health Care Provider if you have any swelling or increased swelling in your feet, ankles, and/or stomach.  Take all of your medication as directed.  If you become dizzy call your Health Care Provider.  Secondary Diagnosis:	Chronic obstructive pulmonary disease, unspecified COPD type  Assessment and plan of treatment:	Continue all medications as ordered, follow up with pulmonary in 1 week  Secondary Diagnosis:	Coronary artery disease with other form of angina pectoris  Assessment and plan of treatment:	Coronary artery disease is a condition where the arteries the supply the heart muscle get clogges with fatty deposits & puts you at risk for a heart attack  Call your doctor if you have any new pain, pressure, or discomfort in the center of your chest, pain, tingling or discomfort in arms, back, neck, jaw, or stomach, shortness of breath, nausea, vomiting, burping or heartburn, sweating, cold and clammy skin, racing or abnormal heartbeat for more than 10 minutes or if they keep coming & going.  Call 911 and do not tr to get to hospital by care  You can help yourself with lefestyle changes (quitting smoking if you smoke), eat lots of fruits & vegetables & low fat dairy products, not a lot of meat & fatty foods, walk or some form of physical activity most days of the week, lose weight if you are overweight  Take your cardiac medication as prescribed to lower cholesterol, to lower blood pressure, aspirin to prevent blood clots, and diabetes control  Make sure to keep appointments with doctor for cardiac follow up care  Secondary Diagnosis:	DM (diabetes mellitus), secondary  Assessment and plan of treatment:	HgA1C this admission.  Make sure you get your HgA1c checked every three months.  If you take oral diabetes medications, check your blood glucose two times a day.  If you take insulin, check your blood glucose before meals and at bedtime.  It's important not to skip any meals.  Keep a log of your blood glucose results and always take it with you to your doctor appointments.  Keep a list of your current medications including injectables and over the counter medications and bring this medication list with you to all your doctor appointments.  If you have not seen your ophthalmologist this year call for appointment.  Check your feet daily for redness, sores, or openings. Do not self treat. If no improvement in two days call your primary care physician for an appointment.  Low blood sugar (hypoglycemia) is a blood sugar below 70mg/dl. Check your blood sugar if you feel signs/symptoms of hypoglycemia. If your blood sugar is below 70 take 15 grams of carbohydrates (ex 4 oz of apple juice, 3-4 glucose tablets, or 4-6 oz of regular soda) wait 15 minutes and repeat blood sugar to make sure it comes up above 70.  If your blood sugar is above 70 and you are due for a meal, have a meal.  If you are not due for a meal have a snack.  This snack helps keeps your blood sugar at a safe range.  Secondary Diagnosis:	Essential hypertension  Assessment and plan of treatment:	Low salt diet  Activity as tolerated.  Take all medication as prescribed.  Follow up with your medical doctor for routine blood pressure monitoring at your next visit.  Notify your doctor if you have any of the following symptoms:   Dizziness, Lightheadedness, Blurry vision, Headache, Chest pain, Shortness of breath

## 2018-11-06 NOTE — DISCHARGE NOTE ADULT - PLAN OF CARE
Symptoms improved Weigh yourself daily.  If you gain 3lbs in 3 days, or 5lbs in a week call your Health Care Provider.  Do not eat or drink foods containing more than 2000mg of salt (sodium) in your diet every day.  Call your Health Care Provider if you have any swelling or increased swelling in your feet, ankles, and/or stomach.  Take all of your medication as directed.  If you become dizzy call your Health Care Provider. Continue all medications as ordered, follow up with pulmonary in 1 week Coronary artery disease is a condition where the arteries the supply the heart muscle get clogges with fatty deposits & puts you at risk for a heart attack  Call your doctor if you have any new pain, pressure, or discomfort in the center of your chest, pain, tingling or discomfort in arms, back, neck, jaw, or stomach, shortness of breath, nausea, vomiting, burping or heartburn, sweating, cold and clammy skin, racing or abnormal heartbeat for more than 10 minutes or if they keep coming & going.  Call 911 and do not tr to get to hospital by care  You can help yourself with lefestyle changes (quitting smoking if you smoke), eat lots of fruits & vegetables & low fat dairy products, not a lot of meat & fatty foods, walk or some form of physical activity most days of the week, lose weight if you are overweight  Take your cardiac medication as prescribed to lower cholesterol, to lower blood pressure, aspirin to prevent blood clots, and diabetes control  Make sure to keep appointments with doctor for cardiac follow up care HgA1C this admission.  Make sure you get your HgA1c checked every three months.  If you take oral diabetes medications, check your blood glucose two times a day.  If you take insulin, check your blood glucose before meals and at bedtime.  It's important not to skip any meals.  Keep a log of your blood glucose results and always take it with you to your doctor appointments.  Keep a list of your current medications including injectables and over the counter medications and bring this medication list with you to all your doctor appointments.  If you have not seen your ophthalmologist this year call for appointment.  Check your feet daily for redness, sores, or openings. Do not self treat. If no improvement in two days call your primary care physician for an appointment.  Low blood sugar (hypoglycemia) is a blood sugar below 70mg/dl. Check your blood sugar if you feel signs/symptoms of hypoglycemia. If your blood sugar is below 70 take 15 grams of carbohydrates (ex 4 oz of apple juice, 3-4 glucose tablets, or 4-6 oz of regular soda) wait 15 minutes and repeat blood sugar to make sure it comes up above 70.  If your blood sugar is above 70 and you are due for a meal, have a meal.  If you are not due for a meal have a snack.  This snack helps keeps your blood sugar at a safe range. Low salt diet  Activity as tolerated.  Take all medication as prescribed.  Follow up with your medical doctor for routine blood pressure monitoring at your next visit.  Notify your doctor if you have any of the following symptoms:   Dizziness, Lightheadedness, Blurry vision, Headache, Chest pain, Shortness of breath

## 2018-11-06 NOTE — PROGRESS NOTE ADULT - ASSESSMENT
72 y/o F COPD, CHF, HTN, HLD, DM2,Anemia ,CKD ,smoking x60+ years, presenting with shortness of breath worsening for one month. Patient was diagnosed with flu x2 weeks ago after her daughter had flu, and had significant shortness of breath with cough and congestion as well as shortness of breath at that time. She reports now that for the last week her shortness of breath is intermittent but has been worsening in severity. She states that she uses nebulizer once daily for her COPD, but yesterday had to use it multiple more times. Reports still feeling congested and intermittently coughing up mucous without any blood. She denies any fevers or chills. Went to her PMD today for increased shortness of breath and was told to come here concerned for fluid on the lungs.      Problem/Plan - 1:  ·  Problem: Acute respiratory failure with hypoxia and Hypercapnia .  Plan: Secondary to CHF as well COPD exacerbation. Oxygen to Keep Saturation 92% or above. Pulmonary helping.       Problem/Plan - 2:  ·  Problem: Chronic obstructive pulmonary disease with acute exacerbation.  Plan: Improving.  Steroid  ,Neb Rx and Oxygen . Advised to quit smoking . Pulmonary helping.  Added Singulair      Problem/Plan - 3:  ·  Problem: Acute on chronic Systolic congestive heart failure .  Plan: IV Lasix and TTE noted... < from: Transthoracic Echocardiogram (10.31.18 @ 09:40) >  Conclusions:  1. Mitral annular calcification and calcified and tethered  mitral leaflets with normal diastolic opening. Moderate  mitral regurgitation.  2. Severely dilated left atrium.  LA volume index = 51  cc/m2.  3. Moderate left ventricular enlargement.  4. Mild global left ventricular systolic dysfunction.  5. The right ventricle is not well visualized; grossly  normal right ventricular systolic function.  6. Estimated pulmonary artery systolic pressure equals 20  mm Hg, assuming right atrial pressure equals 8  mm Hg,  consistent with normal pulmonary pressures.  7. Small circumferential pericardial effusion.  *** No previous Echo exam.    < end of copied text >  CT chest ... < from: CT Chest No Cont (10.31.18 @ 12:07) >  IMPRESSION:     Interlobular septal thickening, bilateral diffuse groundglass opacities,   and small bilateral pleural effusions are consistent with pulmonary edema.    Left sided endobronchial nodularity likely reflect secretions. Follow-up   CT chest is recommended in 1 month to ensure resolution. The   endobronchial nodularity can also be reassessed at that time.    < end of copied text >  Cardiology helping and D/W cardiology attending about abnormal NST so planning cath tomorrow.      Problem/Plan - 4:  ·  Problem: Stage 4 chronic kidney disease with MELISSA .  Plan: Renal helping and may need HD so getting Outpt AVF.      Problem/Plan - 5:  ·  Problem: Diabetes mellitus.  Plan: Lantus and SSI for now. Sugars in good range.      Problem/Plan - 6:  Problem: Hypertension. Plan: BP meds with parameters. BP readings fine.      Problem/Plan - 7:  ·  Problem: Anemia due to chronic kidney disease.  Plan: Chronic .  Watching CBC . On Epogen .      Problem/Plan - 8:  ·  Problem: Smoker.  Plan: Advised to quit.     Disposition : DC planning home pending cardiac cath .

## 2018-11-07 LAB
ANION GAP SERPL CALC-SCNC: 15 MMOL/L — SIGNIFICANT CHANGE UP (ref 5–17)
BUN SERPL-MCNC: 102 MG/DL — HIGH (ref 7–23)
CALCIUM SERPL-MCNC: 9.3 MG/DL — SIGNIFICANT CHANGE UP (ref 8.4–10.5)
CHLORIDE SERPL-SCNC: 91 MMOL/L — LOW (ref 96–108)
CO2 SERPL-SCNC: 28 MMOL/L — SIGNIFICANT CHANGE UP (ref 22–31)
CREAT SERPL-MCNC: 4.18 MG/DL — HIGH (ref 0.5–1.3)
GLUCOSE BLDC GLUCOMTR-MCNC: 129 MG/DL — HIGH (ref 70–99)
GLUCOSE BLDC GLUCOMTR-MCNC: 132 MG/DL — HIGH (ref 70–99)
GLUCOSE BLDC GLUCOMTR-MCNC: 135 MG/DL — HIGH (ref 70–99)
GLUCOSE BLDC GLUCOMTR-MCNC: 151 MG/DL — HIGH (ref 70–99)
GLUCOSE SERPL-MCNC: 125 MG/DL — HIGH (ref 70–99)
HCT VFR BLD CALC: 27.3 % — LOW (ref 34.5–45)
HGB BLD-MCNC: 8.4 G/DL — LOW (ref 11.5–15.5)
MCHC RBC-ENTMCNC: 27.5 PG — SIGNIFICANT CHANGE UP (ref 27–34)
MCHC RBC-ENTMCNC: 30.8 GM/DL — LOW (ref 32–36)
MCV RBC AUTO: 89.2 FL — SIGNIFICANT CHANGE UP (ref 80–100)
PLATELET # BLD AUTO: 263 K/UL — SIGNIFICANT CHANGE UP (ref 150–400)
POTASSIUM SERPL-MCNC: 4.6 MMOL/L — SIGNIFICANT CHANGE UP (ref 3.5–5.3)
POTASSIUM SERPL-SCNC: 4.6 MMOL/L — SIGNIFICANT CHANGE UP (ref 3.5–5.3)
RBC # BLD: 3.06 M/UL — LOW (ref 3.8–5.2)
RBC # FLD: 16.5 % — HIGH (ref 10.3–14.5)
SODIUM SERPL-SCNC: 134 MMOL/L — LOW (ref 135–145)
WBC # BLD: 7.33 K/UL — SIGNIFICANT CHANGE UP (ref 3.8–10.5)
WBC # FLD AUTO: 7.33 K/UL — SIGNIFICANT CHANGE UP (ref 3.8–10.5)

## 2018-11-07 PROCEDURE — 93010 ELECTROCARDIOGRAM REPORT: CPT

## 2018-11-07 RX ORDER — ERYTHROPOIETIN 10000 [IU]/ML
10000 INJECTION, SOLUTION INTRAVENOUS; SUBCUTANEOUS
Qty: 0 | Refills: 0 | Status: DISCONTINUED | OUTPATIENT
Start: 2018-11-07 | End: 2018-11-08

## 2018-11-07 RX ORDER — FENTANYL CITRATE 50 UG/ML
25 INJECTION INTRAVENOUS ONCE
Qty: 0 | Refills: 0 | Status: DISCONTINUED | OUTPATIENT
Start: 2018-11-07 | End: 2018-11-07

## 2018-11-07 RX ADMIN — Medication 0: at 17:29

## 2018-11-07 RX ADMIN — CARVEDILOL PHOSPHATE 12.5 MILLIGRAM(S): 80 CAPSULE, EXTENDED RELEASE ORAL at 17:26

## 2018-11-07 RX ADMIN — Medication 3 UNIT(S): at 17:29

## 2018-11-07 RX ADMIN — Medication 100 MILLIGRAM(S): at 18:36

## 2018-11-07 RX ADMIN — HEPARIN SODIUM 5000 UNIT(S): 5000 INJECTION INTRAVENOUS; SUBCUTANEOUS at 18:35

## 2018-11-07 RX ADMIN — Medication 81 MILLIGRAM(S): at 17:33

## 2018-11-07 RX ADMIN — HEPARIN SODIUM 5000 UNIT(S): 5000 INJECTION INTRAVENOUS; SUBCUTANEOUS at 05:47

## 2018-11-07 RX ADMIN — FENTANYL CITRATE 25 MICROGRAM(S): 50 INJECTION INTRAVENOUS at 12:38

## 2018-11-07 RX ADMIN — GABAPENTIN 300 MILLIGRAM(S): 400 CAPSULE ORAL at 22:55

## 2018-11-07 RX ADMIN — Medication 667 MILLIGRAM(S): at 18:36

## 2018-11-07 RX ADMIN — INSULIN GLARGINE 10 UNIT(S): 100 INJECTION, SOLUTION SUBCUTANEOUS at 22:55

## 2018-11-07 RX ADMIN — FENTANYL CITRATE 25 MICROGRAM(S): 50 INJECTION INTRAVENOUS at 12:10

## 2018-11-07 RX ADMIN — Medication 325 MILLIGRAM(S): at 18:35

## 2018-11-07 RX ADMIN — BUDESONIDE AND FORMOTEROL FUMARATE DIHYDRATE 2 PUFF(S): 160; 4.5 AEROSOL RESPIRATORY (INHALATION) at 05:47

## 2018-11-07 RX ADMIN — Medication 100 MILLIGRAM(S): at 05:45

## 2018-11-07 RX ADMIN — Medication 3 MILLILITER(S): at 17:25

## 2018-11-07 RX ADMIN — Medication 80 MILLIGRAM(S): at 18:35

## 2018-11-07 RX ADMIN — Medication 1 PATCH: at 22:01

## 2018-11-07 RX ADMIN — Medication 3 MILLILITER(S): at 05:45

## 2018-11-07 RX ADMIN — MONTELUKAST 10 MILLIGRAM(S): 4 TABLET, CHEWABLE ORAL at 18:36

## 2018-11-07 RX ADMIN — CLOPIDOGREL BISULFATE 75 MILLIGRAM(S): 75 TABLET, FILM COATED ORAL at 17:34

## 2018-11-07 RX ADMIN — ATORVASTATIN CALCIUM 40 MILLIGRAM(S): 80 TABLET, FILM COATED ORAL at 22:55

## 2018-11-07 RX ADMIN — Medication 1 APPLICATION(S): at 22:59

## 2018-11-07 RX ADMIN — CARVEDILOL PHOSPHATE 12.5 MILLIGRAM(S): 80 CAPSULE, EXTENDED RELEASE ORAL at 22:56

## 2018-11-07 RX ADMIN — AMLODIPINE BESYLATE 10 MILLIGRAM(S): 2.5 TABLET ORAL at 22:55

## 2018-11-07 NOTE — PROGRESS NOTE ADULT - SUBJECTIVE AND OBJECTIVE BOX
INTERVAL HPI/OVERNIGHT EVENTS:  Vital Signs Last 24 Hrs  T(C): 36.7 (07 Nov 2018 05:02), Max: 36.7 (06 Nov 2018 21:13)  T(F): 98 (07 Nov 2018 05:02), Max: 98.1 (06 Nov 2018 21:13)  HR: 74 (07 Nov 2018 05:02) (67 - 80)  BP: 149/66 (07 Nov 2018 05:02) (149/66 - 169/72)  BP(mean): --  RR: 20 (07 Nov 2018 05:02) (20 - 20)  SpO2: 90% (07 Nov 2018 05:02) (90% - 93%)  I&O's Summary    06 Nov 2018 07:01  -  07 Nov 2018 07:00  --------------------------------------------------------  IN: 900 mL / OUT: 2450 mL / NET: -1550 mL    07 Nov 2018 07:01  -  07 Nov 2018 13:08  --------------------------------------------------------  IN: 0 mL / OUT: 0 mL / NET: 0 mL      MEDICATIONS  (STANDING):  ALBUTerol/ipratropium for Nebulization 3 milliLiter(s) Nebulizer every 6 hours  amLODIPine   Tablet 10 milliGRAM(s) Oral at bedtime  AQUAPHOR (petrolatum Ointment) 1 Application(s) Topical two times a day  aspirin enteric coated 81 milliGRAM(s) Oral daily  atorvastatin 40 milliGRAM(s) Oral at bedtime  buDESOnide 160 MICROgram(s)/formoterol 4.5 MICROgram(s) Inhaler 2 Puff(s) Inhalation two times a day  calcium acetate 667 milliGRAM(s) Oral three times a day with meals  carvedilol 12.5 milliGRAM(s) Oral every 12 hours  clopidogrel Tablet 75 milliGRAM(s) Oral daily  dextrose 5%. 1000 milliLiter(s) (50 mL/Hr) IV Continuous <Continuous>  dextrose 50% Injectable 12.5 Gram(s) IV Push once  dextrose 50% Injectable 25 Gram(s) IV Push once  dextrose 50% Injectable 25 Gram(s) IV Push once  epoetin terence Injectable 69368 Unit(s) SubCutaneous <User Schedule>  epoetin terence Injectable 05962 Unit(s) SubCutaneous <User Schedule>  ferrous    sulfate 325 milliGRAM(s) Oral daily  furosemide   Injectable 80 milliGRAM(s) IV Push two times a day  gabapentin 300 milliGRAM(s) Oral at bedtime  heparin  Injectable 5000 Unit(s) SubCutaneous every 12 hours  hydrALAZINE 100 milliGRAM(s) Oral three times a day  influenza   Vaccine 0.5 milliLiter(s) IntraMuscular once  insulin glargine Injectable (LANTUS) 10 Unit(s) SubCutaneous at bedtime  insulin lispro (HumaLOG) corrective regimen sliding scale   SubCutaneous three times a day before meals  insulin lispro (HumaLOG) corrective regimen sliding scale   SubCutaneous at bedtime  insulin lispro Injectable (HumaLOG) 3 Unit(s) SubCutaneous three times a day before meals  metolazone 2.5 milliGRAM(s) Oral <User Schedule>  montelukast 10 milliGRAM(s) Oral daily  nicotine -  14 mG/24Hr(s) Patch 1 patch Transdermal daily  pantoprazole    Tablet 40 milliGRAM(s) Oral before breakfast    MEDICATIONS  (PRN):  acetaminophen   Tablet .. 500 milliGRAM(s) Oral every 4 hours PRN Temp greater or equal to 38.5C (101.3F), Moderate Pain (4 - 6)  ALBUTerol/ipratropium for Nebulization 3 milliLiter(s) Nebulizer every 2 hours PRN Shortness of Breath and/or Wheezing  dextrose 40% Gel 15 Gram(s) Oral once PRN Blood Glucose LESS THAN 70 milliGRAM(s)/deciliter  glucagon  Injectable 1 milliGRAM(s) IntraMuscular once PRN Glucose LESS THAN 70 milligrams/deciliter  nicotine - Inhaler 1 Each Inhalation every 1 hour PRN smoking cessation  sodium chloride 0.65% Nasal 1 Spray(s) Both Nostrils two times a day PRN Nasal Congestion    LABS:                        8.4    7.33  )-----------( 263      ( 07 Nov 2018 09:47 )             27.3     11-07    134<L>  |  91<L>  |  102<H>  ----------------------------<  125<H>  4.6   |  28  |  4.18<H>    Ca    9.3      07 Nov 2018 08:05  Phos  3.7     11-06          CAPILLARY BLOOD GLUCOSE      POCT Blood Glucose.: 132 mg/dL (07 Nov 2018 07:32)  POCT Blood Glucose.: 151 mg/dL (07 Nov 2018 05:16)  POCT Blood Glucose.: 308 mg/dL (06 Nov 2018 21:16)  POCT Blood Glucose.: 223 mg/dL (06 Nov 2018 17:17)          REVIEW OF SYSTEMS:  CONSTITUTIONAL: No fever, weight loss, or fatigue  EYES: No eye pain, visual disturbances, or discharge  ENMT:  No difficulty hearing, tinnitus, vertigo; No sinus or throat pain  NECK: No pain or stiffness  BREASTS: No pain, masses, or nipple discharge  RESPIRATORY: No cough, wheezing, chills or hemoptysis; No shortness of breath  CARDIOVASCULAR: No chest pain, palpitations, dizziness, or leg swelling  GASTROINTESTINAL: No abdominal or epigastric pain. No nausea, vomiting, or hematemesis; No diarrhea or constipation. No melena or hematochezia.  GENITOURINARY: No dysuria, frequency, hematuria, or incontinence  NEUROLOGICAL: No headaches, memory loss, loss of strength, numbness, or tremors  SKIN: No itching, burning, rashes, or lesions   LYMPH NODES: No enlarged glands  ENDOCRINE: No heat or cold intolerance; No hair loss  MUSCULOSKELETAL: No joint pain or swelling; No muscle, back, or extremity pain  PSYCHIATRIC: No depression, anxiety, mood swings, or difficulty sleeping  HEME/LYMPH: No easy bruising, or bleeding gums  ALLERY AND IMMUNOLOGIC: No hives or eczema    RADIOLOGY & ADDITIONAL TESTS:    Consultant(s) Notes Reviewed:  [x ] YES  [ ] NO    PHYSICAL EXAM:  GENERAL: NAD, well-groomed, well-developed,not in any distress ,  HEAD:  Atraumatic, Normocephalic  EYES: EOMI, PERRLA, conjunctiva and sclera clear  ENMT: No tonsillar erythema, exudates, or enlargement; Moist mucous membranes, Good dentition, No lesions  NECK: Supple, No JVD, Normal thyroid  NERVOUS SYSTEM:  Alert & Oriented X3, No focal deficit   CHEST/LUNG: Good air entry bilateral with no  rales, rhonchi, wheezing, or rubs  HEART: Regular rate and rhythm; No murmurs, rubs, or gallops  ABDOMEN: Soft, Nontender, Nondistended; Bowel sounds present  EXTREMITIES:  2+ Peripheral Pulses, No clubbing, cyanosis, or edema  SKIN: No rashes or lesions    Care Discussed with Consultants/Other Providers [ x] YES  [ ] NO INTERVAL HPI/OVERNIGHT EVENTS: S/P Cardiac cath . I feel fine.   Vital Signs Last 24 Hrs  T(C): 36.7 (07 Nov 2018 05:02), Max: 36.7 (06 Nov 2018 21:13)  T(F): 98 (07 Nov 2018 05:02), Max: 98.1 (06 Nov 2018 21:13)  HR: 74 (07 Nov 2018 05:02) (67 - 80)  BP: 149/66 (07 Nov 2018 05:02) (149/66 - 169/72)  BP(mean): --  RR: 20 (07 Nov 2018 05:02) (20 - 20)  SpO2: 90% (07 Nov 2018 05:02) (90% - 93%)  I&O's Summary    06 Nov 2018 07:01  -  07 Nov 2018 07:00  --------------------------------------------------------  IN: 900 mL / OUT: 2450 mL / NET: -1550 mL    07 Nov 2018 07:01  -  07 Nov 2018 13:08  --------------------------------------------------------  IN: 0 mL / OUT: 0 mL / NET: 0 mL      MEDICATIONS  (STANDING):  ALBUTerol/ipratropium for Nebulization 3 milliLiter(s) Nebulizer every 6 hours  amLODIPine   Tablet 10 milliGRAM(s) Oral at bedtime  AQUAPHOR (petrolatum Ointment) 1 Application(s) Topical two times a day  aspirin enteric coated 81 milliGRAM(s) Oral daily  atorvastatin 40 milliGRAM(s) Oral at bedtime  buDESOnide 160 MICROgram(s)/formoterol 4.5 MICROgram(s) Inhaler 2 Puff(s) Inhalation two times a day  calcium acetate 667 milliGRAM(s) Oral three times a day with meals  carvedilol 12.5 milliGRAM(s) Oral every 12 hours  clopidogrel Tablet 75 milliGRAM(s) Oral daily  dextrose 5%. 1000 milliLiter(s) (50 mL/Hr) IV Continuous <Continuous>  dextrose 50% Injectable 12.5 Gram(s) IV Push once  dextrose 50% Injectable 25 Gram(s) IV Push once  dextrose 50% Injectable 25 Gram(s) IV Push once  epoetin terence Injectable 07456 Unit(s) SubCutaneous <User Schedule>  epoetin terence Injectable 63855 Unit(s) SubCutaneous <User Schedule>  ferrous    sulfate 325 milliGRAM(s) Oral daily  furosemide   Injectable 80 milliGRAM(s) IV Push two times a day  gabapentin 300 milliGRAM(s) Oral at bedtime  heparin  Injectable 5000 Unit(s) SubCutaneous every 12 hours  hydrALAZINE 100 milliGRAM(s) Oral three times a day  influenza   Vaccine 0.5 milliLiter(s) IntraMuscular once  insulin glargine Injectable (LANTUS) 10 Unit(s) SubCutaneous at bedtime  insulin lispro (HumaLOG) corrective regimen sliding scale   SubCutaneous three times a day before meals  insulin lispro (HumaLOG) corrective regimen sliding scale   SubCutaneous at bedtime  insulin lispro Injectable (HumaLOG) 3 Unit(s) SubCutaneous three times a day before meals  metolazone 2.5 milliGRAM(s) Oral <User Schedule>  montelukast 10 milliGRAM(s) Oral daily  nicotine -  14 mG/24Hr(s) Patch 1 patch Transdermal daily  pantoprazole    Tablet 40 milliGRAM(s) Oral before breakfast    MEDICATIONS  (PRN):  acetaminophen   Tablet .. 500 milliGRAM(s) Oral every 4 hours PRN Temp greater or equal to 38.5C (101.3F), Moderate Pain (4 - 6)  ALBUTerol/ipratropium for Nebulization 3 milliLiter(s) Nebulizer every 2 hours PRN Shortness of Breath and/or Wheezing  dextrose 40% Gel 15 Gram(s) Oral once PRN Blood Glucose LESS THAN 70 milliGRAM(s)/deciliter  glucagon  Injectable 1 milliGRAM(s) IntraMuscular once PRN Glucose LESS THAN 70 milligrams/deciliter  nicotine - Inhaler 1 Each Inhalation every 1 hour PRN smoking cessation  sodium chloride 0.65% Nasal 1 Spray(s) Both Nostrils two times a day PRN Nasal Congestion    LABS:                        8.4    7.33  )-----------( 263      ( 07 Nov 2018 09:47 )             27.3     11-07    134<L>  |  91<L>  |  102<H>  ----------------------------<  125<H>  4.6   |  28  |  4.18<H>    Ca    9.3      07 Nov 2018 08:05  Phos  3.7     11-06          CAPILLARY BLOOD GLUCOSE      POCT Blood Glucose.: 132 mg/dL (07 Nov 2018 07:32)  POCT Blood Glucose.: 151 mg/dL (07 Nov 2018 05:16)  POCT Blood Glucose.: 308 mg/dL (06 Nov 2018 21:16)  POCT Blood Glucose.: 223 mg/dL (06 Nov 2018 17:17)          REVIEW OF SYSTEMS:  CONSTITUTIONAL: No fever, weight loss, or fatigue  EYES: No eye pain, visual disturbances, or discharge  ENMT:  No difficulty hearing, tinnitus, vertigo; No sinus or throat pain  NECK: No pain or stiffness  RESPIRATORY: No cough, wheezing, chills or hemoptysis; No shortness of breath  CARDIOVASCULAR: No chest pain, palpitations, dizziness, or leg swelling  GASTROINTESTINAL: No abdominal or epigastric pain. No nausea, vomiting, or hematemesis; No diarrhea or constipation. No melena or hematochezia.  GENITOURINARY: No dysuria, frequency, hematuria, or incontinence  NEUROLOGICAL: No headaches, memory loss, loss of strength, numbness, or tremors    Consultant(s) Notes Reviewed:  [x ] YES  [ ] NO    PHYSICAL EXAM:  GENERAL: NAD, Obese , not in any distress ,NC Oxygen   HEAD:  Atraumatic, Normocephalic  NECK: Supple, No JVD, Normal thyroid  NERVOUS SYSTEM:  Alert & Oriented X3, No focal deficit   CHEST/LUNG: Good air entry bilateral with no  rales, rhonchi, wheezing, or rubs  HEART: Regular rate and rhythm; No murmurs, rubs, or gallops  ABDOMEN: Soft, Nontender, Nondistended; Bowel sounds present  EXTREMITIES:  2+ Peripheral Pulses, No clubbing, cyanosis, or edema      Care Discussed with Consultants/Other Providers [ x] YES  [ ] NO

## 2018-11-07 NOTE — PROGRESS NOTE ADULT - ATTENDING COMMENTS
BAG noted likely would need home oxygen She also likely has LIANNA: Would need to be tested as an outpatient::  11/6: stable: cont oxygen 24 hor a day: Low dose: 1-2 L / Mt  11/7: Stable from resp point of view:
BAG noted likely would need home oxygen She also likely has LIANNA: Would need to be tested as an outpatient::
BAG noted likely would need home oxygen She also likely has LIANNA: Would need to be tested as an outpatient::  11/6: stable: cont oxygen 24 hor a day: Low dose: 1-2 L / Mt

## 2018-11-07 NOTE — PROGRESS NOTE ADULT - PROBLEM SELECTOR PLAN 6
Controlled:
Controlled:
Controlled:  11/2: Stable!!
Controlled:  11/2: Stable!!
Controlled:  11/2: Stable!!  11/5: Controled
Controlled:  11/2: Stable!!  11/5: Controled

## 2018-11-07 NOTE — PROGRESS NOTE ADULT - SUBJECTIVE AND OBJECTIVE BOX
Patient is a 73y old  Female who presents with a chief complaint of SOB since 2 weeks (06 Nov 2018 14:03)      Any change in ROS: Doing ok : S/P Cath: no SOB     MEDICATIONS  (STANDING):  ALBUTerol/ipratropium for Nebulization 3 milliLiter(s) Nebulizer every 6 hours  amLODIPine   Tablet 10 milliGRAM(s) Oral at bedtime  AQUAPHOR (petrolatum Ointment) 1 Application(s) Topical two times a day  aspirin enteric coated 81 milliGRAM(s) Oral daily  atorvastatin 40 milliGRAM(s) Oral at bedtime  buDESOnide 160 MICROgram(s)/formoterol 4.5 MICROgram(s) Inhaler 2 Puff(s) Inhalation two times a day  calcium acetate 667 milliGRAM(s) Oral three times a day with meals  carvedilol 12.5 milliGRAM(s) Oral every 12 hours  clopidogrel Tablet 75 milliGRAM(s) Oral daily  dextrose 5%. 1000 milliLiter(s) (50 mL/Hr) IV Continuous <Continuous>  dextrose 50% Injectable 12.5 Gram(s) IV Push once  dextrose 50% Injectable 25 Gram(s) IV Push once  dextrose 50% Injectable 25 Gram(s) IV Push once  epoetin terence Injectable 90884 Unit(s) SubCutaneous <User Schedule>  epoetin terence Injectable 15122 Unit(s) SubCutaneous <User Schedule>  ferrous    sulfate 325 milliGRAM(s) Oral daily  furosemide   Injectable 80 milliGRAM(s) IV Push two times a day  gabapentin 300 milliGRAM(s) Oral at bedtime  heparin  Injectable 5000 Unit(s) SubCutaneous every 12 hours  hydrALAZINE 100 milliGRAM(s) Oral three times a day  influenza   Vaccine 0.5 milliLiter(s) IntraMuscular once  insulin glargine Injectable (LANTUS) 10 Unit(s) SubCutaneous at bedtime  insulin lispro (HumaLOG) corrective regimen sliding scale   SubCutaneous three times a day before meals  insulin lispro (HumaLOG) corrective regimen sliding scale   SubCutaneous at bedtime  insulin lispro Injectable (HumaLOG) 3 Unit(s) SubCutaneous three times a day before meals  metolazone 2.5 milliGRAM(s) Oral <User Schedule>  montelukast 10 milliGRAM(s) Oral daily  nicotine -  14 mG/24Hr(s) Patch 1 patch Transdermal daily  pantoprazole    Tablet 40 milliGRAM(s) Oral before breakfast    MEDICATIONS  (PRN):  acetaminophen   Tablet .. 500 milliGRAM(s) Oral every 4 hours PRN Temp greater or equal to 38.5C (101.3F), Moderate Pain (4 - 6)  ALBUTerol/ipratropium for Nebulization 3 milliLiter(s) Nebulizer every 2 hours PRN Shortness of Breath and/or Wheezing  dextrose 40% Gel 15 Gram(s) Oral once PRN Blood Glucose LESS THAN 70 milliGRAM(s)/deciliter  glucagon  Injectable 1 milliGRAM(s) IntraMuscular once PRN Glucose LESS THAN 70 milligrams/deciliter  nicotine - Inhaler 1 Each Inhalation every 1 hour PRN smoking cessation  sodium chloride 0.65% Nasal 1 Spray(s) Both Nostrils two times a day PRN Nasal Congestion    Vital Signs Last 24 Hrs  T(C): 36.7 (07 Nov 2018 05:02), Max: 36.7 (06 Nov 2018 21:13)  T(F): 98 (07 Nov 2018 05:02), Max: 98.1 (06 Nov 2018 21:13)  HR: 74 (07 Nov 2018 05:02) (67 - 80)  BP: 149/66 (07 Nov 2018 05:02) (149/66 - 169/72)  BP(mean): --  RR: 20 (07 Nov 2018 05:02) (20 - 20)  SpO2: 90% (07 Nov 2018 05:02) (90% - 93%)    I&O's Summary    06 Nov 2018 07:01  -  07 Nov 2018 07:00  --------------------------------------------------------  IN: 900 mL / OUT: 2450 mL / NET: -1550 mL    07 Nov 2018 07:01  -  07 Nov 2018 10:43  --------------------------------------------------------  IN: 0 mL / OUT: 0 mL / NET: 0 mL          Physical Exam:   GENERAL: NAD, well-groomed, well-developed  HEENT: BIRGIT/   Atraumatic, Normocephalic  ENMT: No tonsillar erythema, exudates, or enlargement; Moist mucous membranes, Good dentition, No lesions  NECK: Supple, No JVD, Normal thyroid  CHEST/LUNG: Clear to auscultaion, ; No rales, rhonchi, wheezing, or rubs  CVS: Regular rate and rhythm; No murmurs, rubs, or gallops  GI: : Soft, Nontender, Nondistended; Bowel sounds present  NERVOUS SYSTEM:  Alert & Oriented X3  EXTREMITIES:  mild edema  LYMPH: No lymphadenopathy noted  SKIN: No rashes or lesions  ENDOCRINOLOGY: No Thyromegaly  PSYCH: Appropriate    Labs:                              8.4    7.33  )-----------( 263      ( 07 Nov 2018 09:47 )             27.3                         8.2    7.97  )-----------( 237      ( 06 Nov 2018 08:10 )             26.2                         8.0    6.89  )-----------( 228      ( 05 Nov 2018 08:27 )             25.0                         8.4    6.01  )-----------( 230      ( 04 Nov 2018 08:06 )             26.6     11-07    134<L>  |  91<L>  |  102<H>  ----------------------------<  125<H>  4.6   |  28  |  4.18<H>  11-06    132<L>  |  92<L>  |  100<H>  ----------------------------<  88  4.4   |  28  |  4.21<H>  11-05    132<L>  |  92<L>  |  94<H>  ----------------------------<  142<H>  4.5   |  26  |  4.16<H>  11-04    132<L>  |  93<L>  |  94<H>  ----------------------------<  100<H>  4.5   |  24  |  4.11<H>    Ca    9.3      07 Nov 2018 08:05  Ca    9.1      06 Nov 2018 06:40  Phos  3.7     11-06      CAPILLARY BLOOD GLUCOSE      POCT Blood Glucose.: 132 mg/dL (07 Nov 2018 07:32)  POCT Blood Glucose.: 151 mg/dL (07 Nov 2018 05:16)  POCT Blood Glucose.: 308 mg/dL (06 Nov 2018 21:16)  POCT Blood Glucose.: 223 mg/dL (06 Nov 2018 17:17)  POCT Blood Glucose.: 151 mg/dL (06 Nov 2018 11:33)          < from: Xray Chest 1 View- PORTABLE-Urgent (11.06.18 @ 10:57) >    EXAM:  XR CHEST PORTABLE URGENT 1V                            PROCEDURE DATE:  11/06/2018            INTERPRETATION:  A single chest x-ray was obtained on November 16, 2018.    Indication: Shortness of breath.    Impression:    The heart is markedly enlarged. Right pleural effusion. Mild pulmonary   vascular congestion.      < from: CT Chest No Cont (10.31.18 @ 12:07) >  LUNGS AND LARGE AIRWAYS: Endotracheal nodule opacity extending into the   left mainstem bronchus likely reflecting secretions, with patchy   opacification of the bibasilar distal airways. Diffuse bilateral   groundglass opacities. Interlobular septal thickening. Bibasilar   segmental and subsegmental atelectasis. Peripheral geographic nodular   focus in the right apex is suggestive of a focal area of atelectasis.  Right middle lobe and lingular linear atelectasis. 3 mm right apical   pulmonary nodule (3:32).  PLEURA: Small bilateral pleural effusions.  VESSELS: Atherosclerosis of the aorta and coronary arteries.  HEART: Multichamber cardiac enlargement. Small pericardial effusion.   Aortic valvular and mitral annular calcifications.  MEDIASTINUM AND SCOTT: Enlarged mediastinal lymph nodes measuring up to   3.1 x 2.1 cm in a right lower paratracheal node (3:60).  CHEST WALL AND LOWER NECK: Enlarged, heterogeneous thyroid.  VISUALIZED UPPER ABDOMEN: Nodular left adrenal thickening.  BONES: Degenerative changes.    IMPRESSION:     Interlobular septal thickening, bilateral diffuse groundglass opacities,   and small bilateral pleural effusions are consistent with pulmonary edema.    Left sided endobronchial nodularity likely reflect secretions. Follow-up   CT chest is recommended in 1 month to ensure resolution. The   endobronchial nodularity can also be reassessed at that time.                MEAGHAN VÁZQUEZ M.D., RADIOLOGY RESIDENT  This document has been electronically signed.  JORDAN COHEN M.D., ATTENDING RADIOLOGIST  This document has been electronically signed. Oct 31 2018  3:26PM        < end of copied text >                HELIO PALACIOS M.D., ATTENDING RADIOLOGIST  This document has been electronically signed. Nov 6 2018 11:14AM        < end of copied text >          RECENT CULTURES:        RESPIRATORY CULTURES:          Studies  Chest X-RAY  CT SCAN Chest   Venous Dopplers: LE:   CT Abdomen  Others

## 2018-11-07 NOTE — PROGRESS NOTE ADULT - PROBLEM SELECTOR PLAN 8
Adv strongly to stop smoking: She says she has been trying for some time but she has been smoking since she was 11 years old:

## 2018-11-07 NOTE — CHART NOTE - NSCHARTNOTEFT_GEN_A_CORE
Removal of Femoral Sheath    Pulses in the right lower extremity are (palpable/audible by doppler/absent). The patient was placed in the supine position. The insertion site was identified and the sutures were removed per protocol.  The _5/7___ Pashto femoral sheath was then removed. Direct pressure was applied for  _____20_ minutes.     Monitoring of the right groin and both lower extremities including neuro-vascular checks and vital signs every 15 minutes x 4, then every 30 minutes x 2, then every 1 hour was ordered.    Complications: none    Comments: sheath removed at 1300, activity restrictiosn to start at the at time, patient tolerated sheath removal well. BL LE are warm to touch, BL DP pulse, + sensation. Patient has existing possible fungal rash under pannus. Encountered rash at time of sheath removal. encouraged to keep area dry and wicked with gauze Removal of Femoral Sheath    Pulses in the right lower extremity are (palpable/audible by doppler/absent). The patient was placed in the supine position. The insertion site was identified and the sutures were removed per protocol.  The _5/7___ Chinese femoral sheath was then removed. Direct pressure was applied for  __20_ minutes.     Monitoring of the right groin and both lower extremities including neuro-vascular checks and vital signs every 15 minutes x 4, then every 30 minutes x 2, then every 1 hour was ordered.    Complications: none    Comments: sheath removed at 1300, activity restrictions to start at the at time, patient tolerated sheath removal well. BL LE are warm to touch, BL DP pulse, + sensation. Patient has existing possible fungal rash under pannus. Encountered rash at time of sheath removal. encouraged to keep area dry and wicked with gauze

## 2018-11-07 NOTE — PROGRESS NOTE ADULT - PROBLEM SELECTOR PLAN 7
defer to primary care
defer to priamry care
defer to primary care
defer to primary care

## 2018-11-07 NOTE — PROGRESS NOTE ADULT - ASSESSMENT
72 y/o F COPD, CHF, HTN, HLD, DM2,Anemia ,CKD ,smoking x60+ years, presenting with shortness of breath worsening for one month. Patient was diagnosed with flu x2 weeks ago after her daughter had flu, and had significant shortness of breath with cough and congestion as well as shortness of breath at that time. She reports now that for the last week her shortness of breath is intermittent but has been worsening in severity. She states that she uses nebulizer once daily for her COPD, but yesterday had to use it multiple more times. Reports still feeling congested and intermittently coughing up mucous without any blood. She denies any fevers or chills. Went to her PMD today for increased shortness of breath and was told to come here concerned for fluid on the lungs.      Problem/Plan - 1:  ·  Problem: Acute respiratory failure with hypoxia and Hypercapnia .  Plan: Secondary to CHF as well COPD exacerbation. Oxygen to Keep Saturation 92% or above. Pulmonary helping.       Problem/Plan - 2:  ·  Problem: Chronic obstructive pulmonary disease with acute exacerbation.  Plan: Improving.  Steroid  ,Neb Rx and Oxygen . Advised to quit smoking . Pulmonary helping.  Added Singulair      Problem/Plan - 3:  ·  Problem: Acute on chronic Systolic congestive heart failure .  Plan: IV Lasix and TTE noted... < from: Transthoracic Echocardiogram (10.31.18 @ 09:40) >  Conclusions:  1. Mitral annular calcification and calcified and tethered  mitral leaflets with normal diastolic opening. Moderate  mitral regurgitation.  2. Severely dilated left atrium.  LA volume index = 51  cc/m2.  3. Moderate left ventricular enlargement.  4. Mild global left ventricular systolic dysfunction.  5. The right ventricle is not well visualized; grossly  normal right ventricular systolic function.  6. Estimated pulmonary artery systolic pressure equals 20  mm Hg, assuming right atrial pressure equals 8  mm Hg,  consistent with normal pulmonary pressures.  7. Small circumferential pericardial effusion.  *** No previous Echo exam.    < end of copied text >  CT chest ... < from: CT Chest No Cont (10.31.18 @ 12:07) >  IMPRESSION:     Interlobular septal thickening, bilateral diffuse groundglass opacities,   and small bilateral pleural effusions are consistent with pulmonary edema.    Left sided endobronchial nodularity likely reflect secretions. Follow-up   CT chest is recommended in 1 month to ensure resolution. The   endobronchial nodularity can also be reassessed at that time.    < end of copied text >  Cardiology helping and S/P Cardiac cath with Stent .      Problem/Plan - 4:  ·  Problem: Stage 4 chronic kidney disease with MELISSA .  Plan: S/P Cardiac cath so watching BMP closely . Renal helping and may need HD so getting Outpt AVF.      Problem/Plan - 5:  ·  Problem: Diabetes mellitus.  Plan: Lantus and SSI for now. Sugars in good range.      Problem/Plan - 6:  Problem: Hypertension. Plan: BP meds with parameters. BP readings fine.      Problem/Plan - 7:  ·  Problem: Anemia due to chronic kidney disease.  Plan: HH stable. Watching CBC . On Epogen .      Problem/Plan - 8:  ·  Problem: Smoker.  Plan: Advised to quit.     Disposition : DC planning home ptomorrow

## 2018-11-07 NOTE — PROGRESS NOTE ADULT - SUBJECTIVE AND OBJECTIVE BOX
[facility]   MD CHAPIN MCADAMS MD RUORU WONG, PA    TEL:  OFFICE: [phone]  DR ALCANTAR CELL: [phone]  YAMILET BOATENG CELL: [phone]  DR. ACOSTA CELL: [phone]    RENAL FOLLOW UP NOTE  --------------------------------------------------------------------------------  HPI:      Pt seen and examined in recovery room  pt s/p cardiac cath today     PAST HISTORY  --------------------------------------------------------------------------------  No significant changes to PMH, PSH, FHx, SHx, unless otherwise noted    ALLERGIES & MEDICATIONS  --------------------------------------------------------------------------------  Allergies    No Known Allergies    Intolerances      Standing Inpatient Medications  ALBUTerol/ipratropium for Nebulization 3 milliLiter(s) Nebulizer every 6 hours  amLODIPine   Tablet 10 milliGRAM(s) Oral at bedtime  AQUAPHOR (petrolatum Ointment) 1 Application(s) Topical two times a day  aspirin enteric coated 81 milliGRAM(s) Oral daily  atorvastatin 40 milliGRAM(s) Oral at bedtime  buDESOnide 160 MICROgram(s)/formoterol 4.5 MICROgram(s) Inhaler 2 Puff(s) Inhalation two times a day  calcium acetate 667 milliGRAM(s) Oral three times a day with meals  carvedilol 12.5 milliGRAM(s) Oral every 12 hours  clopidogrel Tablet 75 milliGRAM(s) Oral daily  dextrose 5%. 1000 milliLiter(s) IV Continuous <Continuous>  dextrose 50% Injectable 12.5 Gram(s) IV Push once  dextrose 50% Injectable 25 Gram(s) IV Push once  dextrose 50% Injectable 25 Gram(s) IV Push once  epoetin terence Injectable 33794 Unit(s) SubCutaneous <User Schedule>  epoetin terence Injectable 60990 Unit(s) SubCutaneous <User Schedule>  ferrous    sulfate 325 milliGRAM(s) Oral daily  furosemide   Injectable 80 milliGRAM(s) IV Push two times a day  gabapentin 300 milliGRAM(s) Oral at bedtime  heparin  Injectable 5000 Unit(s) SubCutaneous every 12 hours  hydrALAZINE 100 milliGRAM(s) Oral three times a day  influenza   Vaccine 0.5 milliLiter(s) IntraMuscular once  insulin glargine Injectable (LANTUS) 10 Unit(s) SubCutaneous at bedtime  insulin lispro (HumaLOG) corrective regimen sliding scale   SubCutaneous three times a day before meals  insulin lispro (HumaLOG) corrective regimen sliding scale   SubCutaneous at bedtime  insulin lispro Injectable (HumaLOG) 3 Unit(s) SubCutaneous three times a day before meals  metolazone 2.5 milliGRAM(s) Oral <User Schedule>  montelukast 10 milliGRAM(s) Oral daily  nicotine -  14 mG/24Hr(s) Patch 1 patch Transdermal daily  pantoprazole    Tablet 40 milliGRAM(s) Oral before breakfast    PRN Inpatient Medications  acetaminophen   Tablet .. 500 milliGRAM(s) Oral every 4 hours PRN  ALBUTerol/ipratropium for Nebulization 3 milliLiter(s) Nebulizer every 2 hours PRN  dextrose 40% Gel 15 Gram(s) Oral once PRN  glucagon  Injectable 1 milliGRAM(s) IntraMuscular once PRN  nicotine - Inhaler 1 Each Inhalation every 1 hour PRN  sodium chloride 0.65% Nasal 1 Spray(s) Both Nostrils two times a day PRN      REVIEW OF SYSTEMS  --------------------------------------------------------------------------------  General: no fever  MSK: + edema     VITALS/PHYSICAL EXAM  --------------------------------------------------------------------------------  T(C): 36.7 (11-07-18 @ 05:02), Max: 36.7 (11-06-18 @ 21:13)  HR: 74 (11-07-18 @ 05:02) (67 - 80)  BP: 149/66 (11-07-18 @ 05:02) (149/66 - 169/72)  RR: 20 (11-07-18 @ 05:02) (20 - 20)  SpO2: 90% (11-07-18 @ 05:02) (90% - 93%)  Wt(kg): --    Weight (kg): 118.2 (11-06-18 @ 09:00)      11-06-18 @ 07:01  -  11-07-18 @ 07:00  --------------------------------------------------------  IN: 900 mL / OUT: 2450 mL / NET: -1550 mL    11-07-18 @ 07:01  -  11-07-18 @ 11:02  --------------------------------------------------------  IN: 0 mL / OUT: 0 mL / NET: 0 mL      Physical Exam:  	Gen: NAD  	HEENT: MMM  	Pulm: CTA B/L  	CV: S1S2  	Abd: Soft, +BS  	Ext: + LE edema B/L                      Neuro: Awake   	Skin: Warm and Dry   	    LABS/STUDIES  --------------------------------------------------------------------------------              8.4    7.33  >-----------<  263      [11-07-18 @ 09:47]              27.3     134  |  91  |  102  ----------------------------<  125      [11-07-18 @ 08:05]  4.6   |  28  |  4.18        Ca     9.3     [11-07-18 @ 08:05]      Phos  3.7     [11-06-18 @ 06:40]            Creatinine Trend:  SCr 4.18 [11-07 @ 08:05]  SCr 4.21 [11-06 @ 06:40]  SCr 4.16 [11-05 @ 07:00]  SCr 4.11 [11-04 @ 07:04]  SCr 4.33 [11-03 @ 06:20]    Urinalysis - [11-04-18 @ 21:33]      Color Light Yellow / Appearance Clear / SG 1.011 / pH 6.0      Gluc Trace / Ketone Negative  / Bili Negative / Urobili Negative       Blood Negative / Protein 30 mg/dL / Leuk Est Negative / Nitrite Negative      RBC 1 / WBC 0 / Hyaline 1 / Gran  / Sq Epi  / Non Sq Epi 1 / Bacteria Negative      Iron 32, TIBC 244, %sat 13      [10-31-18 @ 09:21]  Ferritin 142      [11-01-18 @ 13:52]  PTH -- (Ca 8.7)      [10-31-18 @ 09:24]   269  HbA1c 5.6      [10-31-18 @ 07:56]  TSH 0.58      [11-02-18 @ 08:02]

## 2018-11-07 NOTE — PROGRESS NOTE ADULT - ASSESSMENT
EkG : Likely SR with Atypical LBBB.    Echo < from: Transthoracic Echocardiogram (10.31.18 @ 09:40) >  1. Mitral annular calcification and calcified and tethered  mitral leaflets with normal diastolic opening. Moderate  mitral regurgitation.  2. Severely dilated left atrium.  LA volume index = 51  cc/m2.  3. Moderate left ventricular enlargement.  4. Mild global left ventricular systolic dysfunction.  5. The right ventricle is not well visualized; grossly  normal right ventricular systolic function.  6. Estimated pulmonary artery systolic pressure equals 20  mm Hg, assuming right atrial pressure equals 8  mm Hg,  consistent with normal pulmonary pressures.  7. Small circumferential pericardial effusion.    < end of copied text >    Nuclear Scan at rest: < from: Nuclear Myocardial-Rest Study (11.06.18 @ 10:58) >  The left ventricle was enlarged.  * There are large, severe defects in the inferolateral,  basal to mid inferior, and basal inferoseptal walls.  * There is a small, moderate to severe defect in the apex.  * There are medium-sized, mild defects in the basal  anterior, basal to mid anterolateral, and basal  anteroseptal walls.    < end of copied text >        Assessement and Plan:    New onset LV dysfunction: , nuclear stress as above , [facility] today LCX with 95% Prox and mid LCX s/p PCI with KARLEE, c/w asa and Plavix       COPD: On nebs Pulm on board     CKD:  renal on board , monitor renal function, planned for AVF ,     DVT PPX heparin EkG : Likely SR with Atypical LBBB.    Echo < from: Transthoracic Echocardiogram (10.31.18 @ 09:40) >  1. Mitral annular calcification and calcified and tethered  mitral leaflets with normal diastolic opening. Moderate  mitral regurgitation.  2. Severely dilated left atrium.  LA volume index = 51  cc/m2.  3. Moderate left ventricular enlargement.  4. Mild global left ventricular systolic dysfunction.  5. The right ventricle is not well visualized; grossly  normal right ventricular systolic function.  6. Estimated pulmonary artery systolic pressure equals 20  mm Hg, assuming right atrial pressure equals 8  mm Hg,  consistent with normal pulmonary pressures.  7. Small circumferential pericardial effusion.    < end of copied text >    Nuclear Scan at rest: < from: Nuclear Myocardial-Rest Study (11.06.18 @ 10:58) >  The left ventricle was enlarged.  * There are large, severe defects in the inferolateral,  basal to mid inferior, and basal inferoseptal walls.  * There is a small, moderate to severe defect in the apex.  * There are medium-sized, mild defects in the basal  anterior, basal to mid anterolateral, and basal  anteroseptal walls.    < end of copied text >    Right and Left heart cath shows Sever PHTN, mildly elevated left sided filling pressures , CI >2  95% Calcified LCX stenosis s/p PCI       Assessement and Plan:    New onset LV dysfunction: , nuclear stress as above , [facility] today LCX with 95% Prox and mid LCX s/p PCI with KARLEE, c/w asa and Plavix       COPD: On nebs Pulm on board     CKD:  renal on board , monitor renal function, planned for AVF ,     DVT PPX heparin

## 2018-11-07 NOTE — PROGRESS NOTE ADULT - PROBLEM SELECTOR PROBLEM 7
Anemia due to stage 3 chronic kidney disease

## 2018-11-07 NOTE — PROGRESS NOTE ADULT - SUBJECTIVE AND OBJECTIVE BOX
Gregor Barrow MD  Interventional Cardiology  [place] Office : [phone] [place] [address] [place]. [address]  Tel:   [place] Office : [address] [facility] [place] [address]  Tel: [phone]  Cell : [phone]    Subjective : Pt lying in bed comfortable, not in distress, denies any chest pain or SOB  	  MEDICATIONS:  amLODIPine   Tablet 10 milliGRAM(s) Oral at bedtime  aspirin enteric coated 81 milliGRAM(s) Oral daily  carvedilol 12.5 milliGRAM(s) Oral every 12 hours  clopidogrel Tablet 75 milliGRAM(s) Oral daily  furosemide   Injectable 80 milliGRAM(s) IV Push two times a day  heparin  Injectable 5000 Unit(s) SubCutaneous every 12 hours  hydrALAZINE 100 milliGRAM(s) Oral three times a day  metolazone 2.5 milliGRAM(s) Oral <User Schedule>      ALBUTerol/ipratropium for Nebulization 3 milliLiter(s) Nebulizer every 6 hours  ALBUTerol/ipratropium for Nebulization 3 milliLiter(s) Nebulizer every 2 hours PRN  buDESOnide 160 MICROgram(s)/formoterol 4.5 MICROgram(s) Inhaler 2 Puff(s) Inhalation two times a day  montelukast 10 milliGRAM(s) Oral daily    acetaminophen   Tablet .. 500 milliGRAM(s) Oral every 4 hours PRN  gabapentin 300 milliGRAM(s) Oral at bedtime    pantoprazole    Tablet 40 milliGRAM(s) Oral before breakfast    atorvastatin 40 milliGRAM(s) Oral at bedtime  dextrose 40% Gel 15 Gram(s) Oral once PRN  dextrose 50% Injectable 12.5 Gram(s) IV Push once  dextrose 50% Injectable 25 Gram(s) IV Push once  dextrose 50% Injectable 25 Gram(s) IV Push once  glucagon  Injectable 1 milliGRAM(s) IntraMuscular once PRN  insulin glargine Injectable (LANTUS) 10 Unit(s) SubCutaneous at bedtime  insulin lispro (HumaLOG) corrective regimen sliding scale   SubCutaneous three times a day before meals  insulin lispro (HumaLOG) corrective regimen sliding scale   SubCutaneous at bedtime  insulin lispro Injectable (HumaLOG) 3 Unit(s) SubCutaneous three times a day before meals    AQUAPHOR (petrolatum Ointment) 1 Application(s) Topical two times a day  calcium acetate 667 milliGRAM(s) Oral three times a day with meals  dextrose 5%. 1000 milliLiter(s) IV Continuous <Continuous>  epoetin terence Injectable 91102 Unit(s) SubCutaneous <User Schedule>  epoetin terence Injectable 44166 Unit(s) SubCutaneous <User Schedule>  ferrous    sulfate 325 milliGRAM(s) Oral daily  influenza   Vaccine 0.5 milliLiter(s) IntraMuscular once  sodium chloride 0.65% Nasal 1 Spray(s) Both Nostrils two times a day PRN      PHYSICAL EXAM:  T(C): 36.7 (11-07-18 @ 05:02), Max: 36.7 (11-07-18 @ 05:02)  HR: 74 (11-07-18 @ 05:02) (74 - 74)  BP: 149/66 (11-07-18 @ 05:02) (149/66 - 149/66)  RR: 20 (11-07-18 @ 05:02) (20 - 20)  SpO2: 90% (11-07-18 @ 05:02) (90% - 90%)  Wt(kg): --  I&O's Summary    06 Nov 2018 07:01  -  07 Nov 2018 07:00  --------------------------------------------------------  IN: 900 mL / OUT: 2450 mL / NET: -1550 mL    07 Nov 2018 07:01  -  07 Nov 2018 21:37  --------------------------------------------------------          Appearance: Normal	  HEENT:   Normal oral mucosa, PERRL, EOMI	  Cardiovascular: Normal S1 S2, No JVD, No murmurs, No edema  Respiratory: occasional wheezing 	  Gastrointestinal:  Soft, Non-tender, + BS	  Extremities:No clubbing, cyanosis or edema  IN: 0 mL / OUT: 0 mL / NET: 0 mL                                            8.4    7.33  )-----------( 263      ( 07 Nov 2018 09:47 )             27.3     11-07    134<L>  |  91<L>  |  102<H>  ----------------------------<  125<H>  4.6   |  28  |  4.18<H>    Ca    9.3      07 Nov 2018 08:05  Phos  3.7     11-06      proBNP:   Lipid Profile:   HgA1c:   TSH:

## 2018-11-08 VITALS — OXYGEN SATURATION: 94 %

## 2018-11-08 LAB
ANION GAP SERPL CALC-SCNC: 13 MMOL/L — SIGNIFICANT CHANGE UP (ref 5–17)
BASOPHILS # BLD AUTO: 0 K/UL — SIGNIFICANT CHANGE UP (ref 0–0.2)
BASOPHILS NFR BLD AUTO: 0 % — SIGNIFICANT CHANGE UP (ref 0–2)
BUN SERPL-MCNC: 104 MG/DL — HIGH (ref 7–23)
CALCIUM SERPL-MCNC: 8.8 MG/DL — SIGNIFICANT CHANGE UP (ref 8.4–10.5)
CHLORIDE SERPL-SCNC: 95 MMOL/L — LOW (ref 96–108)
CO2 SERPL-SCNC: 29 MMOL/L — SIGNIFICANT CHANGE UP (ref 22–31)
CREAT SERPL-MCNC: 4.14 MG/DL — HIGH (ref 0.5–1.3)
EOSINOPHIL # BLD AUTO: 0.4 K/UL — SIGNIFICANT CHANGE UP (ref 0–0.5)
EOSINOPHIL NFR BLD AUTO: 4.6 % — SIGNIFICANT CHANGE UP (ref 0–6)
GLUCOSE BLDC GLUCOMTR-MCNC: 105 MG/DL — HIGH (ref 70–99)
GLUCOSE BLDC GLUCOMTR-MCNC: 112 MG/DL — HIGH (ref 70–99)
GLUCOSE BLDC GLUCOMTR-MCNC: 124 MG/DL — HIGH (ref 70–99)
GLUCOSE BLDC GLUCOMTR-MCNC: 65 MG/DL — LOW (ref 70–99)
GLUCOSE BLDC GLUCOMTR-MCNC: 69 MG/DL — LOW (ref 70–99)
GLUCOSE BLDC GLUCOMTR-MCNC: 82 MG/DL — SIGNIFICANT CHANGE UP (ref 70–99)
GLUCOSE BLDC GLUCOMTR-MCNC: 83 MG/DL — SIGNIFICANT CHANGE UP (ref 70–99)
GLUCOSE BLDC GLUCOMTR-MCNC: 87 MG/DL — SIGNIFICANT CHANGE UP (ref 70–99)
GLUCOSE SERPL-MCNC: 64 MG/DL — LOW (ref 70–99)
HCT VFR BLD CALC: 25.2 % — LOW (ref 34.5–45)
HGB BLD-MCNC: 8 G/DL — LOW (ref 11.5–15.5)
IMM GRANULOCYTES NFR BLD AUTO: 0.3 % — SIGNIFICANT CHANGE UP (ref 0–1.5)
LYMPHOCYTES # BLD AUTO: 1.2 K/UL — SIGNIFICANT CHANGE UP (ref 1–3.3)
LYMPHOCYTES # BLD AUTO: 13.8 % — SIGNIFICANT CHANGE UP (ref 13–44)
MCHC RBC-ENTMCNC: 27.9 PG — SIGNIFICANT CHANGE UP (ref 27–34)
MCHC RBC-ENTMCNC: 31.7 GM/DL — LOW (ref 32–36)
MCV RBC AUTO: 87.8 FL — SIGNIFICANT CHANGE UP (ref 80–100)
MONOCYTES # BLD AUTO: 0.43 K/UL — SIGNIFICANT CHANGE UP (ref 0–0.9)
MONOCYTES NFR BLD AUTO: 5 % — SIGNIFICANT CHANGE UP (ref 2–14)
NEUTROPHILS # BLD AUTO: 6.61 K/UL — SIGNIFICANT CHANGE UP (ref 1.8–7.4)
NEUTROPHILS NFR BLD AUTO: 76.3 % — SIGNIFICANT CHANGE UP (ref 43–77)
PLATELET # BLD AUTO: 217 K/UL — SIGNIFICANT CHANGE UP (ref 150–400)
POTASSIUM SERPL-MCNC: 4.5 MMOL/L — SIGNIFICANT CHANGE UP (ref 3.5–5.3)
POTASSIUM SERPL-SCNC: 4.5 MMOL/L — SIGNIFICANT CHANGE UP (ref 3.5–5.3)
RBC # BLD: 2.87 M/UL — LOW (ref 3.8–5.2)
RBC # FLD: 17.1 % — HIGH (ref 10.3–14.5)
SODIUM SERPL-SCNC: 137 MMOL/L — SIGNIFICANT CHANGE UP (ref 135–145)
WBC # BLD: 8.67 K/UL — SIGNIFICANT CHANGE UP (ref 3.8–10.5)
WBC # FLD AUTO: 8.67 K/UL — SIGNIFICANT CHANGE UP (ref 3.8–10.5)

## 2018-11-08 PROCEDURE — 83540 ASSAY OF IRON: CPT

## 2018-11-08 PROCEDURE — 84132 ASSAY OF SERUM POTASSIUM: CPT

## 2018-11-08 PROCEDURE — 97116 GAIT TRAINING THERAPY: CPT

## 2018-11-08 PROCEDURE — 85730 THROMBOPLASTIN TIME PARTIAL: CPT

## 2018-11-08 PROCEDURE — A9500: CPT

## 2018-11-08 PROCEDURE — 82435 ASSAY OF BLOOD CHLORIDE: CPT

## 2018-11-08 PROCEDURE — 93970 EXTREMITY STUDY: CPT

## 2018-11-08 PROCEDURE — 87581 M.PNEUMON DNA AMP PROBE: CPT

## 2018-11-08 PROCEDURE — 83735 ASSAY OF MAGNESIUM: CPT

## 2018-11-08 PROCEDURE — 82330 ASSAY OF CALCIUM: CPT

## 2018-11-08 PROCEDURE — 93460 R&L HRT ART/VENTRICLE ANGIO: CPT | Mod: 59

## 2018-11-08 PROCEDURE — 81001 URINALYSIS AUTO W/SCOPE: CPT

## 2018-11-08 PROCEDURE — 82607 VITAMIN B-12: CPT

## 2018-11-08 PROCEDURE — 87486 CHLMYD PNEUM DNA AMP PROBE: CPT

## 2018-11-08 PROCEDURE — C1769: CPT

## 2018-11-08 PROCEDURE — 96374 THER/PROPH/DIAG INJ IV PUSH: CPT

## 2018-11-08 PROCEDURE — 82947 ASSAY GLUCOSE BLOOD QUANT: CPT

## 2018-11-08 PROCEDURE — 84100 ASSAY OF PHOSPHORUS: CPT

## 2018-11-08 PROCEDURE — 83880 ASSAY OF NATRIURETIC PEPTIDE: CPT

## 2018-11-08 PROCEDURE — 85027 COMPLETE CBC AUTOMATED: CPT

## 2018-11-08 PROCEDURE — 85014 HEMATOCRIT: CPT

## 2018-11-08 PROCEDURE — 36600 WITHDRAWAL OF ARTERIAL BLOOD: CPT

## 2018-11-08 PROCEDURE — C9600: CPT | Mod: LC

## 2018-11-08 PROCEDURE — 99285 EMERGENCY DEPT VISIT HI MDM: CPT | Mod: 25

## 2018-11-08 PROCEDURE — 83970 ASSAY OF PARATHORMONE: CPT

## 2018-11-08 PROCEDURE — 82962 GLUCOSE BLOOD TEST: CPT

## 2018-11-08 PROCEDURE — 99152 MOD SED SAME PHYS/QHP 5/>YRS: CPT

## 2018-11-08 PROCEDURE — 71045 X-RAY EXAM CHEST 1 VIEW: CPT

## 2018-11-08 PROCEDURE — 71250 CT THORAX DX C-: CPT

## 2018-11-08 PROCEDURE — 83605 ASSAY OF LACTIC ACID: CPT

## 2018-11-08 PROCEDURE — 82728 ASSAY OF FERRITIN: CPT

## 2018-11-08 PROCEDURE — 82746 ASSAY OF FOLIC ACID SERUM: CPT

## 2018-11-08 PROCEDURE — 93306 TTE W/DOPPLER COMPLETE: CPT

## 2018-11-08 PROCEDURE — 83615 LACTATE (LD) (LDH) ENZYME: CPT

## 2018-11-08 PROCEDURE — 83036 HEMOGLOBIN GLYCOSYLATED A1C: CPT

## 2018-11-08 PROCEDURE — 84295 ASSAY OF SERUM SODIUM: CPT

## 2018-11-08 PROCEDURE — 84443 ASSAY THYROID STIM HORMONE: CPT

## 2018-11-08 PROCEDURE — C1894: CPT

## 2018-11-08 PROCEDURE — 80053 COMPREHEN METABOLIC PANEL: CPT

## 2018-11-08 PROCEDURE — 80048 BASIC METABOLIC PNL TOTAL CA: CPT

## 2018-11-08 PROCEDURE — 82803 BLOOD GASES ANY COMBINATION: CPT

## 2018-11-08 PROCEDURE — 94640 AIRWAY INHALATION TREATMENT: CPT

## 2018-11-08 PROCEDURE — 78452 HT MUSCLE IMAGE SPECT MULT: CPT

## 2018-11-08 PROCEDURE — C1887: CPT

## 2018-11-08 PROCEDURE — C1725: CPT

## 2018-11-08 PROCEDURE — 87633 RESP VIRUS 12-25 TARGETS: CPT

## 2018-11-08 PROCEDURE — 83550 IRON BINDING TEST: CPT

## 2018-11-08 PROCEDURE — 90686 IIV4 VACC NO PRSV 0.5 ML IM: CPT

## 2018-11-08 PROCEDURE — 99153 MOD SED SAME PHYS/QHP EA: CPT

## 2018-11-08 PROCEDURE — 84484 ASSAY OF TROPONIN QUANT: CPT

## 2018-11-08 PROCEDURE — 93010 ELECTROCARDIOGRAM REPORT: CPT

## 2018-11-08 PROCEDURE — 97161 PT EVAL LOW COMPLEX 20 MIN: CPT

## 2018-11-08 PROCEDURE — 85045 AUTOMATED RETICULOCYTE COUNT: CPT

## 2018-11-08 PROCEDURE — 82310 ASSAY OF CALCIUM: CPT

## 2018-11-08 PROCEDURE — C1874: CPT

## 2018-11-08 PROCEDURE — 83010 ASSAY OF HAPTOGLOBIN QUANT: CPT

## 2018-11-08 PROCEDURE — 76937 US GUIDE VASCULAR ACCESS: CPT

## 2018-11-08 PROCEDURE — 93005 ELECTROCARDIOGRAM TRACING: CPT

## 2018-11-08 PROCEDURE — 85610 PROTHROMBIN TIME: CPT

## 2018-11-08 PROCEDURE — 87798 DETECT AGENT NOS DNA AMP: CPT

## 2018-11-08 RX ORDER — MONTELUKAST 4 MG/1
1 TABLET, CHEWABLE ORAL
Qty: 30 | Refills: 0 | OUTPATIENT
Start: 2018-11-08 | End: 2018-12-07

## 2018-11-08 RX ORDER — CALCIUM ACETATE 667 MG
1 TABLET ORAL
Qty: 90 | Refills: 0 | OUTPATIENT
Start: 2018-11-08 | End: 2018-12-07

## 2018-11-08 RX ORDER — DEXLANSOPRAZOLE 30 MG/1
1 CAPSULE, DELAYED RELEASE ORAL
Qty: 0 | Refills: 0 | COMMUNITY

## 2018-11-08 RX ORDER — LOSARTAN POTASSIUM 100 MG/1
1 TABLET, FILM COATED ORAL
Qty: 0 | Refills: 0 | COMMUNITY

## 2018-11-08 RX ORDER — GABAPENTIN 400 MG/1
1 CAPSULE ORAL
Qty: 0 | Refills: 0 | COMMUNITY

## 2018-11-08 RX ORDER — NICOTINE POLACRILEX 2 MG
1 GUM BUCCAL
Qty: 5 | Refills: 0 | OUTPATIENT
Start: 2018-11-08 | End: 2018-11-12

## 2018-11-08 RX ORDER — INSULIN DETEMIR 100/ML (3)
10 INSULIN PEN (ML) SUBCUTANEOUS
Qty: 0 | Refills: 0 | COMMUNITY

## 2018-11-08 RX ORDER — FUROSEMIDE 40 MG
80 TABLET ORAL
Qty: 0 | Refills: 0 | Status: DISCONTINUED | OUTPATIENT
Start: 2018-11-08 | End: 2018-11-08

## 2018-11-08 RX ORDER — CARVEDILOL PHOSPHATE 80 MG/1
1 CAPSULE, EXTENDED RELEASE ORAL
Qty: 60 | Refills: 0 | OUTPATIENT
Start: 2018-11-08 | End: 2018-12-07

## 2018-11-08 RX ORDER — CARVEDILOL PHOSPHATE 80 MG/1
1 CAPSULE, EXTENDED RELEASE ORAL
Qty: 0 | Refills: 0 | COMMUNITY

## 2018-11-08 RX ORDER — ASPIRIN/CALCIUM CARB/MAGNESIUM 324 MG
1 TABLET ORAL
Qty: 0 | Refills: 0 | COMMUNITY

## 2018-11-08 RX ORDER — AMLODIPINE BESYLATE 2.5 MG/1
1 TABLET ORAL
Qty: 30 | Refills: 0 | OUTPATIENT
Start: 2018-11-08 | End: 2018-12-07

## 2018-11-08 RX ORDER — AMLODIPINE BESYLATE 2.5 MG/1
1 TABLET ORAL
Qty: 0 | Refills: 0 | COMMUNITY

## 2018-11-08 RX ORDER — FLUTICASONE PROPIONATE 50 MCG
1 SPRAY, SUSPENSION NASAL
Qty: 0 | Refills: 0 | COMMUNITY

## 2018-11-08 RX ORDER — NYSTATIN CREAM 100000 [USP'U]/G
1 CREAM TOPICAL
Qty: 0 | Refills: 0 | Status: DISCONTINUED | OUTPATIENT
Start: 2018-11-08 | End: 2018-11-08

## 2018-11-08 RX ADMIN — ATORVASTATIN CALCIUM 40 MILLIGRAM(S): 80 TABLET, FILM COATED ORAL at 21:09

## 2018-11-08 RX ADMIN — Medication 325 MILLIGRAM(S): at 12:10

## 2018-11-08 RX ADMIN — Medication 1 PATCH: at 12:09

## 2018-11-08 RX ADMIN — Medication 667 MILLIGRAM(S): at 17:18

## 2018-11-08 RX ADMIN — Medication 1 APPLICATION(S): at 06:24

## 2018-11-08 RX ADMIN — Medication 100 MILLIGRAM(S): at 21:09

## 2018-11-08 RX ADMIN — Medication 3 UNIT(S): at 18:51

## 2018-11-08 RX ADMIN — Medication 3 MILLILITER(S): at 17:17

## 2018-11-08 RX ADMIN — Medication 1 APPLICATION(S): at 17:20

## 2018-11-08 RX ADMIN — Medication 3 MILLILITER(S): at 06:23

## 2018-11-08 RX ADMIN — Medication 3 UNIT(S): at 00:33

## 2018-11-08 RX ADMIN — Medication 3 UNIT(S): at 07:59

## 2018-11-08 RX ADMIN — Medication 100 MILLIGRAM(S): at 06:24

## 2018-11-08 RX ADMIN — HEPARIN SODIUM 5000 UNIT(S): 5000 INJECTION INTRAVENOUS; SUBCUTANEOUS at 06:23

## 2018-11-08 RX ADMIN — NYSTATIN CREAM 1 APPLICATION(S): 100000 CREAM TOPICAL at 17:20

## 2018-11-08 RX ADMIN — CARVEDILOL PHOSPHATE 12.5 MILLIGRAM(S): 80 CAPSULE, EXTENDED RELEASE ORAL at 12:12

## 2018-11-08 RX ADMIN — PANTOPRAZOLE SODIUM 40 MILLIGRAM(S): 20 TABLET, DELAYED RELEASE ORAL at 06:24

## 2018-11-08 RX ADMIN — Medication 81 MILLIGRAM(S): at 12:11

## 2018-11-08 RX ADMIN — GABAPENTIN 300 MILLIGRAM(S): 400 CAPSULE ORAL at 21:09

## 2018-11-08 RX ADMIN — Medication 1 PATCH: at 21:08

## 2018-11-08 RX ADMIN — CLOPIDOGREL BISULFATE 75 MILLIGRAM(S): 75 TABLET, FILM COATED ORAL at 12:11

## 2018-11-08 RX ADMIN — Medication 667 MILLIGRAM(S): at 08:00

## 2018-11-08 RX ADMIN — BUDESONIDE AND FORMOTEROL FUMARATE DIHYDRATE 2 PUFF(S): 160; 4.5 AEROSOL RESPIRATORY (INHALATION) at 17:18

## 2018-11-08 RX ADMIN — Medication 667 MILLIGRAM(S): at 12:10

## 2018-11-08 RX ADMIN — MONTELUKAST 10 MILLIGRAM(S): 4 TABLET, CHEWABLE ORAL at 12:11

## 2018-11-08 RX ADMIN — Medication 3 MILLILITER(S): at 12:31

## 2018-11-08 RX ADMIN — ERYTHROPOIETIN 10000 UNIT(S): 10000 INJECTION, SOLUTION INTRAVENOUS; SUBCUTANEOUS at 00:54

## 2018-11-08 RX ADMIN — Medication 100 MILLIGRAM(S): at 00:32

## 2018-11-08 RX ADMIN — Medication 80 MILLIGRAM(S): at 18:48

## 2018-11-08 RX ADMIN — Medication 80 MILLIGRAM(S): at 06:23

## 2018-11-08 RX ADMIN — INFLUENZA VIRUS VACCINE 0.5 MILLILITER(S): 15; 15; 15; 15 SUSPENSION INTRAMUSCULAR at 17:11

## 2018-11-08 RX ADMIN — AMLODIPINE BESYLATE 10 MILLIGRAM(S): 2.5 TABLET ORAL at 21:09

## 2018-11-08 RX ADMIN — CARVEDILOL PHOSPHATE 12.5 MILLIGRAM(S): 80 CAPSULE, EXTENDED RELEASE ORAL at 21:09

## 2018-11-08 RX ADMIN — HEPARIN SODIUM 5000 UNIT(S): 5000 INJECTION INTRAVENOUS; SUBCUTANEOUS at 17:19

## 2018-11-08 RX ADMIN — Medication 100 MILLIGRAM(S): at 15:32

## 2018-11-08 RX ADMIN — BUDESONIDE AND FORMOTEROL FUMARATE DIHYDRATE 2 PUFF(S): 160; 4.5 AEROSOL RESPIRATORY (INHALATION) at 06:24

## 2018-11-08 NOTE — PROGRESS NOTE ADULT - NSHPATTENDINGPLANDISCUSS_GEN_ALL_CORE
pt and her daughter as well nP
Team/Family
pt and daughter and NP
pt and her daughter and NP
pt,her daughters and NP
Team/Family
Family
Family
Medical Attending/ NP/ Family at bedside
Another Daughter as well as NP
Another Daughter as well as NP
Daughter as well as Tobias
NP and daughter

## 2018-11-08 NOTE — PROGRESS NOTE ADULT - PROBLEM SELECTOR PLAN 4
in setting of RF  Low PO4 diet   continue  Phoslo 2 tab TID
in setting of RF  Low PO4 diet   continue  Phoslo 2 tab TID
Worsening renal functions: Likely due to Lasix: This needs ot be monitored and consider reducing the dose of Lasix  11/2: worsening renal functions: off lasix! for avf NOW  11/3: AVF next week  11/5: for avf this week  11/6; for AVF once cleared by cardiology  11/7: stable: renal following
in setting of RF  Low PO4 diet   continue  Phoslo 2 tab TID
in setting of RF  Low PO4 diet   continue  Phoslo 2 tab TID
Worsening renal functions: Likely due to Lasix: This needs ot be monitored and consider reducing the dose of Lasix
Worsening renal functions: Likely due to Lasix: This needs ot be monitored and consider reducing the dose of Lasix  11/2: worsening renal functions: off lasix! for avf NOW
Worsening renal functions: Likely due to Lasix: This needs ot be monitored and consider reducing the dose of Lasix  11/2: worsening renal functions: off lasix! for avf NOW  11/3: AVF next week
Worsening renal functions: Likely due to Lasix: This needs ot be monitored and consider reducing the dose of Lasix  11/2: worsening renal functions: off lasix! for avf NOW  11/3: AVF next week  11/5: for avf this week
Worsening renal functions: Likely due to Lasix: This needs ot be monitored and consider reducing the dose of Lasix  11/2: worsening renal functions: off lasix! for avf NOW  11/3: AVF next week  11/5: for avf this week  11/6; for AVF once cleared by cardiology
in setting of RF  Low PO4 diet   continue  Phoslo 2 tab TID
in setting of RF  Low PO4 diet   continue  Phoslo 2 tab TID
in setting of RF  Low PO4 diet   Increase Phoslo 2 tab TID

## 2018-11-08 NOTE — PROGRESS NOTE ADULT - REASON FOR ADMISSION
SOB since 2 weeks

## 2018-11-08 NOTE — PROGRESS NOTE ADULT - PROBLEM SELECTOR PLAN 5
Sec to iron deficiency + CKD  Start Venofer 100 mg QD for 5 day  Start Procrit 10,000 U SC QW
Sec to iron deficiency + CKD  continue  Venofer 100 mg QD for 5 day  continue Procrit 10,000 U SC QW
Seems to under control  11/2: Controlled  11/3: controlled  11/5: blood glucose reasonable!  11/6: stable  11/7: Controlled!
Sec to iron deficiency + CKD  completed Venofer 100 mg QD x 5 doses   continue Procrit 10,000 U SC TIW
Sec to iron deficiency + CKD  continue  Venofer 100 mg QD for 5 day  continue Procrit 10,000 U SC QW
Seems to under control
Seems to under control  11/2: Controlled
Seems to under control  11/2: Controlled  11/3: controlled
Seems to under control  11/2: Controlled  11/3: controlled  11/5: blood glucose reasonable!
Seems to under control  11/2: Controlled  11/3: controlled  11/5: blood glucose reasonable!  11/6: stable

## 2018-11-08 NOTE — PROGRESS NOTE ADULT - PROBLEM SELECTOR PROBLEM 5
Anemia, unspecified type
Diabetes mellitus
Anemia, unspecified type
Anemia, unspecified type
Diabetes mellitus

## 2018-11-08 NOTE — PROGRESS NOTE ADULT - PROBLEM SELECTOR PROBLEM 2
Chronic kidney disease
Chronic obstructive pulmonary disease with acute exacerbation
Chronic kidney disease
Chronic kidney disease
Chronic obstructive pulmonary disease with acute exacerbation
Chronic kidney disease
Chronic kidney disease

## 2018-11-08 NOTE — PROGRESS NOTE ADULT - PROBLEM SELECTOR PLAN 2
CKD Stage 4/5  CKD sec to longstanding DM/HTN  Monitor BMP, strict I/O  Avoid nephrotoxics, NSAIDs RCA.
stable: No wheezing: She has left sided irregularity in the left main  bronchus: ? secretions : This must be followed as she is chronic smoker for many years: If that doesn't resolved: she would need bronchoscopy!
CKD Stage 4/5  CKD sec to longstanding DM/HTN  Monitor BMP, strict I/O  Avoid nephrotoxics, NSAIDs RCA.
CKD Stage 4/5  CKD sec to longstanding DM/HTN  Monitor BMP, strict I/O  Avoid nephrotoxics, NSAIDs RCA.
cont BD and changed to oral steroids. She should get outpatient PSG
cont BD and changed to oral steroids. She should get outpatient PSG  11/2: Cont steroids as well as inhalers!!
cont BD and changed to oral steroids. She should get outpatient PSG  11/2: Cont steroids as well as inhalers!!  11/3: cont steroids
cont BD and changed to oral steroids. She should get outpatient PSG  11/2: Cont steroids as well as inhalers!!  11/3: cont steroids  11/5: off steoirds now: cont symbicort as well as BD: Pt is an active soker: Adv strongly to not to smoke  11/5 no wheezing:
cont BD and changed to oral steroids. She should get outpatient PSG  : Cont steroids as well as inhalers!!  11/3: cont steroids  : off steoirds now: cont symbicort as well as BD: Pt is an active soker: Adv strongly to not to smoke   no wheezin/6; Seems stable: Has hypoxia: with mild retention of paco2:
CKD Stage 4/5  CKD sec to longstanding DM/HTN  Monitor BMP, strict I/O  Avoid nephrotoxics, NSAIDs RCA.

## 2018-11-08 NOTE — CHART NOTE - NSCHARTNOTEFT_GEN_A_CORE
Pt recommended Rehab: pt and family refuse rehab  - daughter states she will take care of her mom  - D/w    - Kouts w pt

## 2018-11-08 NOTE — PROGRESS NOTE ADULT - ASSESSMENT
74 y/o F COPD, CHF, HTN, HLD, DM2,Anemia ,CKD ,smoking x60+ years, presenting with shortness of breath worsening for one month. Patient was diagnosed with flu x2 weeks ago after her daughter had flu, and had significant shortness of breath with cough and congestion as well as shortness of breath at that time. She reports now that for the last week her shortness of breath is intermittent but has been worsening in severity. She states that she uses nebulizer once daily for her COPD, but yesterday had to use it multiple more times. Reports still feeling congested and intermittently coughing up mucous without any blood. She denies any fevers or chills. Went to her PMD today for increased shortness of breath and was told to come here concerned for fluid on the lungs.      Problem/Plan - 1:  ·  Problem: Acute respiratory failure with hypoxia and Hypercapnia .  Plan: Secondary to CHF as well COPD exacerbation. Oxygen to Keep Saturation 92% or above. Pulmonary helping.       Problem/Plan - 2:  ·  Problem: Chronic obstructive pulmonary disease with acute exacerbation.  Plan: Improving.  Steroid  ,Neb Rx and Oxygen . Advised to quit smoking . Pulmonary helping.  Added Singulair      Problem/Plan - 3:  ·  Problem: Acute on chronic Systolic congestive heart failure .  Plan: IV Lasix and TTE noted... < from: Transthoracic Echocardiogram (10.31.18 @ 09:40) >  Conclusions:  1. Mitral annular calcification and calcified and tethered  mitral leaflets with normal diastolic opening. Moderate  mitral regurgitation.  2. Severely dilated left atrium.  LA volume index = 51  cc/m2.  3. Moderate left ventricular enlargement.  4. Mild global left ventricular systolic dysfunction.  5. The right ventricle is not well visualized; grossly  normal right ventricular systolic function.  6. Estimated pulmonary artery systolic pressure equals 20  mm Hg, assuming right atrial pressure equals 8  mm Hg,  consistent with normal pulmonary pressures.  7. Small circumferential pericardial effusion.  *** No previous Echo exam.    < end of copied text >  CT chest ... < from: CT Chest No Cont (10.31.18 @ 12:07) >  IMPRESSION:     Interlobular septal thickening, bilateral diffuse groundglass opacities,   and small bilateral pleural effusions are consistent with pulmonary edema.    Left sided endobronchial nodularity likely reflect secretions. Follow-up   CT chest is recommended in 1 month to ensure resolution. The   endobronchial nodularity can also be reassessed at that time.    < end of copied text >  Cardiology helping and S/P Cardiac cath with Stent .      Problem/Plan - 4:  ·  Problem: Stage 4 chronic kidney disease with MELISSA .  Plan: S/P Cardiac cath so watching BMP closely . Renal helping and may need HD so getting Outpt AVF. D/W renal attending and Okay to go home an dF/U outpt.      Problem/Plan - 5:  ·  Problem: Diabetes mellitus.  Plan: Lantus and SSI for now. Sugars in good range.      Problem/Plan - 6:  Problem: Hypertension. Plan: BP meds with parameters. BP readings fine.      Problem/Plan - 7:  ·  Problem: Anemia due to chronic kidney disease.  Plan: HH stable. Watching CBC . On Epogen .      Problem/Plan - 8:  ·  Problem: Smoker.  Plan: Advised to quit.     Disposition : DC planning home today

## 2018-11-08 NOTE — PROGRESS NOTE ADULT - SUBJECTIVE AND OBJECTIVE BOX
[facility] [place] [facility]   MD CHAPIN MCADAMS MD RUORU WONG, PA    TEL:  OFFICE: [phone]  DR ALCANTAR CELL: [phone]  YAMILET BOATENG CELL: [phone]  DR. ACOSTA CELL: [phone]    RENAL FOLLOW UP NOTE  --------------------------------------------------------------------------------  HPI:      Pt seen and examined at bedside.       PAST HISTORY  --------------------------------------------------------------------------------  No significant changes to PMH, PSH, FHx, SHx, unless otherwise noted    ALLERGIES & MEDICATIONS  --------------------------------------------------------------------------------  Allergies    No Known Allergies    Intolerances      Standing Inpatient Medications  ALBUTerol/ipratropium for Nebulization 3 milliLiter(s) Nebulizer every 6 hours  amLODIPine   Tablet 10 milliGRAM(s) Oral at bedtime  AQUAPHOR (petrolatum Ointment) 1 Application(s) Topical two times a day  aspirin enteric coated 81 milliGRAM(s) Oral daily  atorvastatin 40 milliGRAM(s) Oral at bedtime  buDESOnide 160 MICROgram(s)/formoterol 4.5 MICROgram(s) Inhaler 2 Puff(s) Inhalation two times a day  calcium acetate 667 milliGRAM(s) Oral three times a day with meals  carvedilol 12.5 milliGRAM(s) Oral every 12 hours  clopidogrel Tablet 75 milliGRAM(s) Oral daily  dextrose 5%. 1000 milliLiter(s) IV Continuous <Continuous>  dextrose 50% Injectable 12.5 Gram(s) IV Push once  dextrose 50% Injectable 25 Gram(s) IV Push once  dextrose 50% Injectable 25 Gram(s) IV Push once  epoetin terence Injectable 14099 Unit(s) SubCutaneous <User Schedule>  ferrous    sulfate 325 milliGRAM(s) Oral daily  furosemide   Injectable 80 milliGRAM(s) IV Push two times a day  gabapentin 300 milliGRAM(s) Oral at bedtime  heparin  Injectable 5000 Unit(s) SubCutaneous every 12 hours  hydrALAZINE 100 milliGRAM(s) Oral three times a day  influenza   Vaccine 0.5 milliLiter(s) IntraMuscular once  insulin glargine Injectable (LANTUS) 10 Unit(s) SubCutaneous at bedtime  insulin lispro (HumaLOG) corrective regimen sliding scale   SubCutaneous three times a day before meals  insulin lispro (HumaLOG) corrective regimen sliding scale   SubCutaneous at bedtime  insulin lispro Injectable (HumaLOG) 3 Unit(s) SubCutaneous three times a day before meals  metolazone 2.5 milliGRAM(s) Oral <User Schedule>  montelukast 10 milliGRAM(s) Oral daily  nicotine -  14 mG/24Hr(s) Patch 1 patch Transdermal daily  nystatin Powder 1 Application(s) Topical two times a day  pantoprazole    Tablet 40 milliGRAM(s) Oral before breakfast    PRN Inpatient Medications  acetaminophen   Tablet .. 500 milliGRAM(s) Oral every 4 hours PRN  ALBUTerol/ipratropium for Nebulization 3 milliLiter(s) Nebulizer every 2 hours PRN  dextrose 40% Gel 15 Gram(s) Oral once PRN  glucagon  Injectable 1 milliGRAM(s) IntraMuscular once PRN  nicotine - Inhaler 1 Each Inhalation every 1 hour PRN  sodium chloride 0.65% Nasal 1 Spray(s) Both Nostrils two times a day PRN      REVIEW OF SYSTEMS  --------------------------------------------------------------------------------  General: no fever  CVS: no chest pain  MSK: + edema     VITALS/PHYSICAL EXAM  --------------------------------------------------------------------------------  T(C): 36.5 (11-08-18 @ 04:43), Max: 36.7 (11-07-18 @ 21:41)  HR: 71 (11-08-18 @ 04:43) (71 - 76)  BP: 145/58 (11-08-18 @ 04:43) (145/58 - 153/62)  RR: 20 (11-08-18 @ 04:43) (18 - 20)  SpO2: 90% (11-08-18 @ 04:43) (90% - 92%)  Wt(kg): --        11-07-18 @ 07:01  -  11-08-18 @ 07:00  --------------------------------------------------------  IN: 200 mL / OUT: 200 mL / NET: 0 mL    11-08-18 @ 07:01  -  11-08-18 @ 11:15  --------------------------------------------------------  IN: 0 mL / OUT: 200 mL / NET: -200 mL      Physical Exam:  	Gen: NAD  	HEENT: MMM  	Pulm: CTA B/L  	CV: S1S2  	Abd: Soft, +BS  	Ext: + LE edema B/L                      Neuro: Awake   	Skin: Warm and Dry   	    LABS/STUDIES  --------------------------------------------------------------------------------              8.0    8.67  >-----------<  217      [11-08-18 @ 08:15]              25.2     137  |  95  |  104  ----------------------------<  64      [11-08-18 @ 06:07]  4.5   |  29  |  4.14        Ca     8.8     [11-08-18 @ 06:07]            Creatinine Trend:  SCr 4.14 [11-08 @ 06:07]  SCr 4.18 [11-07 @ 08:05]  SCr 4.21 [11-06 @ 06:40]  SCr 4.16 [11-05 @ 07:00]  SCr 4.11 [11-04 @ 07:04]    Urinalysis - [11-04-18 @ 21:33]      Color Light Yellow / Appearance Clear / SG 1.011 / pH 6.0      Gluc Trace / Ketone Negative  / Bili Negative / Urobili Negative       Blood Negative / Protein 30 mg/dL / Leuk Est Negative / Nitrite Negative      RBC 1 / WBC 0 / Hyaline 1 / Gran  / Sq Epi  / Non Sq Epi 1 / Bacteria Negative      Iron 32, TIBC 244, %sat 13      [10-31-18 @ 09:21]  Ferritin 142      [11-01-18 @ 13:52]  PTH -- (Ca 8.7)      [10-31-18 @ 09:24]   269  HbA1c 5.6      [10-31-18 @ 07:56]  TSH 0.58      [11-02-18 @ 08:02]

## 2018-11-08 NOTE — PROGRESS NOTE ADULT - SUBJECTIVE AND OBJECTIVE BOX
INTERVAL HPI/OVERNIGHT EVENTS: I feel better . Daughters in room . I want to go home today.   Vital Signs Last 24 Hrs  T(C): 36.5 (08 Nov 2018 04:43), Max: 36.7 (07 Nov 2018 21:41)  T(F): 97.7 (08 Nov 2018 04:43), Max: 98.1 (07 Nov 2018 21:41)  HR: 71 (08 Nov 2018 04:43) (71 - 76)  BP: 145/58 (08 Nov 2018 04:43) (145/58 - 153/62)  BP(mean): --  RR: 20 (08 Nov 2018 04:43) (18 - 20)  SpO2: 90% (08 Nov 2018 04:43) (90% - 92%)  I&O's Summary    07 Nov 2018 07:01  -  08 Nov 2018 07:00  --------------------------------------------------------  IN: 200 mL / OUT: 200 mL / NET: 0 mL    08 Nov 2018 07:01  -  08 Nov 2018 11:20  --------------------------------------------------------  IN: 0 mL / OUT: 200 mL / NET: -200 mL      MEDICATIONS  (STANDING):  ALBUTerol/ipratropium for Nebulization 3 milliLiter(s) Nebulizer every 6 hours  amLODIPine   Tablet 10 milliGRAM(s) Oral at bedtime  AQUAPHOR (petrolatum Ointment) 1 Application(s) Topical two times a day  aspirin enteric coated 81 milliGRAM(s) Oral daily  atorvastatin 40 milliGRAM(s) Oral at bedtime  buDESOnide 160 MICROgram(s)/formoterol 4.5 MICROgram(s) Inhaler 2 Puff(s) Inhalation two times a day  calcium acetate 667 milliGRAM(s) Oral three times a day with meals  carvedilol 12.5 milliGRAM(s) Oral every 12 hours  clopidogrel Tablet 75 milliGRAM(s) Oral daily  dextrose 5%. 1000 milliLiter(s) (50 mL/Hr) IV Continuous <Continuous>  dextrose 50% Injectable 12.5 Gram(s) IV Push once  dextrose 50% Injectable 25 Gram(s) IV Push once  dextrose 50% Injectable 25 Gram(s) IV Push once  epoetin terence Injectable 92021 Unit(s) SubCutaneous <User Schedule>  ferrous    sulfate 325 milliGRAM(s) Oral daily  furosemide   Injectable 80 milliGRAM(s) IV Push two times a day  gabapentin 300 milliGRAM(s) Oral at bedtime  heparin  Injectable 5000 Unit(s) SubCutaneous every 12 hours  hydrALAZINE 100 milliGRAM(s) Oral three times a day  influenza   Vaccine 0.5 milliLiter(s) IntraMuscular once  insulin glargine Injectable (LANTUS) 10 Unit(s) SubCutaneous at bedtime  insulin lispro (HumaLOG) corrective regimen sliding scale   SubCutaneous three times a day before meals  insulin lispro (HumaLOG) corrective regimen sliding scale   SubCutaneous at bedtime  insulin lispro Injectable (HumaLOG) 3 Unit(s) SubCutaneous three times a day before meals  metolazone 2.5 milliGRAM(s) Oral <User Schedule>  montelukast 10 milliGRAM(s) Oral daily  nicotine -  14 mG/24Hr(s) Patch 1 patch Transdermal daily  nystatin Powder 1 Application(s) Topical two times a day  pantoprazole    Tablet 40 milliGRAM(s) Oral before breakfast    MEDICATIONS  (PRN):  acetaminophen   Tablet .. 500 milliGRAM(s) Oral every 4 hours PRN Temp greater or equal to 38.5C (101.3F), Moderate Pain (4 - 6)  ALBUTerol/ipratropium for Nebulization 3 milliLiter(s) Nebulizer every 2 hours PRN Shortness of Breath and/or Wheezing  dextrose 40% Gel 15 Gram(s) Oral once PRN Blood Glucose LESS THAN 70 milliGRAM(s)/deciliter  glucagon  Injectable 1 milliGRAM(s) IntraMuscular once PRN Glucose LESS THAN 70 milligrams/deciliter  nicotine - Inhaler 1 Each Inhalation every 1 hour PRN smoking cessation  sodium chloride 0.65% Nasal 1 Spray(s) Both Nostrils two times a day PRN Nasal Congestion    LABS:                        8.0    8.67  )-----------( 217      ( 08 Nov 2018 08:15 )             25.2     11-08    137  |  95<L>  |  104<H>  ----------------------------<  64<L>  4.5   |  29  |  4.14<H>    Ca    8.8      08 Nov 2018 06:07          CAPILLARY BLOOD GLUCOSE      POCT Blood Glucose.: 83 mg/dL (08 Nov 2018 07:13)  POCT Blood Glucose.: 135 mg/dL (07 Nov 2018 22:49)  POCT Blood Glucose.: 129 mg/dL (07 Nov 2018 17:18)          REVIEW OF SYSTEMS:  CONSTITUTIONAL: No fever, weight loss, or fatigue  EYES: No eye pain, visual disturbances, or discharge  ENMT:  No difficulty hearing, tinnitus, vertigo; No sinus or throat pain  NECK: No pain or stiffness  RESPIRATORY: No cough, wheezing, chills or hemoptysis; No shortness of breath  CARDIOVASCULAR: No chest pain, palpitations, dizziness, or leg swelling  GASTROINTESTINAL: No abdominal or epigastric pain. No nausea, vomiting, or hematemesis; No diarrhea or constipation. No melena or hematochezia.  GENITOURINARY: No dysuria, frequency, hematuria, or incontinence  NEUROLOGICAL: No headaches, memory loss, loss of strength, numbness, or tremors      Consultant(s) Notes Reviewed:  [x ] YES  [ ] NO    PHYSICAL EXAM:  GENERAL: NAD, well-groomed, well-developed ,not in any distress ,  HEAD:  Atraumatic, Normocephalic  EYES: EOMI, PERRLA, conjunctiva and sclera clear  NECK: Supple, No JVD, Normal thyroid  NERVOUS SYSTEM:  Alert & Oriented X3, No focal deficit   CHEST/LUNG: Good air entry bilateral except bases with occ rales, rhonchi,   HEART: Regular rate and rhythm; No murmurs, rubs, or gallops  ABDOMEN: Soft, Nontender, Nondistended; Bowel sounds present  EXTREMITIES:  2+ Peripheral Pulses, No clubbing, cyanosis, or edema    Care Discussed with Consultants/Other Providers [ x] YES  [ ] NO

## 2018-11-08 NOTE — PROGRESS NOTE ADULT - PROBLEM SELECTOR PROBLEM 4
Hyperphosphatemia
Stage 3 chronic kidney disease
Hyperphosphatemia
Hyperphosphatemia
Stage 3 chronic kidney disease
Hyperphosphatemia

## 2018-11-08 NOTE — PROGRESS NOTE ADULT - PROBLEM SELECTOR PROBLEM 3
Volume overload
Volume overload
Acute on chronic congestive heart failure, unspecified heart failure type
Volume overload
Volume overload
Acute on chronic congestive heart failure, unspecified heart failure type
Volume overload

## 2018-11-08 NOTE — PROGRESS NOTE ADULT - ASSESSMENT
EkG : Likely SR with Atypical LBBB.    Echo < from: Transthoracic Echocardiogram (10.31.18 @ 09:40) >  1. Mitral annular calcification and calcified and tethered  mitral leaflets with normal diastolic opening. Moderate  mitral regurgitation.  2. Severely dilated left atrium.  LA volume index = 51  cc/m2.  3. Moderate left ventricular enlargement.  4. Mild global left ventricular systolic dysfunction.  5. The right ventricle is not well visualized; grossly  normal right ventricular systolic function.  6. Estimated pulmonary artery systolic pressure equals 20  mm Hg, assuming right atrial pressure equals 8  mm Hg,  consistent with normal pulmonary pressures.  7. Small circumferential pericardial effusion.    < end of copied text >    Nuclear Scan at rest: < from: Nuclear Myocardial-Rest Study (11.06.18 @ 10:58) >  The left ventricle was enlarged.  * There are large, severe defects in the inferolateral,  basal to mid inferior, and basal inferoseptal walls.  * There is a small, moderate to severe defect in the apex.  * There are medium-sized, mild defects in the basal  anterior, basal to mid anterolateral, and basal  anteroseptal walls.    < end of copied text >    Right and Left heart cath shows Sever PHTN, mildly elevated left sided filling pressures , CI >2  95% Calcified LCX stenosis s/p PCI       Assessement and Plan:    New onset LV dysfunction: , nuclear stress as above , [facility] yesterday LCX with 95% Prox and mid LCX s/p PCI with KARLEE, c/w asa and Plavix       COPD: On nebs Pulm on board     CKD:  renal on board , monitor renal function, planned for AVF ,     DVT PPX heparin

## 2018-11-08 NOTE — PROGRESS NOTE ADULT - PROBLEM SELECTOR PLAN 3
Fluid overload in setting of HF/RF  Continue lasix 80mg IV BID  Monitor daily weights  Follow up Cardiology.
Fluid overload in setting of HF/RF  Continue lasix 80mg IV BID, increase metolazone  Monitor daily weights  Follow up Cardiology.
CT scan with evidence of chf exacerbation: She is on IV lasix  11/2: off diuretics now  11/3: for avf PLACEMENT  11/5: cont Lasix: FOR AVF placement  11/6: for nuclear stress test  11/7: stress test reviewed: s/p cath : Await results
Fluid overload in setting of HF/RF  Continue lasix 80mg IV BID  Monitor daily weights  Increase in weight? add metolazone 2.5mg   Follow up Cardiology.
Fluid overload in setting of HF/RF  Continue lasix 80mg IV BID,and  metolazone  Monitor daily weights  Follow up Cardiology.
CT scan with evidence of chf exacerbation: She is on IV lasix
CT scan with evidence of chf exacerbation: She is on IV lasix  11/2: off diuretics now
CT scan with evidence of chf exacerbation: She is on IV lasix  11/2: off diuretics now  11/3: for avf PLACEMENT
CT scan with evidence of chf exacerbation: She is on IV lasix  11/2: off diuretics now  11/3: for avf PLACEMENT  11/5: cont Lasix: FOR AVF placement
CT scan with evidence of chf exacerbation: She is on IV lasix  11/2: off diuretics now  11/3: for avf PLACEMENT  11/5: cont Lasix: FOR AVF placement  11/6: for nuclear stress test
Fluid overload in setting of HF/RF  Continue lasix 80mg IV BID  Monitor daily weights  Follow up Cardiology.
Fluid overload in setting of HF/RF  Continue lasix 80mg IV BID,and  metolazone  Monitor daily weights  Follow up Cardiology.
Fluid overload in setting of HF/RF  Follow up ECHO  COntinue lasix 80mg IV BID  Monitor daily weights  Follow up Cardiology.

## 2018-11-08 NOTE — PROGRESS NOTE ADULT - PROBLEM SELECTOR PLAN 1
MELISSA sec to CHF  Renal function stable   Pt with Scr 3.9 on admission on 10/30   Continue lasix 80mg IV BID - pt remains volume overloaded- Increase  metolazone 2.5mg BID (30 min prior to lasix)  Will likely need HD in near future  Discussed with pt in length regarding, preparation of HD. PT is  agreeable  for AVF  PT will need cardiac clearance for AVF  Monitor BMP, strict I/O  Avoid nephrotoxics, NSAIDs RCA.    * If planned for cardia cath, pt ar 57.3% risk of SARITA and 12.6% risk of dialysis.   Recommend to hold AM dose of lasix on day of procedure.
Acute on chronic hypercarbic resp failure: Has underlying COPD as well as likely has OHS too: She is feeling ok and much better today: I don't think she needs bipap at this time: Cont BD ! can change to po steorids: There is no pneumonia on ct chest but there are some secretions in the left main bronchus: This should be followed up with repeat ct scan chest in a month to ensure its resolution: She is a chronic smoker and endobronchial disease must be ruled out if the nodularity still persists:  11/2: Has mild ac on chronic hypercarbic resp failure: She has imrpoved quite a bit: She likely has sleep apnea too: She would need outpt PSG: In addition she needs to be checked for home oxygen requirement bu walking on room air for about 5-6 minutes: Her baseline oxygen level is on the lower side off oxygen while resting!  11/3: more wheezy today: Add Singulair: already on  steroids fro now. Cont BiPAP overnight!!  11/5: Today's ABG noted Ph ok with co2: She is hypoxic: I suspect she would need home oxygen atleast on exertion as well as while sleeping: Would need to be checked prior to dc:  11/6: rpt chest x-ray today : Have not chilango on  bipap at night  11/7: berathing wise OK: on oxygen butnot SOB: Chest ray reviewed
MELISSA sec to CHF  Renal function stable   Pt with Scr 3.9 on admission on 10/30   Continue lasix 80mg IV BID and  metolazone 2.5mg BID (30 min prior to lasix)  Will likely need HD in near future  Discussed with pt in length regarding, preparation of HD. PT is  agreeable  for AVF  PT will need cardiac clearance for AVF  Monitor BMP, strict I/O  Avoid nephrotoxics, NSAIDs RCA.    * Pt s/p cardia cath on 11/7 , pt at 57.3% risk of SARITA and 12.6% risk of dialysis.   - lasix was held in AM prior to procedure, monitor renal function closely for SARITA
MELISSA sec to CHF  Renal function stable today  Pt with Scr 3.9 on admission on 10/30   Continue lasix 80mg IV BID - pt remains volume overloaded- Addd metolazone 2.5mg QD (30 min prior to lasix)  Will likely need HD in near future  Discussed with pt in length regarding, preparation of HD. PT is  agreeable  for AVF  PT will need cardiac clearance for AVF  Monitor BMP, strict I/O  Avoid nephrotoxics, NSAIDs RCA.
Acute on chronic hypercarbic resp failure: Has underlying COPD as well as likely has OHS too: She is feeling ok and much better today: I don't think she needs bipap at this time: Cont BD ! can change to po steorids: There is no pneumonia on ct chest but there are some secretions in the left main bronchus: This should be followed up with repeat ct scan chest in a month to ensure its resolution: She is a chronic smoker and endobronchial disease must be ruled out if the nodularity still persists:
Acute on chronic hypercarbic resp failure: Has underlying COPD as well as likely has OHS too: She is feeling ok and much better today: I don't think she needs bipap at this time: Cont BD ! can change to po steorids: There is no pneumonia on ct chest but there are some secretions in the left main bronchus: This should be followed up with repeat ct scan chest in a month to ensure its resolution: She is a chronic smoker and endobronchial disease must be ruled out if the nodularity still persists:  11/2: Has mild ac on chronic hypercarbic resp failure: She has imrpoved quite a bit: She likely has sleep apnea too: She would need outpt PSG: In addition she needs to be checked for home oxygen requirement bu walking on room air for about 5-6 minutes: Her baseline oxygen level is on the lower side off oxygen while resting!  11/3: more wheezy today: Add Singulair: already on  steroids fro now. Cont BiPAP overnight!!
Acute on chronic hypercarbic resp failure: Has underlying COPD as well as likely has OHS too: She is feeling ok and much better today: I don't think she needs bipap at this time: Cont BD ! can change to po steorids: There is no pneumonia on ct chest but there are some secretions in the left main bronchus: This should be followed up with repeat ct scan chest in a month to ensure its resolution: She is a chronic smoker and endobronchial disease must be ruled out if the nodularity still persists:  11/2: Has mild ac on chronic hypercarbic resp failure: She has imrpoved quite a bit: She likely has sleep apnea too: She would need outpt PSG: In addition she needs to be checked for home oxygen requirement bu walking on room air for about 5-6 minutes: Her baseline oxygen level is on the lower side off oxygen while resting!  11/3: more wheezy today: Add Singulair: already on  steroids fro now. Cont BiPAP overnight!!  11/5: Today's ABG noted Ph ok with co2: She is hypoxic: I suspect she would need home oxygen atleast on exertion as well as while sleeping: Would need to be checked prior to dc:
Acute on chronic hypercarbic resp failure: Has underlying COPD as well as likely has OHS too: She is feeling ok and much better today: I don't think she needs bipap at this time: Cont BD ! can change to po steorids: There is no pneumonia on ct chest but there are some secretions in the left main bronchus: This should be followed up with repeat ct scan chest in a month to ensure its resolution: She is a chronic smoker and endobronchial disease must be ruled out if the nodularity still persists:  11/2: Has mild ac on chronic hypercarbic resp failure: She has imrpoved quite a bit: She likely has sleep apnea too: She would need outpt PSG: In addition she needs to be checked for home oxygen requirement bu walking on room air for about 5-6 minutes: Her baseline oxygen level is on the lower side off oxygen while resting!  11/3: more wheezy today: Add Singulair: already on  steroids fro now. Cont BiPAP overnight!!  11/5: Today's ABG noted Ph ok with co2: She is hypoxic: I suspect she would need home oxygen atleast on exertion as well as while sleeping: Would need to be checked prior to dc:  11/6: rpt chest x-ray today : Have not chilango on  bipap at night
Acute on chronic hypercarbic resp failure: Has underlying COPD as well as likely has OHS too: She is feeling ok and much better today: I don't think she needs bipap at this time: Cont BD ! can change to po steorids: There is no pneumonia on ct chest but there are some secretions in the left main bronchus: This should be followed up with repeat ct scan chest in a month to ensure its resolution: She is a chronic smoker and endobronchial disease must be ruled out if the nodularity still persists:  11/2: Has mild ac on chronic hypercarbic resp failure: She has imrpoved quite a bit: She likely has sleep apnea too: She would need outpt PSG: In addition she needs to be checked for home oxygen requirement bu walking on room air for about 5-6 minutes: Her baslin eoxygen level is on the lower side off oxygen while resting!
MELISSA sec to CHF  Pt with Scr 3.9 on admission on 10/30   Pt s/p cardiac cath on 11/7 , renal function stable today  Continue lasix 80mg IV BID and  metolazone 2.5mg BID (30 min prior to lasix)  Will likely need HD in near future  Discussed with pt in length regarding, preparation of HD. PT is  agreeable  for AVF  PT will need cardiac clearance for AVF  Monitor BMP, strict I/O  Avoid nephrotoxics, NSAIDs RCA.  * if discharged, please discharge on lasix 80mg BID PO and metolazone 2.5mg BID(30min prior to lasix), pt advised to follow up in Dr. Morales's office in 1-2 weeks
MELISSA sec to CHF  Renal function stable today  Pt with Scr 3.9 on admission on 10/30   Continue lasix 80mg IV BID - pt remains volume overloaded  Will likely need HD in near future  Discussed with pt in length regarding, preparation of HD. PT is  agreeable  for AVF  PT will need cardiac clearance for AVF  Monitor BMP, strict I/O  Avoid nephrotoxics, NSAIDs RCA.
? MELISSA sec to CHF  Renal function is worsening, it is likely sec to diuresis and getting to its new baseline. It may be natural progression of the disease   Pt with Scr 3.9 on admission on 10/30   Continue lasix 80mg IV BID   Will likely need HD in near future  Di/W patient and daughter, they agreed for AVF  Monitor BMP, strict I/O  Avoid nephrotoxics, NSAIDs RCA.
? MELISSA sec to CHF  Renal function is worsening, it is likely sec to diuresis and getting to its new baseline. It may be natural progression of the disease   Pt with Scr 3.9 on admission on 10/30   Continue lasix 80mg IV BID - pt remains volume overloaded  Will likely need HD in near future  Discussed with pt in length regarding, preparation of HD. PT is  agreeable  for AVF  PT will need cardiac clearance for AVF  Monitor BMP, strict I/O  Avoid nephrotoxics, NSAIDs RCA.

## 2018-11-08 NOTE — PROGRESS NOTE ADULT - SUBJECTIVE AND OBJECTIVE BOX
Gregor Barrow MD  Interventional Cardiology / Advance Heart Failure and Cardiac Transplant Specialist  [place] Office : [address] [place] [address] [place]. [address]  Tel:   [place] Office : [address] [facility] [place] [address]  Tel: [phone]  Cell : [phone]    Subjective : Pt lying in bed comfortable, not in distress, denies any chest pain or SOB  	  MEDICATIONS:  amLODIPine   Tablet 10 milliGRAM(s) Oral at bedtime  aspirin enteric coated 81 milliGRAM(s) Oral daily  carvedilol 12.5 milliGRAM(s) Oral every 12 hours  clopidogrel Tablet 75 milliGRAM(s) Oral daily  furosemide    Tablet 80 milliGRAM(s) Oral two times a day  heparin  Injectable 5000 Unit(s) SubCutaneous every 12 hours  hydrALAZINE 100 milliGRAM(s) Oral three times a day  metolazone 2.5 milliGRAM(s) Oral <User Schedule>      ALBUTerol/ipratropium for Nebulization 3 milliLiter(s) Nebulizer every 6 hours  ALBUTerol/ipratropium for Nebulization 3 milliLiter(s) Nebulizer every 2 hours PRN  buDESOnide 160 MICROgram(s)/formoterol 4.5 MICROgram(s) Inhaler 2 Puff(s) Inhalation two times a day  montelukast 10 milliGRAM(s) Oral daily    acetaminophen   Tablet .. 500 milliGRAM(s) Oral every 4 hours PRN  gabapentin 300 milliGRAM(s) Oral at bedtime    pantoprazole    Tablet 40 milliGRAM(s) Oral before breakfast    atorvastatin 40 milliGRAM(s) Oral at bedtime  dextrose 40% Gel 15 Gram(s) Oral once PRN  dextrose 50% Injectable 12.5 Gram(s) IV Push once  dextrose 50% Injectable 25 Gram(s) IV Push once  dextrose 50% Injectable 25 Gram(s) IV Push once  glucagon  Injectable 1 milliGRAM(s) IntraMuscular once PRN  insulin glargine Injectable (LANTUS) 10 Unit(s) SubCutaneous at bedtime  insulin lispro (HumaLOG) corrective regimen sliding scale   SubCutaneous three times a day before meals  insulin lispro (HumaLOG) corrective regimen sliding scale   SubCutaneous at bedtime  insulin lispro Injectable (HumaLOG) 3 Unit(s) SubCutaneous three times a day before meals    AQUAPHOR (petrolatum Ointment) 1 Application(s) Topical two times a day  calcium acetate 667 milliGRAM(s) Oral three times a day with meals  dextrose 5%. 1000 milliLiter(s) IV Continuous <Continuous>  epoetin terence Injectable 40310 Unit(s) SubCutaneous <User Schedule>  ferrous    sulfate 325 milliGRAM(s) Oral daily  nystatin Powder 1 Application(s) Topical two times a day  sodium chloride 0.65% Nasal 1 Spray(s) Both Nostrils two times a day PRN      PHYSICAL EXAM:  T(C): 36.6 (11-08-18 @ 20:24), Max: 36.9 (11-08-18 @ 13:47)  HR: 67 (11-08-18 @ 20:24) (67 - 76)  BP: 150/74 (11-08-18 @ 20:24) (138/68 - 173/66)  RR: 20 (11-08-18 @ 20:24) (18 - 20)  SpO2: 94% (11-08-18 @ 20:24) (90% - 94%)  Wt(kg): --  I&O's Summary    07 Nov 2018 07:01  -  08 Nov 2018 07:00  --------------------------------------------------------  IN: 200 mL / OUT: 200 mL / NET: 0 mL    08 Nov 2018 07:01  -  08 Nov 2018 21:22  --------------------------------------------------------  IN: 540 mL / OUT: 200 mL / NET: 340 mL          Appearance: Normal	  HEENT:   Normal oral mucosa, PERRL, EOMI	  Cardiovascular: Normal S1 S2, No JVD, No murmurs, No edema  Respiratory: Lungs clear to auscultation	  Gastrointestinal:  Soft, Non-tender, + BS	  Extremities: right groin soft                                  8.0    8.67  )-----------( 217      ( 08 Nov 2018 08:15 )             25.2     11-08    137  |  95<L>  |  104<H>  ----------------------------<  64<L>  4.5   |  29  |  4.14<H>    Ca    8.8      08 Nov 2018 06:07      proBNP:   Lipid Profile:   HgA1c:   TSH:

## 2018-11-08 NOTE — CHART NOTE - NSCHARTNOTEFT_GEN_A_CORE
Patient with COPD , now requiring home oxygen continuous, patient sat 85% on room air at rest, 90-93 % on 3lnc

## 2018-11-08 NOTE — PROGRESS NOTE ADULT - PROBLEM SELECTOR PROBLEM 1
Acute respiratory failure with hypoxia
R/O MELISSA (acute kidney injury)
R/O MELISSA (acute kidney injury)
Acute respiratory failure with hypoxia
R/O MELISSA (acute kidney injury)

## 2018-11-08 NOTE — PROGRESS NOTE ADULT - PROVIDER SPECIALTY LIST ADULT
Cardiology
Internal Medicine
Intervent Cardiology
Nephrology
Pulmonology
Internal Medicine
Cardiology
Internal Medicine
Nephrology
Pulmonology

## 2018-11-09 RX ORDER — MONTELUKAST 4 MG/1
1 TABLET, CHEWABLE ORAL
Qty: 30 | Refills: 0 | OUTPATIENT
Start: 2018-11-09 | End: 2018-12-08

## 2018-11-09 RX ORDER — NICOTINE POLACRILEX 2 MG
1 GUM BUCCAL
Qty: 5 | Refills: 0 | OUTPATIENT
Start: 2018-11-09 | End: 2018-11-13

## 2018-11-19 PROBLEM — K64.9 UNSPECIFIED HEMORRHOIDS: Chronic | Status: ACTIVE | Noted: 2018-10-30

## 2018-11-19 PROBLEM — J44.9 CHRONIC OBSTRUCTIVE PULMONARY DISEASE, UNSPECIFIED: Chronic | Status: ACTIVE | Noted: 2018-10-30

## 2018-11-19 PROBLEM — N18.9 CHRONIC KIDNEY DISEASE, UNSPECIFIED: Chronic | Status: ACTIVE | Noted: 2018-10-30

## 2018-11-19 PROBLEM — D64.9 ANEMIA, UNSPECIFIED: Chronic | Status: ACTIVE | Noted: 2018-10-30

## 2018-11-19 PROBLEM — H26.9 UNSPECIFIED CATARACT: Chronic | Status: ACTIVE | Noted: 2018-10-30

## 2018-11-19 PROBLEM — E66.01 MORBID (SEVERE) OBESITY DUE TO EXCESS CALORIES: Chronic | Status: ACTIVE | Noted: 2018-10-30

## 2018-11-19 PROBLEM — K21.9 GASTRO-ESOPHAGEAL REFLUX DISEASE WITHOUT ESOPHAGITIS: Chronic | Status: ACTIVE | Noted: 2018-10-30

## 2018-11-19 PROBLEM — E78.5 HYPERLIPIDEMIA, UNSPECIFIED: Chronic | Status: ACTIVE | Noted: 2018-10-30

## 2018-11-19 PROBLEM — I10 ESSENTIAL (PRIMARY) HYPERTENSION: Chronic | Status: ACTIVE | Noted: 2018-10-30

## 2018-11-19 PROBLEM — I50.9 HEART FAILURE, UNSPECIFIED: Chronic | Status: ACTIVE | Noted: 2018-10-30

## 2018-11-19 PROBLEM — K44.9 DIAPHRAGMATIC HERNIA WITHOUT OBSTRUCTION OR GANGRENE: Chronic | Status: ACTIVE | Noted: 2018-10-30

## 2018-11-22 NOTE — H&P ADULT - PEDAL EDEMA TYPE
TECHNIQUE: Serial axial tomographic images of the brain were obtained without the use of intravenous contrast. FINDINGS: The midline structures are nondisplaced. There is mild cerebral and cerebellar volume loss, with an associated increase in the prominence of the ventricles and sulci. The basilar cisterns are normal in size and configuration. There is no evidence of intracranial hemorrhage or mass-effect. There is low attenuation in the periventricular white matter, consistent with chronic ischemic change. A remote left basal ganglia infarct with focal encephalomalacia is present. There are no abnormal extra-axial fluid collections. There is no evidence of tonsillar herniation. The included orbits and their contents are unremarkable. The visualized paranasal sinuses, mastoid air cells and middle ear cavities are clear. The visualized osseous structures and overlying soft tissues of the skull and face are intact. Mild cerebral and cerebellar volume loss with chronic microvascular disease but no evidence of acute intracranial process. Signed by Dr Gayle Harmon on 11/20/2018 6:00 PM    Xr Chest Portable    Result Date: 11/20/2018  EXAMINATION: Chest one view 11/20/2018 HISTORY: Altered mental status FINDINGS: Today's exam is compared to previous study of 11/18/2017. There is elevation of the right hemidiaphragm with right basilar atelectasis. Lungs are otherwise clear except for some patchy atelectasis or infiltrate involving the lingular segment of the left upper lobe. No effusion or free air present. 1. Chronic elevation of the right hemidiaphragm with right basilar atelectasis. 2. Patchy airspace disease overlying the left heart border suggesting a left lingular infiltrate or atelectasis.  Signed by Dr Gayle Harmon on 11/20/2018 6:22 PM      Disposition:  Home with his wife     Condition at discharge:  Stable and back to baseline    Discharge Instructions/Follow-up:    PCP next week for hospital follow me at [phone] pitting

## 2018-12-10 ENCOUNTER — OUTPATIENT (OUTPATIENT)
Dept: OUTPATIENT SERVICES | Facility: HOSPITAL | Age: 73
LOS: 1 days | End: 2018-12-10

## 2018-12-10 VITALS
WEIGHT: 192.9 LBS | HEART RATE: 71 BPM | DIASTOLIC BLOOD PRESSURE: 70 MMHG | OXYGEN SATURATION: 98 % | HEIGHT: 60.5 IN | TEMPERATURE: 97 F | RESPIRATION RATE: 16 BRPM | SYSTOLIC BLOOD PRESSURE: 120 MMHG

## 2018-12-10 DIAGNOSIS — J44.9 CHRONIC OBSTRUCTIVE PULMONARY DISEASE, UNSPECIFIED: ICD-10-CM

## 2018-12-10 DIAGNOSIS — N18.5 CHRONIC KIDNEY DISEASE, STAGE 5: ICD-10-CM

## 2018-12-10 DIAGNOSIS — I25.10 ATHEROSCLEROTIC HEART DISEASE OF NATIVE CORONARY ARTERY WITHOUT ANGINA PECTORIS: ICD-10-CM

## 2018-12-10 DIAGNOSIS — N18.6 END STAGE RENAL DISEASE: ICD-10-CM

## 2018-12-10 DIAGNOSIS — E11.9 TYPE 2 DIABETES MELLITUS WITHOUT COMPLICATIONS: ICD-10-CM

## 2018-12-10 DIAGNOSIS — K21.9 GASTRO-ESOPHAGEAL REFLUX DISEASE WITHOUT ESOPHAGITIS: ICD-10-CM

## 2018-12-10 DIAGNOSIS — Z98.890 OTHER SPECIFIED POSTPROCEDURAL STATES: Chronic | ICD-10-CM

## 2018-12-10 DIAGNOSIS — I10 ESSENTIAL (PRIMARY) HYPERTENSION: ICD-10-CM

## 2018-12-10 LAB
BUN SERPL-MCNC: 94 MG/DL — HIGH (ref 7–23)
CALCIUM SERPL-MCNC: 9 MG/DL — SIGNIFICANT CHANGE UP (ref 8.4–10.5)
CHLORIDE SERPL-SCNC: 85 MMOL/L — LOW (ref 98–107)
CO2 SERPL-SCNC: 34 MMOL/L — HIGH (ref 22–31)
CREAT SERPL-MCNC: 5.55 MG/DL — HIGH (ref 0.5–1.3)
GLUCOSE SERPL-MCNC: 131 MG/DL — HIGH (ref 70–99)
HBA1C BLD-MCNC: 4.8 % — SIGNIFICANT CHANGE UP (ref 4–5.6)
HCT VFR BLD CALC: 31.4 % — LOW (ref 34.5–45)
HGB BLD-MCNC: 10 G/DL — LOW (ref 11.5–15.5)
MCHC RBC-ENTMCNC: 27.9 PG — SIGNIFICANT CHANGE UP (ref 27–34)
MCHC RBC-ENTMCNC: 31.8 % — LOW (ref 32–36)
MCV RBC AUTO: 87.7 FL — SIGNIFICANT CHANGE UP (ref 80–100)
NRBC # FLD: 0 — SIGNIFICANT CHANGE UP
PLATELET # BLD AUTO: 249 K/UL — SIGNIFICANT CHANGE UP (ref 150–400)
PMV BLD: 11.5 FL — SIGNIFICANT CHANGE UP (ref 7–13)
POTASSIUM SERPL-MCNC: 3.3 MMOL/L — LOW (ref 3.5–5.3)
POTASSIUM SERPL-SCNC: 3.3 MMOL/L — LOW (ref 3.5–5.3)
RBC # BLD: 3.58 M/UL — LOW (ref 3.8–5.2)
RBC # FLD: 14.9 % — HIGH (ref 10.3–14.5)
SODIUM SERPL-SCNC: 136 MMOL/L — SIGNIFICANT CHANGE UP (ref 135–145)
WBC # BLD: 4.77 K/UL — SIGNIFICANT CHANGE UP (ref 3.8–10.5)
WBC # FLD AUTO: 4.77 K/UL — SIGNIFICANT CHANGE UP (ref 3.8–10.5)

## 2018-12-10 RX ORDER — CLOPIDOGREL BISULFATE 75 MG/1
1 TABLET, FILM COATED ORAL
Qty: 0 | Refills: 0 | COMMUNITY

## 2018-12-10 RX ORDER — FUROSEMIDE 40 MG
2 TABLET ORAL
Qty: 0 | Refills: 0 | COMMUNITY

## 2018-12-10 RX ORDER — MENTHOL AND CAPSAICIN .0375; 5 G/100G; G/100G
1 PATCH TOPICAL
Qty: 0 | Refills: 0 | COMMUNITY

## 2018-12-10 RX ORDER — GABAPENTIN 400 MG/1
1 CAPSULE ORAL
Qty: 0 | Refills: 0 | COMMUNITY

## 2018-12-10 RX ORDER — HYDRALAZINE HCL 50 MG
1 TABLET ORAL
Qty: 0 | Refills: 0 | COMMUNITY

## 2018-12-10 RX ORDER — ALBUTEROL 90 UG/1
3 AEROSOL, METERED ORAL
Qty: 0 | Refills: 0 | COMMUNITY

## 2018-12-10 RX ORDER — ACETAMINOPHEN 500 MG
2 TABLET ORAL
Qty: 0 | Refills: 0 | COMMUNITY

## 2018-12-10 RX ORDER — ALBUTEROL 90 UG/1
2 AEROSOL, METERED ORAL
Qty: 0 | Refills: 0 | COMMUNITY

## 2018-12-10 RX ORDER — ATORVASTATIN CALCIUM 80 MG/1
1 TABLET, FILM COATED ORAL
Qty: 0 | Refills: 0 | COMMUNITY

## 2018-12-10 RX ORDER — BUDESONIDE AND FORMOTEROL FUMARATE DIHYDRATE 160; 4.5 UG/1; UG/1
2 AEROSOL RESPIRATORY (INHALATION)
Qty: 0 | Refills: 0 | COMMUNITY

## 2018-12-10 RX ORDER — GUAIFENESIN/DEXTROMETHORPHAN 600MG-30MG
1 TABLET, EXTENDED RELEASE 12 HR ORAL
Qty: 0 | Refills: 0 | COMMUNITY

## 2018-12-10 RX ORDER — SODIUM CHLORIDE 9 MG/ML
1000 INJECTION, SOLUTION INTRAVENOUS
Qty: 0 | Refills: 0 | Status: DISCONTINUED | OUTPATIENT
Start: 2018-12-14 | End: 2018-12-29

## 2018-12-10 RX ORDER — FERROUS GLUCONATE 100 %
1 POWDER (GRAM) MISCELLANEOUS
Qty: 0 | Refills: 0 | COMMUNITY

## 2018-12-10 RX ORDER — SODIUM CHLORIDE 0.65 %
1 AEROSOL, SPRAY (ML) NASAL
Qty: 0 | Refills: 0 | COMMUNITY

## 2018-12-10 RX ORDER — OXYMETAZOLINE HYDROCHLORIDE 0.5 MG/ML
1 SPRAY NASAL
Qty: 0 | Refills: 0 | COMMUNITY

## 2018-12-10 NOTE — H&P PST ADULT - NEGATIVE MUSCULOSKELETAL SYMPTOMS
no muscle cramps/no muscle weakness/no neck pain/no arm pain L/no arm pain R/no leg pain R/no arthralgia/no stiffness/no joint swelling/no myalgia/no back pain/no leg pain L

## 2018-12-10 NOTE — H&P PST ADULT - PMH
Adult Idiopathic Generalized Osteoporosis    Anemia    CAD (Coronary Artery Disease)  1 stent placed 11/7/18  Cataract    Cerebral infarction  2011  CHF (congestive heart failure)    Chronic obstructive pulmonary disease, unspecified COPD type    CKD (chronic kidney disease)    COPD (chronic obstructive pulmonary disease)    Diabetes mellitus  type 2  Diaphragmatic hernia    DM (Diabetes Mellitus), Secondary    ESRD (end stage renal disease)    GERD (gastroesophageal reflux disease)    Hemorrhoids    Hyperlipidemia    Hypertension    Hypertension    Morbid obesity

## 2018-12-10 NOTE — H&P PST ADULT - HISTORY OF PRESENT ILLNESS
74 yo female with PMH hypertension, hyperlipidemia, DM2, GERD, and CHF presents to pre surgical testing.  Pt reports she was admitted to [facility] 10/30/18 for complaint of fatigue, SOB, and cough.  During workup, patient was found to have CHF exacerbation, further workup including stress test revealed ischemic heart disease.  Cardiac cath was done on 11/7/18 and one coronary stent was placed.  Patient reports during hospitalization, she was also told she may need to be on hemodialysis in the near future.  Pt is scheduled for left possible right atrioventricular fistula creation on 1/14/18. 74 yo female with PMH hypertension, hyperlipidemia, DM2, COPD, GERD, and CHF presents to pre surgical testing.  Pt reports she was admitted to [facility] 10/30/18 for complaint of fatigue, SOB, and cough.  During workup, patient was diagnosed with CHF exacerbation, further workup including stress test revealed ischemic heart disease.  Cardiac cath was done on 11/7/18 and one coronary stent was placed.  Patient reports during hospitalization, she was also told she may need to be on hemodialysis in the near future.  Pt is scheduled for left possible right atrioventricular fistula creation on 12/14/18.

## 2018-12-10 NOTE — H&P PST ADULT - MUSCULOSKELETAL
details… detailed exam no joint warmth/bilateral lower extremities/decreased ROM/no joint swelling/no joint erythema

## 2018-12-10 NOTE — H&P PST ADULT - LYMPHATIC
posterior cervical L/supraclavicular L/anterior cervical R/anterior cervical L/posterior cervical R/supraclavicular R

## 2018-12-10 NOTE — H&P PST ADULT - NSANTHOSAYNRD_GEN_A_CORE
No. LIANNA screening performed.  STOP BANG Legend: 0-2 = LOW Risk; 3-4 = INTERMEDIATE Risk; 5-8 = HIGH Risk

## 2018-12-10 NOTE — H&P PST ADULT - NEGATIVE NEUROLOGICAL SYMPTOMS
no difficulty walking/no vertigo/no transient paralysis/no paresthesias/no loss of sensation/no headache/no weakness/no generalized seizures

## 2018-12-10 NOTE — H&P PST ADULT - PROBLEM SELECTOR PLAN 1
Pt is scheduled for left possible right atrioventricular fistula creation on 12/14/18.   Pre op instructions including chlorhexidine wash given and pt able to verbalize understanding.

## 2018-12-10 NOTE — H&P PST ADULT - PROBLEM SELECTOR PLAN 5
Pt instructed to take inhalers as prescribed.  Will request last pulm note. Pt instructed to take inhalers as prescribed.

## 2018-12-10 NOTE — H&P PST ADULT - PROBLEM SELECTOR PLAN 4
Pt instructed to take carvedilol and hydralazine AM of surgery with a sip of water.   Pt met STOP BANG score for LIANNA precaution. OR booking notified via fax.

## 2018-12-11 PROBLEM — I63.9 CEREBRAL INFARCTION, UNSPECIFIED: Chronic | Status: ACTIVE | Noted: 2018-10-30

## 2018-12-13 ENCOUNTER — TRANSCRIPTION ENCOUNTER (OUTPATIENT)
Age: 73
End: 2018-12-13

## 2018-12-14 ENCOUNTER — APPOINTMENT (OUTPATIENT)
Dept: VASCULAR SURGERY | Facility: HOSPITAL | Age: 73
End: 2018-12-14

## 2018-12-14 ENCOUNTER — OUTPATIENT (OUTPATIENT)
Dept: OUTPATIENT SERVICES | Facility: HOSPITAL | Age: 73
LOS: 1 days | Discharge: HOME | End: 2018-12-14
Payer: MEDICARE

## 2018-12-14 VITALS
RESPIRATION RATE: 14 BRPM | SYSTOLIC BLOOD PRESSURE: 117 MMHG | DIASTOLIC BLOOD PRESSURE: 54 MMHG | HEART RATE: 70 BPM | OXYGEN SATURATION: 93 % | TEMPERATURE: 97 F

## 2018-12-14 VITALS
RESPIRATION RATE: 16 BRPM | HEIGHT: 60.5 IN | DIASTOLIC BLOOD PRESSURE: 48 MMHG | OXYGEN SATURATION: 96 % | WEIGHT: 192.9 LBS | HEART RATE: 69 BPM | SYSTOLIC BLOOD PRESSURE: 126 MMHG | TEMPERATURE: 98 F

## 2018-12-14 DIAGNOSIS — N18.5 CHRONIC KIDNEY DISEASE, STAGE 5: ICD-10-CM

## 2018-12-14 DIAGNOSIS — Z98.890 OTHER SPECIFIED POSTPROCEDURAL STATES: Chronic | ICD-10-CM

## 2018-12-14 LAB
GLUCOSE BLDV-MCNC: 96 — SIGNIFICANT CHANGE UP (ref 70–99)
POTASSIUM BLDV-SCNC: 3.2 MMOL/L — LOW (ref 3.4–4.5)

## 2018-12-14 PROCEDURE — 36821 AV FUSION DIRECT ANY SITE: CPT | Mod: LT

## 2018-12-14 RX ORDER — INSULIN DETEMIR 100/ML (3)
10 INSULIN PEN (ML) SUBCUTANEOUS AT BEDTIME
Qty: 0 | Refills: 0 | Status: DISCONTINUED | OUTPATIENT
Start: 2018-12-14 | End: 2018-12-29

## 2018-12-14 RX ORDER — ALBUTEROL 90 UG/1
2 AEROSOL, METERED ORAL EVERY 6 HOURS
Qty: 0 | Refills: 0 | Status: DISCONTINUED | OUTPATIENT
Start: 2018-12-14 | End: 2018-12-29

## 2018-12-14 RX ORDER — CARVEDILOL PHOSPHATE 80 MG/1
12.5 CAPSULE, EXTENDED RELEASE ORAL EVERY 12 HOURS
Qty: 0 | Refills: 0 | Status: DISCONTINUED | OUTPATIENT
Start: 2018-12-14 | End: 2018-12-29

## 2018-12-14 RX ORDER — OXYCODONE HYDROCHLORIDE 5 MG/1
1 TABLET ORAL
Qty: 8 | Refills: 0
Start: 2018-12-14 | End: 2018-12-15

## 2018-12-14 RX ORDER — ACETAMINOPHEN 500 MG
650 TABLET ORAL EVERY 6 HOURS
Qty: 0 | Refills: 0 | Status: DISCONTINUED | OUTPATIENT
Start: 2018-12-14 | End: 2018-12-29

## 2018-12-14 RX ORDER — GABAPENTIN 400 MG/1
300 CAPSULE ORAL DAILY
Qty: 0 | Refills: 0 | Status: DISCONTINUED | OUTPATIENT
Start: 2018-12-14 | End: 2018-12-29

## 2018-12-14 RX ORDER — BUDESONIDE AND FORMOTEROL FUMARATE DIHYDRATE 160; 4.5 UG/1; UG/1
2 AEROSOL RESPIRATORY (INHALATION)
Qty: 0 | Refills: 0 | Status: DISCONTINUED | OUTPATIENT
Start: 2018-12-14 | End: 2018-12-29

## 2018-12-14 RX ORDER — MONTELUKAST 4 MG/1
10 TABLET, CHEWABLE ORAL DAILY
Qty: 0 | Refills: 0 | Status: DISCONTINUED | OUTPATIENT
Start: 2018-12-14 | End: 2018-12-29

## 2018-12-14 RX ORDER — OXYCODONE HYDROCHLORIDE 5 MG/1
5 TABLET ORAL EVERY 6 HOURS
Qty: 0 | Refills: 0 | Status: DISCONTINUED | OUTPATIENT
Start: 2018-12-14 | End: 2018-12-14

## 2018-12-14 RX ORDER — AMLODIPINE BESYLATE 2.5 MG/1
10 TABLET ORAL DAILY
Qty: 0 | Refills: 0 | Status: DISCONTINUED | OUTPATIENT
Start: 2018-12-14 | End: 2018-12-29

## 2018-12-14 RX ORDER — ATORVASTATIN CALCIUM 80 MG/1
40 TABLET, FILM COATED ORAL AT BEDTIME
Qty: 0 | Refills: 0 | Status: DISCONTINUED | OUTPATIENT
Start: 2018-12-14 | End: 2018-12-29

## 2018-12-14 NOTE — ASU DISCHARGE PLAN (ADULT/PEDIATRIC). - MEDICATION SUMMARY - MEDICATIONS TO TAKE
I will START or STAY ON the medications listed below when I get home from the hospital:    oxygen 2L NC as needed  -- Indication: For Home O2    Tylenol 500 mg oral tablet  -- 2 tab(s) by mouth every 6 hours PRN mild and moderate pain  -- Indication: For Mild and moderate pain    oxyCODONE 5 mg oral tablet  -- 1 tab(s) by mouth every 6 hours as needed for severe pain MDD:4 tabs   -- Caution federal law prohibits the transfer of this drug to any person other  than the person for whom it was prescribed.  It is very important that you take or use this exactly as directed.  Do not skip doses or discontinue unless directed by your doctor.  May cause drowsiness.  Alcohol may intensify this effect.  Use care when operating dangerous machinery.  This prescription cannot be refilled.  Using more of this medication than prescribed may cause serious breathing problems.    -- Indication: For Severe pain    aspirin 81 mg oral tablet  -- 1 tab(s) by mouth once a day  -- Indication: For HOME MED    gabapentin 300 mg oral tablet  -- 1 tab(s) by mouth 3 times a day  -- Indication: For HOME MED    Levemir 100 units/mL subcutaneous solution  -- 10 unit(s) subcutaneous once a day (at bedtime)  -- Indication: For HOME MED    atorvastatin 40 mg oral tablet  -- 1 tab(s) by mouth once a day  -- Indication: For HOME MED    clopidogrel 75 mg oral tablet  -- 1 tab(s) by mouth once a day  -- Indication: For HOME MED    carvedilol 12.5 mg oral tablet  -- 1 tab(s) by mouth 2 times a day  -- Indication: For HOME MED    Symbicort 160 mcg-4.5 mcg/inh inhalation aerosol  -- 2 puff(s) inhaled 2 times a day, As Needed  -- Indication: For HOME MED    ProAir HFA 90 mcg/inh inhalation aerosol  -- 2 puff(s) inhaled 4 times a day, As Needed  -- Indication: For HOME MED    amLODIPine 10 mg oral tablet  -- 1 tab(s) by mouth once a day  -- Indication: For HOME MED    metOLazone 2.5 mg oral tablet  -- 1 tab(s) by mouth once a day  30 minutes prior to furosemide  -- Indication: For HOME MED    furosemide 40 mg oral tablet  -- 1 tab(s) by mouth 2 times a day  -- Indication: For HOME MED    ferrous gluconate 324 mg (37.5 mg elemental iron) oral tablet  -- 1 tab(s) by mouth 3 times a day  -- Indication: For HOME MED    montelukast 10 mg oral tablet  -- 1 tab(s) by mouth once a day  -- Indication: For HOME MED    Dexilant 60 mg oral delayed release capsule  -- 1 cap(s) by mouth once a day  -- Indication: For HOME MED    hydrALAZINE 100 mg oral tablet  -- 1 tab(s) by mouth 3 times a day  -- Indication: For HOME MED

## 2018-12-14 NOTE — ASU DISCHARGE PLAN (ADULT/PEDIATRIC). - NURSING INSTRUCTIONS
Do not take pain medication on an empty stomach.  Increase fluids and fiber in diet to prevent constipation. protect your left arm because you received a block wear sling until all sensation and feeling returns

## 2018-12-14 NOTE — BRIEF OPERATIVE NOTE - PROCEDURE
<<-----Click on this checkbox to enter Procedure Arteriovenous anastomosis, Bj  12/14/2018    Active  [employer]ETKIN

## 2018-12-14 NOTE — ASU DISCHARGE PLAN (ADULT/PEDIATRIC). - NOTIFY
Pain not relieved by Medications/Bleeding that does not stop/Swelling that continues/Persistent Nausea and Vomiting/Inability to Tolerate Liquids or Foods/Increased Irritability or Sluggishness/Fever greater than 101/Numbness, tingling/Numbness, color, or temperature change to extremity

## 2018-12-14 NOTE — ASU DISCHARGE PLAN (ADULT/PEDIATRIC). - SPECIAL INSTRUCTIONS
WOUND CARE: keep incisions intact  BATHING: Please do not submerge wound underwater. You may shower and/or sponge bathe. Shower in 48hrs.  ACTIVITY: No heavy lifting or straining. Otherwise, you may return to your usual level of physical activity. If you are taking narcotic pain medication (such as Percocet) DO NOT drive a car, operate machinery or make important decisions.  DIET: Return to your usual diet renal diet.  NOTIFY YOUR SURGEON IF: You have any bleeding that does not stop, any pus draining from your wound(s), any fever (over 100.4 F) or chills, persistent nausea/vomiting, persistent diarrhea, or if your pain is not controlled on your discharge pain medications.  FOLLOW-UP: Please follow up with your primary care physician in one week regarding your hospitalization.  Please follow up with Dr. Johnson in 2 weeks. WOUND CARE: keep incisions intact  BATHING: Please do not submerge wound underwater. You may shower and/or sponge bathe. Shower in 48hrs.  ACTIVITY: No heavy lifting or straining. Otherwise, you may return to your usual level of physical activity. If you are taking narcotic pain medication (such as Percocet) DO NOT drive a car, operate machinery or make important decisions.  DIET: Return to your usual diabetic renal diet.  NOTIFY YOUR SURGEON IF: You have any bleeding that does not stop, any pus draining from your wound(s), any fever (over 100.4 F) or chills, persistent nausea/vomiting, persistent diarrhea, or if your pain is not controlled on your discharge pain medications.  FOLLOW-UP: Please follow up with your primary care physician in one week regarding your hospitalization.  Please follow up with Dr. Johnson in 2 weeks.

## 2019-01-02 ENCOUNTER — APPOINTMENT (OUTPATIENT)
Dept: VASCULAR SURGERY | Facility: CLINIC | Age: 74
End: 2019-01-02
Payer: MEDICARE

## 2019-01-02 VITALS
HEART RATE: 75 BPM | HEIGHT: 61 IN | TEMPERATURE: 98 F | BODY MASS INDEX: 37.19 KG/M2 | DIASTOLIC BLOOD PRESSURE: 71 MMHG | WEIGHT: 197 LBS | SYSTOLIC BLOOD PRESSURE: 126 MMHG

## 2019-01-02 PROCEDURE — 99024 POSTOP FOLLOW-UP VISIT: CPT

## 2019-02-22 ENCOUNTER — APPOINTMENT (OUTPATIENT)
Dept: VASCULAR SURGERY | Facility: CLINIC | Age: 74
End: 2019-02-22
Payer: MEDICARE

## 2019-02-22 VITALS
BODY MASS INDEX: 32.1 KG/M2 | TEMPERATURE: 97.3 F | SYSTOLIC BLOOD PRESSURE: 102 MMHG | HEART RATE: 71 BPM | DIASTOLIC BLOOD PRESSURE: 65 MMHG | HEIGHT: 61 IN | WEIGHT: 170 LBS

## 2019-02-22 PROCEDURE — 99024 POSTOP FOLLOW-UP VISIT: CPT

## 2019-02-22 PROCEDURE — 93990 DOPPLER FLOW TESTING: CPT

## 2019-02-22 NOTE — HISTORY OF PRESENT ILLNESS
[FreeTextEntry1] : s/p l radiocephalic avf 12/14 [de-identified] : no pain or weakness of the hand, no pain at the surgical site

## 2019-03-13 ENCOUNTER — APPOINTMENT (OUTPATIENT)
Dept: VASCULAR SURGERY | Facility: CLINIC | Age: 74
End: 2019-03-13

## 2019-03-29 ENCOUNTER — APPOINTMENT (OUTPATIENT)
Dept: VASCULAR SURGERY | Facility: CLINIC | Age: 74
End: 2019-03-29

## 2019-03-29 ENCOUNTER — APPOINTMENT (OUTPATIENT)
Dept: VASCULAR SURGERY | Facility: CLINIC | Age: 74
End: 2019-03-29
Payer: MEDICARE

## 2019-03-29 VITALS
DIASTOLIC BLOOD PRESSURE: 50 MMHG | HEIGHT: 61 IN | HEART RATE: 72 BPM | SYSTOLIC BLOOD PRESSURE: 110 MMHG | BODY MASS INDEX: 33.04 KG/M2 | WEIGHT: 175 LBS

## 2019-03-29 DIAGNOSIS — N18.6 END STAGE RENAL DISEASE: ICD-10-CM

## 2019-03-29 PROCEDURE — 93990 DOPPLER FLOW TESTING: CPT

## 2019-03-29 PROCEDURE — 99212 OFFICE O/P EST SF 10 MIN: CPT | Mod: 25

## 2019-03-29 PROCEDURE — ZZZZZ: CPT

## 2019-10-18 ENCOUNTER — INPATIENT (INPATIENT)
Facility: HOSPITAL | Age: 74
LOS: 12 days | End: 2019-10-31
Attending: INTERNAL MEDICINE | Admitting: INTERNAL MEDICINE
Payer: MEDICARE

## 2019-10-18 VITALS — SYSTOLIC BLOOD PRESSURE: 89 MMHG | RESPIRATION RATE: 18 BRPM | HEART RATE: 48 BPM | DIASTOLIC BLOOD PRESSURE: 54 MMHG

## 2019-10-18 DIAGNOSIS — Z98.890 OTHER SPECIFIED POSTPROCEDURAL STATES: Chronic | ICD-10-CM

## 2019-10-18 LAB
ALBUMIN SERPL ELPH-MCNC: 3 G/DL — LOW (ref 3.3–5)
ALP SERPL-CCNC: 129 U/L — HIGH (ref 40–120)
ALT FLD-CCNC: 19 U/L — SIGNIFICANT CHANGE UP (ref 4–33)
ANION GAP SERPL CALC-SCNC: 18 MMO/L — HIGH (ref 7–14)
ANISOCYTOSIS BLD QL: SLIGHT — SIGNIFICANT CHANGE UP
APTT BLD: 39.2 SEC — HIGH (ref 27.5–36.3)
AST SERPL-CCNC: 20 U/L — SIGNIFICANT CHANGE UP (ref 4–32)
BASE EXCESS BLDV CALC-SCNC: -7.5 MMOL/L — SIGNIFICANT CHANGE UP
BASOPHILS # BLD AUTO: 0 K/UL — SIGNIFICANT CHANGE UP (ref 0–0.2)
BASOPHILS NFR BLD AUTO: 0 % — SIGNIFICANT CHANGE UP (ref 0–2)
BASOPHILS NFR SPEC: 0 % — SIGNIFICANT CHANGE UP (ref 0–2)
BILIRUB SERPL-MCNC: 0.2 MG/DL — SIGNIFICANT CHANGE UP (ref 0.2–1.2)
BLASTS # FLD: 0 % — SIGNIFICANT CHANGE UP (ref 0–0)
BLD GP AB SCN SERPL QL: NEGATIVE — SIGNIFICANT CHANGE UP
BLOOD GAS VENOUS - CREATININE: 5.54 MG/DL — HIGH (ref 0.5–1.3)
BUN SERPL-MCNC: 86 MG/DL — HIGH (ref 7–23)
CALCIUM SERPL-MCNC: 6.2 MG/DL — CRITICAL LOW (ref 8.4–10.5)
CHLORIDE BLDV-SCNC: 105 MMOL/L — SIGNIFICANT CHANGE UP (ref 96–108)
CHLORIDE SERPL-SCNC: 102 MMOL/L — SIGNIFICANT CHANGE UP (ref 98–107)
CO2 SERPL-SCNC: 16 MMOL/L — LOW (ref 22–31)
CREAT SERPL-MCNC: 5.39 MG/DL — HIGH (ref 0.5–1.3)
EOSINOPHIL # BLD AUTO: 0.04 K/UL — SIGNIFICANT CHANGE UP (ref 0–0.5)
EOSINOPHIL NFR BLD AUTO: 0.4 % — SIGNIFICANT CHANGE UP (ref 0–6)
EOSINOPHIL NFR FLD: 0 % — SIGNIFICANT CHANGE UP (ref 0–6)
GAS PNL BLDV: 133 MMOL/L — LOW (ref 136–146)
GLUCOSE BLDV-MCNC: 62 MG/DL — LOW (ref 70–99)
GLUCOSE SERPL-MCNC: 66 MG/DL — LOW (ref 70–99)
HCO3 BLDV-SCNC: 18 MMOL/L — LOW (ref 20–27)
HCT VFR BLD CALC: 20.9 % — CRITICAL LOW (ref 34.5–45)
HCT VFR BLDV CALC: 21.1 % — LOW (ref 34.5–45)
HGB BLD-MCNC: 6.4 G/DL — CRITICAL LOW (ref 11.5–15.5)
HGB BLDV-MCNC: 6.7 G/DL — CRITICAL LOW (ref 11.5–15.5)
IMM GRANULOCYTES NFR BLD AUTO: 0.7 % — SIGNIFICANT CHANGE UP (ref 0–1.5)
INR BLD: 1.3 — HIGH (ref 0.88–1.17)
LACTATE BLDV-MCNC: 0.7 MMOL/L — SIGNIFICANT CHANGE UP (ref 0.5–2)
LYMPHOCYTES # BLD AUTO: 0.43 K/UL — LOW (ref 1–3.3)
LYMPHOCYTES # BLD AUTO: 4.1 % — LOW (ref 13–44)
LYMPHOCYTES NFR SPEC AUTO: 1.7 % — LOW (ref 13–44)
MCHC RBC-ENTMCNC: 28.2 PG — SIGNIFICANT CHANGE UP (ref 27–34)
MCHC RBC-ENTMCNC: 30.6 % — LOW (ref 32–36)
MCV RBC AUTO: 92.1 FL — SIGNIFICANT CHANGE UP (ref 80–100)
METAMYELOCYTES # FLD: 0 % — SIGNIFICANT CHANGE UP (ref 0–1)
MICROCYTES BLD QL: SLIGHT — SIGNIFICANT CHANGE UP
MONOCYTES # BLD AUTO: 0.39 K/UL — SIGNIFICANT CHANGE UP (ref 0–0.9)
MONOCYTES NFR BLD AUTO: 3.7 % — SIGNIFICANT CHANGE UP (ref 2–14)
MONOCYTES NFR BLD: 4.4 % — SIGNIFICANT CHANGE UP (ref 2–9)
MYELOCYTES NFR BLD: 0 % — SIGNIFICANT CHANGE UP (ref 0–0)
NEUTROPHIL AB SER-ACNC: 93.9 % — HIGH (ref 43–77)
NEUTROPHILS # BLD AUTO: 9.64 K/UL — HIGH (ref 1.8–7.4)
NEUTROPHILS NFR BLD AUTO: 91.1 % — HIGH (ref 43–77)
NEUTS BAND # BLD: 0 % — SIGNIFICANT CHANGE UP (ref 0–6)
NRBC # FLD: 0.08 K/UL — SIGNIFICANT CHANGE UP (ref 0–0)
NT-PROBNP SERPL-SCNC: SIGNIFICANT CHANGE UP PG/ML
OTHER - HEMATOLOGY %: 0 — SIGNIFICANT CHANGE UP
OVALOCYTES BLD QL SMEAR: SLIGHT — SIGNIFICANT CHANGE UP
PCO2 BLDV: 49 MMHG — SIGNIFICANT CHANGE UP (ref 41–51)
PH BLDV: 7.21 PH — LOW (ref 7.32–7.43)
PLATELET # BLD AUTO: 162 K/UL — SIGNIFICANT CHANGE UP (ref 150–400)
PLATELET COUNT - ESTIMATE: SIGNIFICANT CHANGE UP
PMV BLD: 9.7 FL — SIGNIFICANT CHANGE UP (ref 7–13)
PO2 BLDV: 56 MMHG — HIGH (ref 35–40)
POIKILOCYTOSIS BLD QL AUTO: SLIGHT — SIGNIFICANT CHANGE UP
POTASSIUM BLDV-SCNC: 3.8 MMOL/L — SIGNIFICANT CHANGE UP (ref 3.4–4.5)
POTASSIUM SERPL-MCNC: 3.7 MMOL/L — SIGNIFICANT CHANGE UP (ref 3.5–5.3)
POTASSIUM SERPL-SCNC: 3.7 MMOL/L — SIGNIFICANT CHANGE UP (ref 3.5–5.3)
PROMYELOCYTES # FLD: 0 % — SIGNIFICANT CHANGE UP (ref 0–0)
PROT SERPL-MCNC: 6.4 G/DL — SIGNIFICANT CHANGE UP (ref 6–8.3)
PROTHROM AB SERPL-ACNC: 14.5 SEC — HIGH (ref 9.8–13.1)
RBC # BLD: 2.27 M/UL — LOW (ref 3.8–5.2)
RBC # FLD: 18 % — HIGH (ref 10.3–14.5)
RH IG SCN BLD-IMP: POSITIVE — SIGNIFICANT CHANGE UP
SAO2 % BLDV: 82.9 % — SIGNIFICANT CHANGE UP (ref 60–85)
SODIUM SERPL-SCNC: 136 MMOL/L — SIGNIFICANT CHANGE UP (ref 135–145)
TROPONIN T, HIGH SENSITIVITY: 54 NG/L — CRITICAL HIGH (ref ?–14)
VARIANT LYMPHS # BLD: 0 % — SIGNIFICANT CHANGE UP
WBC # BLD: 10.57 K/UL — HIGH (ref 3.8–10.5)
WBC # FLD AUTO: 10.57 K/UL — HIGH (ref 3.8–10.5)

## 2019-10-18 PROCEDURE — 71045 X-RAY EXAM CHEST 1 VIEW: CPT | Mod: 26

## 2019-10-18 RX ORDER — IPRATROPIUM/ALBUTEROL SULFATE 18-103MCG
3 AEROSOL WITH ADAPTER (GRAM) INHALATION ONCE
Refills: 0 | Status: COMPLETED | OUTPATIENT
Start: 2019-10-18 | End: 2019-10-18

## 2019-10-18 RX ORDER — ONDANSETRON 8 MG/1
4 TABLET, FILM COATED ORAL ONCE
Refills: 0 | Status: COMPLETED | OUTPATIENT
Start: 2019-10-18 | End: 2019-10-18

## 2019-10-18 RX ORDER — SODIUM CHLORIDE 9 MG/ML
1000 INJECTION INTRAMUSCULAR; INTRAVENOUS; SUBCUTANEOUS ONCE
Refills: 0 | Status: COMPLETED | OUTPATIENT
Start: 2019-10-18 | End: 2019-10-18

## 2019-10-18 RX ORDER — AZITHROMYCIN 500 MG/1
500 TABLET, FILM COATED ORAL ONCE
Refills: 0 | Status: COMPLETED | OUTPATIENT
Start: 2019-10-18 | End: 2019-10-18

## 2019-10-18 RX ORDER — CEFTRIAXONE 500 MG/1
1000 INJECTION, POWDER, FOR SOLUTION INTRAMUSCULAR; INTRAVENOUS ONCE
Refills: 0 | Status: COMPLETED | OUTPATIENT
Start: 2019-10-18 | End: 2019-10-18

## 2019-10-18 RX ADMIN — ONDANSETRON 4 MILLIGRAM(S): 8 TABLET, FILM COATED ORAL at 22:00

## 2019-10-18 RX ADMIN — Medication 3 MILLILITER(S): at 21:55

## 2019-10-18 RX ADMIN — Medication 40 MILLIGRAM(S): at 21:47

## 2019-10-18 RX ADMIN — Medication 3 MILLILITER(S): at 22:10

## 2019-10-18 RX ADMIN — SODIUM CHLORIDE 1000 MILLILITER(S): 9 INJECTION INTRAMUSCULAR; INTRAVENOUS; SUBCUTANEOUS at 23:36

## 2019-10-18 RX ADMIN — CEFTRIAXONE 100 MILLIGRAM(S): 500 INJECTION, POWDER, FOR SOLUTION INTRAMUSCULAR; INTRAVENOUS at 23:50

## 2019-10-18 RX ADMIN — Medication 3 MILLILITER(S): at 21:45

## 2019-10-18 NOTE — ED ADULT TRIAGE NOTE - CHIEF COMPLAINT QUOTE
As per NSEMT " Found unrespondsive sitting on floor fbs 20, gave one amp d50 + response after minute." Pt arrives vomiting axox4 on arrival  as per Pt" I been puking all day....I couldn't eat all day." Unable to obtain temp in triage. FBS in triage  =69 pt to trb as per Daughters....": She has had a cough all week and not feel well all week." As per NSEMT " Found unrespondsive sitting on floor fbs 20, gave one amp d50 + response after minute." Pt arrives vomiting axox4 on arrival  as per Pt" I been puking all day....I couldn't eat all day." Unable to obtain temp in triage. FBS in triage  =69 pt to trb as per Daughters....": She has had a cough all week and has not felt well all week."

## 2019-10-18 NOTE — ED PROVIDER NOTE - CARE PLAN
Principal Discharge DX:	Severe sepsis  Secondary Diagnosis:	Hypoglycemia  Secondary Diagnosis:	GI bleed

## 2019-10-18 NOTE — ED PROVIDER NOTE - CARDIAC, MLM
Normal rate, regular rhythm.  Heart sounds S1, S2.  No murmurs, rubs or gallops. 3+pitting edema BL LE

## 2019-10-18 NOTE — ED ADULT NURSE NOTE - OBJECTIVE STATEMENT
rec'd pt in room  tr b escorted by ems and daughters.  as pe daughters, pt was found sitting on floor leaning against the bed incoherent. ..pt was mumbling.  reports has been sick for past week... productive cough with yellow sputum, cold sx.  accucheck by ems was 29.  rec'd pt with 18 gauge to left ac inserted by ems. pt has av fistula to left forearm + thrills .    as per ems, family did not report pt had fistula until after iv was inserted and meds given.  hl removed. pressure dsg applied.  20 gauge inserted right ac. blood sent as ordered. placed on cardiac monitor... sinus merrick noted.  placed on o2 via nc at 2l.  pt uses o2 prn at night.  has green bruise to right shoulder.  small red reynaldo medial of right scapula.  pitting b/l lower ext edema noted... as per family is better than usual.  rec';d  pt awake, alert and oriented x3.   accucheck 67. given apple juice. for rpt accucheck in 15 mins. md aware.  ekg done.  for close monitoring.

## 2019-10-18 NOTE — ED PROVIDER NOTE - ATTENDING CONTRIBUTION TO CARE
Attending note:   After face to face evaluation of this patient, I concur with above noted hx, pe, and care plan for this patient.  73 y/o F with copd, chf, neb use, dm on levamir in evening, no food today, found unconscious with glucose 20; resuscitated with D50, but patient glucose 67 on arrival in trauma B.   Juice administered.    Evaluation in progress

## 2019-10-18 NOTE — ED ADULT NURSE NOTE - CHIEF COMPLAINT QUOTE
As per NSEMT " Found unrespondsive sitting on floor fbs 20, gave one amp d50 + response after minute." Pt arrives vomiting axox4 on arrival  as per Pt" I been puking all day....I couldn't eat all day." Unable to obtain temp in triage. FBS in triage  =69 pt to trb as per Daughters....": She has had a cough all week and not feel well all week."

## 2019-10-18 NOTE — ED PROVIDER NOTE - NS ED MD EM SELECTION
Neb tx complete. Pt states she is feeling much better. Continues to have dry, and persistent. Pt appears to be in NAD.    [id number] Critical Care - 30 to 74 minutes

## 2019-10-18 NOTE — ED PROVIDER NOTE - PROGRESS NOTE DETAILS
Adrian LAST: Pt signed out to me.  Pt somnolent on evaluation, but arousable to voice and stimuli.  Noted to be anemic, PRBCs currently transfusing.  She continues to be hypotensive, e/o fluid overload on exam - edema, rales - will start norepi.  [facility] is at bedside evaluating the patient for admission. Elizabeth Goldberger PGY-3: pt signed out to me pending admission but became hypotensive systolic 70s. Ordering stat bolus and blood (already ordered and consented), though w close observation as pt w CKD and CHF w peripheral edema. Will also call MICU for consult and give empiric abx for possible copd exac and/or other occult infxn Elizabeth Goldberger PGY-3: MICU to accept pt however no beds available so will stay here until bed opens

## 2019-10-18 NOTE — ED PROVIDER NOTE - OBJECTIVE STATEMENT
75 yo female with pmhx of CHF, DM on insulin, COPD, presenting with hypoglycemia. She was found today at home by daughters unresponsive, EMS was called, FS 20, given amp of D50, FS >200. Patient also hypotensive en route. Patient returned to baseline, but endorsed that she has been not feeling well for past week with cough/congestion for past week. States she has not eaten anything today. Also endorsing one week of increased LE swelling. Daughter had cold sx last week.    Denies fevers, CP, abd pain, vomiting, recent travel

## 2019-10-18 NOTE — ED ADULT NURSE NOTE - INTERVENTIONS DEFINITIONS
Instruct patient to call for assistance/Stretcher in lowest position, wheels locked, appropriate side rails in place/Provide visual cue, wrist band, yellow gown, etc./Non-slip footwear when patient is off stretcher/Physically safe environment: no spills, clutter or unnecessary equipment/Call bell, personal items and telephone within reach

## 2019-10-19 DIAGNOSIS — A41.9 SEPSIS, UNSPECIFIED ORGANISM: ICD-10-CM

## 2019-10-19 LAB
ALBUMIN SERPL ELPH-MCNC: 2.6 G/DL — LOW (ref 3.3–5)
ALBUMIN SERPL ELPH-MCNC: 2.8 G/DL — LOW (ref 3.3–5)
ALP SERPL-CCNC: 120 U/L — SIGNIFICANT CHANGE UP (ref 40–120)
ALP SERPL-CCNC: 128 U/L — HIGH (ref 40–120)
ALT FLD-CCNC: 20 U/L — SIGNIFICANT CHANGE UP (ref 4–33)
ALT FLD-CCNC: 22 U/L — SIGNIFICANT CHANGE UP (ref 4–33)
ANION GAP SERPL CALC-SCNC: 18 MMO/L — HIGH (ref 7–14)
ANION GAP SERPL CALC-SCNC: 19 MMO/L — HIGH (ref 7–14)
ANION GAP SERPL CALC-SCNC: 20 MMO/L — HIGH (ref 7–14)
ANION GAP SERPL CALC-SCNC: 21 MMO/L — HIGH (ref 7–14)
APPEARANCE UR: CLEAR — SIGNIFICANT CHANGE UP
AST SERPL-CCNC: 18 U/L — SIGNIFICANT CHANGE UP (ref 4–32)
AST SERPL-CCNC: 22 U/L — SIGNIFICANT CHANGE UP (ref 4–32)
B PERT DNA SPEC QL NAA+PROBE: NOT DETECTED — SIGNIFICANT CHANGE UP
BASE EXCESS BLDA CALC-SCNC: -11.1 MMOL/L — SIGNIFICANT CHANGE UP
BASE EXCESS BLDA CALC-SCNC: -11.5 MMOL/L — SIGNIFICANT CHANGE UP
BASE EXCESS BLDA CALC-SCNC: -6.9 MMOL/L — SIGNIFICANT CHANGE UP
BASE EXCESS BLDA CALC-SCNC: -7.7 MMOL/L — SIGNIFICANT CHANGE UP
BILIRUB SERPL-MCNC: 0.3 MG/DL — SIGNIFICANT CHANGE UP (ref 0.2–1.2)
BILIRUB SERPL-MCNC: 0.3 MG/DL — SIGNIFICANT CHANGE UP (ref 0.2–1.2)
BILIRUB UR-MCNC: NEGATIVE — SIGNIFICANT CHANGE UP
BLOOD GAS ARTERIAL - FIO2: 21 — SIGNIFICANT CHANGE UP
BLOOD GAS ARTERIAL - FIO2: 29 — SIGNIFICANT CHANGE UP
BLOOD UR QL VISUAL: NEGATIVE — SIGNIFICANT CHANGE UP
BUN SERPL-MCNC: 81 MG/DL — HIGH (ref 7–23)
BUN SERPL-MCNC: 83 MG/DL — HIGH (ref 7–23)
BUN SERPL-MCNC: 83 MG/DL — HIGH (ref 7–23)
BUN SERPL-MCNC: 85 MG/DL — HIGH (ref 7–23)
C PNEUM DNA SPEC QL NAA+PROBE: NOT DETECTED — SIGNIFICANT CHANGE UP
CALCIUM SERPL-MCNC: 5.9 MG/DL — CRITICAL LOW (ref 8.4–10.5)
CALCIUM SERPL-MCNC: 6 MG/DL — CRITICAL LOW (ref 8.4–10.5)
CALCIUM SERPL-MCNC: 6 MG/DL — CRITICAL LOW (ref 8.4–10.5)
CALCIUM SERPL-MCNC: 6.1 MG/DL — CRITICAL LOW (ref 8.4–10.5)
CHLORIDE BLDA-SCNC: 102 MMOL/L — SIGNIFICANT CHANGE UP (ref 96–108)
CHLORIDE BLDA-SCNC: 103 MMOL/L — SIGNIFICANT CHANGE UP (ref 96–108)
CHLORIDE BLDA-SCNC: 104 MMOL/L — SIGNIFICANT CHANGE UP (ref 96–108)
CHLORIDE BLDA-SCNC: 104 MMOL/L — SIGNIFICANT CHANGE UP (ref 96–108)
CHLORIDE SERPL-SCNC: 100 MMOL/L — SIGNIFICANT CHANGE UP (ref 98–107)
CHLORIDE SERPL-SCNC: 101 MMOL/L — SIGNIFICANT CHANGE UP (ref 98–107)
CHLORIDE SERPL-SCNC: 101 MMOL/L — SIGNIFICANT CHANGE UP (ref 98–107)
CHLORIDE SERPL-SCNC: 99 MMOL/L — SIGNIFICANT CHANGE UP (ref 98–107)
CO2 SERPL-SCNC: 13 MMOL/L — LOW (ref 22–31)
CO2 SERPL-SCNC: 17 MMOL/L — LOW (ref 22–31)
CO2 SERPL-SCNC: 18 MMOL/L — LOW (ref 22–31)
CO2 SERPL-SCNC: 18 MMOL/L — LOW (ref 22–31)
COLOR SPEC: YELLOW — SIGNIFICANT CHANGE UP
CREAT SERPL-MCNC: 5.16 MG/DL — HIGH (ref 0.5–1.3)
CREAT SERPL-MCNC: 5.24 MG/DL — HIGH (ref 0.5–1.3)
CREAT SERPL-MCNC: 5.28 MG/DL — HIGH (ref 0.5–1.3)
CREAT SERPL-MCNC: 5.34 MG/DL — HIGH (ref 0.5–1.3)
FLUAV H1 2009 PAND RNA SPEC QL NAA+PROBE: NOT DETECTED — SIGNIFICANT CHANGE UP
FLUAV H1 RNA SPEC QL NAA+PROBE: NOT DETECTED — SIGNIFICANT CHANGE UP
FLUAV H3 RNA SPEC QL NAA+PROBE: NOT DETECTED — SIGNIFICANT CHANGE UP
FLUAV SUBTYP SPEC NAA+PROBE: NOT DETECTED — SIGNIFICANT CHANGE UP
FLUBV RNA SPEC QL NAA+PROBE: NOT DETECTED — SIGNIFICANT CHANGE UP
GLUCOSE BLDA-MCNC: 132 MG/DL — HIGH (ref 70–99)
GLUCOSE BLDA-MCNC: 181 MG/DL — HIGH (ref 70–99)
GLUCOSE BLDA-MCNC: 193 MG/DL — HIGH (ref 70–99)
GLUCOSE BLDA-MCNC: 200 MG/DL — HIGH (ref 70–99)
GLUCOSE SERPL-MCNC: 121 MG/DL — HIGH (ref 70–99)
GLUCOSE SERPL-MCNC: 138 MG/DL — HIGH (ref 70–99)
GLUCOSE SERPL-MCNC: 192 MG/DL — HIGH (ref 70–99)
GLUCOSE SERPL-MCNC: 196 MG/DL — HIGH (ref 70–99)
GLUCOSE UR-MCNC: NEGATIVE — SIGNIFICANT CHANGE UP
GRAM STN SPT: SIGNIFICANT CHANGE UP
HADV DNA SPEC QL NAA+PROBE: NOT DETECTED — SIGNIFICANT CHANGE UP
HCO3 BLDA-SCNC: 15 MMOL/L — LOW (ref 22–26)
HCO3 BLDA-SCNC: 15 MMOL/L — LOW (ref 22–26)
HCO3 BLDA-SCNC: 18 MMOL/L — LOW (ref 22–26)
HCO3 BLDA-SCNC: 19 MMOL/L — LOW (ref 22–26)
HCOV PNL SPEC NAA+PROBE: SIGNIFICANT CHANGE UP
HCT VFR BLD CALC: 23.4 % — LOW (ref 34.5–45)
HCT VFR BLD CALC: 25.1 % — LOW (ref 34.5–45)
HCT VFR BLD CALC: 25.8 % — LOW (ref 34.5–45)
HCT VFR BLDA CALC: 21.9 % — LOW (ref 34.5–46.5)
HCT VFR BLDA CALC: 22.3 % — LOW (ref 34.5–46.5)
HCT VFR BLDA CALC: 23.5 % — LOW (ref 34.5–46.5)
HCT VFR BLDA CALC: 25.1 % — LOW (ref 34.5–46.5)
HGB BLD-MCNC: 7 G/DL — CRITICAL LOW (ref 11.5–15.5)
HGB BLD-MCNC: 7.5 G/DL — LOW (ref 11.5–15.5)
HGB BLD-MCNC: 7.7 G/DL — LOW (ref 11.5–15.5)
HGB BLDA-MCNC: 7 G/DL — CRITICAL LOW (ref 11.5–15.5)
HGB BLDA-MCNC: 7.1 G/DL — LOW (ref 11.5–15.5)
HGB BLDA-MCNC: 7.5 G/DL — LOW (ref 11.5–15.5)
HGB BLDA-MCNC: 8.1 G/DL — LOW (ref 11.5–15.5)
HMPV RNA SPEC QL NAA+PROBE: NOT DETECTED — SIGNIFICANT CHANGE UP
HPIV1 RNA SPEC QL NAA+PROBE: NOT DETECTED — SIGNIFICANT CHANGE UP
HPIV2 RNA SPEC QL NAA+PROBE: NOT DETECTED — SIGNIFICANT CHANGE UP
HPIV3 RNA SPEC QL NAA+PROBE: NOT DETECTED — SIGNIFICANT CHANGE UP
HPIV4 RNA SPEC QL NAA+PROBE: NOT DETECTED — SIGNIFICANT CHANGE UP
KETONES UR-MCNC: NEGATIVE — SIGNIFICANT CHANGE UP
LACTATE BLDA-SCNC: 0.9 MMOL/L — SIGNIFICANT CHANGE UP (ref 0.5–2)
LACTATE BLDA-SCNC: 0.9 MMOL/L — SIGNIFICANT CHANGE UP (ref 0.5–2)
LACTATE BLDA-SCNC: 1.3 MMOL/L — SIGNIFICANT CHANGE UP (ref 0.5–2)
LACTATE BLDA-SCNC: 1.9 MMOL/L — SIGNIFICANT CHANGE UP (ref 0.5–2)
LEUKOCYTE ESTERASE UR-ACNC: NEGATIVE — SIGNIFICANT CHANGE UP
MAGNESIUM SERPL-MCNC: 1.4 MG/DL — LOW (ref 1.6–2.6)
MAGNESIUM SERPL-MCNC: 1.5 MG/DL — LOW (ref 1.6–2.6)
MAGNESIUM SERPL-MCNC: 1.6 MG/DL — SIGNIFICANT CHANGE UP (ref 1.6–2.6)
MCHC RBC-ENTMCNC: 27.6 PG — SIGNIFICANT CHANGE UP (ref 27–34)
MCHC RBC-ENTMCNC: 28 PG — SIGNIFICANT CHANGE UP (ref 27–34)
MCHC RBC-ENTMCNC: 28.3 PG — SIGNIFICANT CHANGE UP (ref 27–34)
MCHC RBC-ENTMCNC: 29.8 % — LOW (ref 32–36)
MCHC RBC-ENTMCNC: 29.9 % — LOW (ref 32–36)
MCHC RBC-ENTMCNC: 29.9 % — LOW (ref 32–36)
MCV RBC AUTO: 92.1 FL — SIGNIFICANT CHANGE UP (ref 80–100)
MCV RBC AUTO: 93.7 FL — SIGNIFICANT CHANGE UP (ref 80–100)
MCV RBC AUTO: 94.9 FL — SIGNIFICANT CHANGE UP (ref 80–100)
NITRITE UR-MCNC: NEGATIVE — SIGNIFICANT CHANGE UP
NRBC # FLD: 0.11 K/UL — SIGNIFICANT CHANGE UP (ref 0–0)
NRBC # FLD: 0.13 K/UL — SIGNIFICANT CHANGE UP (ref 0–0)
NRBC # FLD: 0.17 K/UL — SIGNIFICANT CHANGE UP (ref 0–0)
NRBC FLD-RTO: 1.1 — SIGNIFICANT CHANGE UP
NRBC FLD-RTO: 1.6 — SIGNIFICANT CHANGE UP
NT-PROBNP SERPL-SCNC: SIGNIFICANT CHANGE UP PG/ML
NT-PROBNP SERPL-SCNC: SIGNIFICANT CHANGE UP PG/ML
OB PNL STL: POSITIVE — SIGNIFICANT CHANGE UP
PCO2 BLDA: 35 MMHG — SIGNIFICANT CHANGE UP (ref 32–48)
PCO2 BLDA: 47 MMHG — SIGNIFICANT CHANGE UP (ref 32–48)
PCO2 BLDA: 57 MMHG — HIGH (ref 32–48)
PCO2 BLDA: 68 MMHG — HIGH (ref 32–48)
PH BLDA: 7.05 PH — CRITICAL LOW (ref 7.35–7.45)
PH BLDA: 7.09 PH — CRITICAL LOW (ref 7.35–7.45)
PH BLDA: 7.22 PH — LOW (ref 7.35–7.45)
PH BLDA: 7.33 PH — LOW (ref 7.35–7.45)
PH UR: 5.5 — SIGNIFICANT CHANGE UP (ref 5–8)
PHOSPHATE SERPL-MCNC: 8 MG/DL — HIGH (ref 2.5–4.5)
PHOSPHATE SERPL-MCNC: 8.7 MG/DL — HIGH (ref 2.5–4.5)
PHOSPHATE SERPL-MCNC: 8.9 MG/DL — HIGH (ref 2.5–4.5)
PLATELET # BLD AUTO: 172 K/UL — SIGNIFICANT CHANGE UP (ref 150–400)
PLATELET # BLD AUTO: 195 K/UL — SIGNIFICANT CHANGE UP (ref 150–400)
PLATELET # BLD AUTO: 196 K/UL — SIGNIFICANT CHANGE UP (ref 150–400)
PMV BLD: 10.3 FL — SIGNIFICANT CHANGE UP (ref 7–13)
PMV BLD: 10.6 FL — SIGNIFICANT CHANGE UP (ref 7–13)
PMV BLD: 10.7 FL — SIGNIFICANT CHANGE UP (ref 7–13)
PO2 BLDA: 100 MMHG — SIGNIFICANT CHANGE UP (ref 83–108)
PO2 BLDA: 151 MMHG — HIGH (ref 83–108)
PO2 BLDA: 75 MMHG — LOW (ref 83–108)
PO2 BLDA: 90 MMHG — SIGNIFICANT CHANGE UP (ref 83–108)
POTASSIUM BLDA-SCNC: 3.4 MMOL/L — SIGNIFICANT CHANGE UP (ref 3.4–4.5)
POTASSIUM BLDA-SCNC: 3.6 MMOL/L — SIGNIFICANT CHANGE UP (ref 3.4–4.5)
POTASSIUM SERPL-MCNC: 3.6 MMOL/L — SIGNIFICANT CHANGE UP (ref 3.5–5.3)
POTASSIUM SERPL-MCNC: 3.9 MMOL/L — SIGNIFICANT CHANGE UP (ref 3.5–5.3)
POTASSIUM SERPL-MCNC: 4.1 MMOL/L — SIGNIFICANT CHANGE UP (ref 3.5–5.3)
POTASSIUM SERPL-MCNC: 4.2 MMOL/L — SIGNIFICANT CHANGE UP (ref 3.5–5.3)
POTASSIUM SERPL-SCNC: 3.6 MMOL/L — SIGNIFICANT CHANGE UP (ref 3.5–5.3)
POTASSIUM SERPL-SCNC: 3.9 MMOL/L — SIGNIFICANT CHANGE UP (ref 3.5–5.3)
POTASSIUM SERPL-SCNC: 4.1 MMOL/L — SIGNIFICANT CHANGE UP (ref 3.5–5.3)
POTASSIUM SERPL-SCNC: 4.2 MMOL/L — SIGNIFICANT CHANGE UP (ref 3.5–5.3)
PROT SERPL-MCNC: 5.8 G/DL — LOW (ref 6–8.3)
PROT SERPL-MCNC: 6.1 G/DL — SIGNIFICANT CHANGE UP (ref 6–8.3)
PROT UR-MCNC: 10 — SIGNIFICANT CHANGE UP
RBC # BLD: 2.54 M/UL — LOW (ref 3.8–5.2)
RBC # BLD: 2.68 M/UL — LOW (ref 3.8–5.2)
RBC # BLD: 2.72 M/UL — LOW (ref 3.8–5.2)
RBC # FLD: 17.6 % — HIGH (ref 10.3–14.5)
RBC # FLD: 17.8 % — HIGH (ref 10.3–14.5)
RBC # FLD: 17.8 % — HIGH (ref 10.3–14.5)
RSV RNA SPEC QL NAA+PROBE: NOT DETECTED — SIGNIFICANT CHANGE UP
RV+EV RNA SPEC QL NAA+PROBE: NOT DETECTED — SIGNIFICANT CHANGE UP
SAO2 % BLDA: 89.9 % — LOW (ref 95–99)
SAO2 % BLDA: 95.5 % — SIGNIFICANT CHANGE UP (ref 95–99)
SAO2 % BLDA: 97.7 % — SIGNIFICANT CHANGE UP (ref 95–99)
SAO2 % BLDA: 98.7 % — SIGNIFICANT CHANGE UP (ref 95–99)
SODIUM BLDA-SCNC: 133 MMOL/L — LOW (ref 136–146)
SODIUM BLDA-SCNC: 133 MMOL/L — LOW (ref 136–146)
SODIUM BLDA-SCNC: 134 MMOL/L — LOW (ref 136–146)
SODIUM BLDA-SCNC: 135 MMOL/L — LOW (ref 136–146)
SODIUM SERPL-SCNC: 135 MMOL/L — SIGNIFICANT CHANGE UP (ref 135–145)
SODIUM SERPL-SCNC: 136 MMOL/L — SIGNIFICANT CHANGE UP (ref 135–145)
SODIUM SERPL-SCNC: 137 MMOL/L — SIGNIFICANT CHANGE UP (ref 135–145)
SODIUM SERPL-SCNC: 137 MMOL/L — SIGNIFICANT CHANGE UP (ref 135–145)
SP GR SPEC: 1.02 — SIGNIFICANT CHANGE UP (ref 1–1.04)
SPECIMEN SOURCE: SIGNIFICANT CHANGE UP
TROPONIN T, HIGH SENSITIVITY: 51 NG/L — SIGNIFICANT CHANGE UP (ref ?–14)
TSH SERPL-MCNC: 1.11 UIU/ML — SIGNIFICANT CHANGE UP (ref 0.27–4.2)
UROBILINOGEN FLD QL: NORMAL — SIGNIFICANT CHANGE UP
WBC # BLD: 10.62 K/UL — HIGH (ref 3.8–10.5)
WBC # BLD: 11.75 K/UL — HIGH (ref 3.8–10.5)
WBC # BLD: 13.26 K/UL — HIGH (ref 3.8–10.5)
WBC # FLD AUTO: 10.62 K/UL — HIGH (ref 3.8–10.5)
WBC # FLD AUTO: 11.75 K/UL — HIGH (ref 3.8–10.5)
WBC # FLD AUTO: 13.26 K/UL — HIGH (ref 3.8–10.5)

## 2019-10-19 PROCEDURE — 31500 INSERT EMERGENCY AIRWAY: CPT | Mod: GC

## 2019-10-19 PROCEDURE — 71045 X-RAY EXAM CHEST 1 VIEW: CPT | Mod: 26

## 2019-10-19 PROCEDURE — 99291 CRITICAL CARE FIRST HOUR: CPT | Mod: 25

## 2019-10-19 PROCEDURE — 36556 INSERT NON-TUNNEL CV CATH: CPT | Mod: GC

## 2019-10-19 PROCEDURE — 73562 X-RAY EXAM OF KNEE 3: CPT | Mod: 26,50

## 2019-10-19 PROCEDURE — 99291 CRITICAL CARE FIRST HOUR: CPT

## 2019-10-19 PROCEDURE — 76604 US EXAM CHEST: CPT | Mod: 26,GC

## 2019-10-19 PROCEDURE — 93306 TTE W/DOPPLER COMPLETE: CPT | Mod: 26

## 2019-10-19 PROCEDURE — 93308 TTE F-UP OR LMTD: CPT | Mod: 26,GC

## 2019-10-19 RX ORDER — BUDESONIDE AND FORMOTEROL FUMARATE DIHYDRATE 160; 4.5 UG/1; UG/1
2 AEROSOL RESPIRATORY (INHALATION)
Qty: 0 | Refills: 0 | DISCHARGE

## 2019-10-19 RX ORDER — VANCOMYCIN HCL 1 G
1000 VIAL (EA) INTRAVENOUS ONCE
Refills: 0 | Status: COMPLETED | OUTPATIENT
Start: 2019-10-19 | End: 2019-10-19

## 2019-10-19 RX ORDER — SODIUM CHLORIDE 9 MG/ML
10 INJECTION INTRAMUSCULAR; INTRAVENOUS; SUBCUTANEOUS
Refills: 0 | Status: DISCONTINUED | OUTPATIENT
Start: 2019-10-19 | End: 2019-10-22

## 2019-10-19 RX ORDER — INSULIN LISPRO 100/ML
VIAL (ML) SUBCUTANEOUS EVERY 6 HOURS
Refills: 0 | Status: DISCONTINUED | OUTPATIENT
Start: 2019-10-19 | End: 2019-10-23

## 2019-10-19 RX ORDER — DEXTROSE 50 % IN WATER 50 %
15 SYRINGE (ML) INTRAVENOUS ONCE
Refills: 0 | Status: DISCONTINUED | OUTPATIENT
Start: 2019-10-19 | End: 2019-10-31

## 2019-10-19 RX ORDER — MONTELUKAST 4 MG/1
1 TABLET, CHEWABLE ORAL
Qty: 0 | Refills: 0 | DISCHARGE

## 2019-10-19 RX ORDER — DEXTROSE 50 % IN WATER 50 %
25 SYRINGE (ML) INTRAVENOUS ONCE
Refills: 0 | Status: DISCONTINUED | OUTPATIENT
Start: 2019-10-19 | End: 2019-10-31

## 2019-10-19 RX ORDER — AMLODIPINE BESYLATE 2.5 MG/1
1 TABLET ORAL
Qty: 0 | Refills: 0 | DISCHARGE

## 2019-10-19 RX ORDER — FERROUS GLUCONATE 100 %
1 POWDER (GRAM) MISCELLANEOUS
Qty: 0 | Refills: 0 | DISCHARGE

## 2019-10-19 RX ORDER — PIPERACILLIN AND TAZOBACTAM 4; .5 G/20ML; G/20ML
3.38 INJECTION, POWDER, LYOPHILIZED, FOR SOLUTION INTRAVENOUS ONCE
Refills: 0 | Status: COMPLETED | OUTPATIENT
Start: 2019-10-19 | End: 2019-10-19

## 2019-10-19 RX ORDER — CARVEDILOL PHOSPHATE 80 MG/1
1 CAPSULE, EXTENDED RELEASE ORAL
Qty: 0 | Refills: 0 | DISCHARGE

## 2019-10-19 RX ORDER — MIDAZOLAM HYDROCHLORIDE 1 MG/ML
2 INJECTION, SOLUTION INTRAMUSCULAR; INTRAVENOUS ONCE
Refills: 0 | Status: DISCONTINUED | OUTPATIENT
Start: 2019-10-19 | End: 2019-10-19

## 2019-10-19 RX ORDER — DEXLANSOPRAZOLE 30 MG/1
1 CAPSULE, DELAYED RELEASE ORAL
Qty: 0 | Refills: 0 | DISCHARGE

## 2019-10-19 RX ORDER — HYDRALAZINE HCL 50 MG
1 TABLET ORAL
Qty: 0 | Refills: 0 | DISCHARGE

## 2019-10-19 RX ORDER — INSULIN DETEMIR 100/ML (3)
10 INSULIN PEN (ML) SUBCUTANEOUS
Qty: 0 | Refills: 0 | DISCHARGE

## 2019-10-19 RX ORDER — SODIUM CHLORIDE 9 MG/ML
1000 INJECTION, SOLUTION INTRAVENOUS
Refills: 0 | Status: DISCONTINUED | OUTPATIENT
Start: 2019-10-19 | End: 2019-10-31

## 2019-10-19 RX ORDER — AZITHROMYCIN 500 MG/1
500 TABLET, FILM COATED ORAL EVERY 24 HOURS
Refills: 0 | Status: DISCONTINUED | OUTPATIENT
Start: 2019-10-20 | End: 2019-10-20

## 2019-10-19 RX ORDER — ATORVASTATIN CALCIUM 80 MG/1
1 TABLET, FILM COATED ORAL
Qty: 0 | Refills: 0 | DISCHARGE

## 2019-10-19 RX ORDER — SODIUM BICARBONATE 1 MEQ/ML
0.13 SYRINGE (ML) INTRAVENOUS
Qty: 150 | Refills: 0 | Status: DISCONTINUED | OUTPATIENT
Start: 2019-10-19 | End: 2019-10-20

## 2019-10-19 RX ORDER — PANTOPRAZOLE SODIUM 20 MG/1
40 TABLET, DELAYED RELEASE ORAL ONCE
Refills: 0 | Status: COMPLETED | OUTPATIENT
Start: 2019-10-19 | End: 2019-10-19

## 2019-10-19 RX ORDER — DEXTROSE 50 % IN WATER 50 %
12.5 SYRINGE (ML) INTRAVENOUS ONCE
Refills: 0 | Status: DISCONTINUED | OUTPATIENT
Start: 2019-10-19 | End: 2019-10-31

## 2019-10-19 RX ORDER — GABAPENTIN 400 MG/1
1 CAPSULE ORAL
Qty: 0 | Refills: 0 | DISCHARGE

## 2019-10-19 RX ORDER — MIDAZOLAM HYDROCHLORIDE 1 MG/ML
4 INJECTION, SOLUTION INTRAMUSCULAR; INTRAVENOUS ONCE
Refills: 0 | Status: DISCONTINUED | OUTPATIENT
Start: 2019-10-19 | End: 2019-10-19

## 2019-10-19 RX ORDER — GLUCAGON INJECTION, SOLUTION 0.5 MG/.1ML
1 INJECTION, SOLUTION SUBCUTANEOUS ONCE
Refills: 0 | Status: DISCONTINUED | OUTPATIENT
Start: 2019-10-19 | End: 2019-10-31

## 2019-10-19 RX ORDER — PROPOFOL 10 MG/ML
10 INJECTION, EMULSION INTRAVENOUS
Qty: 1000 | Refills: 0 | Status: DISCONTINUED | OUTPATIENT
Start: 2019-10-19 | End: 2019-10-21

## 2019-10-19 RX ORDER — SODIUM BICARBONATE 1 MEQ/ML
50 SYRINGE (ML) INTRAVENOUS ONCE
Refills: 0 | Status: COMPLETED | OUTPATIENT
Start: 2019-10-19 | End: 2019-10-19

## 2019-10-19 RX ORDER — NOREPINEPHRINE BITARTRATE/D5W 8 MG/250ML
0.05 PLASTIC BAG, INJECTION (ML) INTRAVENOUS
Qty: 16 | Refills: 0 | Status: DISCONTINUED | OUTPATIENT
Start: 2019-10-19 | End: 2019-10-21

## 2019-10-19 RX ORDER — PANTOPRAZOLE SODIUM 20 MG/1
40 TABLET, DELAYED RELEASE ORAL DAILY
Refills: 0 | Status: DISCONTINUED | OUTPATIENT
Start: 2019-10-19 | End: 2019-10-19

## 2019-10-19 RX ORDER — CALCIUM GLUCONATE 100 MG/ML
2 VIAL (ML) INTRAVENOUS ONCE
Refills: 0 | Status: COMPLETED | OUTPATIENT
Start: 2019-10-19 | End: 2019-10-19

## 2019-10-19 RX ORDER — FUROSEMIDE 40 MG
1 TABLET ORAL
Qty: 0 | Refills: 0 | DISCHARGE

## 2019-10-19 RX ORDER — CHLORHEXIDINE GLUCONATE 213 G/1000ML
15 SOLUTION TOPICAL EVERY 12 HOURS
Refills: 0 | Status: DISCONTINUED | OUTPATIENT
Start: 2019-10-19 | End: 2019-10-22

## 2019-10-19 RX ORDER — SEVELAMER CARBONATE 2400 MG/1
0 POWDER, FOR SUSPENSION ORAL
Qty: 0 | Refills: 0 | DISCHARGE

## 2019-10-19 RX ORDER — CHLORHEXIDINE GLUCONATE 213 G/1000ML
1 SOLUTION TOPICAL
Refills: 0 | Status: DISCONTINUED | OUTPATIENT
Start: 2019-10-19 | End: 2019-10-22

## 2019-10-19 RX ORDER — NOREPINEPHRINE BITARTRATE/D5W 8 MG/250ML
0.05 PLASTIC BAG, INJECTION (ML) INTRAVENOUS
Qty: 8 | Refills: 0 | Status: DISCONTINUED | OUTPATIENT
Start: 2019-10-19 | End: 2019-10-19

## 2019-10-19 RX ORDER — PANTOPRAZOLE SODIUM 20 MG/1
40 TABLET, DELAYED RELEASE ORAL EVERY 12 HOURS
Refills: 0 | Status: DISCONTINUED | OUTPATIENT
Start: 2019-10-19 | End: 2019-10-22

## 2019-10-19 RX ORDER — ACETAMINOPHEN 500 MG
2 TABLET ORAL
Qty: 0 | Refills: 0 | DISCHARGE

## 2019-10-19 RX ORDER — ALBUTEROL 90 UG/1
2 AEROSOL, METERED ORAL
Qty: 0 | Refills: 0 | DISCHARGE

## 2019-10-19 RX ORDER — ASPIRIN/CALCIUM CARB/MAGNESIUM 324 MG
1 TABLET ORAL
Qty: 0 | Refills: 0 | DISCHARGE

## 2019-10-19 RX ORDER — PIPERACILLIN AND TAZOBACTAM 4; .5 G/20ML; G/20ML
3.38 INJECTION, POWDER, LYOPHILIZED, FOR SOLUTION INTRAVENOUS EVERY 12 HOURS
Refills: 0 | Status: DISCONTINUED | OUTPATIENT
Start: 2019-10-19 | End: 2019-10-30

## 2019-10-19 RX ORDER — CLOPIDOGREL BISULFATE 75 MG/1
1 TABLET, FILM COATED ORAL
Qty: 0 | Refills: 0 | DISCHARGE

## 2019-10-19 RX ADMIN — Medication 8.08 MICROGRAM(S)/KG/MIN: at 02:00

## 2019-10-19 RX ADMIN — PIPERACILLIN AND TAZOBACTAM 25 GRAM(S): 4; .5 INJECTION, POWDER, LYOPHILIZED, FOR SOLUTION INTRAVENOUS at 18:40

## 2019-10-19 RX ADMIN — Medication 75 MEQ/KG/HR: at 20:31

## 2019-10-19 RX ADMIN — PANTOPRAZOLE SODIUM 40 MILLIGRAM(S): 20 TABLET, DELAYED RELEASE ORAL at 02:19

## 2019-10-19 RX ADMIN — Medication 50 MILLIEQUIVALENT(S): at 03:01

## 2019-10-19 RX ADMIN — MIDAZOLAM HYDROCHLORIDE 2 MILLIGRAM(S): 1 INJECTION, SOLUTION INTRAMUSCULAR; INTRAVENOUS at 10:00

## 2019-10-19 RX ADMIN — Medication 8.08 MICROGRAM(S)/KG/MIN: at 11:16

## 2019-10-19 RX ADMIN — Medication 250 MILLIGRAM(S): at 05:17

## 2019-10-19 RX ADMIN — Medication 1: at 18:40

## 2019-10-19 RX ADMIN — Medication 1: at 05:12

## 2019-10-19 RX ADMIN — PROPOFOL 6.31 MICROGRAM(S)/KG/MIN: 10 INJECTION, EMULSION INTRAVENOUS at 20:30

## 2019-10-19 RX ADMIN — Medication 4.93 MICROGRAM(S)/KG/MIN: at 20:30

## 2019-10-19 RX ADMIN — PIPERACILLIN AND TAZOBACTAM 200 GRAM(S): 4; .5 INJECTION, POWDER, LYOPHILIZED, FOR SOLUTION INTRAVENOUS at 04:39

## 2019-10-19 RX ADMIN — PROPOFOL 6.31 MICROGRAM(S)/KG/MIN: 10 INJECTION, EMULSION INTRAVENOUS at 11:17

## 2019-10-19 RX ADMIN — AZITHROMYCIN 255 MILLIGRAM(S): 500 TABLET, FILM COATED ORAL at 03:29

## 2019-10-19 RX ADMIN — Medication 75 MEQ/KG/HR: at 04:56

## 2019-10-19 RX ADMIN — Medication 200 GRAM(S): at 14:40

## 2019-10-19 RX ADMIN — Medication 1 MILLIGRAM(S): at 06:49

## 2019-10-19 RX ADMIN — Medication 1 MILLIGRAM(S): at 06:41

## 2019-10-19 RX ADMIN — PANTOPRAZOLE SODIUM 40 MILLIGRAM(S): 20 TABLET, DELAYED RELEASE ORAL at 18:40

## 2019-10-19 RX ADMIN — MIDAZOLAM HYDROCHLORIDE 4 MILLIGRAM(S): 1 INJECTION, SOLUTION INTRAMUSCULAR; INTRAVENOUS at 09:00

## 2019-10-19 RX ADMIN — CHLORHEXIDINE GLUCONATE 1 APPLICATION(S): 213 SOLUTION TOPICAL at 11:16

## 2019-10-19 RX ADMIN — Medication 75 MEQ/KG/HR: at 11:17

## 2019-10-19 RX ADMIN — CHLORHEXIDINE GLUCONATE 15 MILLILITER(S): 213 SOLUTION TOPICAL at 18:40

## 2019-10-19 RX ADMIN — Medication 1: at 12:20

## 2019-10-19 NOTE — CONSULT NOTE ADULT - ASSESSMENT
74F h/o HTN, HLD, IDDM2, COPD (former smoker, 2L/min PRN at home), CHF (EF 12/2018 45-50%), ESRD not on dialysis admitted to [facility] for hypotensive, ams, hypoglycemia and hypothermia.     CKD stage 5 not yet on hd  s/p left avf 12/18, + thrill + bruit (never been used)  patient critically ill  renal function has been stable, potassium is ok  seems hypervolemic however with hypotension on norepinephrine  s/p intubation  bush in place + urine production  consent for cvvh, IHD in chart from daughter  currently no urgent indication for dialysis     hypocalcemia  check pth, vit d 25 level  low abd  consider supplement ca 2g iv x 1  monitor    hyperphosphatemia  npo right now  would start phoslo 1334 tid with meal when eating  monitor    anemia  occult +  monitor hb  transfuse as needed  consider Gi eval    acidosis  likely sec to renal failure +diarrhea  getting bicarb gtt  monitor    hypotensive  ? sepsis vs bb intoxication   on pressor and iv antibiotic  monitor 74F h/o HTN, HLD, IDDM2, COPD (former smoker, 2L/min PRN at home), CHF (EF 12/2018 45-50%), ESRD not on dialysis admitted to [facility] for hypotensive, ams, hypoglycemia and hypothermia.     CKD stage 5 not yet on hd  s/p left avf 12/18, + thrill + bruit (never been used)  patient critically ill  renal function has been stable, potassium is ok  seems hypervolemic however with hypotension on norepinephrine  s/p intubation  bush in place + urine production  consent for cvvh, IHD in chart from daughter  patient is not hemodynamically stable for IHD currently  would consider CVVH if fluid status worsening, oliguric, elevated k.   please call if any concerns     hypocalcemia  check pth, vit d 25 level  low abd  consider supplement ca 2g iv x 1  monitor    hyperphosphatemia  npo right now  would start phoslo 1334 tid with meal when eating  monitor    anemia  occult +  monitor hb  transfuse as needed  consider Gi eval    acidosis  likely sec to renal failure +diarrhea  getting bicarb gtt  monitor    hypotensive  ? sepsis vs bb intoxication   on pressor and iv antibiotic  monitor 74F h/o HTN, HLD, IDDM2, COPD (former smoker, 2L/min PRN at home), CHF (EF 12/2018 45-50%), ESRD not on dialysis admitted to micu for hypotensive, ams, hypoglycemia and hypothermia.     CKD stage 5 not yet on hd  s/p left avf 12/18, + thrill + bruit (never been used)  patient critically ill  renal function has been stable, potassium is ok  seems hypervolemic however with hypotension on norepinephrine  s/p intubation  bush in place + urine production  consent for cvvh, IHD in chart from daughter  patient is not hemodynamically stable for IHD currently  would consider CVVH if fluid status worsening, oliguric, elevated k.   if need CVVH, will need shiley for access   please call if any concerns     hypocalcemia  check pth, vit d 25 level  low abd  consider supplement ca 2g iv x 1  monitor    hyperphosphatemia  npo right now  would start phoslo 1334 tid with meal when eating  monitor    anemia  occult +  monitor hb  transfuse as needed  consider Gi eval    acidosis  likely sec to renal failure +diarrhea  getting bicarb gtt  monitor    hypotensive  ? sepsis vs bb intoxication   on pressor and iv antibiotic  monitor 74F h/o HTN, HLD, IDDM2, COPD (former smoker, 2L/min PRN at home), CHF (EF 12/2018 45-50%), ESRD not on dialysis admitted to [facility] for hypotensive, ams, hypoglycemia and hypothermia.     CKD stage 5 not yet on hd  s/p left avf 12/18, + thrill + bruit (never been used)  patient critically ill  renal function has been stable, potassium is ok  seems hypervolemic however with hypotension on norepinephrine  s/p intubation  bush in place + urine production  consent for cvvh, IHD in chart from daughter  patient is not hemodynamically stable for IHD currently  would consider CVVH if needed for fluid status, elevated k, acidosis.   if need CVVH, will need shiley for access   please call if any question    hypocalcemia  check pth, vit d 25 level  low abd  consider supplement ca 2g iv x 1  monitor    hyperphosphatemia  npo right now  would start phoslo 1334 tid with meal when eating  monitor    anemia  occult +  monitor hb  transfuse as needed  consider Gi eval    acidosis  likely sec to renal failure +diarrhea  getting bicarb gtt  monitor    hypotensive  ? sepsis vs bb intoxication   on pressor and iv antibiotic  monitor

## 2019-10-19 NOTE — H&P ADULT - ATTENDING COMMENTS
73 yo with CKD IV, baseline creat 5.4, LUE fistula in place, never used; COPD on home O2; DM on insulin, HTN on carvedilol; presents after being found unresponsive with FS 20 and hypotensive.  Recent cold symptoms, diarrhea x1 week  In ED was hypotensive, bradycardic to 40's, hypothermic 91 rectal, anemic Hgb 6.6 (baseline 8), rectal occult blood +, encephalopathic - lethargic after brief period of lucidity and alertness with / .  She received 750 cc IVF and 2U PRBC.  Pt now on norepi and bicarb gtt  Pt seen and examined with resident  BP 72/39 off NE, HR 40's  Obese woman stating "I am tired" oriented to place and name, knows daughters in room  Very somnolent but arousable  lymphedematous lower extremeties  POCUS LungsA line predom with occas B lines; no pleff, no consolidation  Cor: merrick, LVF appears mildly to moderately reduced; RV enlarged, no flattening of septum; IVC plump  EKG widenid QRS, Sinus merrick  ABG 7.09/57/100 on 3L NC   bicarb 16 (previously 34)  AG 18  lactate 1.9  troponin 54 -> 51,  BNP >50863  TSH wnl    Hypotension, hypoglycemia, bradycardia in pt on beta blocker   - must consider BB toxicity  - will contact toxicology and consider glucagon if no other cause obvious  anemia acute on chronic, FOB+, likely chronic, no sign of signif GI bleed but will follow  - pantoprazole  - transfused, check post transfusion Hgb  unchanged advanced renal dz, BUN and Cr unchanged from last,    encephalopathy  mixed nonaniongap and anion gap met acidosis:   - may be combo of RF and diarrhea causing bicarb loss  - bicarb gtt  - consider HD   hypothermia, acidemia, nl lactate  - cover for sepsis with broad abx and f/u cultures    very guarded  cc time 60 min excluding time spent on POCUS

## 2019-10-19 NOTE — H&P ADULT - HISTORY OF PRESENT ILLNESS
74F h/o HTN, HLD, IDDM2, COPD (former smoker, 2L/min PRN at home), CHF (EF 12/2018 45-50%) p/w AMS and hypotension. Per daughter at bedside, pt has been having a "chest cold" for the past week associated with cough productive of brown sputum, rhinorrhea, and diarrhea. Her daughter notes poor PO intake recently but pt has been receiving usual dose of insulin. At baseline, pt is A&Ox3 and ambulates w/ a cane, but can only walk short distances without becoming short of breath (< 1 block). Last known normal mental status was 10/18 in the AM. In the afternoon on 10/18, daughter found pt unresponsive. EMS measured FS 20, gave 1 amp of D50 with FS >200 after. Her daughter recently had a cold last week. Per daughter, pt has not had fevers, chills, nausea, vomiting, oliguria or anuria, dysuria, CP, palpitations, SOB, hematochezia, hematemesis, melena, and recent travel.     Of note, pt has ESRD w/ fistula placed 12/2018, but has not been getting dialysis. She has not followed up with any doctors recently.    In the ED, T 91.1F, HR 48, BP 89/54, RR 18. Initially hypotensive, given 1L bolus w/ improvement in BP and mental status. However, spontaneously became hypotensive and altered while eating. She has been bradycardic even when her mental status had improved briefly. Hgb was 6.4, currently receiving 1u pRBC. 74F h/o HTN, HLD, IDDM2, COPD (former smoker, 2L/min PRN at home), CHF (EF 12/2018 45-50%), ESRD not on dialysis, p/w AMS and hypotension. Per daughter at bedside, pt has been having a "chest cold" for the past week associated with cough productive of brown sputum, rhinorrhea, and diarrhea. Her daughter notes poor PO intake recently but pt has been receiving usual dose of insulin. At baseline, pt is A&Ox3 and ambulates w/ a cane, but can only walk short distances without becoming short of breath (< 1 block). Last known normal mental status was 10/18 in the AM. In the afternoon on 10/18, daughter found pt unresponsive. EMS measured FS 20, gave 1 amp of D50 with FS >200 after. Her daughter recently had a cold last week. Per daughter, pt has not had fevers, chills, nausea, vomiting, oliguria or anuria, dysuria, CP, palpitations, SOB, hematochezia, hematemesis, melena, and recent travel.     Of note, pt has ESRD w/ fistula placed 12/2018, but has not been getting dialysis. She has not followed up with any doctors recently.    In the ED, T 91.1F, HR 48, BP 89/54, RR 18. Initially hypotensive, given 750cc bolus w/ improvement in BP and mental status. However, spontaneously became hypotensive and altered while eating. She has been bradycardic even when her mental status had improved briefly. Hgb was 6.4, currently receiving 1u pRBC. 74F h/o HTN, HLD, IDDM2, COPD (former smoker, 2L/min PRN at home), CHF (EF 12/2018 45-50%), ESRD not on dialysis, p/w AMS and hypotension. Per daughter at bedside, pt has been having a "chest cold" for the past week associated with cough productive of brown sputum, rhinorrhea, and diarrhea. Her daughter notes poor PO intake recently but pt has been receiving usual dose of insulin. At baseline, pt is A&Ox3 and ambulates w/ a cane, but can only walk short distances without becoming short of breath (< 1 block). Last known normal mental status was 10/18 in the AM. In the afternoon on 10/18, daughter found pt unresponsive. EMS measured FS 20, gave 1 amp of D50 with FS >200 after. Her daughter recently had a cold last week. Per daughter, pt has not had fevers, chills, nausea, vomiting, oliguria or anuria, dysuria, CP, palpitations, SOB, hematochezia, hematemesis, melena, and recent travel.     Of note, pt has ESRD w/ fistula placed 12/2018, but has not been getting dialysis. She has not followed up with any doctors recently.    In the ED, T 91.1F, HR 48, BP 89/54, RR 18. Initially hypotensive, given 750cc bolus w/ improvement in BP and mental status. However, spontaneously became hypotensive and altered while eating. She has been bradycardic even when her mental status had improved briefly. Hgb was 6.4, received 2u pRBC. Also received ceftriaxone 1g x1, Solumedrol 40mg x1, Zofran 4mg x1, and duonebs x3.

## 2019-10-19 NOTE — H&P ADULT - ASSESSMENT
74F h/o HTN, HLD, IDDM2, COPD (former smoker, 2L/min PRN at home), CHF (EF 12/2018 45-50%) p/w AMS and hypotension.    #Neuro    #Respiratory    #CV    #GI    #Renal    #Heme    #ID    #Endo 74F h/o HTN, HLD, IDDM2, COPD (former smoker, 2L/min PRN at home), CHF (EF 12/2018 45-50%), ESRD not on dialysis, p/w acute metabolic encephalopathy likely multifactorial, possibly 2/2 hypoglycemia, hypotension, bradycardia, or anemia.    #Neuro  Acute Metabolic Encephalopathy    #Respiratory    #CV  Hypotension    #GI    #Renal  ESRD    #Heme  Anemia    #ID    #Endo  IDDM2 74F h/o HTN, HLD, IDDM2, COPD (former smoker, 2L/min PRN at home), CHF (EF 12/2018 45-50%), ESRD not on dialysis, p/w acute metabolic encephalopathy likely multifactorial, possibly 2/2 hypoglycemia, hypotension, bradycardia, or anemia.    #Neuro  Acute Metabolic Encephalopathy  - At baseline, A&O x3 and ambulates w/ cane  - Currently lethargic and A&O x1    #Respiratory  Pulmonary Edema  - h/o CHF (EF 45-50% 12/2018), dyspneic walking < 1 block at baseline  - CXR showing b/l reticular and patchy opacities c/w pulmonary edema    #CV  Hypotension  - Hypotensive to 89/54 in the ED, briefly improved s/p 750cc NS bolus  - Started on Levophed gtt    #GI  - No hematemesis, hematochezia, or melena, but acutely anemic to 6.4 requiring 1u pRBC  - Strict NPO    #Renal  ESRD  - L arm AVF placed 12/2018, not on dialysis d/t poor f/u w/ her nephrologist    #Heme  Anemia  - No overt source of bleeding  - Hgb 6.4 in the ED, receiving 1u pRBC  - Monitor CBC    #ID    #Endo  IDDM2 74F h/o HTN, HLD, IDDM2, COPD (former smoker, 2L/min PRN at home), CHF (EF 12/2018 45-50%), ESRD not on dialysis, p/w acute metabolic encephalopathy likely multifactorial, possibly 2/2 hypoglycemia, hypotension, bradycardia, or anemia.    #Neuro  Acute Metabolic Encephalopathy  - At baseline, A&O x3 and ambulates w/ cane  - Currently lethargic and A&O x1    #Respiratory  Pulmonary Edema  - h/o CHF (EF 45-50% 12/2018), dyspneic walking < 1 block at baseline  - CXR showing b/l reticular and patchy opacities c/w pulmonary edema    #CV  Hypotension  - Hypotensive to 89/54 in the ED, briefly improved s/p 750cc NS bolus  - Started on Levophed gtt    #GI  - No hematemesis, hematochezia, or melena, but acutely anemic to 6.4 requiring 1u pRBC  - FOBT positive  - On PPI at home, will give pantoprazole IV 40mg qd  - Strict NPO    #Renal  ESRD  - L arm AVF placed 12/2018, not on dialysis d/t poor f/u w/ her nephrologist    #Heme  Anemia  - No overt source of bleeding  - Hgb 6.4 in the ED, receiving 1u pRBC  - Monitor CBC    #ID  - Hypothermic to 91.1F on admission    #Endo  IDDM2 74F h/o HTN, HLD, IDDM2, COPD (former smoker, 2L/min PRN at home), CHF (EF 12/2018 45-50%), ESRD not on dialysis, p/w acute metabolic encephalopathy likely multifactorial, possibly 2/2 hypoglycemia, hypotension, bradycardia, or anemia.    #Neuro  Acute Metabolic Encephalopathy  - At baseline, A&O x3 and ambulates w/ cane  - Currently lethargic and A&O x1    #Respiratory  Pulmonary Edema  - h/o CHF (EF 45-50% 12/2018), dyspneic walking < 1 block at baseline  - CXR showing b/l reticular and patchy opacities c/w pulmonary edema    #CV  Hypotension  - Hypotensive to 89/54 in the ED, briefly improved s/p 750cc NS bolus  - Holding all home BP meds  - Started on Levophed gtt    #GI  - No hematemesis, hematochezia, or melena, but acutely anemic to 6.4 requiring 1u pRBC  - FOBT positive  - On PPI at home, will give pantoprazole IV 40mg qd  - Strict NPO    #Renal  ESRD  - L arm AVF placed 12/2018, not on dialysis  - Has not followed up w/ her nephrologist recently    #Heme  Anemia  - Hgb 6.4 in the ED, receiving 1u pRBC  - No reported hematochezia or melena, but FOBT positive  - Monitor CBC q6h    #ID  - Hypothermic to 91.1F on admission    #Endo  IDDM2 74F h/o HTN, HLD, IDDM2, COPD (former smoker, 2L/min PRN at home), CHF (EF 12/2018 45-50%), ESRD not on dialysis, p/w acute metabolic encephalopathy likely multifactorial, possibly 2/2 hypoglycemia, hypotension, bradycardia, or anemia.    #Neuro  Acute Metabolic Encephalopathy  - At baseline, A&O x3 and ambulates w/ cane  - Currently lethargic and A&O x1    #Respiratory  Pulmonary Edema  - h/o CHF (EF 45-50% 12/2018), dyspneic walking < 1 block at baseline  - CXR showing b/l reticular and patchy opacities c/w pulmonary edema    #CV  Hypotension  - Hypotensive to 89/54 in the ED, briefly improved s/p 750cc NS bolus  - Holding all home BP meds  - Started on Levophed gtt    CHF  - Last TTE 10/31/18, EF 45-50% w/ severe LA enlargement, normal PA pressures  - Holding home Lasix and metolazone  - Will get repeat TTE    #GI  - No hematemesis, hematochezia, or melena, but acutely anemic to 6.4 requiring 1u pRBC  - FOBT positive  - On PPI at home, will give pantoprazole IV 40mg qd  - Strict NPO    #Renal  ESRD  - L arm AVF placed 12/2018, not on dialysis  - Has not followed up w/ her nephrologist recently    #Heme  Anemia  - Hgb 6.4 in the ED, receiving 1u pRBC  - No reported hematochezia or melena, but FOBT positive  - Monitor CBC q6h    #ID  - Hypothermic to 91.1F on admission    #Endo  IDDM2 74F h/o HTN, HLD, IDDM2, COPD (former smoker, 2L/min PRN at home), CHF (EF 12/2018 45-50%), ESRD not on dialysis, p/w acute metabolic encephalopathy likely multifactorial, possibly 2/2 hypoglycemia, hypotension, bradycardia, anemia, acidosis, or uremia.    #Neuro  Acute Metabolic Encephalopathy  - At baseline, A&O x3 and ambulates w/ cane  - Currently lethargic and A&O x1  - Likely multifactorial, possibly 2/2 hypoglycemia, hypotension, bradycardia, anemia, acidosis, or uremia    #Respiratory  Pulmonary Edema  - h/o CHF (EF 45-50% 12/2018), dyspneic walking < 1 block at baseline  - CXR showing b/l reticular and patchy opacities c/w pulmonary edema  - Holding home Lasix and metolazone    #CV  Hypotension  - Hypotensive to 89/54 in the ED, briefly improved s/p 750cc NS bolus  - Holding all home BP meds  - Started on Levophed gtt    CHF  - Last TTE 10/31/18, EF 45-50% w/ severe LA enlargement, normal PA pressures  - Holding home Lasix and metolazone  - Will get repeat TTE    #GI  - No hematemesis, hematochezia, or melena, but acutely anemic to 6.4 requiring 1u pRBC  - FOBT positive  - On PPI at home, will give pantoprazole IV 40mg qd  - Strict NPO    #Renal  ESRD  - L arm AVF placed 12/2018, not on dialysis  - Has not followed up w/ her nephrologist recently    #Heme  Anemia  - Hgb 6.4 in the ED, receiving 1u pRBC  - No reported hematochezia or melena, but FOBT positive  - Monitor CBC q6h    #ID  - Hypothermic to 91.1F on admission  - s/p ceftriaxone 1g x1 in the ED  - Broad coverage w/ Vanc and Zosyn  - f/u BCx  - Will get UA    #Endo  IDDM2  - Poor PO intake over past week, on Levemir 10u qd at home  - Hypoglycemic w/ FS 20 at home, s/p 1 amp D50    #GOC  - Full Code 74F h/o HTN, HLD, IDDM2, COPD (former smoker, 2L/min PRN at home), CHF (EF 12/2018 45-50%), ESRD not on dialysis, p/w acute metabolic encephalopathy likely multifactorial, possibly 2/2 hypoglycemia, hypotension, bradycardia, anemia, acidosis, or uremia.    #Neuro  Acute Metabolic Encephalopathy  - At baseline, A&O x3 and ambulates w/ cane  - Currently lethargic and A&O x1  - Likely multifactorial, possibly 2/2 hypoglycemia, hypotension, bradycardia, anemia, acidosis, or uremia    #Respiratory  Pulmonary Edema  - h/o CHF (EF 45-50% 12/2018), dyspneic walking < 1 block at baseline  - CXR showing b/l reticular and patchy opacities c/w pulmonary edema  - Holding home Lasix and metolazone    #CV  Hypotension  - Hypotensive to 89/54 in the ED, briefly improved s/p 750cc NS bolus  - Holding all home BP meds  - Started on Levophed gtt    CHF  - Last TTE 10/31/18, EF 45-50% w/ severe LA enlargement, normal PA pressures  - Holding home Lasix and metolazone  - Will get repeat TTE    #GI  - No hematemesis, hematochezia, or melena, but acutely anemic to 6.4 requiring 1u pRBC  - FOBT positive  - On PPI at home, will give pantoprazole IV 40mg qd  - Strict NPO    #Renal  ESRD  - L arm AVF placed 12/2018, not on dialysis  - Has not followed up w/ her nephrologist recently    #Heme  Anemia  - Hgb 6.4 in the ED, receiving 1u pRBC  - No reported hematochezia or melena, but FOBT positive  - Monitor CBC q6h    #ID  - Hypothermic to 91.1F on admission  - s/p ceftriaxone 1g x1 in the ED  - Broad coverage w/ Vanc and Zosyn  - f/u BCx  - Will get UA    #Endo  IDDM2  - Poor PO intake over past week, on Levemir 10u qd at home  - Hypoglycemic w/ FS 20 at home, s/p 1 amp D50  - Holding home insulin  - FS and low SSI q6h while NPO    #GOC  - Full Code 74F h/o HTN, HLD, IDDM2, COPD (former smoker, 2L/min PRN at home), CHF (EF 12/2018 45-50%), ESRD not on dialysis, p/w acute metabolic encephalopathy likely multifactorial, possibly 2/2 hypoglycemia, hypotension, bradycardia, anemia, acidosis, or uremia.    #Neuro  Acute Metabolic Encephalopathy  - At baseline, A&O x3 and ambulates w/ cane  - Currently lethargic and A&O x1  - Likely multifactorial, possibly 2/2 hypoglycemia, hypotension, bradycardia, anemia, acidosis, or uremia  - Neuro checks    #Respiratory  Pulmonary Edema  - h/o CHF (EF 45-50% 12/2018), dyspneic walking < 1 block at baseline  - CXR showing b/l reticular and patchy opacities c/w pulmonary edema  - Holding home Lasix and metolazone    #CV  Hypotension  - Hypotensive to 89/54 in the ED, briefly improved s/p 750cc NS bolus  - Holding all home BP meds  - Started on Levophed gtt    CHF  - Last TTE 10/31/18, EF 45-50% w/ severe LA enlargement, normal PA pressures  - Holding home Lasix and metolazone  - Will get repeat TTE    #GI  - No hematemesis, hematochezia, or melena, but acutely anemic to 6.4 requiring 1u pRBC  - FOBT positive  - On PPI at home, will give pantoprazole IV 40mg qd  - Strict NPO    #Renal  Metabolic Acidosis  - Anion gap minimally elevated  - Bicarb gtt started    ESRD  - SCr 5.39, minimal change from 5.55 in 12/2018  - L arm AVF placed 12/2018, not on dialysis  - Has not followed up w/ her nephrologist recently  - Trend BMP q6h  - Nephrology consult in the AM    #Heme  Anemia  - Hgb 6.4 in the ED, receiving 1u pRBC  - No reported hematochezia or melena, but FOBT positive  - Monitor CBC q6h    #ID  - One week h/o diarrhea  - Hypothermic to 91.1F on admission  - s/p ceftriaxone 1g x1 in the ED  - Broad coverage w/ Vanc, Zosyn, and azithromycin  - f/u BCx and stool studies  - Will get UA and urine Legionella    #Endo  IDDM2  - Poor PO intake over past week, on Levemir 10u qd at home  - Hypoglycemic w/ FS 20 at home, s/p 1 amp D50  - Holding home insulin  - FS and low SSI q6h while NPO    #GOC  - Full Code    #Medication Management  - Will obtain full medication reconciliation in the AM. Daughter was uncertain of some medication dosages.

## 2019-10-19 NOTE — PROCEDURE NOTE - NSTRACHPOSTINTU_RESP_A_CORE
Appropriate capnography/Breath sounds equal/Chest excursion noted/Positive end tidal Co2 noted/Breath sounds bilateral/Chest X-Ray

## 2019-10-19 NOTE — H&P ADULT - NSHPLABSRESULTS_GEN_ALL_CORE
6.4    10.57 )-----------( 162      ( 18 Oct 2019 21:30 )             20.9       10-18    136  |  102  |  86<H>  ----------------------------<  66<L>  3.7   |  16<L>  |  5.39<H>    Ca    6.2<LL>      18 Oct 2019 21:30    TPro  6.4  /  Alb  3.0<L>  /  TBili  0.2  /  DBili  x   /  AST  20  /  ALT  19  /  AlkPhos  129<H>  10-18        ABG - ( 19 Oct 2019 01:42 )  pH, Arterial: 7.09  pH, Blood: x     /  pCO2: 57    /  pO2: 100   / HCO3: 15    / Base Excess: -11.5 /  SaO2: 95.5      PT/INR - ( 18 Oct 2019 21:30 )   PT: 14.5 SEC;   INR: 1.30     PTT - ( 18 Oct 2019 21:30 )  PTT:39.2 SEC    Lactate Trend  10-19 @ 01:42 Lactate:1.9     CAPILLARY BLOOD GLUCOSE  POCT Blood Glucose.: 140 mg/dL (19 Oct 2019 00:11)    RADIOLOGY & ADDITIONAL TESTING:  < from: Xray Chest 1 View-PORTABLE IMMEDIATE (10.18.19 @ 21:49) >    ******PRELIMINARY REPORT******    ******PRELIMINARY REPORT******          INTERPRETATION:  Small left pleural effusion. Diffuse bilateral reticular   and patchy opacities consistent with pulmonary edema.    ******PRELIMINARY REPORT******    ******PRELIMINARY REPORT******          NEELAM BAXTER M.D., RADIOLOGY RESIDENT    < end of copied text >

## 2019-10-19 NOTE — H&P ADULT - NSICDXFAMILYHX_GEN_ALL_CORE_FT
FAMILY HISTORY:  Grandparent  Still living? No  Family history of breast cancer, Age at diagnosis: Age Unknown

## 2019-10-19 NOTE — CHART NOTE - NSCHARTNOTEFT_GEN_A_CORE
Nurse was in neighboring room when she heard ventilator alarm, entered room and found ventilator disconnected and patient desaturating to 47%.  Nurse immediately attached bag to ETT and bagged patient with immediate response; code button was pressed for additional personnel help.  Patient satting 100% after immediate response by myself and fellow.  Ventilator reconnected without further issue.

## 2019-10-19 NOTE — H&P ADULT - NSHPPHYSICALEXAM_GEN_ALL_CORE
PHYSICAL EXAM:  GENERAL: Lethargic  HEAD:  Atraumatic, Normocephalic  CHEST/LUNG: Rhonchi anterior lung fields.  HEART: Distant heart sounds. Regular rate and rhythm. Normal S1/S2. No murmurs, rubs, or gallops  ABDOMEN: Soft, non-tender, non-distended; normal bowel sounds, no organomegaly  EXTREMITIES: 3+ pitting edema up to above the knee. L arm AVF. 2+ peripheral pulses b/l  NEUROLOGY: A&O x 1, minimally responsive to hearing her name  SKIN: No rashes or lesions

## 2019-10-19 NOTE — CONSULT NOTE ADULT - SUBJECTIVE AND OBJECTIVE BOX
[facility]   MD CHARITY MCADAMS DO MARYANNE SOURIAL, JASSON CONNOLLY    TEL:  OFFICE: [phone]  DR MCKEON CELL: [phone]  DR. MORRIS CELL: [phone]  DR. BROWN CELL: [phone]  TENISHA BOATENG CELL: [phone]    HPI:  74F h/o HTN, HLD, IDDM2, COPD (former smoker, 2L/min PRN at home), CHF (EF 2018 45-50%), ESRD not on dialysis, p/w AMS and hypotension.  baseline, pt is A&Ox3 and ambulates w/ a cane. per daughter found unresponsive 10/18. EMS found patient to have FS 20, gave 1 amp of D50 with FS >200 after. now admitted to [facility] for hypotensive, ams, hypoglycemia and hypothermia.   patient has been follow up with dr mckeon for ckd management. last visit in  scr ~5, had avf placement .          Allergies:  calcium acetate (Vomiting; Nausea; Chills)  wool-rash (Other)      PAST MEDICAL & SURGICAL HISTORY:  ESRD (end stage renal disease): L arm AVF placed 2018  Hemorrhoids  GERD (gastroesophageal reflux disease)  Morbid obesity  Cataract  Diaphragmatic hernia  Cerebral infarction:   CKD (chronic kidney disease)  Anemia  Diabetes mellitus: Insulin dependent. Type 2  CHF (congestive heart failure)  Hyperlipidemia  Hypertension  COPD (chronic obstructive pulmonary disease)  Chronic obstructive pulmonary disease, unspecified COPD type  Adult Idiopathic Generalized Osteoporosis  CAD (Coronary Artery Disease): 1 stent placed 18  DM (Diabetes Mellitus), Secondary  Hypertension  H/O coronary angiogram: [facility] 1 stent placed 2018      Home Medications Reviewed    Hospital Medications:   MEDICATIONS  (STANDING):  azithromycin  IVPB 500 milliGRAM(s) IV Intermittent every 24 hours  chlorhexidine 0.12% Liquid 15 milliLiter(s) Oral Mucosa every 12 hours  chlorhexidine 4% Liquid 1 Application(s) Topical <User Schedule>  dextrose 5%. 1000 milliLiter(s) (50 mL/Hr) IV Continuous <Continuous>  dextrose 50% Injectable 12.5 Gram(s) IV Push once  dextrose 50% Injectable 25 Gram(s) IV Push once  dextrose 50% Injectable 25 Gram(s) IV Push once  insulin lispro (HumaLOG) corrective regimen sliding scale   SubCutaneous every 6 hours  norepinephrine Infusion 0.05 MICROgram(s)/kG/Min (8.081 mL/Hr) IV Continuous <Continuous>  pantoprazole  Injectable 40 milliGRAM(s) IV Push every 12 hours  piperacillin/tazobactam IVPB.. 3.375 Gram(s) IV Intermittent every 12 hours  propofol Infusion 10 MICROgram(s)/kG/Min (6.306 mL/Hr) IV Continuous <Continuous>  sodium bicarbonate  Infusion 0.131 mEq/kG/Hr (75 mL/Hr) IV Continuous <Continuous>      SOCIAL HISTORY:  former smoking,     FAMILY HISTORY:  Family history of breast cancer (Grandparent)      REVIEW OF SYSTEMS:  patient sedated and intubated    VITALS:  T(F): 92.4 (10-19-19 @ 04:01), Max: 92.4 (10-19-19 @ 04:01)  HR: 58 (10-19-19 @ 09:38)  BP: 47/33 (10-19-19 @ 07:30)  RR: 14 (10-19-19 @ 09:08)  SpO2: 97% (10-19-19 @ 09:38)  Wt(kg): --    10-18 @ 07:01  -  10-19 @ 07:00  --------------------------------------------------------  IN: 697.9 mL / OUT: 30 mL / NET: 667.9 mL    10-19 @ 07:01  -  10-19 @ 10:03  --------------------------------------------------------  IN: 336.8 mL / OUT: 0 mL / NET: 336.8 mL      Height (cm): 165.1 (10-19 @ 04:01)  Weight (kg): 105.1 (10-19 @ 04:01)  BMI (kg/m2): 38.6 (10-19 @ 04:01)  BSA (m2): 2.11 (10-19 @ 04:01)    PHYSICAL EXAM:  Constitutional: intubated/sedated   HEENT: eyes closed  Neck: No JVD  Respiratory: CTAB, no wheezes, rales or rhonchi  Cardiovascular: S1, S2, RRR  Gastrointestinal: BS+, soft, NT/ND  Extremities: No cyanosis or clubbing. No peripheral edema  Neurological: A/O x 3, no focal deficits  Psychiatric: Normal mood, normal affect  : No CVA tenderness. No bush.   Skin: No rashes  Vascular Access:    LABS:  10-19    137  |  100  |  83<H>  ----------------------------<  192<H>  4.1   |  18<L>  |  5.16<H>    Ca    5.9<LL>      19 Oct 2019 07:24  Phos  8.9     10-19  Mg     1.6     10-19    TPro  6.1  /  Alb  2.8<L>  /  TBili  0.3  /  DBili      /  AST  22  /  ALT  22  /  AlkPhos  128<H>  10-19    Creatinine Trend: 5.16 <--, 5.28 <--, 5.39 <--                        7.7    13.26 )-----------( 196      ( 19 Oct 2019 07:24 )             25.8     Urine Studies:  Urinalysis Basic - ( 19 Oct 2019 03:30 )    Color: YELLOW / Appearance: CLEAR / S.017 / pH: 5.5  Gluc: NEGATIVE / Ketone: NEGATIVE  / Bili: NEGATIVE / Urobili: NORMAL   Blood: NEGATIVE / Protein: 10 / Nitrite: NEGATIVE   Leuk Esterase: NEGATIVE / RBC:  / WBC    Sq Epi:  / Non Sq Epi:  / Bacteria:           RADIOLOGY & ADDITIONAL STUDIES: [facility] [place] [facility]   MD CHARITY MCADAMS DO MARYANNE SOURIAL, JASSON CONNOLLY    TEL:  OFFICE: [phone]  DR MCKEON CELL: [phone]  DR. MORRIS CELL: [phone]  DR. BROWN CELL: [phone]  TENISHA BOATENG CELL: [phone]    HPI:  74F h/o HTN, HLD, IDDM2, COPD (former smoker, 2L/min PRN at home), CHF (EF 2018 45-50%), ESRD not on dialysis, p/w AMS and hypotension.  baseline, pt is A&Ox3 and ambulates w/ a cane. per daughter found unresponsive 10/18. EMS found patient to have FS 20, gave 1 amp of D50 with FS >200 after. now admitted to [facility] for hypotensive, ams, hypoglycemia and hypothermia.   patient has been follow up with dr mckeon for ckd management. last visit in  scr ~5, had avf placement .          Allergies:  calcium acetate (Vomiting; Nausea; Chills)  wool-rash (Other)      PAST MEDICAL & SURGICAL HISTORY:  ESRD (end stage renal disease): L arm AVF placed 2018  Hemorrhoids  GERD (gastroesophageal reflux disease)  Morbid obesity  Cataract  Diaphragmatic hernia  Cerebral infarction:   CKD (chronic kidney disease)  Anemia  Diabetes mellitus: Insulin dependent. Type 2  CHF (congestive heart failure)  Hyperlipidemia  Hypertension  COPD (chronic obstructive pulmonary disease)  Chronic obstructive pulmonary disease, unspecified COPD type  Adult Idiopathic Generalized Osteoporosis  CAD (Coronary Artery Disease): 1 stent placed 18  DM (Diabetes Mellitus), Secondary  Hypertension  H/O coronary angiogram: [facility] 1 stent placed 2018      Home Medications Reviewed    Hospital Medications:   MEDICATIONS  (STANDING):  azithromycin  IVPB 500 milliGRAM(s) IV Intermittent every 24 hours  chlorhexidine 0.12% Liquid 15 milliLiter(s) Oral Mucosa every 12 hours  chlorhexidine 4% Liquid 1 Application(s) Topical <User Schedule>  dextrose 5%. 1000 milliLiter(s) (50 mL/Hr) IV Continuous <Continuous>  dextrose 50% Injectable 12.5 Gram(s) IV Push once  dextrose 50% Injectable 25 Gram(s) IV Push once  dextrose 50% Injectable 25 Gram(s) IV Push once  insulin lispro (HumaLOG) corrective regimen sliding scale   SubCutaneous every 6 hours  norepinephrine Infusion 0.05 MICROgram(s)/kG/Min (8.081 mL/Hr) IV Continuous <Continuous>  pantoprazole  Injectable 40 milliGRAM(s) IV Push every 12 hours  piperacillin/tazobactam IVPB.. 3.375 Gram(s) IV Intermittent every 12 hours  propofol Infusion 10 MICROgram(s)/kG/Min (6.306 mL/Hr) IV Continuous <Continuous>  sodium bicarbonate  Infusion 0.131 mEq/kG/Hr (75 mL/Hr) IV Continuous <Continuous>      SOCIAL HISTORY:  former smoking,     FAMILY HISTORY:  Family history of breast cancer (Grandparent)      REVIEW OF SYSTEMS:  patient sedated and intubated    VITALS:  T(F): 92.4 (10-19-19 @ 04:01), Max: 92.4 (10-19-19 @ 04:01)  HR: 58 (10-19-19 @ 09:38)  BP: 47/33 (10-19-19 @ 07:30)  RR: 14 (10-19-19 @ 09:08)  SpO2: 97% (10-19-19 @ 09:38)  Wt(kg): --    10-18 @ 07:01  -  10-19 @ 07:00  --------------------------------------------------------  IN: 697.9 mL / OUT: 30 mL / NET: 667.9 mL    10-19 @ 07:01  -  10-19 @ 10:03  --------------------------------------------------------  IN: 336.8 mL / OUT: 0 mL / NET: 336.8 mL      Height (cm): 165.1 (10-19 @ 04:01)  Weight (kg): 105.1 (10-19 @ 04:01)  BMI (kg/m2): 38.6 (10-19 @ 04:01)  BSA (m2): 2.11 (10-19 @ 04:01)    PHYSICAL EXAM:  Constitutional: intubated/sedated   HEENT: eyes closed  Neck: No JVD  Respiratory: diffused rhonchi  Cardiovascular: S1, S2, RRR  Gastrointestinal: BS+, soft, NT/ND  Extremities: ++ peripheral edema  Neurological: A/O x 0  :+ bush.   Skin: No rashes  Vascular Access: left avf + thrill + bruit    LABS:  10-19    137  |  100  |  83<H>  ----------------------------<  192<H>  4.1   |  18<L>  |  5.16<H>    Ca    5.9<LL>      19 Oct 2019 07:24  Phos  8.9     10-19  Mg     1.6     10-19    TPro  6.1  /  Alb  2.8<L>  /  TBili  0.3  /  DBili      /  AST  22  /  ALT  22  /  AlkPhos  128<H>  10-19    Creatinine Trend: 5.16 <--, 5.28 <--, 5.39 <--                        7.7    13.26 )-----------( 196      ( 19 Oct 2019 07:24 )             25.8     Urine Studies:  Urinalysis Basic - ( 19 Oct 2019 03:30 )    Color: YELLOW / Appearance: CLEAR / S.017 / pH: 5.5  Gluc: NEGATIVE / Ketone: NEGATIVE  / Bili: NEGATIVE / Urobili: NORMAL   Blood: NEGATIVE / Protein: 10 / Nitrite: NEGATIVE   Leuk Esterase: NEGATIVE / RBC:  / WBC    Sq Epi:  / Non Sq Epi:  / Bacteria:           RADIOLOGY & ADDITIONAL STUDIES:

## 2019-10-19 NOTE — H&P ADULT - NSHPOUTPATIENTPROVIDERS_GEN_ALL_CORE
PMD: Dr. Lillina Sosa  Vascular: Dr. Daja Johnson PMD: Dr. Lillian Sosa  Cardiology: Dr. Gregor Barrow  Vascular: Dr. Daja Johnson

## 2019-10-19 NOTE — PROGRESS NOTE ADULT - SUBJECTIVE AND OBJECTIVE BOX
CHIEF COMPLAINT: Patient is a 74y old  Female who presents with a chief complaint of AMS and Hypotension (19 Oct 2019 10:02)    Interval Events:  Patient intubated for airway protection, CVL placed for access and high volume pressor requirements.  Will continue abx for suspicion of sepsis, consult nephrology for consideration of dialysis in light of fluid overload and pulmonary edema.    REVIEW OF SYSTEMS:  Unable to obtain due to patient mental status.    OBJECTIVE:  ICU Vital Signs Last 24 Hrs  T(C): 34.1 (19 Oct 2019 08:00), Max: 34.1 (19 Oct 2019 08:00)  T(F): 93.4 (19 Oct 2019 08:00), Max: 93.4 (19 Oct 2019 08:00)  HR: 68 (19 Oct 2019 11:16) (46 - 73)  BP: 47/33 (19 Oct 2019 07:30) (47/33 - 132/90)  BP(mean): 39 (19 Oct 2019 07:30) (39 - 97)  ABP: 131/62 (19 Oct 2019 11:00) (80/45 - 148/72)  ABP(mean): 85 (19 Oct 2019 11:00) (50 - 97)  RR: 17 (19 Oct 2019 11:00) (11 - 23)  SpO2: 97% (19 Oct 2019 11:16) (76% - 100%)    Mode: AC/ CMV (Assist Control/ Continuous Mandatory Ventilation), RR (machine): 18, TV (machine): 400, FiO2: 50, PEEP: 5, MAP: 12, PIP: 26    10-18 @ :  -  10-19 @ 07:00  --------------------------------------------------------  IN: 697.9 mL / OUT: 30 mL / NET: 667.9 mL    10-19 @ 07:  -  10-19 @ 11:20  --------------------------------------------------------  IN: 336.8 mL / OUT: 0 mL / NET: 336.8 mL      CAPILLARY BLOOD GLUCOSE      POCT Blood Glucose.: 183 mg/dL (19 Oct 2019 05:03)      PHYSICAL EXAM:  GENERAL: Intubated and sedated  	HEAD:  Atraumatic, Normocephalic.  CVL in place L IJ.  	CHEST/LUNG: Rhonchi anterior lung fields.  	HEART: Distant heart sounds. Regular rate and rhythm. Normal S1/S2. No murmurs, rubs, or gallops  	ABDOMEN: Soft, non-tender, non-distended; normal bowel sounds, no organomegaly  	EXTREMITIES: 3+ pitting edema up to above the knee. L arm AVF. 2+ peripheral pulses b/l  	NEUROLOGY: A&O x 1, minimally responsive to hearing her name  SKIN: No rashes or lesions    HOSPITAL MEDICATIONS:  MEDICATIONS  (STANDING):  azithromycin  IVPB 500 milliGRAM(s) IV Intermittent every 24 hours  chlorhexidine 0.12% Liquid 15 milliLiter(s) Oral Mucosa every 12 hours  chlorhexidine 4% Liquid 1 Application(s) Topical <User Schedule>  dextrose 5%. 1000 milliLiter(s) (50 mL/Hr) IV Continuous <Continuous>  dextrose 50% Injectable 12.5 Gram(s) IV Push once  dextrose 50% Injectable 25 Gram(s) IV Push once  dextrose 50% Injectable 25 Gram(s) IV Push once  insulin lispro (HumaLOG) corrective regimen sliding scale   SubCutaneous every 6 hours  norepinephrine Infusion 0.05 MICROgram(s)/kG/Min (8.081 mL/Hr) IV Continuous <Continuous>  pantoprazole  Injectable 40 milliGRAM(s) IV Push every 12 hours  piperacillin/tazobactam IVPB.. 3.375 Gram(s) IV Intermittent every 12 hours  propofol Infusion 10 MICROgram(s)/kG/Min (6.306 mL/Hr) IV Continuous <Continuous>  sodium bicarbonate  Infusion 0.131 mEq/kG/Hr (75 mL/Hr) IV Continuous <Continuous>    MEDICATIONS  (PRN):  dextrose 40% Gel 15 Gram(s) Oral once PRN Blood Glucose LESS THAN 70 milliGRAM(s)/deciliter  glucagon  Injectable 1 milliGRAM(s) IntraMuscular once PRN Glucose LESS THAN 70 milligrams/deciliter      LABS:  (10-19 @ 07:24)                        7.7  13.26 )-----------( 196                 25.8    Neutrophils = -- (--%)  Lymphocytes = -- (--%)  Eosinophils = -- (--%)  Basophils = -- (--%)  Monocytes = -- (--%)  Bands = --%    WBC Trend: 13.26<--, 10.57<--  Hb Trend: 7.7<--, 6.4<--  Plt Trend: 196<--, 162<--  10-19    137  |  100  |  83<H>  ----------------------------<  192<H>  4.1   |  18<L>  |  5.16<H>    Ca    5.9<LL>      19 Oct 2019 07:24  Phos  8.9     10-19  Mg     1.6     10-19    TPro  6.1  /  Alb  2.8<L>  /  TBili  0.3  /  DBili  x   /  AST  22  /  ALT  22  /  AlkPhos  128<H>  10-19    Creatinine Trend: 5.16<--, 5.28<--, 5.39<--  PT/INR - ( 18 Oct 2019 21:30 )   PT: 14.5 SEC;   INR: 1.30          PTT - ( 18 Oct 2019 21:30 )  PTT:39.2 SEC  Urinalysis Basic - ( 19 Oct 2019 03:30 )    Color: YELLOW / Appearance: CLEAR / S.017 / pH: 5.5  Gluc: NEGATIVE / Ketone: NEGATIVE  / Bili: NEGATIVE / Urobili: NORMAL   Blood: NEGATIVE / Protein: 10 / Nitrite: NEGATIVE   Leuk Esterase: NEGATIVE / RBC: x / WBC x   Sq Epi: x / Non Sq Epi: x / Bacteria: x      Arterial Blood Gas:  10-19 @ 07:24  7.05/68/75/15/89.9/-11.1  ABG lactate: 1.3  Arterial Blood Gas:  10-19 @ 01:42  7.09/57/100/15/95.5/-11.5  ABG lactate: 1.9    Venous Blood Gas:  10-18 @ 21:30  7.21/49/56/18/82.9  VBG Lactate: 0.7          MICROBIOLOGY:   Blood Cx: pending  Urine Cx: pending  Legionella: pending  RVP: negative    RADIOLOGY:  X Ray: pulmonary edema

## 2019-10-19 NOTE — PROGRESS NOTE ADULT - ASSESSMENT
74F h/o HTN, HLD, IDDM2, COPD (former smoker, 2L/min PRN at home), CHF (EF 12/2018 45-50%), ESRD not on dialysis, p/w acute metabolic encephalopathy likely multifactorial, possibly 2/2 hypoglycemia, hypotension, bradycardia, anemia, acidosis, or uremia.    #Neuro  Acute Metabolic Encephalopathy  - At baseline, A&O x3 and ambulates w/ cane  - Was lethargic and A&O x1, now intubated and sedated  - Likely multifactorial, possibly 2/2 hypoglycemia, hypotension, bradycardia, anemia, acidosis, or uremia  - Neuro checks  - CT head unremarkable    #Respiratory  Pulmonary Edema  - h/o CHF (EF 45-50% 12/2018), dyspneic walking < 1 block at baseline  - Pro-BNP >56000 x3  - CXR showing b/l reticular and patchy opacities c/w pulmonary edema  - Holding home Lasix and metolazone    #CV  Septic Shock with unknown etiology  - Hypotensive to 89/54 in the ED, briefly improved s/p 750cc NS bolus  - Holding all home BP meds  - Started on Levophed gtt    CHF  - Last TTE 10/31/18, EF 45-50% w/ severe LA enlargement, normal PA pressures  - Holding home Lasix and metolazone  - Will get repeat TTE    #GI  - No hematemesis, hematochezia, or melena, but acutely anemic to 6.4 requiring 1u pRBC  - FOBT positive  - On PPI at home, will give pantoprazole IV 40mg qd  - Strict NPO    #Renal  Metabolic Acidosis  - Anion gap minimally elevated  - Bicarb gtt started    ESRD  - SCr 5.39, minimal change from 5.55 in 12/2018  - L arm AVF placed 12/2018, not on dialysis  - Has not followed up w/ her nephrologist recently  - Trend BMP q6h  - Nephrology consulted for consideration of dialysis  - Minimal UOP    #Heme  Anemia  - Hgb 6.4 in the ED, receiving 1u pRBC  - No reported hematochezia or melena, but FOBT positive  - Monitor CBC q6h    #ID  - One week h/o diarrhea  - Hypothermic to 91.1F on admission  - s/p ceftriaxone 1g x1 in the ED  - Broad coverage w/ Vanc, Zosyn, and azithromycin  - f/u BCx and stool studies  - UA and urine Legionella pending    #Endo  IDDM2  - Poor PO intake over past week, on Levemir 10u qd at home  - Hypoglycemic w/ FS 20 at home, s/p 1 amp D50  - Holding home insulin  - FS and low SSI q6h while NPO    #GOC  - Full Code

## 2019-10-19 NOTE — H&P ADULT - NSHPREVIEWOFSYSTEMS_GEN_ALL_CORE
REVIEW OF SYSTEMS:    Unable to obtain ROS from pt; ROS per pt's daughter.    CONSTITUTIONAL: No weakness, fevers or chills  EYES/ENT: +rhinorrhea. No visual changes;  No vertigo or throat pain   NECK: No pain or stiffness  RESPIRATORY: +cough productive of brown sputum. +dyspnea with exertion (walking > 1 block).  CARDIOVASCULAR: No chest pain or palpitations  GASTROINTESTINAL: +diarrhea. No abdominal or epigastric pain. No nausea, vomiting, or hematemesis; No constipation. No melena or hematochezia.  GENITOURINARY: No dysuria, frequency or hematuria  NEUROLOGICAL: No numbness or weakness  SKIN: No itching, burning, rashes, or lesions   All other review of systems is negative unless indicated above.

## 2019-10-19 NOTE — PROGRESS NOTE ADULT - ATTENDING COMMENTS
Septic shock likely due to PNA  Metabolic acidosis  CKD  Acute respiratory failure requiring invasive ventilation    Lung protective ventilation  levophed  broad spectrum Abx  Nephrology evaluation

## 2019-10-19 NOTE — H&P ADULT - NSICDXPASTMEDICALHX_GEN_ALL_CORE_FT
PAST MEDICAL HISTORY:  Adult Idiopathic Generalized Osteoporosis     Anemia     CAD (Coronary Artery Disease) 1 stent placed 11/7/18    Cataract     Cerebral infarction 2011    CHF (congestive heart failure)     Chronic obstructive pulmonary disease, unspecified COPD type     CKD (chronic kidney disease)     COPD (chronic obstructive pulmonary disease)     Diabetes mellitus Insulin dependent. Type 2    Diaphragmatic hernia     DM (Diabetes Mellitus), Secondary     ESRD (end stage renal disease) L arm AVF placed 12/2018    GERD (gastroesophageal reflux disease)     Hemorrhoids     Hyperlipidemia     Hypertension     Hypertension     Morbid obesity

## 2019-10-19 NOTE — ED ADULT NURSE REASSESSMENT NOTE - CONDITION
pt's blood sugar at 2130 was 67. rpt at 2155 66, 2203 was 74 and at 2340 142.  p[t was awake, alert, verbal . given apple juice.  pt h/h 6.3/20.9.  has new 20 gauge inserted right ac, 22 gauge to right hand. pt more restless, agitated, speech garbled, restless.  labs drawn as ordered.  rectal temp 32.9, given 2 units prbc. meds given as ordered.  na bicarb drip still not avilable., pharmacy called. to be given in icu. pt has bruise toe right elbow, multiple brusies to left arm, bruise left abd.  verbal report given to rn martinez.  tfre'd ti icu

## 2019-10-20 LAB
ALBUMIN SERPL ELPH-MCNC: 2.6 G/DL — LOW (ref 3.3–5)
ALP SERPL-CCNC: 108 U/L — SIGNIFICANT CHANGE UP (ref 40–120)
ALT FLD-CCNC: 14 U/L — SIGNIFICANT CHANGE UP (ref 4–33)
ANION GAP SERPL CALC-SCNC: 20 MMO/L — HIGH (ref 7–14)
AST SERPL-CCNC: 10 U/L — SIGNIFICANT CHANGE UP (ref 4–32)
BASE EXCESS BLDA CALC-SCNC: -1.3 MMOL/L — SIGNIFICANT CHANGE UP
BASOPHILS # BLD AUTO: 0.01 K/UL — SIGNIFICANT CHANGE UP (ref 0–0.2)
BASOPHILS NFR BLD AUTO: 0.1 % — SIGNIFICANT CHANGE UP (ref 0–2)
BILIRUB SERPL-MCNC: 0.2 MG/DL — SIGNIFICANT CHANGE UP (ref 0.2–1.2)
BLOOD GAS ARTERIAL - FIO2: 40 — SIGNIFICANT CHANGE UP
BUN SERPL-MCNC: 83 MG/DL — HIGH (ref 7–23)
CA-I BLDA-SCNC: 0.87 MMOL/L — LOW (ref 1.15–1.29)
CALCIUM SERPL-MCNC: 5.8 MG/DL — CRITICAL LOW (ref 8.4–10.5)
CHLORIDE SERPL-SCNC: 98 MMOL/L — SIGNIFICANT CHANGE UP (ref 98–107)
CO2 SERPL-SCNC: 19 MMOL/L — LOW (ref 22–31)
CREAT SERPL-MCNC: 5.17 MG/DL — HIGH (ref 0.5–1.3)
EOSINOPHIL # BLD AUTO: 0.01 K/UL — SIGNIFICANT CHANGE UP (ref 0–0.5)
EOSINOPHIL NFR BLD AUTO: 0.1 % — SIGNIFICANT CHANGE UP (ref 0–6)
FERRITIN SERPL-MCNC: 283.7 NG/ML — HIGH (ref 15–150)
GLUCOSE BLDA-MCNC: 105 MG/DL — HIGH (ref 70–99)
GLUCOSE SERPL-MCNC: 119 MG/DL — HIGH (ref 70–99)
HAPTOGLOB SERPL-MCNC: 147 MG/DL — SIGNIFICANT CHANGE UP (ref 34–200)
HBA1C BLD-MCNC: 5.2 % — SIGNIFICANT CHANGE UP (ref 4–5.6)
HCO3 BLDA-SCNC: 23 MMOL/L — SIGNIFICANT CHANGE UP (ref 22–26)
HCT VFR BLD CALC: 25.2 % — LOW (ref 34.5–45)
HCT VFR BLDA CALC: 22.7 % — LOW (ref 34.5–46.5)
HCV AB S/CO SERPL IA: 0.16 S/CO — SIGNIFICANT CHANGE UP (ref 0–0.99)
HCV AB SERPL-IMP: SIGNIFICANT CHANGE UP
HGB BLD-MCNC: 7.6 G/DL — LOW (ref 11.5–15.5)
HGB BLDA-MCNC: 7.3 G/DL — LOW (ref 11.5–15.5)
IMM GRANULOCYTES NFR BLD AUTO: 0.7 % — SIGNIFICANT CHANGE UP (ref 0–1.5)
IRON SATN MFR SERPL: 125 UG/DL — LOW (ref 140–530)
IRON SATN MFR SERPL: 27 UG/DL — LOW (ref 30–160)
L PNEUMO AG UR QL: NEGATIVE — SIGNIFICANT CHANGE UP
LACTATE BLDA-SCNC: 0.9 MMOL/L — SIGNIFICANT CHANGE UP (ref 0.5–2)
LDH SERPL L TO P-CCNC: 258 U/L — HIGH (ref 135–225)
LYMPHOCYTES # BLD AUTO: 0.88 K/UL — LOW (ref 1–3.3)
LYMPHOCYTES # BLD AUTO: 6.4 % — LOW (ref 13–44)
MAGNESIUM SERPL-MCNC: 1.4 MG/DL — LOW (ref 1.6–2.6)
MCHC RBC-ENTMCNC: 27.6 PG — SIGNIFICANT CHANGE UP (ref 27–34)
MCHC RBC-ENTMCNC: 30.2 % — LOW (ref 32–36)
MCV RBC AUTO: 91.6 FL — SIGNIFICANT CHANGE UP (ref 80–100)
MONOCYTES # BLD AUTO: 0.89 K/UL — SIGNIFICANT CHANGE UP (ref 0–0.9)
MONOCYTES NFR BLD AUTO: 6.5 % — SIGNIFICANT CHANGE UP (ref 2–14)
NEUTROPHILS # BLD AUTO: 11.89 K/UL — HIGH (ref 1.8–7.4)
NEUTROPHILS NFR BLD AUTO: 86.2 % — HIGH (ref 43–77)
NRBC # FLD: 0.22 K/UL — SIGNIFICANT CHANGE UP (ref 0–0)
NRBC FLD-RTO: 1.6 — SIGNIFICANT CHANGE UP
PCO2 BLDA: 36 MMHG — SIGNIFICANT CHANGE UP (ref 32–48)
PH BLDA: 7.41 PH — SIGNIFICANT CHANGE UP (ref 7.35–7.45)
PHOSPHATE SERPL-MCNC: 7.6 MG/DL — HIGH (ref 2.5–4.5)
PLATELET # BLD AUTO: 229 K/UL — SIGNIFICANT CHANGE UP (ref 150–400)
PMV BLD: 10.3 FL — SIGNIFICANT CHANGE UP (ref 7–13)
PO2 BLDA: 100 MMHG — SIGNIFICANT CHANGE UP (ref 83–108)
POTASSIUM BLDA-SCNC: 3.2 MMOL/L — LOW (ref 3.4–4.5)
POTASSIUM SERPL-MCNC: 3.7 MMOL/L — SIGNIFICANT CHANGE UP (ref 3.5–5.3)
POTASSIUM SERPL-SCNC: 3.7 MMOL/L — SIGNIFICANT CHANGE UP (ref 3.5–5.3)
PROT SERPL-MCNC: 5 G/DL — LOW (ref 6–8.3)
PTH-INTACT SERPL-MCNC: 319.9 PG/ML — HIGH (ref 15–65)
RBC # BLD: 2.75 M/UL — LOW (ref 3.8–5.2)
RBC # FLD: 18.1 % — HIGH (ref 10.3–14.5)
SAO2 % BLDA: 98.2 % — SIGNIFICANT CHANGE UP (ref 95–99)
SODIUM BLDA-SCNC: 132 MMOL/L — LOW (ref 136–146)
SODIUM SERPL-SCNC: 137 MMOL/L — SIGNIFICANT CHANGE UP (ref 135–145)
SPECIMEN SOURCE: SIGNIFICANT CHANGE UP
SPECIMEN SOURCE: SIGNIFICANT CHANGE UP
TRANSFERRIN SERPL-MCNC: 106 MG/DL — LOW (ref 200–360)
UIBC SERPL-MCNC: 98.1 UG/DL — LOW (ref 110–370)
VANCOMYCIN FLD-MCNC: 7.2 UG/ML — SIGNIFICANT CHANGE UP
WBC # BLD: 13.78 K/UL — HIGH (ref 3.8–10.5)
WBC # FLD AUTO: 13.78 K/UL — HIGH (ref 3.8–10.5)

## 2019-10-20 PROCEDURE — 36620 INSERTION CATHETER ARTERY: CPT

## 2019-10-20 PROCEDURE — 36010 PLACE CATHETER IN VEIN: CPT

## 2019-10-20 PROCEDURE — 76937 US GUIDE VASCULAR ACCESS: CPT | Mod: 26

## 2019-10-20 PROCEDURE — 99291 CRITICAL CARE FIRST HOUR: CPT

## 2019-10-20 RX ORDER — CALCIUM GLUCONATE 100 MG/ML
1 VIAL (ML) INTRAVENOUS ONCE
Refills: 0 | Status: COMPLETED | OUTPATIENT
Start: 2019-10-20 | End: 2019-10-20

## 2019-10-20 RX ORDER — VANCOMYCIN HCL 1 G
1000 VIAL (EA) INTRAVENOUS ONCE
Refills: 0 | Status: COMPLETED | OUTPATIENT
Start: 2019-10-20 | End: 2019-10-20

## 2019-10-20 RX ORDER — ASPIRIN/CALCIUM CARB/MAGNESIUM 324 MG
81 TABLET ORAL DAILY
Refills: 0 | Status: DISCONTINUED | OUTPATIENT
Start: 2019-10-20 | End: 2019-10-22

## 2019-10-20 RX ORDER — MAGNESIUM SULFATE 500 MG/ML
1 VIAL (ML) INJECTION ONCE
Refills: 0 | Status: COMPLETED | OUTPATIENT
Start: 2019-10-20 | End: 2019-10-20

## 2019-10-20 RX ADMIN — PIPERACILLIN AND TAZOBACTAM 25 GRAM(S): 4; .5 INJECTION, POWDER, LYOPHILIZED, FOR SOLUTION INTRAVENOUS at 05:07

## 2019-10-20 RX ADMIN — CHLORHEXIDINE GLUCONATE 15 MILLILITER(S): 213 SOLUTION TOPICAL at 05:07

## 2019-10-20 RX ADMIN — AZITHROMYCIN 255 MILLIGRAM(S): 500 TABLET, FILM COATED ORAL at 00:24

## 2019-10-20 RX ADMIN — Medication 81 MILLIGRAM(S): at 13:12

## 2019-10-20 RX ADMIN — Medication 100 GRAM(S): at 08:53

## 2019-10-20 RX ADMIN — Medication 4.93 MICROGRAM(S)/KG/MIN: at 08:54

## 2019-10-20 RX ADMIN — CHLORHEXIDINE GLUCONATE 15 MILLILITER(S): 213 SOLUTION TOPICAL at 18:45

## 2019-10-20 RX ADMIN — CHLORHEXIDINE GLUCONATE 1 APPLICATION(S): 213 SOLUTION TOPICAL at 23:12

## 2019-10-20 RX ADMIN — PROPOFOL 6.31 MICROGRAM(S)/KG/MIN: 10 INJECTION, EMULSION INTRAVENOUS at 23:13

## 2019-10-20 RX ADMIN — PIPERACILLIN AND TAZOBACTAM 25 GRAM(S): 4; .5 INJECTION, POWDER, LYOPHILIZED, FOR SOLUTION INTRAVENOUS at 18:45

## 2019-10-20 RX ADMIN — PANTOPRAZOLE SODIUM 40 MILLIGRAM(S): 20 TABLET, DELAYED RELEASE ORAL at 05:07

## 2019-10-20 RX ADMIN — Medication 200 GRAM(S): at 06:48

## 2019-10-20 RX ADMIN — PANTOPRAZOLE SODIUM 40 MILLIGRAM(S): 20 TABLET, DELAYED RELEASE ORAL at 18:45

## 2019-10-20 RX ADMIN — PROPOFOL 6.31 MICROGRAM(S)/KG/MIN: 10 INJECTION, EMULSION INTRAVENOUS at 08:54

## 2019-10-20 RX ADMIN — Medication 200 GRAM(S): at 17:03

## 2019-10-20 RX ADMIN — Medication 75 MEQ/KG/HR: at 08:54

## 2019-10-20 RX ADMIN — Medication 250 MILLIGRAM(S): at 17:03

## 2019-10-20 NOTE — PROGRESS NOTE ADULT - ASSESSMENT
74F h/o HTN, HLD, IDDM2, COPD (former smoker, 2L/min PRN at home), CHF (EF 12/2018 45-50%), ESRD not on dialysis, p/w acute metabolic encephalopathy likely multifactorial, possibly 2/2 hypoglycemia, hypotension, bradycardia, anemia, acidosis, or uremia.    #Neuro  Acute Metabolic Encephalopathy  - At baseline, A&O x3 and ambulates w/ cane  - Was lethargic and A&O x1, now intubated and sedated  - Likely multifactorial, possibly 2/2 hypoglycemia, hypotension, bradycardia, anemia, acidosis, or uremia  - Neuro checks  - CT head unremarkable    #Respiratory  Pulmonary Edema  - h/o CHF (EF 45-50% 12/2018), dyspneic walking < 1 block at baseline  - Pro-BNP >56000 x3  - CXR showing b/l reticular and patchy opacities c/w pulmonary edema  - Holding home Lasix and metolazone    #CV  Septic Shock with unknown etiology  - Hypotensive to 89/54 in the ED, briefly improved s/p 750cc NS bolus  - Holding all home BP meds  - Started on Levophed gtt    CHF  - Last TTE 10/31/18, EF 45-50% w/ severe LA enlargement, normal PA pressures  - Holding home Lasix and metolazone  - Will get repeat TTE    #GI  - No hematemesis, hematochezia, or melena, but acutely anemic to 6.4 requiring 1u pRBC  - FOBT positive  - On PPI at home, will give pantoprazole IV 40mg qd  - Strict NPO    #Renal  Metabolic Acidosis  - Anion gap minimally elevated  - Bicarb gtt started    ESRD  - SCr 5.39, minimal change from 5.55 in 12/2018  - L arm AVF placed 12/2018, not on dialysis  - Has not followed up w/ her nephrologist recently  - Trend BMP q6h  - Nephrology consulted for consideration of dialysis  - Minimal UOP    #Heme  Anemia  - Hgb 6.4 in the ED, receiving 1u pRBC  - No reported hematochezia or melena, but FOBT positive  - Monitor CBC q6h    #ID  - One week h/o diarrhea  - Hypothermic to 91.1F on admission  - s/p ceftriaxone 1g x1 in the ED  - Broad coverage w/ Vanc, Zosyn, and azithromycin  - f/u BCx and stool studies  - UA and urine Legionella pending    #Endo  IDDM2  - Poor PO intake over past week, on Levemir 10u qd at home  - Hypoglycemic w/ FS 20 at home, s/p 1 amp D50  - Holding home insulin  - FS and low SSI q6h while NPO    #GOC  - Full Code 74F h/o HTN, HLD, IDDM2, COPD (former smoker, 2L/min PRN at home), CHF (EF 12/2018 45-50%), ESRD not on dialysis, p/w acute metabolic encephalopathy likely multifactorial, possibly 2/2 hypoglycemia, hypotension, bradycardia, anemia, acidosis, or uremia.    #Neuro  Acute Metabolic Encephalopathy  - At baseline, A&O x3 and ambulates w/ cane  - Was lethargic and A&O x1, now intubated and sedated  - Likely multifactorial, possibly 2/2 hypoglycemia, hypotension, bradycardia, anemia, acidosis, or uremia  - Neuro checks  - Plan to decrease sedation today to reassess mental status    #Respiratory  Pulmonary Edema  - h/o CHF (EF 45-50% 12/2018), dyspneic walking < 1 block at baseline  - Pro-BNP >56000 x3  - CXR showing b/l reticular and patchy opacities c/w pulmonary edema  - Holding home Lasix and metolazone, OK to administer lasix PRN, but patient with adequate urine output at this time.     #CV  Septic Shock with unknown etiology  - Hypotensive to 89/54 in the ED, briefly improved s/p 750cc NS bolus  - Holding all home BP meds  - Started on Levophed gtt, titrated down to 0.03 mcg/kg/hour    CHF  - Last TTE 10/31/18, EF 45-50% w/ severe LA enlargement, normal PA pressures  - Holding home Lasix and metolazone  - Repeat echo showing no focal segmental abnormality    #GI  - No hematemesis, hematochezia, or melena, but acutely anemic to 6.4 requiring 1u pRBC  - FOBT positive  - On PPI at home, will give pantoprazole IV 40mg qd  - Strict NPO    #Renal  Metabolic Acidosis  - Anion gap minimally elevated  - Discontinuing bicarb drip at this time    ESRD  - SCr 5.39, minimal change from 5.55 in 12/2018  - L arm AVF placed 12/2018, not on dialysis  - Has not followed up w/ her nephrologist recently  - Trend BMP q6h  - Nephrology consulted for consideration of dialysis  - Adequate urine output, will continue to monitor     #Heme  Anemia  - Hgb 6.4 in the ED, received 1u pRBC  - No reported hematochezia or melena, but FOBT positive  - Monitor CBC q6h  - Hgb stable today (7.6 <- 7.0)    #ID  - One week h/o diarrhea  - Hypothermic to 91.1F on admission  - s/p ceftriaxone 1g x1 in the ED  - Broad coverage w/ Vanc, Zosyn, and azithromycin  - f/u BCx and stool studies  - UA and urine Legionella pending    #Endo  IDDM2  - Poor PO intake over past week, on Levemir 10u qd at home  - Hypoglycemic w/ FS 20 at home, s/p 1 amp D50  - Holding home insulin  - FS and low SSI q6h while NPO    #GOC  - Full Code

## 2019-10-20 NOTE — PROGRESS NOTE ADULT - ATTENDING COMMENTS
74 F PMH COPD, CKD presented with hypothermia, AMS, hypoglycemia likely due to septic shock secondary to PNA. Also had heme positive stool. HGb stable.    last ABG 7.33/35/90 on 24/400/40%/5    Zosyn, vanc and azithromycin  IV PPI  Levophed  nephrology and cardiology recs appreciated  weaning sedation today  SAT/SBT possibly tomorrow

## 2019-10-20 NOTE — CONSULT NOTE ADULT - SUBJECTIVE AND OBJECTIVE BOX
Gregor Barrow MD  Interventional Cardiology / Endovascular Specialist  [place] Office : [phone] [place] [address] [place] [address]  Tel:   [place] Office : [phone] [facility] [place] [address]  Tel: [phone]  Cell : [phone]      HISTORY OF PRESENTING ILLNESS:    74F h/o HTN, HLD, IDDM2, COPD (former smoker, 2L/min PRN at home), CHF (EF 12/2018 45-50%), ESRD not on dialysis, p/w AMS and hypotension. Per daughter at bedside, pt has been having a "chest cold" for the past week associated with cough productive of brown sputum, rhinorrhea, and diarrhea. Her daughter notes poor PO intake recently but pt has been receiving usual dose of insulin. At baseline, pt is A&Ox3 and ambulates w/ a cane, but can only walk short distances without becoming short of breath (< 1 block). Last known normal mental status was 10/18 in the AM. In the afternoon on 10/18, daughter found pt unresponsive. EMS measured FS 20, gave 1 amp of D50 with FS >200 after. Her daughter recently had a cold last week. Per daughter, pt has not had fevers, chills, nausea, vomiting, oliguria or anuria, dysuria, CP, palpitations, SOB, hematochezia, hematemesis, melena, and recent travel.     Of note, pt has ESRD w/ fistula placed 12/2018, but has not been getting dialysis. She has not followed up with any doctors recently.    In the ED, T 91.1F, HR 48, BP 89/54, RR 18. Initially hypotensive, given 750cc bolus w/ improvement in BP and mental status. However, spontaneously became hypotensive and altered while eating. She has been bradycardic even when her mental status had improved briefly. Hgb was 6.4, received 2u pRBC. Also received ceftriaxone 1g x1, Solumedrol 40mg x1, Zofran 4mg x1, and duonebs x3.    PAST MEDICAL & SURGICAL HISTORY:  ESRD (end stage renal disease): L arm AVF placed 12/2018  Hemorrhoids  GERD (gastroesophageal reflux disease)  Morbid obesity  Cataract  Diaphragmatic hernia  Cerebral infarction: 2011  CKD (chronic kidney disease)  Anemia  Diabetes mellitus: Insulin dependent. Type 2  CHF (congestive heart failure)  Hyperlipidemia  Hypertension  COPD (chronic obstructive pulmonary disease)  Chronic obstructive pulmonary disease, unspecified COPD type  Adult Idiopathic Generalized Osteoporosis  CAD (Coronary Artery Disease): 1 stent placed 11/7/18  DM (Diabetes Mellitus), Secondary  Hypertension  H/O coronary angiogram: [facility] 1 stent placed 11/7/2018      SOCIAL HISTORY: Substance Use (street drugs): ( x ) never used  (  ) other:    FAMILY HISTORY:  Family history of breast cancer (Grandparent)      REVIEW OF SYSTEMS:  Pt intubated and sedated unable to obtain     MEDICATIONS:  aspirin  chewable 81 milliGRAM(s) Enteral Tube daily  norepinephrine Infusion 0.05 MICROgram(s)/kG/Min IV Continuous <Continuous>  azithromycin  IVPB 500 milliGRAM(s) IV Intermittent every 24 hours  piperacillin/tazobactam IVPB.. 3.375 Gram(s) IV Intermittent every 12 hours  propofol Infusion 10 MICROgram(s)/kG/Min IV Continuous <Continuous>  pantoprazole  Injectable 40 milliGRAM(s) IV Push every 12 hours  dextrose 40% Gel 15 Gram(s) Oral once PRN  dextrose 50% Injectable 12.5 Gram(s) IV Push once  dextrose 50% Injectable 25 Gram(s) IV Push once  dextrose 50% Injectable 25 Gram(s) IV Push once  glucagon  Injectable 1 milliGRAM(s) IntraMuscular once PRN  insulin lispro (HumaLOG) corrective regimen sliding scale   SubCutaneous every 6 hours  chlorhexidine 0.12% Liquid 15 milliLiter(s) Oral Mucosa every 12 hours  chlorhexidine 4% Liquid 1 Application(s) Topical <User Schedule>  dextrose 5%. 1000 milliLiter(s) IV Continuous <Continuous>  sodium chloride 0.9% lock flush 10 milliLiter(s) IV Push every 1 hour PRN      FAMILY HISTORY:  Family history of breast cancer (Grandparent)        Allergies    calcium acetate (Vomiting; Nausea; Chills)  wool-rash (Other)    Intolerances    	      PHYSICAL EXAM:  T(C): 36.9 (10-20-19 @ 12:00), Max: 37.8 (10-20-19 @ 00:00)  HR: 70 (10-20-19 @ 13:00) (60 - 86)  BP: 102/52 (10-20-19 @ 08:00) (102/52 - 102/52)  RR: 24 (10-20-19 @ 13:00) (21 - 24)  SpO2: 97% (10-20-19 @ 13:00) (92% - 100%)  Wt(kg): --  I&O's Summary    19 Oct 2019 07:01  -  20 Oct 2019 07:00  --------------------------------------------------------  IN: 3055 mL / OUT: 1024 mL / NET: 2031 mL    20 Oct 2019 07:01  -  20 Oct 2019 13:14  --------------------------------------------------------  IN: 472.3 mL / OUT: 402 mL / NET: 70.3 mL        GENERAL: Obese , intubated and sedated   EYES: EOMI, PERRLA, conjunctiva and sclera clear  ENMT: Intubated and sedated   Cardiovascular: Normal S1 S2, No JVD, No murmurs, No edema  Respiratory: b/l basal creps 	  Gastrointestinal:  Soft, Non-tender, + BS	  Extremities: No clubbing, cyanosis or edema  LYMPH: No lymphadenopathy noted    LABS:	 	    CARDIAC MARKERS:                                  7.6    13.78 )-----------( 229      ( 20 Oct 2019 02:40 )             25.2     10-20    137  |  98  |  83<H>  ----------------------------<  119<H>  3.7   |  19<L>  |  5.17<H>    Ca    5.8<LL>      20 Oct 2019 02:40  Phos  7.6     10-20  Mg     1.4     10-20    TPro  5.0<L>  /  Alb  2.6<L>  /  TBili  0.2  /  DBili  x   /  AST  10  /  ALT  14  /  AlkPhos  108  10-20    proBNP:   Lipid Profile:   HgA1c: Hemoglobin A1C, Whole Blood: 5.2 % (10-20 @ 02:40)    TSH:     Consultant(s) Notes Reviewed:  [x ] YES  [ ] NO    Care Discussed with Consultants/Other Providers [ x] YES  [ ] NO    Imaging Personally Reviewed independently:  [x] YES  [ ] NO    All labs, radiologic studies, vitals, orders and medications list reviewed. Patient is seen and examined at bedside. Case discussed with medical team.    ASSESSMENT/PLAN:

## 2019-10-20 NOTE — DIETITIAN INITIAL EVALUATION ADULT. - ADD RECOMMEND
1). If pt to remain with NPO status, consider alternative means of nutrition support. 2). Monitor weights, labs, BM's, skin integrity and edema. 3). Follow up as per protocol.

## 2019-10-20 NOTE — CHART NOTE - NSCHARTNOTEFT_GEN_A_CORE
NUTRITION SERVICES     Upon Nutritional Assessment by the Registered Dietitian your patient was determined to meet criteria/ has evidence of the following diagnosis/diagnoses:  [ ] Mild Protein Calorie Malnutrition   [ ] Moderate Protein Calorie Malnutrition   [x ] Severe Protein Calorie Malnutrition     Findings as based on:  •  Comprehensive nutritional assessment and consultation    Etiology: in the context of acute illness.     Signs/Symptoms: <50% of oral PO intake over 1 wk PTA; 2+ generalized edema    Please refer to Initial Dietitian Evaluation via documents section of [facility] EMR for further recommendations.

## 2019-10-20 NOTE — PROGRESS NOTE ADULT - SUBJECTIVE AND OBJECTIVE BOX
Patient seen and examined at bedside. Sedated and intubated    VITALS:  T(F): 98.4 (10-20-19 @ 12:00), Max: 100 (10-20-19 @ 00:00)  HR: 66 (10-20-19 @ 14:00)  BP: 102/52 (10-20-19 @ 08:00)  RR: 24 (10-20-19 @ 14:00)  SpO2: 97% (10-20-19 @ 14:00)  Wt(kg): --    10-19 @ 07:01  -  10-20 @ 07:00  --------------------------------------------------------  IN: 3055 mL / OUT: 1024 mL / NET: 2031 mL    10-20 @ 07:01  -  10-20 @ 14:48  --------------------------------------------------------  IN: 503.9 mL / OUT: 569 mL / NET: -65.1 mL          PHYSICAL EXAM:  Constitutional: NAD  Neck: No JVD  Respiratory: Intubated  Cardiovascular: S1, S2, RRR  Gastrointestinal: BS+, soft, NT/ND  Extremities: (+) peripheral edema. (+) [facility] Medications:   MEDICATIONS  (STANDING):  aspirin  chewable 81 milliGRAM(s) Enteral Tube daily  azithromycin  IVPB 500 milliGRAM(s) IV Intermittent every 24 hours  chlorhexidine 0.12% Liquid 15 milliLiter(s) Oral Mucosa every 12 hours  chlorhexidine 4% Liquid 1 Application(s) Topical <User Schedule>  dextrose 5%. 1000 milliLiter(s) (50 mL/Hr) IV Continuous <Continuous>  dextrose 50% Injectable 12.5 Gram(s) IV Push once  dextrose 50% Injectable 25 Gram(s) IV Push once  dextrose 50% Injectable 25 Gram(s) IV Push once  insulin lispro (HumaLOG) corrective regimen sliding scale   SubCutaneous every 6 hours  norepinephrine Infusion 0.05 MICROgram(s)/kG/Min (4.927 mL/Hr) IV Continuous <Continuous>  pantoprazole  Injectable 40 milliGRAM(s) IV Push every 12 hours  piperacillin/tazobactam IVPB.. 3.375 Gram(s) IV Intermittent every 12 hours  propofol Infusion 10 MICROgram(s)/kG/Min (6.306 mL/Hr) IV Continuous <Continuous>      LABS:  10-20    137  |  98  |  83<H>  ----------------------------<  119<H>  3.7   |  19<L>  |  5.17<H>    Ca    5.8<LL>      20 Oct 2019 02:40  Phos  7.6     10-20  Mg     1.4     10-20    TPro  5.0<L>  /  Alb  2.6<L>  /  TBili  0.2  /  DBili      /  AST  10  /  ALT  14  /  AlkPhos  108  10-20    Creatinine Trend: 5.17 <--, 5.34 <--, 5.24 <--, 5.16 <--, 5.28 <--, 5.39 <--  Ferritin, Serum: 283.7 ng/mL (10-20 @ 02:40)  Iron Total, Serum: 27 ug/dL (10-20 @ 02:40)  Phosphorus Level, Serum: 7.6 mg/dL (10-20 @ 02:40)  Albumin, Serum: 2.6 g/dL (10-20 @ 02:40)  Phosphorus Level, Serum: 8.0 mg/dL (10-19 @ 20:40)    calcium--  intact pth--  parathyroid hormone intact, [id number]                            7.6    13.78 )-----------( 229      ( 20 Oct 2019 02:40 )             25.2     Urine Studies:      Urinalysis - [10-19-19 @ 03:30]      Color YELLOW / Appearance CLEAR / SG 1.017 / pH 5.5      Gluc NEGATIVE / Ketone NEGATIVE  / Bili NEGATIVE / Urobili NORMAL       Blood NEGATIVE / Protein 10 / Leuk Est NEGATIVE / Nitrite NEGATIVE      RBC  / WBC  / Hyaline  / Gran  / Sq Epi  / Non Sq Epi  / Bacteria       Iron 27, TIBC 125, %sat --      [10-20-19 @ 02:40]  Ferritin 283.7      [10-20-19 @ 02:40]  .9 (Ca --)      [10-20-19 @ 02:40]   --  PTH -- (Ca 8.7)      [10-31-18 @ 09:24]   269  HbA1c 5.2      [10-20-19 @ 02:40]  TSH 1.11      [10-18-19 @ 23:20]    HCV 0.16, Nonreactive Hepatitis C AB  S/CO Ratio                        Interpretation  < 1.00                                   Non-Reactive  1.00 - 4.99                         Weakly-Reactive  >= 5.00                                Reactive  Non-Reactive: [place] with a non-reactive HCV antibody  result is considered uninfected.  No further action is  needed unless recent infection is suspected.  In these  cases, consider repeat testing later to detect  seroconversion..  Weakly-Reactive: HCV antibody test is abnormal, HCV RNA  Qualitative test will follow.  Reactive: HCV antibody test is abnormal, HCV RNA  Qualitative test will follow.  Note: HCV antibody testing is performed on the [employer]   system.      [10-19-19 @ 12:30]      RADIOLOGY & ADDITIONAL STUDIES:

## 2019-10-20 NOTE — DIETITIAN INITIAL EVALUATION ADULT. - PERTINENT MEDS FT
MEDICATIONS  (STANDING):  aspirin  chewable 81 milliGRAM(s) Enteral Tube daily  azithromycin  IVPB 500 milliGRAM(s) IV Intermittent every 24 hours  chlorhexidine 0.12% Liquid 15 milliLiter(s) Oral Mucosa every 12 hours  chlorhexidine 4% Liquid 1 Application(s) Topical <User Schedule>  dextrose 5%. 1000 milliLiter(s) (50 mL/Hr) IV Continuous <Continuous>  dextrose 50% Injectable 12.5 Gram(s) IV Push once  dextrose 50% Injectable 25 Gram(s) IV Push once  dextrose 50% Injectable 25 Gram(s) IV Push once  insulin lispro (HumaLOG) corrective regimen sliding scale   SubCutaneous every 6 hours  norepinephrine Infusion 0.05 MICROgram(s)/kG/Min (4.927 mL/Hr) IV Continuous <Continuous>  pantoprazole  Injectable 40 milliGRAM(s) IV Push every 12 hours  piperacillin/tazobactam IVPB.. 3.375 Gram(s) IV Intermittent every 12 hours  propofol Infusion 10 MICROgram(s)/kG/Min (6.306 mL/Hr) IV Continuous <Continuous>

## 2019-10-20 NOTE — DIETITIAN INITIAL EVALUATION ADULT. - PERTINENT LABORATORY DATA
10-20 Na 137 mmol/L Glu 119 mg/dL<H> K+ 3.7 mmol/L Cr 5.17 mg/dL<H> BUN 83 mg/dL<H> Phos 7.6 mg/dL<H>  10-20-19 HbA1c 5.2 %  10-20 @ 12:01 POCT 106 mg/dL  10-20 @ 05:04 POCT 108 mg/dL  10-19 @ 23:25 POCT 107 mg/dL  10-19 @ 18:28 POCT 157 mg/dL

## 2019-10-20 NOTE — DIETITIAN INITIAL EVALUATION ADULT. - OTHER INFO
75 y/o female admitted with dx of severe sepsis and acute metabolic encephalopathy - pt intubated and sedated on propofol, which is providing approx 166.5 kcal/day. Interviewed pt's daughter for Nutrition hx. Daughter said that pt has had a cold with cough and diarrhea for a week PTA. Pt was only able to tolerate small amounts of food before diarrhea would ensue. Daughter denies food allergies, nausea/vomiting/constipation, or issues with chewing/swallowing PTA. Before this past week, daughter said that her mother's food intake was good with no significant weight change. Daughter said that pt has had previous hospital admissions and has been given dietary instruction on Consistent Carbohydrate, Low Sodium and "Renal" diets. She stated that her mother has been "pretty good" with following therapeutic diets. Pt noted to have 2+ generalized edema. If pt is to remain NPO for an extended time, suggest alternative means of nutrition support. Pt is at severe risk of malnutrition based on poor PO intake over 1 wk PTA and 2+ generalized edema.

## 2019-10-20 NOTE — PROGRESS NOTE ADULT - SUBJECTIVE AND OBJECTIVE BOX
CHIEF COMPLAINT: Patient is a 74y old  Female who presents with a chief complaint of AMS and Hypotension (19 Oct 2019 11:20)    Interval Events:    REVIEW OF SYSTEMS:  Constitutional:   Eyes:  ENT:  CV:  Resp:  GI:  :  MSK:  Integumentary:  Neurological:  Psychiatric:  Endocrine:  Hematologic/Lymphatic:  Allergic/Immunologic:  [ ] All other systems negative  [ ] Unable to assess ROS because ________    OBJECTIVE:  ICU Vital Signs Last 24 Hrs  T(C): 37.8 (20 Oct 2019 04:00), Max: 37.8 (20 Oct 2019 00:00)  T(F): 100 (20 Oct 2019 04:00), Max: 100 (20 Oct 2019 00:00)  HR: 69 (20 Oct 2019 07:18) (57 - 86)  BP: 47/33 (19 Oct 2019 07:30) (47/33 - 47/33)  BP(mean): 39 (19 Oct 2019 07:30) (39 - 39)  ABP: 104/77 (20 Oct 2019 06:00) (81/36 - 148/72)  ABP(mean): 90 (20 Oct 2019 06:00) (50 - 97)  RR: 21 (20 Oct 2019 06:00) (11 - 24)  SpO2: 93% (20 Oct 2019 07:18) (79% - 100%)    Mode: AC/ CMV (Assist Control/ Continuous Mandatory Ventilation), RR (machine): 24, TV (machine): 400, FiO2: 40, PEEP: 5, MAP: 11, PIP: 28    10- @ 07:01  -  10-20 @ 07:00  --------------------------------------------------------  IN: 3055 mL / OUT: 1024 mL / NET: 2031 mL      CAPILLARY BLOOD GLUCOSE      POCT Blood Glucose.: 108 mg/dL (20 Oct 2019 05:04)      PHYSICAL EXAM:  General: NAD, doing well.  HEENT: Intubated. OG in place. Brainstem reflexes in place  Lymph Nodes: No YOLY palpated  Neck: trachea midline. no trach.   Respiratory: CTA b/l. No rales, rhonchi, wheezing appreciated.  Cardiovascular: RRR. +s1/s2, -s3/s4. No murmur, rubs, gallops. No edema  Abdomen: NT/ND. +BS, No HSM.   Extremities: 2+ pulses  Skin: edematous. No rashes, ecchymoses, or petechiae noted  Neurological: AAOx3. DTR 2+. strength 5/5 UE/LE bilaterally.   Psychiatry: Appropriate mood and affect.    HOSPITAL MEDICATIONS:  MEDICATIONS  (STANDING):  azithromycin  IVPB 500 milliGRAM(s) IV Intermittent every 24 hours  chlorhexidine 0.12% Liquid 15 milliLiter(s) Oral Mucosa every 12 hours  chlorhexidine 4% Liquid 1 Application(s) Topical <User Schedule>  dextrose 5%. 1000 milliLiter(s) (50 mL/Hr) IV Continuous <Continuous>  dextrose 50% Injectable 12.5 Gram(s) IV Push once  dextrose 50% Injectable 25 Gram(s) IV Push once  dextrose 50% Injectable 25 Gram(s) IV Push once  insulin lispro (HumaLOG) corrective regimen sliding scale   SubCutaneous every 6 hours  magnesium sulfate  IVPB 1 Gram(s) IV Intermittent once  norepinephrine Infusion 0.05 MICROgram(s)/kG/Min (4.927 mL/Hr) IV Continuous <Continuous>  pantoprazole  Injectable 40 milliGRAM(s) IV Push every 12 hours  piperacillin/tazobactam IVPB.. 3.375 Gram(s) IV Intermittent every 12 hours  propofol Infusion 10 MICROgram(s)/kG/Min (6.306 mL/Hr) IV Continuous <Continuous>  sodium bicarbonate  Infusion 0.131 mEq/kG/Hr (75 mL/Hr) IV Continuous <Continuous>    MEDICATIONS  (PRN):  dextrose 40% Gel 15 Gram(s) Oral once PRN Blood Glucose LESS THAN 70 milliGRAM(s)/deciliter  glucagon  Injectable 1 milliGRAM(s) IntraMuscular once PRN Glucose LESS THAN 70 milligrams/deciliter  sodium chloride 0.9% lock flush 10 milliLiter(s) IV Push every 1 hour PRN Pre/post blood products, medications, blood draw, and to maintain line patency      LABS:  (10-20 @ 02:40)                        7.6  13.78 )-----------( 229                 25.2    Neutrophils = 11.89 (86.2%)  Lymphocytes = 0.88 (6.4%)  Eosinophils = 0.01 (0.1%)  Basophils = 0.01 (0.1%)  Monocytes = 0.89 (6.5%)  Bands = --%    WBC Trend: 13.78<--, 10.62<--, 11.75<--  Hb Trend: 7.6<--, 7.0<--, 7.5<--, 7.7<--, 6.4<--  Plt Trend: 229<--, 172<--, 195<--, 196<--, 162<--  10-20    137  |  98  |  83<H>  ----------------------------<  119<H>  3.7   |  19<L>  |  5.17<H>    Ca    5.8<LL>      20 Oct 2019 02:40  Phos  7.6     10  Mg     1.4     10-20    TPro  5.0<L>  /  Alb  2.6<L>  /  TBili  0.2  /  DBili  x   /  AST  10  /  ALT  14  /  AlkPhos  108  10    Creatinine Trend: 5.17<--, 5.34<--, 5.24<--, 5.16<--, 5.28<--, 5.39<--  PT/INR - ( 18 Oct 2019 21:30 )   PT: 14.5 SEC;   INR: 1.30          PTT - ( 18 Oct 2019 21:30 )  PTT:39.2 SEC  Urinalysis Basic - ( 19 Oct 2019 03:30 )    Color: YELLOW / Appearance: CLEAR / S.017 / pH: 5.5  Gluc: NEGATIVE / Ketone: NEGATIVE  / Bili: NEGATIVE / Urobili: NORMAL   Blood: NEGATIVE / Protein: 10 / Nitrite: NEGATIVE   Leuk Esterase: NEGATIVE / RBC: x / WBC x   Sq Epi: x / Non Sq Epi: x / Bacteria: x      Arterial Blood Gas:  10-19 @ 14:33  7.33/35/90/19/97.7/-6.9  ABG lactate: 0.9  Arterial Blood Gas:  10-19 @ 12:10  7.22/47/151/18/98.7/-7.7  ABG lactate: 0.9  Arterial Blood Gas:  10-19 @ 07:24  7.05/68/75/15/89.9/-11.1  ABG lactate: 1.3  Arterial Blood Gas:  10-19 @ 01:42  7.09/57/100/15/95.5/-11.5  ABG lactate: 1.9    Venous Blood Gas:  10-18 @ 21:30  7.21/49/56/18/82.9  VBG Lactate: 0.7          MICROBIOLOGY:   Blood Cx:  Urine Cx:  Sputum Cx:  Legionella:  RVP:    RADIOLOGY:  X Ray:  CT:  MRI:  Ultrasound:  [ ] Reviewed and interpreted by me    EKG: CHIEF COMPLAINT: Patient is a 74y old  Female who presents with a chief complaint of AMS and Hypotension (19 Oct 2019 11:20)    Interval Events: No acute events overnight.     REVIEW OF SYSTEMS:  Unable to assess ROS because of sedation/intubation.    OBJECTIVE:  ICU Vital Signs Last 24 Hrs  T(C): 37.8 (20 Oct 2019 04:00), Max: 37.8 (20 Oct 2019 00:00)  T(F): 100 (20 Oct 2019 04:00), Max: 100 (20 Oct 2019 00:00)  HR: 69 (20 Oct 2019 07:18) (57 - 86)  BP: 47/33 (19 Oct 2019 07:30) (47/33 - 47/33)  BP(mean): 39 (19 Oct 2019 07:30) (39 - 39)  ABP: 104/77 (20 Oct 2019 06:00) (81/36 - 148/72)  ABP(mean): 90 (20 Oct 2019 06:00) (50 - 97)  RR: 21 (20 Oct 2019 06:00) (11 - 24)  SpO2: 93% (20 Oct 2019 07:18) (79% - 100%)    Mode: AC/ CMV (Assist Control/ Continuous Mandatory Ventilation), RR (machine): 24, TV (machine): 400, FiO2: 40, PEEP: 5, MAP: 11, PIP: 28    10-19 @ 07:01  -  10-20 @ 07:00  --------------------------------------------------------  IN: 3055 mL / OUT: 1024 mL / NET: 2031 mL      CAPILLARY BLOOD GLUCOSE      POCT Blood Glucose.: 108 mg/dL (20 Oct 2019 05:04)      PHYSICAL EXAM:  General: NAD, doing well.  HEENT: Intubated. OG in place. Brainstem reflexes in place  Lymph Nodes: No YOLY palpated  Neck: trachea midline. no trach.   Respiratory: CTA b/l. No rales, rhonchi, wheezing appreciated.  Cardiovascular: RRR. +s1/s2, -s3/s4. No murmur, rubs, gallops. No edema  Abdomen: NT/ND. +BS, No HSM.   Extremities: 2+ pulses  Skin: edematous. No rashes, ecchymoses, or petechiae noted  Neurological: AAOx3. DTR 2+. strength 5/5 UE/LE bilaterally.   Psychiatry: Appropriate mood and affect.    HOSPITAL MEDICATIONS:  MEDICATIONS  (STANDING):  azithromycin  IVPB 500 milliGRAM(s) IV Intermittent every 24 hours  chlorhexidine 0.12% Liquid 15 milliLiter(s) Oral Mucosa every 12 hours  chlorhexidine 4% Liquid 1 Application(s) Topical <User Schedule>  dextrose 5%. 1000 milliLiter(s) (50 mL/Hr) IV Continuous <Continuous>  dextrose 50% Injectable 12.5 Gram(s) IV Push once  dextrose 50% Injectable 25 Gram(s) IV Push once  dextrose 50% Injectable 25 Gram(s) IV Push once  insulin lispro (HumaLOG) corrective regimen sliding scale   SubCutaneous every 6 hours  magnesium sulfate  IVPB 1 Gram(s) IV Intermittent once  norepinephrine Infusion 0.05 MICROgram(s)/kG/Min (4.927 mL/Hr) IV Continuous <Continuous>  pantoprazole  Injectable 40 milliGRAM(s) IV Push every 12 hours  piperacillin/tazobactam IVPB.. 3.375 Gram(s) IV Intermittent every 12 hours  propofol Infusion 10 MICROgram(s)/kG/Min (6.306 mL/Hr) IV Continuous <Continuous>  sodium bicarbonate  Infusion 0.131 mEq/kG/Hr (75 mL/Hr) IV Continuous <Continuous>    MEDICATIONS  (PRN):  dextrose 40% Gel 15 Gram(s) Oral once PRN Blood Glucose LESS THAN 70 milliGRAM(s)/deciliter  glucagon  Injectable 1 milliGRAM(s) IntraMuscular once PRN Glucose LESS THAN 70 milligrams/deciliter  sodium chloride 0.9% lock flush 10 milliLiter(s) IV Push every 1 hour PRN Pre/post blood products, medications, blood draw, and to maintain line patency      LABS:  (10- @ 02:40)                        7.6  13.78 )-----------( 229                 25.2    Neutrophils = 11.89 (86.2%)  Lymphocytes = 0.88 (6.4%)  Eosinophils = 0.01 (0.1%)  Basophils = 0.01 (0.1%)  Monocytes = 0.89 (6.5%)  Bands = --%    WBC Trend: 13.78<--, 10.62<--, 11.75<--  Hb Trend: 7.6<--, 7.0<--, 7.5<--, 7.7<--, 6.4<--  Plt Trend: 229<--, 172<--, 195<--, 196<--, 162<--  10-20    137  |  98  |  83<H>  ----------------------------<  119<H>  3.7   |  19<L>  |  5.17<H>    Ca    5.8<LL>      20 Oct 2019 02:40  Phos  7.6     10-20  Mg     1.4     10-20    TPro  5.0<L>  /  Alb  2.6<L>  /  TBili  0.2  /  DBili  x   /  AST  10  /  ALT  14  /  AlkPhos  108  10-20    Creatinine Trend: 5.17<--, 5.34<--, 5.24<--, 5.16<--, 5.28<--, 5.39<--  PT/INR - ( 18 Oct 2019 21:30 )   PT: 14.5 SEC;   INR: 1.30          PTT - ( 18 Oct 2019 21:30 )  PTT:39.2 SEC  Urinalysis Basic - ( 19 Oct 2019 03:30 )    Color: YELLOW / Appearance: CLEAR / S.017 / pH: 5.5  Gluc: NEGATIVE / Ketone: NEGATIVE  / Bili: NEGATIVE / Urobili: NORMAL   Blood: NEGATIVE / Protein: 10 / Nitrite: NEGATIVE   Leuk Esterase: NEGATIVE / RBC: x / WBC x   Sq Epi: x / Non Sq Epi: x / Bacteria: x      Arterial Blood Gas:  10-19 @ 14:33  7.33/35/90/19/97.7/-6.9  ABG lactate: 0.9  Arterial Blood Gas:  10-19 @ 12:10  7.22/47/151/18/98.7/-7.7  ABG lactate: 0.9  Arterial Blood Gas:  10-19 @ 07:24  7.05/68/75/15/89.9/-11.1  ABG lactate: 1.3  Arterial Blood Gas:  10-19 @ 01:42  7.09/57/100/15/95.5/-11.5  ABG lactate: 1.9    Venous Blood Gas:  1018 @ 21:30  7.21/49/56/18/82.9  VBG Lactate: 0.7          MICROBIOLOGY:   Blood Cx: pending  Urine Cx: pending  Sputum Cx: gram negative rods  Legionella: pending  RVP: negative    RADIOLOGY:  X Ray:   1. Life supporting devices in appropriate position.  2. Persistent pulmonary edema and small left pleural effusion.  Ultrasound:  Conclusions:  1. Mitral annular calcification. Tethered mitral valve  leaflets with normal opening. Moderate mitral  regurgitation.  2. Calcified trileaflet aortic valve with normal opening.  Minimal aortic regurgitation.  3. Moderately dilated left atrium.  LA volume index = 42  cc/m2.  4. Severe left ventricular enlargement.  5. Endocardium not well visualized; grossly low normal left  ventricular systolic function.  6. Moderate diastolic dysfunction (Stage II).  7. The right ventricle is not well visualized; grossly  normal right ventricular systolic function.  8. Normal tricuspid valve.  Moderate tricuspid  regurgitation.  9. Estimated pulmonary artery systolic pressure equals 53  mm Hg, assuming right atrial pressure equals 15  mm Hg,  consistent with moderate pulmonary hypertension.  10. Thickened pericardium inferior to the right ventricle.  Small pericardial effusion adjacent to the right atrium.  *** Compared with echocardiogram of 10/10/2016, results are  similar on today's study. CHIEF COMPLAINT: Patient is a 74y old  Female who presents with a chief complaint of AMS and Hypotension (19 Oct 2019 11:20)    Interval Events: No acute events overnight.     REVIEW OF SYSTEMS:  Unable to assess ROS because of sedation/intubation.    OBJECTIVE:  ICU Vital Signs Last 24 Hrs  T(C): 37.8 (20 Oct 2019 04:00), Max: 37.8 (20 Oct 2019 00:00)  T(F): 100 (20 Oct 2019 04:00), Max: 100 (20 Oct 2019 00:00)  HR: 69 (20 Oct 2019 07:18) (57 - 86)  BP: 47/33 (19 Oct 2019 07:30) (47/33 - 47/33)  BP(mean): 39 (19 Oct 2019 07:30) (39 - 39)  ABP: 104/77 (20 Oct 2019 06:00) (81/36 - 148/72)  ABP(mean): 90 (20 Oct 2019 06:00) (50 - 97)  RR: 21 (20 Oct 2019 06:00) (11 - 24)  SpO2: 93% (20 Oct 2019 07:18) (79% - 100%)    Mode: AC/ CMV (Assist Control/ Continuous Mandatory Ventilation), RR (machine): 24, TV (machine): 400, FiO2: 40, PEEP: 5, MAP: 11, PIP: 28    10-19 @ 07:01  -  10-20 @ 07:00  --------------------------------------------------------  IN: 3055 mL / OUT: 1024 mL / NET: 2031 mL      CAPILLARY BLOOD GLUCOSE      POCT Blood Glucose.: 108 mg/dL (20 Oct 2019 05:04)      PHYSICAL EXAM:  General: NAD, doing well.  HEENT: Intubated. OG in place. Brainstem reflexes in place  Lymph Nodes: No YOLY palpated  Neck: trachea midline. no trach.   Respiratory: CTA b/l. No rales, rhonchi, wheezing appreciated.  Cardiovascular: RRR. +s1/s2, -s3/s4. No murmur, rubs, gallops. No edema  Abdomen: NT/ND. +BS, No HSM.   Extremities: 2+ pulses  Skin: edematous. No rashes, ecchymoses, or petechiae noted  Neurological: AAOx3. DTR 2+. strength 5/5 UE/LE bilaterally.   Psychiatry: Appropriate mood and affect.    HOSPITAL MEDICATIONS:  MEDICATIONS  (STANDING):  azithromycin  IVPB 500 milliGRAM(s) IV Intermittent every 24 hours  chlorhexidine 0.12% Liquid 15 milliLiter(s) Oral Mucosa every 12 hours  chlorhexidine 4% Liquid 1 Application(s) Topical <User Schedule>  dextrose 5%. 1000 milliLiter(s) (50 mL/Hr) IV Continuous <Continuous>  dextrose 50% Injectable 12.5 Gram(s) IV Push once  dextrose 50% Injectable 25 Gram(s) IV Push once  dextrose 50% Injectable 25 Gram(s) IV Push once  insulin lispro (HumaLOG) corrective regimen sliding scale   SubCutaneous every 6 hours  magnesium sulfate  IVPB 1 Gram(s) IV Intermittent once  norepinephrine Infusion 0.05 MICROgram(s)/kG/Min (4.927 mL/Hr) IV Continuous <Continuous>  pantoprazole  Injectable 40 milliGRAM(s) IV Push every 12 hours  piperacillin/tazobactam IVPB.. 3.375 Gram(s) IV Intermittent every 12 hours  propofol Infusion 10 MICROgram(s)/kG/Min (6.306 mL/Hr) IV Continuous <Continuous>  sodium bicarbonate  Infusion 0.131 mEq/kG/Hr (75 mL/Hr) IV Continuous <Continuous>    MEDICATIONS  (PRN):  dextrose 40% Gel 15 Gram(s) Oral once PRN Blood Glucose LESS THAN 70 milliGRAM(s)/deciliter  glucagon  Injectable 1 milliGRAM(s) IntraMuscular once PRN Glucose LESS THAN 70 milligrams/deciliter  sodium chloride 0.9% lock flush 10 milliLiter(s) IV Push every 1 hour PRN Pre/post blood products, medications, blood draw, and to maintain line patency      LABS:  (10- @ 02:40)                        7.6  13.78 )-----------( 229                 25.2    Neutrophils = 11.89 (86.2%)  Lymphocytes = 0.88 (6.4%)  Eosinophils = 0.01 (0.1%)  Basophils = 0.01 (0.1%)  Monocytes = 0.89 (6.5%)  Bands = --%    WBC Trend: 13.78<--, 10.62<--, 11.75<--  Hb Trend: 7.6<--, 7.0<--, 7.5<--, 7.7<--, 6.4<--  Plt Trend: 229<--, 172<--, 195<--, 196<--, 162<--  10-20    137  |  98  |  83<H>  ----------------------------<  119<H>  3.7   |  19<L>  |  5.17<H>    Ca    5.8<LL>      20 Oct 2019 02:40  Phos  7.6     10-20  Mg     1.4     10-20    TPro  5.0<L>  /  Alb  2.6<L>  /  TBili  0.2  /  DBili  x   /  AST  10  /  ALT  14  /  AlkPhos  108  10-20    Creatinine Trend: 5.17<--, 5.34<--, 5.24<--, 5.16<--, 5.28<--, 5.39<--  PT/INR - ( 18 Oct 2019 21:30 )   PT: 14.5 SEC;   INR: 1.30          PTT - ( 18 Oct 2019 21:30 )  PTT:39.2 SEC  Urinalysis Basic - ( 19 Oct 2019 03:30 )    Color: YELLOW / Appearance: CLEAR / S.017 / pH: 5.5  Gluc: NEGATIVE / Ketone: NEGATIVE  / Bili: NEGATIVE / Urobili: NORMAL   Blood: NEGATIVE / Protein: 10 / Nitrite: NEGATIVE   Leuk Esterase: NEGATIVE / RBC: x / WBC x   Sq Epi: x / Non Sq Epi: x / Bacteria: x      Arterial Blood Gas:  10-19 @ 14:33  7.33/35/90/19/97.7/-6.9  ABG lactate: 0.9  Arterial Blood Gas:  10-19 @ 12:10  7.22/47/151/18/98.7/-7.7  ABG lactate: 0.9  Arterial Blood Gas:  10-19 @ 07:24  7.05/68/75/15/89.9/-11.1  ABG lactate: 1.3  Arterial Blood Gas:  10-19 @ 01:42  7.09/57/100/15/95.5/-11.5  ABG lactate: 1.9    Venous Blood Gas:  1018 @ 21:30  7.21/49/56/18/82.9  VBG Lactate: 0.7        MICROBIOLOGY:   Blood Cx: pending  Urine Cx: pending  Sputum Cx: gram negative rods  Legionella: pending  RVP: negative    RADIOLOGY:  X Ray:   1. Life supporting devices in appropriate position.  2. Persistent pulmonary edema and small left pleural effusion.  Ultrasound:  Conclusions:  1. Mitral annular calcification. Tethered mitral valve  leaflets with normal opening. Moderate mitral  regurgitation.  2. Calcified trileaflet aortic valve with normal opening.  Minimal aortic regurgitation.  3. Moderately dilated left atrium.  LA volume index = 42  cc/m2.  4. Severe left ventricular enlargement.  5. Endocardium not well visualized; grossly low normal left  ventricular systolic function.  6. Moderate diastolic dysfunction (Stage II).  7. The right ventricle is not well visualized; grossly  normal right ventricular systolic function.  8. Normal tricuspid valve.  Moderate tricuspid  regurgitation.  9. Estimated pulmonary artery systolic pressure equals 53  mm Hg, assuming right atrial pressure equals 15  mm Hg,  consistent with moderate pulmonary hypertension.  10. Thickened pericardium inferior to the right ventricle.  Small pericardial effusion adjacent to the right atrium.  *** Compared with echocardiogram of 10/10/2016, results are  similar on today's study.

## 2019-10-20 NOTE — CONSULT NOTE ADULT - ASSESSMENT
EKG SR     Echo : < from: Transthoracic Echocardiogram (10.19.19 @ 10:52) >  1. Mitral annular calcification. Tethered mitral valve  leaflets with normal opening. Moderate mitral  regurgitation.  2. Calcified trileaflet aortic valve with normal opening.  Minimal aortic regurgitation.  3. Moderately dilated left atrium.  LA volume index = 42  cc/m2.  4. Severe left ventricular enlargement.  5. Endocardium not well visualized; grossly low normal left  ventricular systolic function.  6. Moderate diastolic dysfunction (Stage II).  7. The right ventricle is not well visualized; grossly  normal right ventricular systolic function.  8. Normal tricuspid valve.  Moderate tricuspid  regurgitation.  9. Estimated pulmonary artery systolic pressure equals 53  mm Hg, assuming right atrial pressure equals 15  mm Hg,  consistent with moderate pulmonary hypertension.  10. Thickened pericardium inferior to the right ventricle.  Small pericardial effusion adjacent to the right atrium.  *** Compared with echocardiogram of 10/10/2016, results are  similar on today's study.    < end of copied text >      Assessment and Plan     1) AMS and Hypotension: intubated and sedated on levo ggt , on t/t for PNA , wean levo as tolerated     2) Pulm edema: start lasix 80 IV BID monitor u/o, might need dialysis once off pressors      3) CAD s/p PCX PCI: restart ASA 81, monitor H/H     4) Anemia : no obvious GIB, stool occult + , monitor H/H asa and Plavix on hold restart asa

## 2019-10-21 LAB
24R-OH-CALCIDIOL SERPL-MCNC: 10.5 NG/ML — LOW (ref 30–80)
ALBUMIN SERPL ELPH-MCNC: 2.1 G/DL — LOW (ref 3.3–5)
ALP SERPL-CCNC: 95 U/L — SIGNIFICANT CHANGE UP (ref 40–120)
ALT FLD-CCNC: 13 U/L — SIGNIFICANT CHANGE UP (ref 4–33)
ANION GAP SERPL CALC-SCNC: 19 MMO/L — HIGH (ref 7–14)
AST SERPL-CCNC: 8 U/L — SIGNIFICANT CHANGE UP (ref 4–32)
BASOPHILS # BLD AUTO: 0.01 K/UL — SIGNIFICANT CHANGE UP (ref 0–0.2)
BASOPHILS NFR BLD AUTO: 0.1 % — SIGNIFICANT CHANGE UP (ref 0–2)
BILIRUB SERPL-MCNC: 0.3 MG/DL — SIGNIFICANT CHANGE UP (ref 0.2–1.2)
BUN SERPL-MCNC: 80 MG/DL — HIGH (ref 7–23)
CALCIUM SERPL-MCNC: 6 MG/DL — CRITICAL LOW (ref 8.4–10.5)
CHLORIDE SERPL-SCNC: 96 MMOL/L — LOW (ref 98–107)
CO2 SERPL-SCNC: 20 MMOL/L — LOW (ref 22–31)
CREAT SERPL-MCNC: 4.87 MG/DL — HIGH (ref 0.5–1.3)
EOSINOPHIL # BLD AUTO: 0.08 K/UL — SIGNIFICANT CHANGE UP (ref 0–0.5)
EOSINOPHIL NFR BLD AUTO: 0.6 % — SIGNIFICANT CHANGE UP (ref 0–6)
GLUCOSE SERPL-MCNC: 87 MG/DL — SIGNIFICANT CHANGE UP (ref 70–99)
HCT VFR BLD CALC: 24.1 % — LOW (ref 34.5–45)
HGB BLD-MCNC: 7.5 G/DL — LOW (ref 11.5–15.5)
IMM GRANULOCYTES NFR BLD AUTO: 1.2 % — SIGNIFICANT CHANGE UP (ref 0–1.5)
LYMPHOCYTES # BLD AUTO: 0.64 K/UL — LOW (ref 1–3.3)
LYMPHOCYTES # BLD AUTO: 4.7 % — LOW (ref 13–44)
MAGNESIUM SERPL-MCNC: 1.6 MG/DL — SIGNIFICANT CHANGE UP (ref 1.6–2.6)
MCHC RBC-ENTMCNC: 28 PG — SIGNIFICANT CHANGE UP (ref 27–34)
MCHC RBC-ENTMCNC: 31.1 % — LOW (ref 32–36)
MCV RBC AUTO: 89.9 FL — SIGNIFICANT CHANGE UP (ref 80–100)
MONOCYTES # BLD AUTO: 0.65 K/UL — SIGNIFICANT CHANGE UP (ref 0–0.9)
MONOCYTES NFR BLD AUTO: 4.8 % — SIGNIFICANT CHANGE UP (ref 2–14)
NEUTROPHILS # BLD AUTO: 12.01 K/UL — HIGH (ref 1.8–7.4)
NEUTROPHILS NFR BLD AUTO: 88.6 % — HIGH (ref 43–77)
NRBC # FLD: 0.04 K/UL — SIGNIFICANT CHANGE UP (ref 0–0)
PHOSPHATE SERPL-MCNC: 6.9 MG/DL — HIGH (ref 2.5–4.5)
PLATELET # BLD AUTO: 168 K/UL — SIGNIFICANT CHANGE UP (ref 150–400)
PMV BLD: 9.9 FL — SIGNIFICANT CHANGE UP (ref 7–13)
POTASSIUM SERPL-MCNC: 3.4 MMOL/L — LOW (ref 3.5–5.3)
POTASSIUM SERPL-SCNC: 3.4 MMOL/L — LOW (ref 3.5–5.3)
PROT SERPL-MCNC: 5 G/DL — LOW (ref 6–8.3)
RBC # BLD: 2.68 M/UL — LOW (ref 3.8–5.2)
RBC # FLD: 18.1 % — HIGH (ref 10.3–14.5)
SODIUM SERPL-SCNC: 135 MMOL/L — SIGNIFICANT CHANGE UP (ref 135–145)
VANCOMYCIN TROUGH SERPL-MCNC: 15.8 UG/ML — SIGNIFICANT CHANGE UP (ref 10–20)
WBC # BLD: 13.55 K/UL — HIGH (ref 3.8–10.5)
WBC # FLD AUTO: 13.55 K/UL — HIGH (ref 3.8–10.5)

## 2019-10-21 PROCEDURE — 99291 CRITICAL CARE FIRST HOUR: CPT

## 2019-10-21 RX ADMIN — PANTOPRAZOLE SODIUM 40 MILLIGRAM(S): 20 TABLET, DELAYED RELEASE ORAL at 18:10

## 2019-10-21 RX ADMIN — PROPOFOL 6.31 MICROGRAM(S)/KG/MIN: 10 INJECTION, EMULSION INTRAVENOUS at 14:02

## 2019-10-21 RX ADMIN — CHLORHEXIDINE GLUCONATE 15 MILLILITER(S): 213 SOLUTION TOPICAL at 06:09

## 2019-10-21 RX ADMIN — Medication 81 MILLIGRAM(S): at 14:02

## 2019-10-21 RX ADMIN — PIPERACILLIN AND TAZOBACTAM 25 GRAM(S): 4; .5 INJECTION, POWDER, LYOPHILIZED, FOR SOLUTION INTRAVENOUS at 06:09

## 2019-10-21 RX ADMIN — CHLORHEXIDINE GLUCONATE 15 MILLILITER(S): 213 SOLUTION TOPICAL at 18:09

## 2019-10-21 RX ADMIN — PANTOPRAZOLE SODIUM 40 MILLIGRAM(S): 20 TABLET, DELAYED RELEASE ORAL at 06:09

## 2019-10-21 RX ADMIN — PIPERACILLIN AND TAZOBACTAM 25 GRAM(S): 4; .5 INJECTION, POWDER, LYOPHILIZED, FOR SOLUTION INTRAVENOUS at 18:09

## 2019-10-21 NOTE — PROGRESS NOTE ADULT - ASSESSMENT
74F h/o HTN, HLD, IDDM2, COPD (former smoker, 2L/min PRN at home), CHF (EF 12/2018 45-50%), ESRD not on dialysis, p/w acute metabolic encephalopathy likely multifactorial, possibly 2/2 hypoglycemia, hypotension, bradycardia, anemia, acidosis, or uremia.    #Neuro  Acute Metabolic Encephalopathy  - At baseline, A&O x3 and ambulates w/ cane  - Was lethargic and A&O x1, now intubated and sedated  - Likely multifactorial, possibly 2/2 hypoglycemia, hypotension, bradycardia, anemia, acidosis, or uremia    #Respiratory  Pulmonary Edema  - h/o CHF (EF 45-50% 12/2018), dyspneic walking < 1 block at baseline  - Pro-BNP >56000 x3  - CXR showing b/l reticular and patchy opacities c/w pulmonary edema  - Holding home Lasix and metolazone, OK to administer lasix PRN, but patient with adequate urine output at this time.     #CV  Septic Shock with unknown etiology  - Hypotensive to 89/54 in the ED, briefly improved s/p 750cc NS bolus  - Holding all home BP meds  - Started on Levophed gtt, now titrated off     CHF  - Last TTE 10/31/18, EF 45-50% w/ severe LA enlargement, normal PA pressures  - Holding home Lasix and metolazone  - Repeat echo showing no focal segmental abnormality    #GI  - No hematemesis, hematochezia, or melena, but acutely anemic to 6.4 requiring 1u pRBC  - FOBT positive  - On PPI at home, will give pantoprazole IV 40mg qd  - Strict NPO    #Renal  Metabolic Acidosis  - Anion gap minimally elevated, improved     ESRD  - SCr improved now 4.8, minimal change from 5.55 in 12/2018  - L arm AVF placed 12/2018, not on dialysis  - Has not followed up w/ her nephrologist recently  - Trend BMP q6h  - Nephrology consulted for consideration of dialysis, not candidate for dialysis per nephro.   - Adequate urine output, will continue to monitor     #Heme  Anemia  - Hgb 6.4 in the ED, received 1u pRBC  - No reported hematochezia or melena, but FOBT positive  - Monitor CBC q6h  - Hgb stable today (7.6 <- 7.0)    #ID  - One week h/o diarrhea  - Hypothermic to 91.1F on admission  - s/p ceftriaxone 1g x1 in the ED  - Broad coverage w/ Vanc, Zosyn, and azithromycin  - f/u BCx and stool studies  - UA and urine Legionella pending    #Endo  IDDM2  - Poor PO intake over past week, on Levemir 10u qd at home  - Hypoglycemic w/ FS 20 at home, s/p 1 amp D50  - Holding home insulin  - FS and low SSI q6h while NPO    #GOC  - Full Code

## 2019-10-21 NOTE — CHART NOTE - NSCHARTNOTEFT_GEN_A_CORE
: Cleopatra Corrales    INDICATION: MICU POCUS    PROCEDURE:  [X] LIMITED ECHO  [X] LIMITED CHEST  [ ] LIMITED RETROPERITONEAL  [ ] LIMITED ABDOMINAL  [ ] LIMITED DVT  [ ] NEEDLE GUIDANCE VASCULAR  [ ] NEEDLE GUIDANCE THORACENTESIS  [ ] NEEDLE GUIDANCE PARACENTESIS  [ ] NEEDLE GUIDANCE PERICARDIOCENTESIS  [ ] OTHER    FINDINGS:  Pulmonary: Scattered B lines in R anteriorly, few B lines in L. R sided consolidation, no pleural effusion. Difficult to visualize L; possible consolidation on L, no effusion.  ECHO: reduced LV function. No pericardial effusion. Grossly normal RV size. Large IVC.    INTERPRETATION:  Unable to achieve accurate exam of L pulm. Signs of pulmonary edema and consolidation on R.  Decreased LV function    Images stored on [employer].

## 2019-10-21 NOTE — PROGRESS NOTE ADULT - ATTENDING COMMENTS
74 F PMH COPD, CKD presented with hypothermia, AMS, hypoglycemia likely due to septic shock secondary to PNA. Also had heme positive stool. HGb stable.    - Now off sedation with appropriate mental status  - Did well on CPAP 8/5 cmH2O but thick secretions prohibited extubation  - Cont antibiotics  - Chest PT, Metanebs  - Prognosis is guarded.

## 2019-10-21 NOTE — PROGRESS NOTE ADULT - SUBJECTIVE AND OBJECTIVE BOX
Gregor Barrow MD  Interventional Cardiology / Endovascular Specialist  [place] Office : [phone] [place] [address] [place] [address]  Tel:   [place] Office : [phone] [facility] [place] [address]  Tel: [phone]  Cell : [phone]      HISTORY OF PRESENTING ILLNESS:    74F h/o HTN, HLD, IDDM2, COPD (former smoker, 2L/min PRN at home), CHF (EF 12/2018 45-50%), ESRD not on dialysis, p/w AMS and hypotension, intubated and sedated found to have a PNA     PAST MEDICAL & SURGICAL HISTORY:  ESRD (end stage renal disease): L arm AVF placed 12/2018  Hemorrhoids  GERD (gastroesophageal reflux disease)  Morbid obesity  Cataract  Diaphragmatic hernia  Cerebral infarction: 2011  CKD (chronic kidney disease)  Anemia  Diabetes mellitus: Insulin dependent. Type 2  CHF (congestive heart failure)  Hyperlipidemia  Hypertension  COPD (chronic obstructive pulmonary disease)  Chronic obstructive pulmonary disease, unspecified COPD type  Adult Idiopathic Generalized Osteoporosis  CAD (Coronary Artery Disease): 1 stent placed 11/7/18  DM (Diabetes Mellitus), Secondary  Hypertension  H/O coronary angiogram: [facility] 1 stent placed 11/7/2018      SOCIAL HISTORY: Substance Use (street drugs): ( x ) never used  (  ) other:    FAMILY HISTORY:  Family history of breast cancer (Grandparent)      REVIEW OF SYSTEMS:  unable to obtain     MEDICATIONS:  aspirin  chewable 81 milliGRAM(s) Enteral Tube daily    piperacillin/tazobactam IVPB.. 3.375 Gram(s) IV Intermittent every 12 hours        pantoprazole  Injectable 40 milliGRAM(s) IV Push every 12 hours    dextrose 40% Gel 15 Gram(s) Oral once PRN  dextrose 50% Injectable 12.5 Gram(s) IV Push once  dextrose 50% Injectable 25 Gram(s) IV Push once  dextrose 50% Injectable 25 Gram(s) IV Push once  glucagon  Injectable 1 milliGRAM(s) IntraMuscular once PRN  insulin lispro (HumaLOG) corrective regimen sliding scale   SubCutaneous every 6 hours    chlorhexidine 0.12% Liquid 15 milliLiter(s) Oral Mucosa every 12 hours  chlorhexidine 4% Liquid 1 Application(s) Topical <User Schedule>  dextrose 5%. 1000 milliLiter(s) IV Continuous <Continuous>  sodium chloride 0.9% lock flush 10 milliLiter(s) IV Push every 1 hour PRN      FAMILY HISTORY:  Family history of breast cancer (Grandparent)        Allergies    calcium acetate (Vomiting; Nausea; Chills)  wool-rash (Other)    Intolerances    	      PHYSICAL EXAM:  T(C): 36.9 (10-21-19 @ 20:00), Max: 36.9 (10-21-19 @ 20:00)  HR: 81 (10-21-19 @ 23:00) (52 - 83)  BP: 131/110 (10-21-19 @ 15:15) (111/51 - 131/110)  RR: 24 (10-21-19 @ 23:00) (15 - 24)  SpO2: 100% (10-21-19 @ 23:00) (96% - 100%)  Wt(kg): --  I&O's Summary    20 Oct 2019 07:01  -  21 Oct 2019 07:00  --------------------------------------------------------  IN: 1128.5 mL / OUT: 2133 mL / NET: -1004.5 mL    21 Oct 2019 07:01  -  21 Oct 2019 23:27  --------------------------------------------------------  IN: 41.1 mL / OUT: 1848 mL / NET: -1806.9 mL    GENERAL: Obese , intubated and sedated   EYES: EOMI, PERRLA, conjunctiva and sclera clear  ENMT: Intubated and sedated   Cardiovascular: Normal S1 S2, No JVD, No murmurs, No edema  Respiratory: b/l basal creps 	  Gastrointestinal:  Soft, Non-tender, + BS	  Extremities: No clubbing, cyanosis or edema  LYMPH: No lymphadenopathy noted        LABS:	 	    CARDIAC MARKERS:                                  7.5    13.55 )-----------( 168      ( 21 Oct 2019 02:35 )             24.1     10-21    135  |  96<L>  |  80<H>  ----------------------------<  87  3.4<L>   |  20<L>  |  4.87<H>    Ca    6.0<LL>      21 Oct 2019 02:35  Phos  6.9     10-21  Mg     1.6     10-21    TPro  5.0<L>  /  Alb  2.1<L>  /  TBili  0.3  /  DBili  x   /  AST  8   /  ALT  13  /  AlkPhos  95  10-21    proBNP:   Lipid Profile:   HgA1c:   TSH:     Consultant(s) Notes Reviewed:  [x ] YES  [ ] NO    Care Discussed with Consultants/Other Providers [ x] YES  [ ] NO    Imaging Personally Reviewed independently:  [x] YES  [ ] NO    All labs, radiologic studies, vitals, orders and medications list reviewed. Patient is seen and examined at bedside. Case discussed with medical team.    ASSESSMENT/PLAN:

## 2019-10-21 NOTE — PROGRESS NOTE ADULT - ASSESSMENT
74F h/o HTN, HLD, IDDM2, COPD (former smoker, 2L/min PRN at home), CHF (EF 12/2018 45-50%), ESRD not on dialysis admitted to micu for hypotensive, ams, hypoglycemia and hypothermia.     CKD stage 5 not yet on hd  s/p left avf 12/18, + thrill + bruit (never been used)  patient critically ill  renal function has been stable, potassium is ok  seems hypervolemic  BP is better. On norepinephrine  s/p intubation  Consider Lasix 80 mg q12  bush in place non oliguric   consent for cvvh, IHD in chart from daughter  no urgent indication for dialysis currently   if need CVVH, will need shiley for access   please call if any question    hypocalcemia  pth elevated.  F/U vit d 25 level  low albumin   Supplement ca 2g  monitor    hyperphosphatemia  npo right now  would start phoslo 1334 tid with meal when eating  monitor    anemia  occult +  monitor hb  Iron low:27  transfuse as needed  consider Gi eval    acidosis  likely sec to renal failure +diarrhea  improving   Bicarb drip discontinued  monitor    hypotensive  ? sepsis vs bb intoxication   on pressor and iv antibiotic  BP is better  monitor

## 2019-10-21 NOTE — PROGRESS NOTE ADULT - ASSESSMENT
EKG SR     Echo : < from: Transthoracic Echocardiogram (10.19.19 @ 10:52) >  1. Mitral annular calcification. Tethered mitral valve  leaflets with normal opening. Moderate mitral  regurgitation.  2. Calcified trileaflet aortic valve with normal opening.  Minimal aortic regurgitation.  3. Moderately dilated left atrium.  LA volume index = 42  cc/m2.  4. Severe left ventricular enlargement.  5. Endocardium not well visualized; grossly low normal left  ventricular systolic function.  6. Moderate diastolic dysfunction (Stage II).  7. The right ventricle is not well visualized; grossly  normal right ventricular systolic function.  8. Normal tricuspid valve.  Moderate tricuspid  regurgitation.  9. Estimated pulmonary artery systolic pressure equals 53  mm Hg, assuming right atrial pressure equals 15  mm Hg,  consistent with moderate pulmonary hypertension.  10. Thickened pericardium inferior to the right ventricle.  Small pericardial effusion adjacent to the right atrium.  *** Compared with echocardiogram of 10/10/2016, results are  similar on today's study.    < end of copied text >      Assessment and Plan     1) AMS and Hypotension: intubated and sedated off levo ggt , on t/t for PNA , wean levo as tolerated     2) Pulm edema: start lasix 80 IV BID monitor u/o,      3) CAD s/p PCX PCI: restart ASA 81, monitor H/H     4) Anemia : no obvious GIB, stool occult + , monitor H/H  Plavix on hold , restarted asa     5) CKD V : renal on board

## 2019-10-21 NOTE — PROGRESS NOTE ADULT - SUBJECTIVE AND OBJECTIVE BOX
INTERVAL HPI/OVERNIGHT EVENTS:    SUBJECTIVE: Patient seen and examined at bedside.     CONSTITUTIONAL: No weakness, fevers or chills  EYES/ENT: No visual changes;  No vertigo or throat pain   NECK: No pain or stiffness  RESPIRATORY: No cough, wheezing, hemoptysis; No shortness of breath  CARDIOVASCULAR: No chest pain or palpitations  GASTROINTESTINAL: No abdominal or epigastric pain. No nausea, vomiting, or hematemesis; No diarrhea or constipation. No melena or hematochezia.  GENITOURINARY: No dysuria, frequency or hematuria  NEUROLOGICAL: No numbness or weakness  SKIN: No itching, rashes    OBJECTIVE:    VITAL SIGNS:  ICU Vital Signs Last 24 Hrs  T(C): 36.6 (21 Oct 2019 04:00), Max: 37.6 (20 Oct 2019 08:00)  T(F): 97.9 (21 Oct 2019 04:00), Max: 99.7 (20 Oct 2019 08:00)  HR: 70 (21 Oct 2019 06:00) (57 - 70)  BP: 102/52 (20 Oct 2019 08:00) (102/52 - 102/52)  BP(mean): 60 (20 Oct 2019 08:00) (60 - 60)  ABP: 136/58 (21 Oct 2019 06:00) (113/79 - 144/62)  ABP(mean): 88 (21 Oct 2019 06:00) (67 - 95)  RR: 24 (21 Oct 2019 06:00) (19 - 24)  SpO2: 100% (21 Oct 2019 06:00) (92% - 100%)    Mode: AC/ CMV (Assist Control/ Continuous Mandatory Ventilation), RR (machine): 24, TV (machine): 400, FiO2: 40, PEEP: 5, MAP: 11, PIP: 26    10-20 @ 07:01  -  10-21 @ 07:00  --------------------------------------------------------  IN: 1128.5 mL / OUT: 2018 mL / NET: -889.5 mL      CAPILLARY BLOOD GLUCOSE      POCT Blood Glucose.: 81 mg/dL (21 Oct 2019 06:07)      PHYSICAL EXAM:    General: NAD  HEENT: NC/AT; PERRL, clear conjunctiva  Neck: supple  Respiratory: CTA b/l  Cardiovascular: +S1/S2; RRR  Abdomen: soft, NT/ND; +BS x4  Extremities: WWP, 2+ peripheral pulses b/l; no LE edema  Skin: normal color and turgor; no rash  Neurological:    MEDICATIONS:  MEDICATIONS  (STANDING):  aspirin  chewable 81 milliGRAM(s) Enteral Tube daily  chlorhexidine 0.12% Liquid 15 milliLiter(s) Oral Mucosa every 12 hours  chlorhexidine 4% Liquid 1 Application(s) Topical <User Schedule>  dextrose 5%. 1000 milliLiter(s) (50 mL/Hr) IV Continuous <Continuous>  dextrose 50% Injectable 12.5 Gram(s) IV Push once  dextrose 50% Injectable 25 Gram(s) IV Push once  dextrose 50% Injectable 25 Gram(s) IV Push once  insulin lispro (HumaLOG) corrective regimen sliding scale   SubCutaneous every 6 hours  pantoprazole  Injectable 40 milliGRAM(s) IV Push every 12 hours  piperacillin/tazobactam IVPB.. 3.375 Gram(s) IV Intermittent every 12 hours  propofol Infusion 10 MICROgram(s)/kG/Min (6.306 mL/Hr) IV Continuous <Continuous>    MEDICATIONS  (PRN):  dextrose 40% Gel 15 Gram(s) Oral once PRN Blood Glucose LESS THAN 70 milliGRAM(s)/deciliter  glucagon  Injectable 1 milliGRAM(s) IntraMuscular once PRN Glucose LESS THAN 70 milligrams/deciliter  sodium chloride 0.9% lock flush 10 milliLiter(s) IV Push every 1 hour PRN Pre/post blood products, medications, blood draw, and to maintain line patency      ALLERGIES:  Allergies    calcium acetate (Vomiting; Nausea; Chills)  wool-rash (Other)    Intolerances        LABS:                        7.5    13.55 )-----------( 168      ( 21 Oct 2019 02:35 )             24.1     10-21    135  |  96<L>  |  80<H>  ----------------------------<  87  3.4<L>   |  20<L>  |  4.87<H>    Ca    6.0<LL>      21 Oct 2019 02:35  Phos  6.9     10-21  Mg     1.6     10-21    TPro  5.0<L>  /  Alb  2.1<L>  /  TBili  0.3  /  DBili  x   /  AST  8   /  ALT  13  /  AlkPhos  95  10-21          RADIOLOGY & ADDITIONAL TESTS: Reviewed. INTERVAL HPI/OVERNIGHT EVENTS:    SUBJECTIVE: Patient seen and examined at bedside.     CONSTITUTIONAL: No weakness, fevers or chills  EYES/ENT: No visual changes;  No vertigo or throat pain   NECK: No pain or stiffness  RESPIRATORY: No cough, wheezing, hemoptysis; No shortness of breath  CARDIOVASCULAR: No chest pain or palpitations  GASTROINTESTINAL: No abdominal or epigastric pain. No nausea, vomiting, or hematemesis; No diarrhea or constipation. No melena or hematochezia.  GENITOURINARY: No dysuria, frequency or hematuria  NEUROLOGICAL: No numbness or weakness  SKIN: No itching, rashes    OBJECTIVE:    VITAL SIGNS:  ICU Vital Signs Last 24 Hrs  T(C): 36.6 (21 Oct 2019 04:00), Max: 37.6 (20 Oct 2019 08:00)  T(F): 97.9 (21 Oct 2019 04:00), Max: 99.7 (20 Oct 2019 08:00)  HR: 70 (21 Oct 2019 06:00) (57 - 70)  BP: 102/52 (20 Oct 2019 08:00) (102/52 - 102/52)  BP(mean): 60 (20 Oct 2019 08:00) (60 - 60)  ABP: 136/58 (21 Oct 2019 06:00) (113/79 - 144/62)  ABP(mean): 88 (21 Oct 2019 06:00) (67 - 95)  RR: 24 (21 Oct 2019 06:00) (19 - 24)  SpO2: 100% (21 Oct 2019 06:00) (92% - 100%)    Mode: AC/ CMV (Assist Control/ Continuous Mandatory Ventilation), RR (machine): 24, TV (machine): 400, FiO2: 40, PEEP: 5, MAP: 11, PIP: 26    10-20 @ 07:01  -  10-21 @ 07:00  --------------------------------------------------------  IN: 1128.5 mL / OUT: 2018 mL / NET: -889.5 mL      CAPILLARY BLOOD GLUCOSE      POCT Blood Glucose.: 81 mg/dL (21 Oct 2019 06:07)      PHYSICAL EXAM:    General: NAD  HEENT: NC/AT; PERRL, clear conjunctiva  Neck: supple  Respiratory: CTA b/l  Cardiovascular: +S1/S2; RRR  Abdomen: soft, NT/ND; +BS x4  Extremities: WWP, 1+ peripheral pulses, 2+ ISA LE edema   Skin: normal color and turgor; no rash  Neurological: Patient intubated, sedated     MEDICATIONS:  MEDICATIONS  (STANDING):  aspirin  chewable 81 milliGRAM(s) Enteral Tube daily  chlorhexidine 0.12% Liquid 15 milliLiter(s) Oral Mucosa every 12 hours  chlorhexidine 4% Liquid 1 Application(s) Topical <User Schedule>  dextrose 5%. 1000 milliLiter(s) (50 mL/Hr) IV Continuous <Continuous>  dextrose 50% Injectable 12.5 Gram(s) IV Push once  dextrose 50% Injectable 25 Gram(s) IV Push once  dextrose 50% Injectable 25 Gram(s) IV Push once  insulin lispro (HumaLOG) corrective regimen sliding scale   SubCutaneous every 6 hours  pantoprazole  Injectable 40 milliGRAM(s) IV Push every 12 hours  piperacillin/tazobactam IVPB.. 3.375 Gram(s) IV Intermittent every 12 hours  propofol Infusion 10 MICROgram(s)/kG/Min (6.306 mL/Hr) IV Continuous <Continuous>    MEDICATIONS  (PRN):  dextrose 40% Gel 15 Gram(s) Oral once PRN Blood Glucose LESS THAN 70 milliGRAM(s)/deciliter  glucagon  Injectable 1 milliGRAM(s) IntraMuscular once PRN Glucose LESS THAN 70 milligrams/deciliter  sodium chloride 0.9% lock flush 10 milliLiter(s) IV Push every 1 hour PRN Pre/post blood products, medications, blood draw, and to maintain line patency      ALLERGIES:  Allergies    calcium acetate (Vomiting; Nausea; Chills)  wool-rash (Other)    Intolerances        LABS:                        7.5    13.55 )-----------( 168      ( 21 Oct 2019 02:35 )             24.1     10-21    135  |  96<L>  |  80<H>  ----------------------------<  87  3.4<L>   |  20<L>  |  4.87<H>    Ca    6.0<LL>      21 Oct 2019 02:35  Phos  6.9     10-21  Mg     1.6     10-21    TPro  5.0<L>  /  Alb  2.1<L>  /  TBili  0.3  /  DBili  x   /  AST  8   /  ALT  13  /  AlkPhos  95  10-21          RADIOLOGY & ADDITIONAL TESTS: Reviewed.

## 2019-10-21 NOTE — PROGRESS NOTE ADULT - SUBJECTIVE AND OBJECTIVE BOX
[facility]   MD CHARITY MCADAMS, DO FANI BROWN, JASSON CONNOLLY    TEL:  OFFICE: [phone]  DR ALCANTAR CELL: [phone]  DR. MORRIS CELL: [phone]  DR. BROWN CELL: [phone]  TENISHA BOATENG CELL: [phone]        Patient is a 74y old  Female who presents with a chief complaint of AMS and Hypotension (21 Oct 2019 07:28)      Patient seen and examined at bedside.    VITALS:  T(F): 96.4 (10-21-19 @ 12:00), Max: 97.9 (10-21-19 @ 04:00)  HR: 67 (10-21-19 @ 12:30)  BP: 111/51 (10-21-19 @ 08:00)  RR: 21 (10-21-19 @ 12:30)  SpO2: 98% (10-21-19 @ 12:30)  Wt(kg): --    10-20 @ 07:01  -  10-21 @ 07:00  --------------------------------------------------------  IN: 1128.5 mL / OUT: 2133 mL / NET: -1004.5 mL    10-21 @ 07:01  -  10-21 @ 13:41  --------------------------------------------------------  IN: 41.1 mL / OUT: 645 mL / NET: -603.9 mL          PHYSICAL EXAM:  Constitutional: intubated and sedated   Neck: No JVD  Respiratory: CTAB, no wheezes, rales or rhonchi  Cardiovascular: S1, S2, RRR  Gastrointestinal: BS+, soft, NT/ND  Extremities: 2+peripheral edema    Hospital Medications:   MEDICATIONS  (STANDING):  aspirin  chewable 81 milliGRAM(s) Enteral Tube daily  chlorhexidine 0.12% Liquid 15 milliLiter(s) Oral Mucosa every 12 hours  chlorhexidine 4% Liquid 1 Application(s) Topical <User Schedule>  dextrose 5%. 1000 milliLiter(s) (50 mL/Hr) IV Continuous <Continuous>  dextrose 50% Injectable 12.5 Gram(s) IV Push once  dextrose 50% Injectable 25 Gram(s) IV Push once  dextrose 50% Injectable 25 Gram(s) IV Push once  insulin lispro (HumaLOG) corrective regimen sliding scale   SubCutaneous every 6 hours  pantoprazole  Injectable 40 milliGRAM(s) IV Push every 12 hours  piperacillin/tazobactam IVPB.. 3.375 Gram(s) IV Intermittent every 12 hours  propofol Infusion 10 MICROgram(s)/kG/Min (6.306 mL/Hr) IV Continuous <Continuous>      LABS:  10-21    135  |  96<L>  |  80<H>  ----------------------------<  87  3.4<L>   |  20<L>  |  4.87<H>    Ca    6.0<LL>      21 Oct 2019 02:35  Phos  6.9     10-21  Mg     1.6     10-21    TPro  5.0<L>  /  Alb  2.1<L>  /  TBili  0.3  /  DBili      /  AST  8   /  ALT  13  /  AlkPhos  95  10-21    Creatinine Trend: 4.87 <--, 5.17 <--, 5.34 <--, 5.24 <--, 5.16 <--, 5.28 <--, 5.39 <--    Phosphorus Level, Serum: 6.9 mg/dL (10-21 @ 02:35)  Albumin, Serum: 2.1 g/dL (10-21 @ 02:35)                              7.5    13.55 )-----------( 168      ( 21 Oct 2019 02:35 )             24.1     Urine Studies:  Urinalysis - [10-19-19 @ 03:30]      Color YELLOW / Appearance CLEAR / SG 1.017 / pH 5.5      Gluc NEGATIVE / Ketone NEGATIVE  / Bili NEGATIVE / Urobili NORMAL       Blood NEGATIVE / Protein 10 / Leuk Est NEGATIVE / Nitrite NEGATIVE      RBC  / WBC  / Hyaline  / Gran  / Sq Epi  / Non Sq Epi  / Bacteria       Iron 27, TIBC 125, %sat --      [10-20-19 @ 02:40]  Ferritin 283.7      [10-20-19 @ 02:40]  .9 (Ca --)      [10-20-19 @ 02:40]   --  PTH -- (Ca 8.7)      [10-31-18 @ 09:24]   269  Vitamin D (25OH) 10.5      [10-20-19 @ 02:40]  HbA1c 5.2      [10-20-19 @ 02:40]  TSH 1.11      [10-18-19 @ 23:20]    HCV 0.16, Nonreactive Hepatitis C AB  S/CO Ratio                        Interpretation  < 1.00                                   Non-Reactive  1.00 - 4.99                         Weakly-Reactive  >= 5.00                                Reactive  Non-Reactive: Aperson with a non-reactive HCV antibody  result is considered uninfected.  No further action is  needed unless recent infection is suspected.  In these  cases, consider repeat testing later to detect  seroconversion..  Weakly-Reactive: HCV antibody test is abnormal, HCV RNA  Qualitative test will follow.  Reactive: HCV antibody test is abnormal, HCV RNA  Qualitative test will follow.  Note: HCV antibody testing is performed on the [employer]   system.      [10-19-19 @ 12:30]      RADIOLOGY & ADDITIONAL STUDIES:

## 2019-10-22 LAB
-  AMIKACIN: SIGNIFICANT CHANGE UP
-  AZTREONAM: SIGNIFICANT CHANGE UP
-  CEFEPIME: SIGNIFICANT CHANGE UP
-  CEFTAZIDIME: SIGNIFICANT CHANGE UP
-  GENTAMICIN: SIGNIFICANT CHANGE UP
-  IMIPENEM: SIGNIFICANT CHANGE UP
-  LEVOFLOXACIN: SIGNIFICANT CHANGE UP
-  MEROPENEM: SIGNIFICANT CHANGE UP
-  PIPERACILLIN/TAZOBACTAM: SIGNIFICANT CHANGE UP
-  TOBRAMYCIN: SIGNIFICANT CHANGE UP
ANION GAP SERPL CALC-SCNC: 20 MMO/L — HIGH (ref 7–14)
BACTERIA SPT RESP CULT: SIGNIFICANT CHANGE UP
BASE EXCESS BLDA CALC-SCNC: -3.4 MMOL/L — SIGNIFICANT CHANGE UP
BASOPHILS # BLD AUTO: 0.01 K/UL — SIGNIFICANT CHANGE UP (ref 0–0.2)
BASOPHILS NFR BLD AUTO: 0.1 % — SIGNIFICANT CHANGE UP (ref 0–2)
BLOOD GAS ARTERIAL - FIO2: 40 — SIGNIFICANT CHANGE UP
BUN SERPL-MCNC: 81 MG/DL — HIGH (ref 7–23)
CA-I BLDA-SCNC: 0.86 MMOL/L — LOW (ref 1.15–1.29)
CALCIUM SERPL-MCNC: 6.2 MG/DL — CRITICAL LOW (ref 8.4–10.5)
CHLORIDE SERPL-SCNC: 99 MMOL/L — SIGNIFICANT CHANGE UP (ref 98–107)
CO2 SERPL-SCNC: 19 MMOL/L — LOW (ref 22–31)
CREAT SERPL-MCNC: 4.74 MG/DL — HIGH (ref 0.5–1.3)
EOSINOPHIL # BLD AUTO: 0.12 K/UL — SIGNIFICANT CHANGE UP (ref 0–0.5)
EOSINOPHIL NFR BLD AUTO: 1 % — SIGNIFICANT CHANGE UP (ref 0–6)
GLUCOSE BLDA-MCNC: 79 MG/DL — SIGNIFICANT CHANGE UP (ref 70–99)
GLUCOSE SERPL-MCNC: 84 MG/DL — SIGNIFICANT CHANGE UP (ref 70–99)
GRAM STN SPT: SIGNIFICANT CHANGE UP
HCO3 BLDA-SCNC: 22 MMOL/L — SIGNIFICANT CHANGE UP (ref 22–26)
HCT VFR BLD CALC: 24 % — LOW (ref 34.5–45)
HCT VFR BLDA CALC: 24.7 % — LOW (ref 34.5–46.5)
HGB BLD-MCNC: 7.5 G/DL — LOW (ref 11.5–15.5)
HGB BLDA-MCNC: 7.9 G/DL — LOW (ref 11.5–15.5)
IMM GRANULOCYTES NFR BLD AUTO: 0.8 % — SIGNIFICANT CHANGE UP (ref 0–1.5)
LACTATE BLDA-SCNC: 0.7 MMOL/L — SIGNIFICANT CHANGE UP (ref 0.5–2)
LYMPHOCYTES # BLD AUTO: 0.58 K/UL — LOW (ref 1–3.3)
LYMPHOCYTES # BLD AUTO: 4.9 % — LOW (ref 13–44)
MAGNESIUM SERPL-MCNC: 1.6 MG/DL — SIGNIFICANT CHANGE UP (ref 1.6–2.6)
MCHC RBC-ENTMCNC: 27.6 PG — SIGNIFICANT CHANGE UP (ref 27–34)
MCHC RBC-ENTMCNC: 31.3 % — LOW (ref 32–36)
MCV RBC AUTO: 88.2 FL — SIGNIFICANT CHANGE UP (ref 80–100)
METHOD TYPE: SIGNIFICANT CHANGE UP
MONOCYTES # BLD AUTO: 0.59 K/UL — SIGNIFICANT CHANGE UP (ref 0–0.9)
MONOCYTES NFR BLD AUTO: 4.9 % — SIGNIFICANT CHANGE UP (ref 2–14)
NEUTROPHILS # BLD AUTO: 10.56 K/UL — HIGH (ref 1.8–7.4)
NEUTROPHILS NFR BLD AUTO: 88.3 % — HIGH (ref 43–77)
NRBC # FLD: 0.03 K/UL — SIGNIFICANT CHANGE UP (ref 0–0)
ORGANISM # SPEC MICROSCOPIC CNT: SIGNIFICANT CHANGE UP
ORGANISM # SPEC MICROSCOPIC CNT: SIGNIFICANT CHANGE UP
PCO2 BLDA: 35 MMHG — SIGNIFICANT CHANGE UP (ref 32–48)
PH BLDA: 7.39 PH — SIGNIFICANT CHANGE UP (ref 7.35–7.45)
PHOSPHATE SERPL-MCNC: 7.1 MG/DL — HIGH (ref 2.5–4.5)
PLATELET # BLD AUTO: 162 K/UL — SIGNIFICANT CHANGE UP (ref 150–400)
PMV BLD: 9.6 FL — SIGNIFICANT CHANGE UP (ref 7–13)
PO2 BLDA: 122 MMHG — HIGH (ref 83–108)
POTASSIUM BLDA-SCNC: 3 MMOL/L — LOW (ref 3.4–4.5)
POTASSIUM SERPL-MCNC: 3.2 MMOL/L — LOW (ref 3.5–5.3)
POTASSIUM SERPL-SCNC: 3.2 MMOL/L — LOW (ref 3.5–5.3)
RBC # BLD: 2.72 M/UL — LOW (ref 3.8–5.2)
RBC # FLD: 18.2 % — HIGH (ref 10.3–14.5)
SAO2 % BLDA: 98.6 % — SIGNIFICANT CHANGE UP (ref 95–99)
SODIUM BLDA-SCNC: 138 MMOL/L — SIGNIFICANT CHANGE UP (ref 136–146)
SODIUM SERPL-SCNC: 138 MMOL/L — SIGNIFICANT CHANGE UP (ref 135–145)
WBC # BLD: 11.95 K/UL — HIGH (ref 3.8–10.5)
WBC # FLD AUTO: 11.95 K/UL — HIGH (ref 3.8–10.5)

## 2019-10-22 PROCEDURE — 99291 CRITICAL CARE FIRST HOUR: CPT

## 2019-10-22 RX ORDER — PANTOPRAZOLE SODIUM 20 MG/1
40 TABLET, DELAYED RELEASE ORAL DAILY
Refills: 0 | Status: DISCONTINUED | OUTPATIENT
Start: 2019-10-23 | End: 2019-10-31

## 2019-10-22 RX ORDER — POTASSIUM CHLORIDE 20 MEQ
20 PACKET (EA) ORAL ONCE
Refills: 0 | Status: COMPLETED | OUTPATIENT
Start: 2019-10-22 | End: 2019-10-22

## 2019-10-22 RX ORDER — CALCIUM GLUCONATE 100 MG/ML
1 VIAL (ML) INTRAVENOUS ONCE
Refills: 0 | Status: COMPLETED | OUTPATIENT
Start: 2019-10-22 | End: 2019-10-22

## 2019-10-22 RX ORDER — FUROSEMIDE 40 MG
40 TABLET ORAL
Refills: 0 | Status: DISCONTINUED | OUTPATIENT
Start: 2019-10-23 | End: 2019-10-23

## 2019-10-22 RX ORDER — ASPIRIN/CALCIUM CARB/MAGNESIUM 324 MG
81 TABLET ORAL DAILY
Refills: 0 | Status: DISCONTINUED | OUTPATIENT
Start: 2019-10-22 | End: 2019-10-31

## 2019-10-22 RX ADMIN — CHLORHEXIDINE GLUCONATE 15 MILLILITER(S): 213 SOLUTION TOPICAL at 05:13

## 2019-10-22 RX ADMIN — PANTOPRAZOLE SODIUM 40 MILLIGRAM(S): 20 TABLET, DELAYED RELEASE ORAL at 05:13

## 2019-10-22 RX ADMIN — PIPERACILLIN AND TAZOBACTAM 25 GRAM(S): 4; .5 INJECTION, POWDER, LYOPHILIZED, FOR SOLUTION INTRAVENOUS at 05:13

## 2019-10-22 RX ADMIN — Medication 20 MILLIEQUIVALENT(S): at 05:13

## 2019-10-22 RX ADMIN — PIPERACILLIN AND TAZOBACTAM 25 GRAM(S): 4; .5 INJECTION, POWDER, LYOPHILIZED, FOR SOLUTION INTRAVENOUS at 17:51

## 2019-10-22 RX ADMIN — Medication 200 GRAM(S): at 05:13

## 2019-10-22 RX ADMIN — CHLORHEXIDINE GLUCONATE 1 APPLICATION(S): 213 SOLUTION TOPICAL at 08:55

## 2019-10-22 NOTE — PROGRESS NOTE ADULT - ATTENDING COMMENTS
74 F PMH COPD, CKD presented with hypothermia, AMS, hypoglycemia likely due to septic shock secondary to PNA which was found to be pseudomonal pneumonia. Also had heme positive stool. HGb stable.    - Successfully extubated today  - Cont antibiotics tailored to pseudomonas  - Chest PT, Metanebs  - Prognosis is guarded  - May transfer to the medical floor

## 2019-10-22 NOTE — PROGRESS NOTE ADULT - SUBJECTIVE AND OBJECTIVE BOX
Gregor Barrow MD  Interventional Cardiology / Endovascular Specialist  [place] Office : [address] [place] [address] [place] [address]  Tel:   [place] Office : [address] [facility] [place] [address]  Tel: [phone]  Cell : [phone]    HISTORY OF PRESENTING ILLNESS:  74F h/o HTN, HLD, IDDM2, COPD (former smoker, 2L/min PRN at home), CHF (EF 12/2018 45-50%), ESRD not on dialysis, p/w AMS and hypotension, intubated and sedated found to have a PNA   	  MEDICATIONS:  aspirin enteric coated 81 milliGRAM(s) Oral daily    piperacillin/tazobactam IVPB.. 3.375 Gram(s) IV Intermittent every 12 hours        pantoprazole  Injectable 40 milliGRAM(s) IV Push daily    dextrose 40% Gel 15 Gram(s) Oral once PRN  dextrose 50% Injectable 12.5 Gram(s) IV Push once  dextrose 50% Injectable 25 Gram(s) IV Push once  dextrose 50% Injectable 25 Gram(s) IV Push once  glucagon  Injectable 1 milliGRAM(s) IntraMuscular once PRN  insulin lispro (HumaLOG) corrective regimen sliding scale   SubCutaneous every 6 hours    dextrose 5%. 1000 milliLiter(s) IV Continuous <Continuous>      PAST MEDICAL/SURGICAL HISTORY  PAST MEDICAL & SURGICAL HISTORY:  ESRD (end stage renal disease): L arm AVF placed 12/2018  Hemorrhoids  GERD (gastroesophageal reflux disease)  Morbid obesity  Cataract  Diaphragmatic hernia  Cerebral infarction: 2011  CKD (chronic kidney disease)  Anemia  Diabetes mellitus: Insulin dependent. Type 2  CHF (congestive heart failure)  Hyperlipidemia  Hypertension  COPD (chronic obstructive pulmonary disease)  Chronic obstructive pulmonary disease, unspecified COPD type  Adult Idiopathic Generalized Osteoporosis  CAD (Coronary Artery Disease): 1 stent placed 11/7/18  DM (Diabetes Mellitus), Secondary  Hypertension  H/O coronary angiogram: [facility] 1 stent placed 11/7/2018      SOCIAL HISTORY: Substance Use (street drugs): ( x ) never used  (  ) other:    FAMILY HISTORY:  Family history of breast cancer (Grandparent)      REVIEW OF SYSTEMS:  CONSTITUTIONAL: No fever, weight loss, or fatigue  EYES: No eye pain, visual disturbances, or discharge  ENMT:  No difficulty hearing, tinnitus, vertigo; No sinus or throat pain  BREASTS: No pain, masses, or nipple discharge  GASTROINTESTINAL: No abdominal or epigastric pain. No nausea, vomiting, or hematemesis; No diarrhea or constipation. No melena or hematochezia.  GENITOURINARY: No dysuria, frequency, hematuria, or incontinence  NEUROLOGICAL: No headaches, memory loss, loss of strength, numbness, or tremors  ENDOCRINE: No heat or cold intolerance; No hair loss  MUSCULOSKELETAL: No joint pain or swelling; No muscle, back, or extremity pain  PSYCHIATRIC: No depression, anxiety, mood swings, or difficulty sleeping  HEME/LYMPH: No easy bruising, or bleeding gums  All others negative    PHYSICAL EXAM:  T(C): 36.5 (10-22-19 @ 22:59), Max: 36.7 (10-22-19 @ 04:00)  HR: 88 (10-22-19 @ 22:59) (64 - 88)  BP: 103/44 (10-22-19 @ 22:59) (101/40 - 123/39)  RR: 18 (10-22-19 @ 22:59) (12 - 25)  SpO2: 92% (10-22-19 @ 22:59) (91% - 100%)  Wt(kg): --  I&O's Summary    21 Oct 2019 07:01  -  22 Oct 2019 07:00  --------------------------------------------------------  IN: 451.1 mL / OUT: 2552 mL / NET: -2100.9 mL    22 Oct 2019 07:01  -  22 Oct 2019 23:17  --------------------------------------------------------  IN: 280 mL / OUT: 744 mL / NET: -464 mL        GENERAL: Obese , intubated and sedated   EYES: EOMI, PERRLA, conjunctiva and sclera clear  ENMT: Intubated and sedated   Cardiovascular: Normal S1 S2, No JVD, No murmurs, No edema  Respiratory: b/l basal creps 	  Gastrointestinal:  Soft, Non-tender, + BS	  Extremities: No clubbing, cyanosis or edema  LYMPH: No lymphadenopathy noted                                    7.5    11.95 )-----------( 162      ( 22 Oct 2019 02:49 )             24.0     10-22    138  |  99  |  81<H>  ----------------------------<  84  3.2<L>   |  19<L>  |  4.74<H>    Ca    6.2<LL>      22 Oct 2019 02:49  Phos  7.1     10-22  Mg     1.6     10-22    TPro  5.0<L>  /  Alb  2.1<L>  /  TBili  0.3  /  DBili  x   /  AST  8   /  ALT  13  /  AlkPhos  95  10-21    proBNP:   Lipid Profile:   HgA1c:   TSH:     Consultant(s) Notes Reviewed:  [x ] YES  [ ] NO    Care Discussed with Consultants/Other Providers [ x] YES  [ ] NO    Imaging Personally Reviewed independently:  [x] YES  [ ] NO    All labs, radiologic studies, vitals, orders and medications list reviewed. Patient is seen and examined at bedside. Case discussed with medical team.

## 2019-10-22 NOTE — PROGRESS NOTE ADULT - ASSESSMENT
74F h/o HTN, HLD, IDDM2, COPD (former smoker, 2L/min PRN at home), CHF (EF 12/2018 45-50%), ESRD not on dialysis admitted to [facility] for hypotensive, ams, hypoglycemia and hypothermia.     CKD stage 5 not yet on hd  s/p left avf 12/18, + thrill + bruit (never been used)  renal function has been stable  seems hypervolemic  BP is better. off norepinephrine  s/p intubation now extubated 10/22  Consider Lasix 80 mg q12  bush in place non oliguric   consent for cvvh, IHD in chart from daughter  no urgent indication for dialysis currently   if need CVVH, will need shiley for access   please call if any question    hypocalcemia  pth elevated.  F/U vit d 25 level  low albumin   Supplement ca 1g  monitor    hyperphosphatemia  npo right now  would start phoslo 1334 tid with meal when eating  monitor    anemia  occult +  monitor hb  Iron low:27  transfuse as needed  consider Gi eval    acidosis  likely sec to renal failure +diarrhea  improving   Bicarb drip discontinued  monitor    hypotensive  ? sepsis   on  iv antibiotic  BP is better  off pressor  monitor    Hypokalemia  supplemented kcl 20 x1  monitor 74F h/o HTN, HLD, IDDM2, COPD (former smoker, 2L/min PRN at home), CHF (EF 12/2018 45-50%), ESRD not on dialysis admitted to [facility] for hypotensive, ams, hypoglycemia and hypothermia.     CKD stage 5 not yet on hd  s/p left avf 12/18, + thrill + bruit (never been used)  renal function has been stable  seems hypervolemic  BP is better. off norepinephrine  s/p intubation now extubated 10/22  Consider Lasix 80 mg q12  bush in place non oliguric   consent for cvvh, IHD in chart from daughter  no urgent indication for dialysis currently   if need CVVH, will need shiley for access   please call if any question    hypocalcemia  pth elevated.  F/U vit d 25 level  low albumin   Supplement ca 1g  monitor    hyperphosphatemia  npo right now  would start phoslo 1334 tid with meal when eating  monitor    anemia  occult +  monitor hb  Iron low:27  transfuse as needed  consider Gi eval    acidosis  likely sec to renal failure +diarrhea  improving   Bicarb drip discontinued  monitor    hypotensive  ? sepsis   on  iv antibiotic  BP is better now  off pressor  monitor    Hypokalemia  supplemented kcl 20 x1  monitor

## 2019-10-22 NOTE — PROGRESS NOTE ADULT - SUBJECTIVE AND OBJECTIVE BOX
INTERVAL HPI/OVERNIGHT EVENTS: No acute overnight events     SUBJECTIVE: Patient seen and examined at bedside.     CONSTITUTIONAL: No weakness, fevers or chills  EYES/ENT: No visual changes;  No vertigo or throat pain   NECK: No pain or stiffness  RESPIRATORY: No cough, wheezing, hemoptysis; No shortness of breath  CARDIOVASCULAR: No chest pain or palpitations  GASTROINTESTINAL: No abdominal or epigastric pain. No nausea, vomiting, or hematemesis; No diarrhea or constipation. No melena or hematochezia.  GENITOURINARY: No dysuria, frequency or hematuria  NEUROLOGICAL: No numbness or weakness  SKIN: No itching, rashes    OBJECTIVE:    VITAL SIGNS:  ICU Vital Signs Last 24 Hrs  T(C): 36 (22 Oct 2019 12:00), Max: 36.9 (21 Oct 2019 20:00)  T(F): 96.8 (22 Oct 2019 12:00), Max: 98.4 (21 Oct 2019 20:00)  HR: 82 (22 Oct 2019 13:00) (61 - 85)  BP: 131/110 (21 Oct 2019 15:15) (131/110 - 131/110)  BP(mean): 118 (21 Oct 2019 15:15) (118 - 118)  ABP: 137/60 (22 Oct 2019 13:00) (122/46 - 145/63)  ABP(mean): 89 (22 Oct 2019 13:00) (71 - 95)  RR: 19 (22 Oct 2019 13:00) (15 - 24)  SpO2: 91% (22 Oct 2019 13:00) (91% - 100%)    Mode: standby    10-21 @ 07:01  -  10-22 @ 07:00  --------------------------------------------------------  IN: 451.1 mL / OUT: 2552 mL / NET: -2100.9 mL    10-22 @ 07:01  -  10-22 @ 13:20  --------------------------------------------------------  IN: 0 mL / OUT: 600 mL / NET: -600 mL      CAPILLARY BLOOD GLUCOSE      POCT Blood Glucose.: 95 mg/dL (22 Oct 2019 13:07)      PHYSICAL EXAM:    General: NAD  HEENT: NC/AT; PERRL, clear conjunctiva  Neck: supple  Respiratory: CTA b/l  Cardiovascular: +S1/S2; RRR  Abdomen: soft, NT/ND; +BS x4  Extremities: WWP, 2+ peripheral pulses b/l; +ISA LE edema.   Skin: normal color and turgor; no rash  Neurological: Patient intubated, responsive to name call.     MEDICATIONS:  MEDICATIONS  (STANDING):  aspirin  chewable 81 milliGRAM(s) Enteral Tube daily  chlorhexidine 0.12% Liquid 15 milliLiter(s) Oral Mucosa every 12 hours  chlorhexidine 4% Liquid 1 Application(s) Topical <User Schedule>  dextrose 5%. 1000 milliLiter(s) (50 mL/Hr) IV Continuous <Continuous>  dextrose 50% Injectable 12.5 Gram(s) IV Push once  dextrose 50% Injectable 25 Gram(s) IV Push once  dextrose 50% Injectable 25 Gram(s) IV Push once  insulin lispro (HumaLOG) corrective regimen sliding scale   SubCutaneous every 6 hours  pantoprazole  Injectable 40 milliGRAM(s) IV Push daily  piperacillin/tazobactam IVPB.. 3.375 Gram(s) IV Intermittent every 12 hours    MEDICATIONS  (PRN):  dextrose 40% Gel 15 Gram(s) Oral once PRN Blood Glucose LESS THAN 70 milliGRAM(s)/deciliter  glucagon  Injectable 1 milliGRAM(s) IntraMuscular once PRN Glucose LESS THAN 70 milligrams/deciliter  sodium chloride 0.9% lock flush 10 milliLiter(s) IV Push every 1 hour PRN Pre/post blood products, medications, blood draw, and to maintain line patency      ALLERGIES:  Allergies    calcium acetate (Vomiting; Nausea; Chills)  wool-rash (Other)    Intolerances        LABS:                        7.5    11.95 )-----------( 162      ( 22 Oct 2019 02:49 )             24.0     10-22    138  |  99  |  81<H>  ----------------------------<  84  3.2<L>   |  19<L>  |  4.74<H>    Ca    6.2<LL>      22 Oct 2019 02:49  Phos  7.1     10-22  Mg     1.6     10-22    TPro  5.0<L>  /  Alb  2.1<L>  /  TBili  0.3  /  DBili  x   /  AST  8   /  ALT  13  /  AlkPhos  95  10-21          RADIOLOGY & ADDITIONAL TESTS: Reviewed.

## 2019-10-22 NOTE — PROGRESS NOTE ADULT - SUBJECTIVE AND OBJECTIVE BOX
[facility]   MD CHARITY MCADAMS, DO FANI BROWN, JASSON CONNOLLY    TEL:  OFFICE: [phone]  DR ALCANTAR CELL: [phone]  DR. MORRIS CELL: [phone]  DR. BROWN CELL: [phone]  TENISHA BOATENG CELL: [phone]        Patient is a 74y old  Female who presents with a chief complaint of AMS and Hypotension (22 Oct 2019 13:19)      Patient seen and examined at bedside.     VITALS:  T(F): 96.8 (10-22-19 @ 12:00), Max: 98.4 (10-21-19 @ 20:00)  HR: 82 (10-22-19 @ 13:00)  BP: 131/110 (10-21-19 @ 15:15)  RR: 19 (10-22-19 @ 13:00)  SpO2: 91% (10-22-19 @ 13:00)  Wt(kg): --    10-21 @ 07:01  -  10-22 @ 07:00  --------------------------------------------------------  IN: 451.1 mL / OUT: 2552 mL / NET: -2100.9 mL    10-22 @ 07:01  -  10-22 @ 13:45  --------------------------------------------------------  IN: 0 mL / OUT: 600 mL / NET: -600 mL          PHYSICAL EXAM:  Constitutional: NAD, awake and alert   Neck: No JVD  Respiratory: slight rhonchi  Cardiovascular: S1, S2, RRR  Gastrointestinal: BS+, soft, NT/ND  Extremities: + peripheral edema    Hospital Medications:   MEDICATIONS  (STANDING):  aspirin  chewable 81 milliGRAM(s) Enteral Tube daily  chlorhexidine 0.12% Liquid 15 milliLiter(s) Oral Mucosa every 12 hours  chlorhexidine 4% Liquid 1 Application(s) Topical <User Schedule>  dextrose 5%. 1000 milliLiter(s) (50 mL/Hr) IV Continuous <Continuous>  dextrose 50% Injectable 12.5 Gram(s) IV Push once  dextrose 50% Injectable 25 Gram(s) IV Push once  dextrose 50% Injectable 25 Gram(s) IV Push once  insulin lispro (HumaLOG) corrective regimen sliding scale   SubCutaneous every 6 hours  pantoprazole  Injectable 40 milliGRAM(s) IV Push daily  piperacillin/tazobactam IVPB.. 3.375 Gram(s) IV Intermittent every 12 hours      LABS:  10-22    138  |  99  |  81<H>  ----------------------------<  84  3.2<L>   |  19<L>  |  4.74<H>    Ca    6.2<LL>      22 Oct 2019 02:49  Phos  7.1     10-22  Mg     1.6     10-22    TPro  5.0<L>  /  Alb  2.1<L>  /  TBili  0.3  /  DBili      /  AST  8   /  ALT  13  /  AlkPhos  95  10-21    Creatinine Trend: 4.74 <--, 4.87 <--, 5.17 <--, 5.34 <--, 5.24 <--, 5.16 <--, 5.28 <--, 5.39 <--    Phosphorus Level, Serum: 7.1 mg/dL (10-22 @ 02:49)                              7.5    11.95 )-----------( 162      ( 22 Oct 2019 02:49 )             24.0     Urine Studies:  Urinalysis - [10-19-19 @ 03:30]      Color YELLOW / Appearance CLEAR / SG 1.017 / pH 5.5      Gluc NEGATIVE / Ketone NEGATIVE  / Bili NEGATIVE / Urobili NORMAL       Blood NEGATIVE / Protein 10 / Leuk Est NEGATIVE / Nitrite NEGATIVE      RBC  / WBC  / Hyaline  / Gran  / Sq Epi  / Non Sq Epi  / Bacteria       Iron 27, TIBC 125, %sat --      [10-20-19 @ 02:40]  Ferritin 283.7      [10-20-19 @ 02:40]  .9 (Ca --)      [10-20-19 @ 02:40]   --  PTH -- (Ca 8.7)      [10-31-18 @ 09:24]   269  Vitamin D (25OH) 10.5      [10-20-19 @ 02:40]  HbA1c 5.2      [10-20-19 @ 02:40]  TSH 1.11      [10-18-19 @ 23:20]    HCV 0.16, Nonreactive Hepatitis C AB  S/CO Ratio                        Interpretation  < 1.00                                   Non-Reactive  1.00 - 4.99                         Weakly-Reactive  >= 5.00                                Reactive  Non-Reactive: Aperson with a non-reactive HCV antibody  result is considered uninfected.  No further action is  needed unless recent infection is suspected.  In these  cases, consider repeat testing later to detect  seroconversion..  Weakly-Reactive: HCV antibody test is abnormal, HCV RNA  Qualitative test will follow.  Reactive: HCV antibody test is abnormal, HCV RNA  Qualitative test will follow.  Note: HCV antibody testing is performed on the [employer]   system.      [10-19-19 @ 12:30]      RADIOLOGY & ADDITIONAL STUDIES:

## 2019-10-22 NOTE — PROGRESS NOTE ADULT - ASSESSMENT
EKG SR     Echo : < from: Transthoracic Echocardiogram (10.19.19 @ 10:52) >  1. Mitral annular calcification. Tethered mitral valve  leaflets with normal opening. Moderate mitral  regurgitation.  2. Calcified trileaflet aortic valve with normal opening.  Minimal aortic regurgitation.  3. Moderately dilated left atrium.  LA volume index = 42  cc/m2.  4. Severe left ventricular enlargement.  5. Endocardium not well visualized; grossly low normal left  ventricular systolic function.  6. Moderate diastolic dysfunction (Stage II).  7. The right ventricle is not well visualized; grossly  normal right ventricular systolic function.  8. Normal tricuspid valve.  Moderate tricuspid  regurgitation.  9. Estimated pulmonary artery systolic pressure equals 53  mm Hg, assuming right atrial pressure equals 15  mm Hg,  consistent with moderate pulmonary hypertension.  10. Thickened pericardium inferior to the right ventricle.  Small pericardial effusion adjacent to the right atrium.  *** Compared with echocardiogram of 10/10/2016, results are  similar on today's study.    < end of copied text >      Assessment and Plan     1) AMS and Hypotension: intubated and sedated off levo ggt , on t/t for PNA , wean levo as tolerated     2) Pulm edema: start lasix 80 IV BID monitor u/o,      3) CAD s/p PCX PCI: restart ASA 81, monitor H/H     4) Anemia : no obvious GIB, stool occult + , monitor H/H  Plavix on hold , restarted asa     5) CKD V : renal on board    6) Hypocalcemia / prolonged QT : replete Ca , Daily EKG's

## 2019-10-22 NOTE — PROGRESS NOTE ADULT - ASSESSMENT
74F h/o HTN, HLD, IDDM2, COPD (former smoker, 2L/min PRN at home), CHF (EF 12/2018 45-50%), ESRD not on dialysis, p/w acute metabolic encephalopathy likely multifactorial, possibly 2/2 hypoglycemia, hypotension, bradycardia, anemia, acidosis, or uremia.    #Neuro  Acute Metabolic Encephalopathy  - At baseline, A&O x3 and ambulates w/ cane  - Was lethargic and A&O x1, now intubated, responsive to name call.   - Plan for extubation today.   - AMS likely multifactorial, possibly 2/2 hypoglycemia, hypotension, bradycardia, anemia, acidosis, or uremia    #Respiratory  Pulmonary Edema  - h/o CHF (EF 45-50% 12/2018), dyspneic walking < 1 block at baseline  - Pro-BNP >56000 x3  - CXR showing b/l reticular and patchy opacities c/w pulmonary edema  - will restart home lasix today.     #CV  Septic Shock with unknown etiology  - Hypotensive to 89/54 in the ED, briefly improved s/p 750cc NS bolus  - Holding all home BP meds  - Started on Levophed gtt, now titrated off     CHF  - Last TTE 10/31/18, EF 45-50% w/ severe LA enlargement, normal PA pressures  - Holding home Lasix and metolazone  - Repeat echo showing no focal segmental abnormality    #GI  - No hematemesis, hematochezia, or melena, but acutely anemic to 6.4 requiring 1u pRBC  - FOBT positive  - On PPI at home, will give pantoprazole IV 40mg qd  - Strict NPO    #Renal  Metabolic Acidosis  - Anion gap minimally elevated, improved     ESRD  - SCr improved now 4.8, minimal change from 5.55 in 12/2018  - L arm AVF placed 12/2018, not on dialysis  - Has not followed up w/ her nephrologist recently  - Trend BMP q6h  - Nephrology consulted for consideration of dialysis, not candidate for dialysis per nephro.   - Adequate urine output, will continue to monitor     #Heme  Anemia  - Hgb 6.4 in the ED, received 1u pRBC  - No reported hematochezia or melena, but FOBT positive  - Monitor CBC q6h  - Hgb stable ~7.5    #ID  - One week h/o diarrhea  - Hypothermic to 91.1F on admission  - sputum culture + for pseudomonas, c/w zosyn.   - BCx no growth at 72 hours.   - UA, urine legionella negative     #Endo  IDDM2  - Poor PO intake over past week, on Levemir 10u qd at home  - Hypoglycemic w/ FS 20 at home, s/p 1 amp D50  - Holding home insulin  - FS and low SSI q6h while NPO    #GOC  - Full Code 74F h/o HTN, HLD, IDDM2, COPD (former smoker, 2L/min PRN at home), CHF (EF 12/2018 45-50%), ESRD not on dialysis, p/w acute metabolic encephalopathy likely multifactorial, possibly 2/2 hypoglycemia, hypotension, bradycardia, anemia, acidosis, or uremia.    #Neuro  Acute Metabolic Encephalopathy  - At baseline, A&O x3 and ambulates w/ cane  - Was lethargic and A&O x1, now intubated, responsive to name call.   - Plan for extubation today.   - AMS likely multifactorial, possibly 2/2 hypoglycemia, hypotension, bradycardia, anemia, acidosis, or uremia    #Respiratory  Pulmonary Edema  - h/o CHF (EF 45-50% 12/2018), dyspneic walking < 1 block at baseline  - Pro-BNP >56000 x3  - CXR showing b/l reticular and patchy opacities c/w pulmonary edema  - will restart home lasix today.     #CV  Septic Shock with unknown etiology  - Hypotensive to 89/54 in the ED, briefly improved s/p 750cc NS bolus  - Holding all home BP meds  - Started on Levophed gtt, now titrated off     CHF  - Last TTE 10/31/18, EF 45-50% w/ severe LA enlargement, normal PA pressures  - will restart home lasix today  - Repeat echo showing no focal segmental abnormality    #GI  - No hematemesis, hematochezia, or melena, but acutely anemic to 6.4 requiring 1u pRBC  - FOBT positive  - On PPI at home, c/w protonix 40mg IV BID, transition to PO when tolerated.   - Strict NPO, obtain dysphagia screen when mentation improved.     #Renal  Metabolic Acidosis  - Anion gap minimally elevated, improved     ESRD  - SCr improved now 4.8, minimal change from 5.55 in 12/2018  - L arm AVF placed 12/2018, not on dialysis  - Has not followed up w/ her nephrologist recently  - Trend BMP q6h  - Nephrology consulted for consideration of dialysis, not candidate for dialysis per nephro.   - Adequate urine output, will continue to monitor     #Heme  Anemia  - Hgb 6.4 in the ED, received 1u pRBC  - No reported hematochezia or melena, but FOBT positive  - Monitor CBC q6h  - Hgb stable ~7.5    #ID  - One week h/o diarrhea  - Hypothermic to 91.1F on admission  - sputum culture + for pseudomonas, c/w zosyn.   - BCx no growth at 72 hours.   - UA, urine legionella negative     #Endo  IDDM2  - Poor PO intake over past week, on Levemir 10u qd at home  - Hypoglycemic w/ FS 20 at home, s/p 1 amp D50  - Holding home insulin  - FS and low SSI q6h while NPO    #GOC  - Full Code

## 2019-10-22 NOTE — CHART NOTE - NSCHARTNOTEFT_GEN_A_CORE
MICU Transfer Note  ---------------------------    Transfer from: MICU  Transfer to:  (X  ) Medicine    (  ) Telemetry    (  ) RCU    (  ) Palliative    (  ) Stroke Unit    (  ) _______________  Accepting Physican: Macindy       MICU COURSE    Patient presented to the ED 10/18 after being found unresponsive by family, was found to be hypoglycemic and hypotensive. Patient subsequently admitted to MICU overnight. Patient requiring levo gtt to maintain adequate MAP, anemic to 6.4 in ED, received 2 units pRBC. Broad abx coverage started with vanc, zosyn, azithromycin, sedated with propofol. Patient started on bicarb gtt to correct acidosis. on 10/20, propofol was weaned, bicarb gtt discontinued, aspirin restarted. on 10/22, patient extubated, mentating well. Abx narrowed to zosyn, endobronchial cx + for pseudomonas. Patient afebrile and normotensive on 10/22. Patient with good urine output through course, home lasix initially held in setting of worsening MELISSA, however patient with ESRD, Cr now at baseline.        OBJECTIVE --  Vital Signs Last 24 Hrs  T(C): 36 (22 Oct 2019 15:00), Max: 36.9 (21 Oct 2019 20:00)  T(F): 96.8 (22 Oct 2019 15:00), Max: 98.4 (21 Oct 2019 20:00)  HR: 82 (22 Oct 2019 15:00) (64 - 85)  BP: --  BP(mean): --  RR: 17 (22 Oct 2019 15:00) (16 - 24)  SpO2: 95% (22 Oct 2019 15:00) (91% - 100%)    I&O's Summary    21 Oct 2019 07:01  -  22 Oct 2019 07:00  --------------------------------------------------------  IN: 451.1 mL / OUT: 2552 mL / NET: -2100.9 mL    22 Oct 2019 07:01  -  22 Oct 2019 16:21  --------------------------------------------------------  IN: 0 mL / OUT: 744 mL / NET: -744 mL        MEDICATIONS  (STANDING):  aspirin  chewable 81 milliGRAM(s) Enteral Tube daily  chlorhexidine 4% Liquid 1 Application(s) Topical <User Schedule>  dextrose 5%. 1000 milliLiter(s) (50 mL/Hr) IV Continuous <Continuous>  dextrose 50% Injectable 12.5 Gram(s) IV Push once  dextrose 50% Injectable 25 Gram(s) IV Push once  dextrose 50% Injectable 25 Gram(s) IV Push once  insulin lispro (HumaLOG) corrective regimen sliding scale   SubCutaneous every 6 hours  pantoprazole  Injectable 40 milliGRAM(s) IV Push daily  piperacillin/tazobactam IVPB.. 3.375 Gram(s) IV Intermittent every 12 hours    MEDICATIONS  (PRN):  dextrose 40% Gel 15 Gram(s) Oral once PRN Blood Glucose LESS THAN 70 milliGRAM(s)/deciliter  glucagon  Injectable 1 milliGRAM(s) IntraMuscular once PRN Glucose LESS THAN 70 milligrams/deciliter  sodium chloride 0.9% lock flush 10 milliLiter(s) IV Push every 1 hour PRN Pre/post blood products, medications, blood draw, and to maintain line patency        LABS                                            7.5                   Neurophils% (auto):   88.3   (10-22 @ 02:49):    11.95)-----------(162          Lymphocytes% (auto):  4.9                                           24.0                   Eosinphils% (auto):   1.0      Manual%: Neutrophils x    ; Lymphocytes x    ; Eosinophils x    ; Bands%: x    ; Blasts x                                    138    |  99     |  81                  Calcium: 6.2   / iCa: x      (10-22 @ 02:49)    ----------------------------<  84        Magnesium: 1.6                              3.2     |  19     |  4.74             Phosphorous: 7.1          ASSESSMENT & PLAN:     Assessment and Plan:   · Assessment	  74F h/o HTN, HLD, IDDM2, COPD (former smoker, 2L/min PRN at home), CHF (EF 12/2018 45-50%), ESRD not on dialysis, p/w acute metabolic encephalopathy likely multifactorial, possibly 2/2 hypoglycemia, hypotension, bradycardia, anemia, acidosis, or uremia.    #Neuro  Acute Metabolic Encephalopathy  - At baseline, A&O x3 and ambulates w/ cane  - Was lethargic and A&O x1, now intubated, responsive to name call.   - Plan for extubation today.   - AMS likely multifactorial, possibly 2/2 hypoglycemia, hypotension, bradycardia, anemia, acidosis, or uremia    #Respiratory  Pulmonary Edema  - h/o CHF (EF 45-50% 12/2018), dyspneic walking < 1 block at baseline  - Pro-BNP >56000 x3  - CXR showing b/l reticular and patchy opacities c/w pulmonary edema  - will restart home lasix today.     #CV  Septic Shock with unknown etiology  - Hypotensive to 89/54 in the ED, briefly improved s/p 750cc NS bolus  - Holding all home BP meds  - Started on Levophed gtt, now titrated off     CHF  - Last TTE 10/31/18, EF 45-50% w/ severe LA enlargement, normal PA pressures  - will restart home lasix today  - Repeat echo showing no focal segmental abnormality    #GI  - No hematemesis, hematochezia, or melena, but acutely anemic to 6.4 requiring 1u pRBC  - FOBT positive  - On PPI at home, c/w protonix 40mg IV BID, transition to PO when tolerated.   - Strict NPO, obtain dysphagia screen when mentation improved.     #Renal  Metabolic Acidosis  - Anion gap minimally elevated, improved     ESRD  - SCr improved now 4.8, minimal change from 5.55 in 12/2018  - L arm AVF placed 12/2018, not on dialysis  - Has not followed up w/ her nephrologist recently  - Trend BMP q6h  - Nephrology consulted for consideration of dialysis, not candidate for dialysis per nephro.   - Adequate urine output, will continue to monitor     #Heme  Anemia  - Hgb 6.4 in the ED, received 1u pRBC  - No reported hematochezia or melena, but FOBT positive  - Hgb stable ~7.5    #ID  - One week h/o diarrhea  - Hypothermic to 91.1F on admission  - sputum culture + for pseudomonas, c/w zosyn.   - BCx no growth at 72 hours.   - UA, urine legionella negative     #Endo  IDDM2  - Poor PO intake over past week, on Levemir 10u qd at home  - Hypoglycemic w/ FS 20 at home, s/p 1 amp D50  - Holding home insulin  - FS and low SSI q6h while NPO    #GOC  - Full Code      For Follow-Up:  -monitor fluid status, resume lasix   -may require O2 via NC, on home O2 2L   -dysphagia screen  -PO protonix when able to tolerate

## 2019-10-23 DIAGNOSIS — N18.6 END STAGE RENAL DISEASE: ICD-10-CM

## 2019-10-23 DIAGNOSIS — J96.21 ACUTE AND CHRONIC RESPIRATORY FAILURE WITH HYPOXIA: ICD-10-CM

## 2019-10-23 DIAGNOSIS — Z29.9 ENCOUNTER FOR PROPHYLACTIC MEASURES, UNSPECIFIED: ICD-10-CM

## 2019-10-23 DIAGNOSIS — K92.2 GASTROINTESTINAL HEMORRHAGE, UNSPECIFIED: ICD-10-CM

## 2019-10-23 DIAGNOSIS — J15.1 PNEUMONIA DUE TO PSEUDOMONAS: ICD-10-CM

## 2019-10-23 DIAGNOSIS — I50.43 ACUTE ON CHRONIC COMBINED SYSTOLIC (CONGESTIVE) AND DIASTOLIC (CONGESTIVE) HEART FAILURE: ICD-10-CM

## 2019-10-23 DIAGNOSIS — E11.22 TYPE 2 DIABETES MELLITUS WITH DIABETIC CHRONIC KIDNEY DISEASE: ICD-10-CM

## 2019-10-23 LAB
ALBUMIN SERPL ELPH-MCNC: 2 G/DL — LOW (ref 3.3–5)
ALBUMIN SERPL ELPH-MCNC: 2.4 G/DL — LOW (ref 3.3–5)
ALP SERPL-CCNC: 84 U/L — SIGNIFICANT CHANGE UP (ref 40–120)
ALP SERPL-CCNC: 91 U/L — SIGNIFICANT CHANGE UP (ref 40–120)
ALT FLD-CCNC: 10 U/L — SIGNIFICANT CHANGE UP (ref 4–33)
ALT FLD-CCNC: 9 U/L — SIGNIFICANT CHANGE UP (ref 4–33)
ANION GAP SERPL CALC-SCNC: 20 MMO/L — HIGH (ref 7–14)
ANION GAP SERPL CALC-SCNC: 20 MMO/L — HIGH (ref 7–14)
AST SERPL-CCNC: 7 U/L — SIGNIFICANT CHANGE UP (ref 4–32)
AST SERPL-CCNC: 8 U/L — SIGNIFICANT CHANGE UP (ref 4–32)
BASE EXCESS BLDA CALC-SCNC: -1.8 MMOL/L — SIGNIFICANT CHANGE UP
BASE EXCESS BLDA CALC-SCNC: -2.6 MMOL/L — SIGNIFICANT CHANGE UP
BASOPHILS # BLD AUTO: 0.02 K/UL — SIGNIFICANT CHANGE UP (ref 0–0.2)
BASOPHILS NFR BLD AUTO: 0.2 % — SIGNIFICANT CHANGE UP (ref 0–2)
BILIRUB SERPL-MCNC: 0.3 MG/DL — SIGNIFICANT CHANGE UP (ref 0.2–1.2)
BILIRUB SERPL-MCNC: 0.4 MG/DL — SIGNIFICANT CHANGE UP (ref 0.2–1.2)
BLOOD GAS ARTERIAL - FIO2: 21 — SIGNIFICANT CHANGE UP
BUN SERPL-MCNC: 81 MG/DL — HIGH (ref 7–23)
BUN SERPL-MCNC: 82 MG/DL — HIGH (ref 7–23)
CA-I BLDA-SCNC: 1.08 MMOL/L — LOW (ref 1.15–1.29)
CALCIUM SERPL-MCNC: 6.7 MG/DL — LOW (ref 8.4–10.5)
CALCIUM SERPL-MCNC: 7.1 MG/DL — LOW (ref 8.4–10.5)
CHLORIDE SERPL-SCNC: 100 MMOL/L — SIGNIFICANT CHANGE UP (ref 98–107)
CHLORIDE SERPL-SCNC: 101 MMOL/L — SIGNIFICANT CHANGE UP (ref 98–107)
CO2 SERPL-SCNC: 18 MMOL/L — LOW (ref 22–31)
CO2 SERPL-SCNC: 21 MMOL/L — LOW (ref 22–31)
CREAT SERPL-MCNC: 4.66 MG/DL — HIGH (ref 0.5–1.3)
CREAT SERPL-MCNC: 4.74 MG/DL — HIGH (ref 0.5–1.3)
EOSINOPHIL # BLD AUTO: 0.22 K/UL — SIGNIFICANT CHANGE UP (ref 0–0.5)
EOSINOPHIL NFR BLD AUTO: 1.7 % — SIGNIFICANT CHANGE UP (ref 0–6)
GI PCR PANEL, STOOL: SIGNIFICANT CHANGE UP
GLUCOSE BLDA-MCNC: 102 MG/DL — HIGH (ref 70–99)
GLUCOSE BLDA-MCNC: 94 MG/DL — SIGNIFICANT CHANGE UP (ref 70–99)
GLUCOSE SERPL-MCNC: 102 MG/DL — HIGH (ref 70–99)
GLUCOSE SERPL-MCNC: 83 MG/DL — SIGNIFICANT CHANGE UP (ref 70–99)
HCO3 BLDA-SCNC: 22 MMOL/L — SIGNIFICANT CHANGE UP (ref 22–26)
HCO3 BLDA-SCNC: 23 MMOL/L — SIGNIFICANT CHANGE UP (ref 22–26)
HCT VFR BLD CALC: 25.1 % — LOW (ref 34.5–45)
HCT VFR BLD CALC: 26 % — LOW (ref 34.5–45)
HCT VFR BLDA CALC: 25.4 % — LOW (ref 34.5–46.5)
HCT VFR BLDA CALC: 27.3 % — LOW (ref 34.5–46.5)
HGB BLD-MCNC: 7.6 G/DL — LOW (ref 11.5–15.5)
HGB BLD-MCNC: 8 G/DL — LOW (ref 11.5–15.5)
HGB BLDA-MCNC: 8.2 G/DL — LOW (ref 11.5–15.5)
HGB BLDA-MCNC: 8.8 G/DL — LOW (ref 11.5–15.5)
IMM GRANULOCYTES NFR BLD AUTO: 1.6 % — HIGH (ref 0–1.5)
LACTATE BLDA-SCNC: 0.9 MMOL/L — SIGNIFICANT CHANGE UP (ref 0.5–2)
LYMPHOCYTES # BLD AUTO: 0.57 K/UL — LOW (ref 1–3.3)
LYMPHOCYTES # BLD AUTO: 4.5 % — LOW (ref 13–44)
MAGNESIUM SERPL-MCNC: 1.7 MG/DL — SIGNIFICANT CHANGE UP (ref 1.6–2.6)
MAGNESIUM SERPL-MCNC: 1.7 MG/DL — SIGNIFICANT CHANGE UP (ref 1.6–2.6)
MCHC RBC-ENTMCNC: 27.4 PG — SIGNIFICANT CHANGE UP (ref 27–34)
MCHC RBC-ENTMCNC: 28 PG — SIGNIFICANT CHANGE UP (ref 27–34)
MCHC RBC-ENTMCNC: 30.3 % — LOW (ref 32–36)
MCHC RBC-ENTMCNC: 30.8 % — LOW (ref 32–36)
MCV RBC AUTO: 90.6 FL — SIGNIFICANT CHANGE UP (ref 80–100)
MCV RBC AUTO: 90.9 FL — SIGNIFICANT CHANGE UP (ref 80–100)
MONOCYTES # BLD AUTO: 0.62 K/UL — SIGNIFICANT CHANGE UP (ref 0–0.9)
MONOCYTES NFR BLD AUTO: 4.9 % — SIGNIFICANT CHANGE UP (ref 2–14)
NEUTROPHILS # BLD AUTO: 11.01 K/UL — HIGH (ref 1.8–7.4)
NEUTROPHILS NFR BLD AUTO: 87.1 % — HIGH (ref 43–77)
NRBC # FLD: 0.05 K/UL — SIGNIFICANT CHANGE UP (ref 0–0)
NRBC # FLD: 0.06 K/UL — SIGNIFICANT CHANGE UP (ref 0–0)
PCO2 BLDA: 43 MMHG — SIGNIFICANT CHANGE UP (ref 32–48)
PCO2 BLDA: 44 MMHG — SIGNIFICANT CHANGE UP (ref 32–48)
PH BLDA: 7.33 PH — LOW (ref 7.35–7.45)
PH BLDA: 7.35 PH — SIGNIFICANT CHANGE UP (ref 7.35–7.45)
PHOSPHATE SERPL-MCNC: 7.5 MG/DL — HIGH (ref 2.5–4.5)
PHOSPHATE SERPL-MCNC: 7.6 MG/DL — HIGH (ref 2.5–4.5)
PLATELET # BLD AUTO: 172 K/UL — SIGNIFICANT CHANGE UP (ref 150–400)
PLATELET # BLD AUTO: 177 K/UL — SIGNIFICANT CHANGE UP (ref 150–400)
PMV BLD: 9.7 FL — SIGNIFICANT CHANGE UP (ref 7–13)
PMV BLD: 9.9 FL — SIGNIFICANT CHANGE UP (ref 7–13)
PO2 BLDA: 67 MMHG — LOW (ref 83–108)
PO2 BLDA: 94 MMHG — SIGNIFICANT CHANGE UP (ref 83–108)
POTASSIUM BLDA-SCNC: 3.3 MMOL/L — LOW (ref 3.4–4.5)
POTASSIUM BLDA-SCNC: 4 MMOL/L — SIGNIFICANT CHANGE UP (ref 3.4–4.5)
POTASSIUM SERPL-MCNC: 3.5 MMOL/L — SIGNIFICANT CHANGE UP (ref 3.5–5.3)
POTASSIUM SERPL-MCNC: 3.5 MMOL/L — SIGNIFICANT CHANGE UP (ref 3.5–5.3)
POTASSIUM SERPL-SCNC: 3.5 MMOL/L — SIGNIFICANT CHANGE UP (ref 3.5–5.3)
POTASSIUM SERPL-SCNC: 3.5 MMOL/L — SIGNIFICANT CHANGE UP (ref 3.5–5.3)
PROT SERPL-MCNC: 5.6 G/DL — LOW (ref 6–8.3)
PROT SERPL-MCNC: 6 G/DL — SIGNIFICANT CHANGE UP (ref 6–8.3)
RBC # BLD: 2.77 M/UL — LOW (ref 3.8–5.2)
RBC # BLD: 2.86 M/UL — LOW (ref 3.8–5.2)
RBC # FLD: 17.8 % — HIGH (ref 10.3–14.5)
RBC # FLD: 18.2 % — HIGH (ref 10.3–14.5)
SAO2 % BLDA: 91.5 % — LOW (ref 95–99)
SAO2 % BLDA: 97.2 % — SIGNIFICANT CHANGE UP (ref 95–99)
SODIUM BLDA-SCNC: 134 MMOL/L — LOW (ref 136–146)
SODIUM BLDA-SCNC: 139 MMOL/L — SIGNIFICANT CHANGE UP (ref 136–146)
SODIUM SERPL-SCNC: 138 MMOL/L — SIGNIFICANT CHANGE UP (ref 135–145)
SODIUM SERPL-SCNC: 142 MMOL/L — SIGNIFICANT CHANGE UP (ref 135–145)
SPECIMEN SOURCE: SIGNIFICANT CHANGE UP
WBC # BLD: 11.09 K/UL — HIGH (ref 3.8–10.5)
WBC # BLD: 12.64 K/UL — HIGH (ref 3.8–10.5)
WBC # FLD AUTO: 11.09 K/UL — HIGH (ref 3.8–10.5)
WBC # FLD AUTO: 12.64 K/UL — HIGH (ref 3.8–10.5)

## 2019-10-23 PROCEDURE — 99233 SBSQ HOSP IP/OBS HIGH 50: CPT

## 2019-10-23 PROCEDURE — 71045 X-RAY EXAM CHEST 1 VIEW: CPT | Mod: 26

## 2019-10-23 PROCEDURE — 93010 ELECTROCARDIOGRAM REPORT: CPT

## 2019-10-23 RX ORDER — SEVELAMER CARBONATE 2400 MG/1
800 POWDER, FOR SUSPENSION ORAL
Refills: 0 | Status: DISCONTINUED | OUTPATIENT
Start: 2019-10-23 | End: 2019-10-31

## 2019-10-23 RX ORDER — FUROSEMIDE 40 MG
80 TABLET ORAL
Refills: 0 | Status: DISCONTINUED | OUTPATIENT
Start: 2019-10-23 | End: 2019-10-26

## 2019-10-23 RX ORDER — CALCIUM GLUCONATE 100 MG/ML
1 VIAL (ML) INTRAVENOUS DAILY
Refills: 0 | Status: DISCONTINUED | OUTPATIENT
Start: 2019-10-23 | End: 2019-10-31

## 2019-10-23 RX ORDER — ERGOCALCIFEROL 1.25 MG/1
50000 CAPSULE ORAL
Refills: 0 | Status: DISCONTINUED | OUTPATIENT
Start: 2019-10-23 | End: 2019-10-31

## 2019-10-23 RX ORDER — FUROSEMIDE 40 MG
40 TABLET ORAL ONCE
Refills: 0 | Status: COMPLETED | OUTPATIENT
Start: 2019-10-23 | End: 2019-10-23

## 2019-10-23 RX ORDER — CALCITRIOL 0.5 UG/1
0.25 CAPSULE ORAL DAILY
Refills: 0 | Status: DISCONTINUED | OUTPATIENT
Start: 2019-10-23 | End: 2019-10-23

## 2019-10-23 RX ORDER — INSULIN LISPRO 100/ML
VIAL (ML) SUBCUTANEOUS
Refills: 0 | Status: DISCONTINUED | OUTPATIENT
Start: 2019-10-23 | End: 2019-10-31

## 2019-10-23 RX ORDER — ERGOCALCIFEROL 1.25 MG/1
50000 CAPSULE ORAL
Refills: 0 | Status: DISCONTINUED | OUTPATIENT
Start: 2019-10-23 | End: 2019-10-23

## 2019-10-23 RX ORDER — IPRATROPIUM/ALBUTEROL SULFATE 18-103MCG
3 AEROSOL WITH ADAPTER (GRAM) INHALATION EVERY 6 HOURS
Refills: 0 | Status: DISCONTINUED | OUTPATIENT
Start: 2019-10-23 | End: 2019-10-31

## 2019-10-23 RX ADMIN — Medication 80 MILLIGRAM(S): at 17:19

## 2019-10-23 RX ADMIN — Medication 3 MILLILITER(S): at 12:03

## 2019-10-23 RX ADMIN — Medication 81 MILLIGRAM(S): at 17:08

## 2019-10-23 RX ADMIN — Medication 40 MILLIGRAM(S): at 07:33

## 2019-10-23 RX ADMIN — Medication 40 MILLIGRAM(S): at 11:30

## 2019-10-23 RX ADMIN — Medication 3 MILLILITER(S): at 20:05

## 2019-10-23 RX ADMIN — PANTOPRAZOLE SODIUM 40 MILLIGRAM(S): 20 TABLET, DELAYED RELEASE ORAL at 15:32

## 2019-10-23 RX ADMIN — ERGOCALCIFEROL 50000 UNIT(S): 1.25 CAPSULE ORAL at 17:08

## 2019-10-23 RX ADMIN — SEVELAMER CARBONATE 800 MILLIGRAM(S): 2400 POWDER, FOR SUSPENSION ORAL at 17:09

## 2019-10-23 RX ADMIN — Medication 200 GRAM(S): at 11:39

## 2019-10-23 RX ADMIN — PIPERACILLIN AND TAZOBACTAM 25 GRAM(S): 4; .5 INJECTION, POWDER, LYOPHILIZED, FOR SOLUTION INTRAVENOUS at 17:08

## 2019-10-23 RX ADMIN — PIPERACILLIN AND TAZOBACTAM 25 GRAM(S): 4; .5 INJECTION, POWDER, LYOPHILIZED, FOR SOLUTION INTRAVENOUS at 07:33

## 2019-10-23 RX ADMIN — Medication 3 MILLILITER(S): at 15:23

## 2019-10-23 NOTE — PROGRESS NOTE ADULT - SUBJECTIVE AND OBJECTIVE BOX
Gregor Barrow MD  Interventional Cardiology / Endovascular Specialist  [place] Office : [phone] [place] [address] [place] [address]  Tel:   [place] Office : [address] [facility] [place] [address]  Tel: [phone]  Cell : [phone]    HISTORY OF PRESENTING ILLNESS:    74F h/o HTN, HLD, IDDM2, COPD (former smoker, 2L/min PRN at home), CHF (EF 12/2018 45-50%), ESRD not on dialysis, p/w AMS and hypotension, intubated and sedated found to have a PNA , S/P RRT for resp. distress , Volume up on exam , protecting airway on Bipap     MEDICATIONS:  aspirin enteric coated 81 milliGRAM(s) Oral daily  furosemide   Injectable 80 milliGRAM(s) IV Push two times a day  piperacillin/tazobactam IVPB.. 3.375 Gram(s) IV Intermittent every 12 hours  albuterol/ipratropium for Nebulization 3 milliLiter(s) Nebulizer every 6 hours  pantoprazole  Injectable 40 milliGRAM(s) IV Push daily  dextrose 40% Gel 15 Gram(s) Oral once PRN  dextrose 50% Injectable 12.5 Gram(s) IV Push once  dextrose 50% Injectable 25 Gram(s) IV Push once  dextrose 50% Injectable 25 Gram(s) IV Push once  glucagon  Injectable 1 milliGRAM(s) IntraMuscular once PRN  insulin lispro (HumaLOG) corrective regimen sliding scale   SubCutaneous three times a day before meals  calcium gluconate IVPB 1 Gram(s) IV Intermittent daily  dextrose 5%. 1000 milliLiter(s) IV Continuous <Continuous>  ergocalciferol 51199 Unit(s) Oral <User Schedule>      PAST MEDICAL/SURGICAL HISTORY  PAST MEDICAL & SURGICAL HISTORY:  ESRD (end stage renal disease): L arm AVF placed 12/2018  Hemorrhoids  GERD (gastroesophageal reflux disease)  Morbid obesity  Cataract  Diaphragmatic hernia  Cerebral infarction: 2011  CKD (chronic kidney disease)  Anemia  Diabetes mellitus: Insulin dependent. Type 2  CHF (congestive heart failure)  Hyperlipidemia  Hypertension  COPD (chronic obstructive pulmonary disease)  Chronic obstructive pulmonary disease, unspecified COPD type  Adult Idiopathic Generalized Osteoporosis  CAD (Coronary Artery Disease): 1 stent placed 11/7/18  DM (Diabetes Mellitus), Secondary  Hypertension  H/O coronary angiogram: [facility] [place] 1 stent placed 11/7/2018      SOCIAL HISTORY: Substance Use (street drugs): ( x ) never used  (  ) other:    FAMILY HISTORY:  Family history of breast cancer (Grandparent)      REVIEW OF SYSTEMS:  unable to assess    PHYSICAL EXAM:  T(C): 36.9 (10-23-19 @ 16:42), Max: 37 (10-23-19 @ 07:35)  HR: 80 (10-23-19 @ 21:29) (74 - 88)  BP: 148/50 (10-23-19 @ 16:42) (103/44 - 148/50)  RR: 24 (10-23-19 @ 15:03) (18 - 24)  SpO2: 91% (10-23-19 @ 21:29) (91% - 97%)  Wt(kg): --  I&O's Summary    22 Oct 2019 07:01  -  23 Oct 2019 07:00  --------------------------------------------------------  IN: 280 mL / OUT: 744 mL / NET: -464 mL          GENERAL: Obese  on BiPAP  EYES: EOMI, PERRLA, conjunctiva and sclera clear  ENMT: on Biapa   Cardiovascular: Normal S1 S2, No JVD, No murmurs, No edema  Respiratory: b/l basal creps 	  Gastrointestinal:  Soft, Non-tender, + BS	  Extremities: No clubbing, cyanosis or edema  LYMPH: No lymphadenopathy noted                                8.0    12.64 )-----------( 177      ( 23 Oct 2019 12:10 )             26.0     10-23    142  |  101  |  81<H>  ----------------------------<  102<H>  3.5   |  21<L>  |  4.74<H>    Ca    7.1<L>      23 Oct 2019 12:10  Phos  7.6     10-23  Mg     1.7     10-23    TPro  6.0  /  Alb  2.4<L>  /  TBili  0.4  /  DBili  x   /  AST  7   /  ALT  9   /  AlkPhos  91  10-23    proBNP:   Lipid Profile:   HgA1c:   TSH:     Consultant(s) Notes Reviewed:  [x ] YES  [ ] NO    Care Discussed with Consultants/Other Providers [ x] YES  [ ] NO    Imaging Personally Reviewed independently:  [x] YES  [ ] NO    All labs, radiologic studies, vitals, orders and medications list reviewed. Patient is seen and examined at bedside. Case discussed with medical team.

## 2019-10-23 NOTE — CHART NOTE - NSCHARTNOTEFT_GEN_A_CORE
Patient is a 74 year old man with history of HTN, HLD, T2DM on insulin, COPD 2ith 2 L NC at home, CHF with last EF 45-50%, ESRD not on HD (has left AVF not been used) presented to the ED on 10/18 after being found unresponsive by family. Patient was also found to be hypoglycemic and hypotensive in the ED. Patient was admitted to the MICU for septic shock.     In the MICU, patient received 2 u pRBC for anemia to Hb of 6.4. Patient was also started on broad spectrum antibiotic therapy with Vanc, Zosyn, Azithromycin. Urine legionella negative. Patient was started on Bicarb gtt to correct acidosis. Nephrology team consulted and patient not started on HD. Patient was extubated on 10/22. Antibiotic de-escalated to Zosyn (10/19-) after endobronchial culture positive for pseudomonas. Blood culture from 10/19 NGTD. Patient restarted on home dose of Lasix 40 mg BID.     Things to follow up:   [ ] Continue with Zosyn for treatment for PNA, consider 7-10 day course  [ ] Monitor UOP, currently on Lasix 40 mg BID  [ ] Follow up on nephrolgy recs  [ ] Follow up on cardiology recs      Greta Hawk PGY-3  Internal medicine MAR  [id number] Patient is a 74 year old woman with history of HTN, HLD, T2DM on insulin, COPD 2ith 2 L NC at home, CHF with last EF 45-50%, ESRD not on HD (has left AVF not been used) presented to the ED on 10/18 after being found unresponsive by family. Patient was also found to be hypoglycemic and hypotensive in the ED. Patient was admitted to the MICU for septic shock.     In the MICU, patient received 2 u pRBC for anemia to Hb of 6.4. Patient was also started on broad spectrum antibiotic therapy with Vanc, Zosyn, Azithromycin. Urine legionella negative. Patient was started on Bicarb gtt to correct acidosis. Nephrology team consulted and patient not started on HD. Patient was extubated on 10/22. Antibiotic de-escalated to Zosyn (10/19-) after endobronchial culture positive for pseudomonas. Blood culture from 10/19 NGTD. Patient restarted on home dose of Lasix 40 mg BID.     Things to follow up:   [ ] Continue with Zosyn for treatment for PNA, consider 7-10 day course  [ ] Monitor UOP, currently on Lasix 40 mg BID  [ ] Follow up on nephrolgy recs  [ ] Follow up on cardiology recs      Greta Hawk PGY-3  Internal medicine MAR  [id number]

## 2019-10-23 NOTE — PROGRESS NOTE ADULT - ASSESSMENT
74F with PMH HTN, HLD, IDDM2, COPD (former smoker, 2L/min PRN at home), CHF (EF 12/2018 45-50%), ESRD not on dialysis, p/w acute metabolic encephalopathy. Initially found to be hypoglycemia, hypotension, bradycardia, anemia, acidosis, or uremia. Patient was initially admitted to MICU for Septic shock, fluid overloaded and acidosis. She was started on broadspectrum abx and aggressively diuresed with improvement of hemodynamics and respiratory status. Now off pressors and extubated on 10/22 in MICU. Sputum culture grew pan-sensitive pseudomonas, currently on Zosyn (since 10/19/19). Patient was initially on 2L NC, but became hypoxic on medicine floor today, Blood gas showing pure hypoxia. Etiology LIANNA vs Worsening pulm edema, less likely COPD given nl PCO2.

## 2019-10-23 NOTE — PROGRESS NOTE ADULT - PROBLEM SELECTOR PLAN 5
+FOBT and anemia on presentation.   had diarrhea with +stool PCR for EPEC.  s/p 2U PrBC with stable h/h since transfusion  on PPI, will continue.   more likely ACD in setting of chronic kidney disease.   monitor h/h for now.

## 2019-10-23 NOTE — PROGRESS NOTE ADULT - PROBLEM SELECTOR PLAN 1
sepsis resolved, now off pressures, hemodynamically stable.   sputum culture - pseudomonas  c/w zosyn total 14 days course (since 10/19/19), can transition to Levaquin for discharge if QTc permits.

## 2019-10-23 NOTE — PROGRESS NOTE ADULT - SUBJECTIVE AND OBJECTIVE BOX
[facility]   MD CHARITY MCADAMS, DO FANI BROWN, JASSON CONNOLLY    TEL:  OFFICE: [phone]  DR ALCANTAR CELL: [phone]  DR. MORRIS CELL: [phone]  DR. BROWN CELL: [phone]  TENISHA BOATENG CELL: [phone]        Patient is a 74y old  Female who presents with a chief complaint of AMS and Hypotension (22 Oct 2019 13:45)      Patient seen and examined at bedside. No chest pain/sob    VITALS:  T(F): 98.6 (10-23-19 @ 07:35), Max: 98.6 (10-23-19 @ 07:35)  HR: 75 (10-23-19 @ 07:35)  BP: 131/52 (10-23-19 @ 07:35)  RR: 18 (10-23-19 @ 07:35)  SpO2: 97% (10-23-19 @ 07:35)  Wt(kg): --    10-22 @ 07:01  -  10-23 @ 07:00  --------------------------------------------------------  IN: 280 mL / OUT: 744 mL / NET: -464 mL          PHYSICAL EXAM:  Constitutional: NAD  Neck: No JVD  Respiratory: + rhonchi  Cardiovascular: S1, S2, RRR  Gastrointestinal: BS+, soft, NT/ND  Extremities: + peripheral edema    Hospital Medications:   MEDICATIONS  (STANDING):  aspirin enteric coated 81 milliGRAM(s) Oral daily  calcitriol   Capsule 0.25 MICROGram(s) Oral daily  calcium gluconate IVPB 1 Gram(s) IV Intermittent daily  dextrose 5%. 1000 milliLiter(s) (50 mL/Hr) IV Continuous <Continuous>  dextrose 50% Injectable 12.5 Gram(s) IV Push once  dextrose 50% Injectable 25 Gram(s) IV Push once  dextrose 50% Injectable 25 Gram(s) IV Push once  ergocalciferol 07201 Unit(s) Oral every week  furosemide    Tablet 40 milliGRAM(s) Oral two times a day  insulin lispro (HumaLOG) corrective regimen sliding scale   SubCutaneous three times a day before meals  pantoprazole  Injectable 40 milliGRAM(s) IV Push daily  piperacillin/tazobactam IVPB.. 3.375 Gram(s) IV Intermittent every 12 hours  sevelamer carbonate 800 milliGRAM(s) Oral three times a day with meals      LABS:  10-23    138  |  100  |  82<H>  ----------------------------<  83  3.5   |  18<L>  |  4.66<H>    Ca    6.7<L>      23 Oct 2019 07:00  Phos  7.5     10-23  Mg     1.7     10-23    TPro  5.6<L>  /  Alb  2.0<L>  /  TBili  0.3  /  DBili      /  AST  8   /  ALT  10  /  AlkPhos  84  10-23    Creatinine Trend: 4.66 <--, 4.74 <--, 4.87 <--, 5.17 <--, 5.34 <--, 5.24 <--, 5.16 <--, 5.28 <--, 5.39 <--    Phosphorus Level, Serum: 7.5 mg/dL (10-23 @ 07:00)  Albumin, Serum: 2.0 g/dL (10-23 @ 07:00)                              7.6    11.09 )-----------( 172      ( 23 Oct 2019 07:00 )             25.1     Urine Studies:  Urinalysis - [10-19-19 @ 03:30]      Color YELLOW / Appearance CLEAR / SG 1.017 / pH 5.5      Gluc NEGATIVE / Ketone NEGATIVE  / Bili NEGATIVE / Urobili NORMAL       Blood NEGATIVE / Protein 10 / Leuk Est NEGATIVE / Nitrite NEGATIVE      RBC  / WBC  / Hyaline  / Gran  / Sq Epi  / Non Sq Epi  / Bacteria       Iron 27, TIBC 125, %sat --      [10-20-19 @ 02:40]  Ferritin 283.7      [10-20-19 @ 02:40]  .9 (Ca --)      [10-20-19 @ 02:40]   --  PTH -- (Ca 8.7)      [10-31-18 @ 09:24]   269  Vitamin D (25OH) 10.5      [10-20-19 @ 02:40]  HbA1c 5.2      [10-20-19 @ 02:40]  TSH 1.11      [10-18-19 @ 23:20]    HCV 0.16, Nonreactive Hepatitis C AB  S/CO Ratio                        Interpretation  < 1.00                                   Non-Reactive  1.00 - 4.99                         Weakly-Reactive  >= 5.00                                Reactive  Non-Reactive: Aperson with a non-reactive HCV antibody  result is considered uninfected.  No further action is  needed unless recent infection is suspected.  In these  cases, consider repeat testing later to detect  seroconversion..  Weakly-Reactive: HCV antibody test is abnormal, HCV RNA  Qualitative test will follow.  Reactive: HCV antibody test is abnormal, HCV RNA  Qualitative test will follow.  Note: HCV antibody testing is performed on the [employer]   system.      [10-19-19 @ 12:30]      RADIOLOGY & ADDITIONAL STUDIES:

## 2019-10-23 NOTE — PROGRESS NOTE ADULT - ASSESSMENT
EKG SR     Echo : < from: Transthoracic Echocardiogram (10.19.19 @ 10:52) >  1. Mitral annular calcification. Tethered mitral valve  leaflets with normal opening. Moderate mitral  regurgitation.  2. Calcified trileaflet aortic valve with normal opening.  Minimal aortic regurgitation.  3. Moderately dilated left atrium.  LA volume index = 42  cc/m2.  4. Severe left ventricular enlargement.  5. Endocardium not well visualized; grossly low normal left  ventricular systolic function.  6. Moderate diastolic dysfunction (Stage II).  7. The right ventricle is not well visualized; grossly  normal right ventricular systolic function.  8. Normal tricuspid valve.  Moderate tricuspid  regurgitation.  9. Estimated pulmonary artery systolic pressure equals 53  mm Hg, assuming right atrial pressure equals 15  mm Hg,  consistent with moderate pulmonary hypertension.  10. Thickened pericardium inferior to the right ventricle.  Small pericardial effusion adjacent to the right atrium.  *** Compared with echocardiogram of 10/10/2016, results are  similar on today's study.    < end of copied text >      Assessment and Plan     1) Pulm edema: lasix 80 IV BID , Bipap     2) PNA on Vanc and Zosyn     2) CAD s/p PCX PCI: restart ASA 81, monitor H/H     3) Anemia : no obvious GIB, stool occult + , monitor H/H  Plavix on hold , restarted asa     4) CKD V : renal on board    5) Hypocalcemia / prolonged QT : replete Ca , Daily EKG's

## 2019-10-23 NOTE — PROGRESS NOTE ADULT - PROBLEM SELECTOR PLAN 2
h/o COPD with 2L NC at home  acute respiratory failure due to sepsis, extubated 10/22  episode of hypoxia this morning  CXR with increase left lung opacity, ABG with hypoxemia only.  etiology of hypoxia includes LIANNA vs pulm edema, less likely PE or COPD  currently on BiPap, will transition to Venti Mask, patient is mouth breathing, NC might not be sufficient to supply oxygenation.  Pulm consult, f/u recs  transfer to [facility] pulse ox.  c/w IV lasix for diuresis.

## 2019-10-23 NOTE — PROGRESS NOTE ADULT - PROBLEM SELECTOR PLAN 3
TTE on this admission with EF55% and Grade II diastolic dysfunction. dilated LV  cardiology rec appreciated  c/w Lasix 80mg IVP bid.   currently w/o bush and patient is incontinent   PrimaFit for strict I/O

## 2019-10-23 NOTE — RAPID RESPONSE TEAM SUMMARY - NSSITUATIONBACKGROUNDRRT_GEN_ALL_CORE
74 year old woman with history of HTN, HLD, T2DM on insulin, COPD 2ith 2 L NC at home, CHF with last EF 45-50%, ESRD not on HD (has left AVF not been used) presented to the ED on 10/18, found to be hypoglycemic and hypotensive and admitted to the MICU for septic shock. Pt was intubated in MICU, and extubated, started on broad spectrum antibiotics for pseudomonas PNA. Patient was extubated on 10/22. Antibiotic de-escalated to Zosyn (10/19-) after endobronchial culture positive for pseudomonas. Blood culture from 10/19 NGTD. Patient restarted on home dose of Lasix 40 mg BID, switched to 80mg BID on 10/23.    Rapid called for AMS and hypoxia to 70s, as well as resp distress. Pt was placed on NRB prior to team arriving; vitals were HR 85 /57 T 99.9 oral O2 97% on 100%NRB, FSBG 99. Pt was AAOX1, and mildly disoriented. Lungs had B/L crackles, LE with trace pitting edema. Pt was administered one nebulizer treatment. She was also administered 40 lasix IV. CXR revealed left white-sided opacity. MICU was called, and bedside POCUS revealed B-lines suggestive of pulmonary edema. ABG, CBC, CMP, and blood cultures were obtained. Pt was placed on BIPAP 14/6 at 40% with improvement in mental status and respiratory distress. Family at bedside; reconfirmed full code status. RRT was ended with instructions to primary to f/u resp distress on BIPAP, official CXR read, and ABG/labs.

## 2019-10-23 NOTE — PROGRESS NOTE ADULT - SUBJECTIVE AND OBJECTIVE BOX
[facility] Division of Hospital Medicine  Yordy Whitmore MD  In House Pager [phone]    Patient is a 74y old  Female who presents with a chief complaint of AMS and Hypotension (23 Oct 2019 11:43)      SUBJECTIVE / OVERNIGHT EVENTS:  Patient transfer from MICU overnight. had RRT called this morning for hypoxia and AMS, placed on BiPap.  Patient seen at bedside, no apparent distress on BiPap, arousable to voice. daughter at bedside provided information.   Patient denied SOB, chest pain, abd pain. Daughter reports increased LE edema since few days ago.     ADDITIONAL REVIEW OF SYSTEMS:  Gen: no fever, no chill  Pulm: no cough, no SOB  Card: no chest pain, no palpation  Abd: no abd pain; no diarrhea  Ext: no joint pain, no swelling     MEDICATIONS  (STANDING):  albuterol/ipratropium for Nebulization 3 milliLiter(s) Nebulizer every 6 hours  aspirin enteric coated 81 milliGRAM(s) Oral daily  calcium gluconate IVPB 1 Gram(s) IV Intermittent daily  dextrose 5%. 1000 milliLiter(s) (50 mL/Hr) IV Continuous <Continuous>  dextrose 50% Injectable 12.5 Gram(s) IV Push once  dextrose 50% Injectable 25 Gram(s) IV Push once  dextrose 50% Injectable 25 Gram(s) IV Push once  ergocalciferol 55193 Unit(s) Oral <User Schedule>  furosemide   Injectable 80 milliGRAM(s) IV Push two times a day  insulin lispro (HumaLOG) corrective regimen sliding scale   SubCutaneous three times a day before meals  pantoprazole  Injectable 40 milliGRAM(s) IV Push daily  piperacillin/tazobactam IVPB.. 3.375 Gram(s) IV Intermittent every 12 hours  sevelamer carbonate 800 milliGRAM(s) Oral three times a day with meals    MEDICATIONS  (PRN):  dextrose 40% Gel 15 Gram(s) Oral once PRN Blood Glucose LESS THAN 70 milliGRAM(s)/deciliter  glucagon  Injectable 1 milliGRAM(s) IntraMuscular once PRN Glucose LESS THAN 70 milligrams/deciliter      CAPILLARY BLOOD GLUCOSE      POCT Blood Glucose.: 99 mg/dL (23 Oct 2019 11:56)  POCT Blood Glucose.: 86 mg/dL (23 Oct 2019 08:25)  POCT Blood Glucose.: 109 mg/dL (22 Oct 2019 23:41)  POCT Blood Glucose.: 92 mg/dL (22 Oct 2019 17:55)    I&O's Summary    22 Oct 2019 07:01  -  23 Oct 2019 07:00  --------------------------------------------------------  IN: 280 mL / OUT: 744 mL / NET: -464 mL        PHYSICAL EXAM:  Vital Signs Last 24 Hrs  T(C): 36.9 (23 Oct 2019 15:03), Max: 37 (23 Oct 2019 07:35)  T(F): 98.5 (23 Oct 2019 15:03), Max: 98.6 (23 Oct 2019 07:35)  HR: 80 (23 Oct 2019 15:23) (75 - 88)  BP: 119/56 (23 Oct 2019 15:03) (103/44 - 131/52)  BP(mean): 71 (22 Oct 2019 21:00) (65 - 96)  RR: 24 (23 Oct 2019 15:03) (12 - 25)  SpO2: 93% (23 Oct 2019 15:03) (92% - 99%)    CONSTITUTIONAL: NAD, obese female.   EYES: PERRLA; conjunctiva and sclera clear  ENMT: dry oral mucosa, no pharyngeal injection or exudates; normal dentition  NECK: Supple, no palpable masses; no thyromegaly  RESPIRATORY: Normal respiratory effort; diffused expiratory rhonchi, no crackles.   CARDIOVASCULAR: Regular rate and rhythm, normal S1 and S2, no murmur/rub/gallop; + lower extremity edema;  ABDOMEN: Nontender to palpation, normoactive bowel sounds, no rebound/guarding;  MUSCULOSKELETAL:  no joint swelling or tenderness to palpation    LABS:                        8.0    12.64 )-----------( 177      ( 23 Oct 2019 12:10 )             26.0     10-23    142  |  101  |  81<H>  ----------------------------<  102<H>  3.5   |  21<L>  |  4.74<H>    Ca    7.1<L>      23 Oct 2019 12:10  Phos  7.6     10-23  Mg     1.7     10-23    TPro  6.0  /  Alb  2.4<L>  /  TBili  0.4  /  DBili  x   /  AST  7   /  ALT  9   /  AlkPhos  91  10-23              GI PCR Panel, Stool (collected 22 Oct 2019 11:16)  Source: [facility]        RADIOLOGY & ADDITIONAL TESTS:  Results Reviewed:   Imaging Personally Reviewed:  Electrocardiogram Personally Reviewed:    COORDINATION OF CARE:  Care Discussed with Consultants/Other Providers [Y/N]:  Prior or Outpatient Records Reviewed [Y/N]:

## 2019-10-23 NOTE — PROGRESS NOTE ADULT - ASSESSMENT
74F h/o HTN, HLD, IDDM2, COPD (former smoker, 2L/min PRN at home), CHF (EF 12/2018 45-50%), ESRD not on dialysis admitted to micu for hypotensive, ams, hypoglycemia and hypothermia.     CKD stage 5 not yet on hd  s/p left avf 12/18, + thrill + bruit (never been used)  renal function has been stable  seems hypervolemic  BP is better. off norepinephrine  s/p intubation now extubated 10/22  on lasix 40mg po bid  Consider increase Lasix 80 mg q12  bush in place non oliguric   consent for cvvh, IHD in chart from daughter  no urgent indication for dialysis currently, however is fluid overloaded, diurese with lasix. monitor bmp, if renal function worsen or patient not responding to lasix will need to start HD  plan had explained to daughter at bedside. all question answered.   please call if any question    hypocalcemia  pth elevated.  low vit d 25 level  start vit d 02041 weekly x8  start calcitriol 0.25 daily  low albumin   Supplement ca 1g  monitor    hyperphosphatemia  npo right now  allergy to phoslo  start renagel 800mg tid with meal  monitor    anemia  occult +  monitor hb  Iron low:27  transfuse as needed  consider Gi eval    acidosis  likely sec to renal failure +diarrhea  improving   Bicarb drip discontinued  monitor    hypotensive  ? sepsis   on  iv antibiotic  BP is better now  off pressor  monitor    Hypokalemia  better  monitor 74F h/o HTN, HLD, IDDM2, COPD (former smoker, 2L/min PRN at home), CHF (EF 12/2018 45-50%), ESRD not on dialysis admitted to micu for hypotensive, ams, hypoglycemia and hypothermia.     CKD stage 5 not yet on hd  s/p left avf 12/18, + thrill + bruit (never been used)  renal function has been stable  seems hypervolemic  BP is better. off norepinephrine  s/p intubation now extubated 10/22  on lasix 40mg po bid  Consider increase Lasix 80 mg q12  bush in place non oliguric   consent for cvvh, IHD in chart from daughter  no urgent indication for dialysis currently, however is fluid overloaded, diurese with lasix. monitor bmp, if renal function worsen or patient not responding to lasix will need to start HD  plan had explained to daughter at bedside. all question answered.   please call if any question    hypocalcemia  pth elevated.  low vit d 25 level  start vit d 99810 weekly x8  start calcitriol 0.25 daily when phos is better   low albumin   Supplement ca 1g  monitor    hyperphosphatemia  npo right now  allergy to phoslo  start renagel 800mg tid with meal  monitor    anemia  occult +  monitor hb  Iron low:27  transfuse as needed  consider Gi eval    acidosis  likely sec to renal failure +diarrhea  improving   Bicarb drip discontinued  monitor    hypotensive  ? sepsis   on  iv antibiotic  BP is better now  off pressor  monitor    Hypokalemia  better  monitor

## 2019-10-24 DIAGNOSIS — R29.898 OTHER SYMPTOMS AND SIGNS INVOLVING THE MUSCULOSKELETAL SYSTEM: ICD-10-CM

## 2019-10-24 LAB
ANION GAP SERPL CALC-SCNC: 23 MMO/L — HIGH (ref 7–14)
BACTERIA BLD CULT: SIGNIFICANT CHANGE UP
BACTERIA BLD CULT: SIGNIFICANT CHANGE UP
BASE EXCESS BLDA CALC-SCNC: -4 MMOL/L — SIGNIFICANT CHANGE UP
BLOOD GAS ARTERIAL - FIO2: 21 — SIGNIFICANT CHANGE UP
BUN SERPL-MCNC: 83 MG/DL — HIGH (ref 7–23)
CALCIUM SERPL-MCNC: 7.5 MG/DL — LOW (ref 8.4–10.5)
CHLORIDE SERPL-SCNC: 101 MMOL/L — SIGNIFICANT CHANGE UP (ref 98–107)
CO2 SERPL-SCNC: 21 MMOL/L — LOW (ref 22–31)
CREAT SERPL-MCNC: 4.59 MG/DL — HIGH (ref 0.5–1.3)
GLUCOSE SERPL-MCNC: 89 MG/DL — SIGNIFICANT CHANGE UP (ref 70–99)
HBV SURFACE AG SER-ACNC: NEGATIVE — SIGNIFICANT CHANGE UP
HCO3 BLDA-SCNC: 21 MMOL/L — LOW (ref 22–26)
HCT VFR BLD CALC: 28.1 % — LOW (ref 34.5–45)
HGB BLD-MCNC: 8.4 G/DL — LOW (ref 11.5–15.5)
MAGNESIUM SERPL-MCNC: 1.7 MG/DL — SIGNIFICANT CHANGE UP (ref 1.6–2.6)
MCHC RBC-ENTMCNC: 27.5 PG — SIGNIFICANT CHANGE UP (ref 27–34)
MCHC RBC-ENTMCNC: 29.9 % — LOW (ref 32–36)
MCV RBC AUTO: 91.8 FL — SIGNIFICANT CHANGE UP (ref 80–100)
NRBC # FLD: 0.03 K/UL — SIGNIFICANT CHANGE UP (ref 0–0)
PCO2 BLDA: 44 MMHG — SIGNIFICANT CHANGE UP (ref 32–48)
PH BLDA: 7.31 PH — LOW (ref 7.35–7.45)
PHOSPHATE SERPL-MCNC: 7.3 MG/DL — HIGH (ref 2.5–4.5)
PLATELET # BLD AUTO: 190 K/UL — SIGNIFICANT CHANGE UP (ref 150–400)
PMV BLD: 9.7 FL — SIGNIFICANT CHANGE UP (ref 7–13)
PO2 BLDA: 52 MMHG — LOW (ref 83–108)
POTASSIUM SERPL-MCNC: 3.3 MMOL/L — LOW (ref 3.5–5.3)
POTASSIUM SERPL-SCNC: 3.3 MMOL/L — LOW (ref 3.5–5.3)
RBC # BLD: 3.06 M/UL — LOW (ref 3.8–5.2)
RBC # FLD: 17.9 % — HIGH (ref 10.3–14.5)
SAO2 % BLDA: 80.9 % — LOW (ref 95–99)
SODIUM SERPL-SCNC: 145 MMOL/L — SIGNIFICANT CHANGE UP (ref 135–145)
SPECIMEN SOURCE: SIGNIFICANT CHANGE UP
WBC # BLD: 12.83 K/UL — HIGH (ref 3.8–10.5)
WBC # FLD AUTO: 12.83 K/UL — HIGH (ref 3.8–10.5)

## 2019-10-24 PROCEDURE — 99223 1ST HOSP IP/OBS HIGH 75: CPT | Mod: GC

## 2019-10-24 PROCEDURE — 73030 X-RAY EXAM OF SHOULDER: CPT | Mod: 26,RT

## 2019-10-24 PROCEDURE — 99233 SBSQ HOSP IP/OBS HIGH 50: CPT

## 2019-10-24 RX ORDER — POTASSIUM CHLORIDE 20 MEQ
10 PACKET (EA) ORAL
Refills: 0 | Status: COMPLETED | OUTPATIENT
Start: 2019-10-24 | End: 2019-10-24

## 2019-10-24 RX ORDER — ACETYLCYSTEINE 200 MG/ML
2 VIAL (ML) MISCELLANEOUS ONCE
Refills: 0 | Status: COMPLETED | OUTPATIENT
Start: 2019-10-24 | End: 2019-10-24

## 2019-10-24 RX ORDER — NYSTATIN CREAM 100000 [USP'U]/G
1 CREAM TOPICAL
Refills: 0 | Status: DISCONTINUED | OUTPATIENT
Start: 2019-10-24 | End: 2019-10-31

## 2019-10-24 RX ORDER — ERYTHROPOIETIN 10000 [IU]/ML
10000 INJECTION, SOLUTION INTRAVENOUS; SUBCUTANEOUS
Refills: 0 | Status: DISCONTINUED | OUTPATIENT
Start: 2019-10-24 | End: 2019-10-31

## 2019-10-24 RX ORDER — POTASSIUM CHLORIDE 20 MEQ
40 PACKET (EA) ORAL EVERY 4 HOURS
Refills: 0 | Status: DISCONTINUED | OUTPATIENT
Start: 2019-10-24 | End: 2019-10-24

## 2019-10-24 RX ADMIN — Medication 3 MILLILITER(S): at 04:26

## 2019-10-24 RX ADMIN — NYSTATIN CREAM 1 APPLICATION(S): 100000 CREAM TOPICAL at 06:09

## 2019-10-24 RX ADMIN — Medication 200 GRAM(S): at 14:20

## 2019-10-24 RX ADMIN — ERYTHROPOIETIN 10000 UNIT(S): 10000 INJECTION, SOLUTION INTRAVENOUS; SUBCUTANEOUS at 20:52

## 2019-10-24 RX ADMIN — Medication 2 MILLILITER(S): at 21:35

## 2019-10-24 RX ADMIN — Medication 100 MILLIEQUIVALENT(S): at 12:12

## 2019-10-24 RX ADMIN — Medication 100 MILLIEQUIVALENT(S): at 11:05

## 2019-10-24 RX ADMIN — NYSTATIN CREAM 1 APPLICATION(S): 100000 CREAM TOPICAL at 17:02

## 2019-10-24 RX ADMIN — PANTOPRAZOLE SODIUM 40 MILLIGRAM(S): 20 TABLET, DELAYED RELEASE ORAL at 14:21

## 2019-10-24 RX ADMIN — PIPERACILLIN AND TAZOBACTAM 25 GRAM(S): 4; .5 INJECTION, POWDER, LYOPHILIZED, FOR SOLUTION INTRAVENOUS at 06:09

## 2019-10-24 RX ADMIN — Medication 3 MILLILITER(S): at 21:35

## 2019-10-24 RX ADMIN — Medication 3 MILLILITER(S): at 14:31

## 2019-10-24 RX ADMIN — Medication 3 MILLILITER(S): at 10:03

## 2019-10-24 RX ADMIN — PIPERACILLIN AND TAZOBACTAM 25 GRAM(S): 4; .5 INJECTION, POWDER, LYOPHILIZED, FOR SOLUTION INTRAVENOUS at 17:01

## 2019-10-24 RX ADMIN — Medication 80 MILLIGRAM(S): at 06:09

## 2019-10-24 RX ADMIN — Medication 80 MILLIGRAM(S): at 17:01

## 2019-10-24 NOTE — PROGRESS NOTE ADULT - SUBJECTIVE AND OBJECTIVE BOX
Patient is a 74y old  Female who presents with a chief complaint of AMS and Hypotension (23 Oct 2019 16:40)      SUBJECTIVE / OVERNIGHT EVENTS: as per family was able to lift RT hand but now seems a little weaker. NSR 90 w/ PVC's spo2 94% on BiPAP    MEDICATIONS  (STANDING):  albuterol/ipratropium for Nebulization 3 milliLiter(s) Nebulizer every 6 hours  aspirin enteric coated 81 milliGRAM(s) Oral daily  calcium gluconate IVPB 1 Gram(s) IV Intermittent daily  dextrose 5%. 1000 milliLiter(s) (50 mL/Hr) IV Continuous <Continuous>  dextrose 50% Injectable 12.5 Gram(s) IV Push once  dextrose 50% Injectable 25 Gram(s) IV Push once  dextrose 50% Injectable 25 Gram(s) IV Push once  ergocalciferol 43293 Unit(s) Oral <User Schedule>  furosemide   Injectable 80 milliGRAM(s) IV Push two times a day  insulin lispro (HumaLOG) corrective regimen sliding scale   SubCutaneous three times a day before meals  nystatin Powder 1 Application(s) Topical two times a day  pantoprazole  Injectable 40 milliGRAM(s) IV Push daily  piperacillin/tazobactam IVPB.. 3.375 Gram(s) IV Intermittent every 12 hours  sevelamer carbonate 800 milliGRAM(s) Oral three times a day with meals    MEDICATIONS  (PRN):  dextrose 40% Gel 15 Gram(s) Oral once PRN Blood Glucose LESS THAN 70 milliGRAM(s)/deciliter  glucagon  Injectable 1 milliGRAM(s) IntraMuscular once PRN Glucose LESS THAN 70 milligrams/deciliter        CAPILLARY BLOOD GLUCOSE      POCT Blood Glucose.: 90 mg/dL (24 Oct 2019 12:15)  POCT Blood Glucose.: 92 mg/dL (24 Oct 2019 08:36)  POCT Blood Glucose.: 95 mg/dL (23 Oct 2019 17:52)    I&O's Summary    24 Oct 2019 07:01  -  24 Oct 2019 12:57  --------------------------------------------------------  IN: 0 mL / OUT: 1800 mL / NET: -1800 mL        T(C): 37.1 (10-24-19 @ 06:00), Max: 37.1 (10-24-19 @ 06:00)  HR: 85 (10-24-19 @ 12:07) (68 - 85)  BP: 138/71 (10-24-19 @ 06:00) (119/56 - 148/50)  RR: 20 (10-24-19 @ 06:00) (20 - 24)  SpO2: 97% (10-24-19 @ 12:07) (91% - 98%)    PHYSICAL EXAM:  GENERAL: belly breathing   HEAD:  on BiPAP  EYES:  conjunctiva and sclera clear  NECK: Supple thick neck   CHEST/LUNG: LT decrease BS; RT rhonci  HEART: Regular rate and rhythm; No murmurs, rubs, or gallops  ABDOMEN: Soft, Nontender,obese; Bowel sounds present  EXTREMITIES:  anasarca  PSYCH: AAOx3  NEUROLOGY: RT UE distal 5/5; able to flex at elbow unable to keep Lt arm elevated against gravity;  minimal effort with LE       LABS:                        8.4    12.83 )-----------( 190      ( 24 Oct 2019 02:30 )             28.1     10-24    145  |  101  |  83<H>  ----------------------------<  89  3.3<L>   |  21<L>  |  4.59<H>    Ca    7.5<L>      24 Oct 2019 02:30  Phos  7.3     10-24  Mg     1.7     10-24    TPro  6.0  /  Alb  2.4<L>  /  TBili  0.4  /  DBili  x   /  AST  7   /  ALT  9   /  AlkPhos  91  10-23                RADIOLOGY & ADDITIONAL TESTS:    Imaging Personally Reviewed:< from: Xray Chest 1 View AP/PA (10.23.19 @ 12:39) >  IMPRESSION:  Near-complete opacification of the visualized left lung, increased when   compared to chest radiograph from 10/19/2019.    < end of copied text >      Consultant(s) Notes Reviewed:      Care Discussed with Consultants/Other Providers: Patient is a 74y old  Female who presents with a chief complaint of AMS and Hypotension (23 Oct 2019 16:40)      SUBJECTIVE / OVERNIGHT EVENTS: as per family was able to lift RT hand but now seems a little weaker. NSR 90 w/ PVC's spo2 94% on BiPAP    MEDICATIONS  (STANDING):  albuterol/ipratropium for Nebulization 3 milliLiter(s) Nebulizer every 6 hours  aspirin enteric coated 81 milliGRAM(s) Oral daily  calcium gluconate IVPB 1 Gram(s) IV Intermittent daily  dextrose 5%. 1000 milliLiter(s) (50 mL/Hr) IV Continuous <Continuous>  dextrose 50% Injectable 12.5 Gram(s) IV Push once  dextrose 50% Injectable 25 Gram(s) IV Push once  dextrose 50% Injectable 25 Gram(s) IV Push once  ergocalciferol 48613 Unit(s) Oral <User Schedule>  furosemide   Injectable 80 milliGRAM(s) IV Push two times a day  insulin lispro (HumaLOG) corrective regimen sliding scale   SubCutaneous three times a day before meals  nystatin Powder 1 Application(s) Topical two times a day  pantoprazole  Injectable 40 milliGRAM(s) IV Push daily  piperacillin/tazobactam IVPB.. 3.375 Gram(s) IV Intermittent every 12 hours  sevelamer carbonate 800 milliGRAM(s) Oral three times a day with meals    MEDICATIONS  (PRN):  dextrose 40% Gel 15 Gram(s) Oral once PRN Blood Glucose LESS THAN 70 milliGRAM(s)/deciliter  glucagon  Injectable 1 milliGRAM(s) IntraMuscular once PRN Glucose LESS THAN 70 milligrams/deciliter        CAPILLARY BLOOD GLUCOSE      POCT Blood Glucose.: 90 mg/dL (24 Oct 2019 12:15)  POCT Blood Glucose.: 92 mg/dL (24 Oct 2019 08:36)  POCT Blood Glucose.: 95 mg/dL (23 Oct 2019 17:52)    I&O's Summary    24 Oct 2019 07:01  -  24 Oct 2019 12:57  --------------------------------------------------------  IN: 0 mL / OUT: 1800 mL / NET: -1800 mL        T(C): 37.1 (10-24-19 @ 06:00), Max: 37.1 (10-24-19 @ 06:00)  HR: 85 (10-24-19 @ 12:07) (68 - 85)  BP: 138/71 (10-24-19 @ 06:00) (119/56 - 148/50)  RR: 20 (10-24-19 @ 06:00) (20 - 24)  SpO2: 97% (10-24-19 @ 12:07) (91% - 98%)    PHYSICAL EXAM:  GENERAL: belly breathing   HEAD:  on BiPAP  EYES:  conjunctiva and sclera clear  NECK: Supple thick neck   CHEST/LUNG: LT decrease BS; RT rhonci  HEART: Regular rate and rhythm; No murmurs, rubs, or gallops  ABDOMEN: Soft, Nontender,obese; Bowel sounds present  EXTREMITIES:  anasarca  PSYCH: AAOx3  NEUROLOGY: RT UE distal 5/5; able to flex at elbow unable to keep RT arm elevated against gravity;  minimal effort with LE       LABS:                        8.4    12.83 )-----------( 190      ( 24 Oct 2019 02:30 )             28.1     10-24    145  |  101  |  83<H>  ----------------------------<  89  3.3<L>   |  21<L>  |  4.59<H>    Ca    7.5<L>      24 Oct 2019 02:30  Phos  7.3     10-24  Mg     1.7     10-24    TPro  6.0  /  Alb  2.4<L>  /  TBili  0.4  /  DBili  x   /  AST  7   /  ALT  9   /  AlkPhos  91  10-23                RADIOLOGY & ADDITIONAL TESTS:    Imaging Personally Reviewed:< from: Xray Chest 1 View AP/PA (10.23.19 @ 12:39) >  IMPRESSION:  Near-complete opacification of the visualized left lung, increased when   compared to chest radiograph from 10/19/2019.    < end of copied text >      Consultant(s) Notes Reviewed:      Care Discussed with Consultants/Other Providers:

## 2019-10-24 NOTE — CHART NOTE - NSCHARTNOTEFT_GEN_A_CORE
ABG obtained today showed a decrease in oxygen saturation to 80.9. PO2 52. CO2 44. Called pulmonary who stated they think it is mixed venous gas. Recommended to keep her on high flow during the day ABG obtained today showed a decrease in oxygen saturation to 80.9. PO2 52. CO2 44. Called pulmonary who stated they think it is mixed venous gas. Recommended to keep her on high flow during the day and have her go on BiPAP throughout the night. Recommended obtaining ABG in am and CXR in am.

## 2019-10-24 NOTE — PROGRESS NOTE ADULT - ASSESSMENT
74F with PMH HTN, HLD, IDDM2, COPD (former smoker, 2L/min PRN at home), CHF (EF 12/2018 45-50%), ESRD not on dialysis, p/w acute metabolic encephalopathy. Initially found to be hypoglycemia, hypotension, bradycardia, anemia, acidosis, or uremia. Patient was initially admitted to MICU for Septic shock, fluid overloaded and acidosis. She was started on broadspectrum antibiotics with improvement of hemodynamics and respiratory status. Now off pressors and extubated on 10/22 in MICU. Sputum culture grew pan-sensitive pseudomonas, currently on Zosyn (since 10/19/19). RRT ON 9/23 for hypoxia to 70% placed on BipPAP with lasix.

## 2019-10-24 NOTE — PROGRESS NOTE ADULT - PROBLEM SELECTOR PLAN 2
TTE on this admission with EF55% and Grade II diastolic dysfunction. dilated LV  cardiology rec appreciated  c/w Lasix 80mg IVP bid.   currently w/o bush w/ neg fluid balance  PrimaFit for strict I/O

## 2019-10-24 NOTE — PROGRESS NOTE ADULT - PROBLEM SELECTOR PLAN 1
Possibly secondary to lung collapse mucous plug /PNA/diastolic HF  h/o COPD with 2L NC at home  acute respiratory failure due to sepsis, extubated 10/22  CXR with increase left lung opacity, ABG with hypoxemia only.  c/w IV lasix for diuresis neg fluid balance   chest PT   repeat CXR in AM   .Pulm consult, f/u recs

## 2019-10-24 NOTE — PROGRESS NOTE ADULT - ASSESSMENT
EKG SR     Echo : < from: Transthoracic Echocardiogram (10.19.19 @ 10:52) >  1. Mitral annular calcification. Tethered mitral valve  leaflets with normal opening. Moderate mitral  regurgitation.  2. Calcified trileaflet aortic valve with normal opening.  Minimal aortic regurgitation.  3. Moderately dilated left atrium.  LA volume index = 42  cc/m2.  4. Severe left ventricular enlargement.  5. Endocardium not well visualized; grossly low normal left  ventricular systolic function.  6. Moderate diastolic dysfunction (Stage II).  7. The right ventricle is not well visualized; grossly  normal right ventricular systolic function.  8. Normal tricuspid valve.  Moderate tricuspid  regurgitation.  9. Estimated pulmonary artery systolic pressure equals 53  mm Hg, assuming right atrial pressure equals 15  mm Hg,  consistent with moderate pulmonary hypertension.  10. Thickened pericardium inferior to the right ventricle.  Small pericardial effusion adjacent to the right atrium.  *** Compared with echocardiogram of 10/10/2016, results are  similar on today's study.    < end of copied text >      Assessment and Plan     1) Pulm edema: lasix 80 IV BID , Bipap , being switched to Optiflow     2) PNA on Vanc and Zosyn     2) CAD s/p PCX PCI: restart ASA 81, monitor H/H     3) Anemia : no obvious GIB, stool occult + , monitor H/H  Plavix on hold , restarted asa     4) CKD V : renal on board    5) Hypocalcemia / prolonged QT : replete Ca , Daily EKG's     6) Lt. Lung Collapse: t/t per pulm

## 2019-10-24 NOTE — PROGRESS NOTE ADULT - PROBLEM SELECTOR PLAN 4
sepsis resolved, now off pressures, hemodynamically stable.   sputum culture - pseudomonas  c/w zosyn total 14 days course (since 10/19/19)  pulm c/s-f/u recs

## 2019-10-24 NOTE — CONSULT NOTE ADULT - SUBJECTIVE AND OBJECTIVE BOX
CHIEF COMPLAINT: SOB, Bipap    HPI:    The patient is a 74 Y.O. pm h of HTN, HLD, COPD< T2DM, ESRD with fistula not yet being used, HFPEF with moderate MR, pHTN RVSP 53 who presents with hypoglycemia,, hypotesnion,     PAST MEDICAL & SURGICAL HISTORY:  ESRD (end stage renal disease): L arm AVF placed 12/2018  Hemorrhoids  GERD (gastroesophageal reflux disease)  Morbid obesity  Cataract  Diaphragmatic hernia  Cerebral infarction: 2011  CKD (chronic kidney disease)  Anemia  Diabetes mellitus: Insulin dependent. Type 2  CHF (congestive heart failure)  Hyperlipidemia  Hypertension  COPD (chronic obstructive pulmonary disease)  Chronic obstructive pulmonary disease, unspecified COPD type  Adult Idiopathic Generalized Osteoporosis  CAD (Coronary Artery Disease): 1 stent placed 11/7/18  DM (Diabetes Mellitus), Secondary  Hypertension  H/O coronary angiogram: [facility] 1 stent placed 11/7/2018      FAMILY HISTORY:  Family history of breast cancer (Grandparent)      SOCIAL HISTORY:  Smoking: [ ] Never Smoked [ ] Former Smoker (__ packs x ___ years) [ ] Current Smoker  (__ packs x ___ years)  Substance Use: [ ] Never Used [ ] Used ____  EtOH Use:  Marital Status: [ ] Single [ ]  [ ]  [ ]   Sexual History:   Occupation:  Recent Travel:  Country of Birth:  Advance Directives:    Allergies    calcium acetate (Vomiting; Nausea; Chills)  wool-rash (Other)    Intolerances        HOME MEDICATIONS:  Home Medications:  amLODIPine 10 mg oral tablet: 1 tab(s) orally once a day (19 Oct 2019 02:12)  aspirin 81 mg oral tablet: 1 tab(s) orally once a day (19 Oct 2019 02:12)  atorvastatin 40 mg oral tablet: 1 tab(s) orally once a day (19 Oct 2019 02:12)  carvedilol 12.5 mg oral tablet: 1 tab(s) orally 2 times a day (19 Oct 2019 02:12)  clopidogrel 75 mg oral tablet: 1 tab(s) orally once a day (19 Oct 2019 02:12)  Dexilant 60 mg oral delayed release capsule: 1 cap(s) orally once a day (19 Oct 2019 02:12)  ferrous gluconate 324 mg (37.5 mg elemental iron) oral tablet: 1 tab(s) orally 3 times a day (19 Oct 2019 02:12)  furosemide 40 mg oral tablet: 1 tab(s) orally 2 times a day (19 Oct 2019 02:12)  gabapentin 300 mg oral tablet: 1 tab(s) orally 3 times a day (19 Oct 2019 02:12)  hydrALAZINE 100 mg oral tablet: 1 tab(s) orally 3 times a day (19 Oct 2019 02:12)  Levemir 100 units/mL subcutaneous solution: 10 unit(s) subcutaneous once a day (at bedtime) (19 Oct 2019 02:12)  metOLazone 2.5 mg oral tablet: 1 tab(s) orally once a day  30 minutes prior to furosemide (19 Oct 2019 02:12)  montelukast 10 mg oral tablet: 1 tab(s) orally once a day (19 Oct 2019 02:12)  oxygen 2L NC as needed:  (19 Oct 2019 02:12)  sevelamer:  (19 Oct 2019 02:12)      REVIEW OF SYSTEMS:  Constitutional: [ ] negative [ ] fevers [ ] chills [ ] weight loss [ ] weight gain  HEENT: [ ] negative [ ] dry eyes [ ] eye irritation [ ] postnasal drip [ ] nasal congestion  CV: [ ] negative  [ ] chest pain [ ] orthopnea [ ] palpitations [ ] murmur  Resp: [ ] negative [ ] cough [ ] shortness of breath [ ] dyspnea [ ] wheezing [ ] sputum [ ] hemoptysis  GI: [ ] negative [ ] nausea [ ] vomiting [ ] diarrhea [ ] constipation [ ] abd pain [ ] dysphagia   : [ ] negative [ ] dysuria [ ] nocturia [ ] hematuria [ ] increased urinary frequency  Musculoskeletal: [ ] negative [ ] back pain [ ] myalgias [ ] arthralgias [ ] fracture  Skin: [ ] negative [ ] rash [ ] itch  Neurological: [ ] negative [ ] headache [ ] dizziness [ ] syncope [ ] weakness [ ] numbness  Psychiatric: [ ] negative [ ] anxiety [ ] depression  Endocrine: [ ] negative [ ] diabetes [ ] thyroid problem  Hematologic/Lymphatic: [ ] negative [ ] anemia [ ] bleeding problem  Allergic/Immunologic: [ ] negative [ ] itchy eyes [ ] nasal discharge [ ] hives [ ] angioedema  [ ] All other systems negative  [ ] Unable to assess ROS because ________    OBJECTIVE:  ICU Vital Signs Last 24 Hrs  T(C): 37.1 (24 Oct 2019 06:00), Max: 37.1 (24 Oct 2019 06:00)  T(F): 98.8 (24 Oct 2019 06:00), Max: 98.8 (24 Oct 2019 06:00)  HR: 88 (24 Oct 2019 14:25) (68 - 98)  BP: 138/71 (24 Oct 2019 06:00) (138/71 - 148/50)  BP(mean): --  ABP: --  ABP(mean): --  RR: 20 (24 Oct 2019 06:00) (20 - 20)  SpO2: 92% (24 Oct 2019 13:47) (91% - 98%)        10-24 @ 07:01  -  10-24 @ 16:09  --------------------------------------------------------  IN: 0 mL / OUT: 1800 mL / NET: -1800 mL      CAPILLARY BLOOD GLUCOSE      POCT Blood Glucose.: 90 mg/dL (24 Oct 2019 12:15)      PHYSICAL EXAM:  General:   HEENT:   Lymph Nodes:  Neck:   Respiratory:   Cardiovascular:   Abdomen:   Extremities:   Skin:   Neurological:  Psychiatry:    LINES:     HOSPITAL MEDICATIONS:  Standing Meds:  acetylcysteine 20% for bronchoscopy 2 milliLiter(s) Nebulizer once  albuterol/ipratropium for Nebulization 3 milliLiter(s) Nebulizer every 6 hours  aspirin enteric coated 81 milliGRAM(s) Oral daily  calcium gluconate IVPB 1 Gram(s) IV Intermittent daily  dextrose 5%. 1000 milliLiter(s) IV Continuous <Continuous>  dextrose 50% Injectable 12.5 Gram(s) IV Push once  dextrose 50% Injectable 25 Gram(s) IV Push once  dextrose 50% Injectable 25 Gram(s) IV Push once  epoetin terence Injectable 07202 Unit(s) SubCutaneous <User Schedule>  ergocalciferol 88839 Unit(s) Oral <User Schedule>  furosemide   Injectable 80 milliGRAM(s) IV Push two times a day  insulin lispro (HumaLOG) corrective regimen sliding scale   SubCutaneous three times a day before meals  nystatin Powder 1 Application(s) Topical two times a day  pantoprazole  Injectable 40 milliGRAM(s) IV Push daily  piperacillin/tazobactam IVPB.. 3.375 Gram(s) IV Intermittent every 12 hours  sevelamer carbonate 800 milliGRAM(s) Oral three times a day with meals      PRN Meds:  dextrose 40% Gel 15 Gram(s) Oral once PRN  glucagon  Injectable 1 milliGRAM(s) IntraMuscular once PRN      LABS:                        8.4    12.83 )-----------( 190      ( 24 Oct 2019 02:30 )             28.1     Hgb Trend: 8.4<--, 8.0<--, 7.6<--, 7.5<--, 7.5<--  10-24    145  |  101  |  83<H>  ----------------------------<  89  3.3<L>   |  21<L>  |  4.59<H>    Ca    7.5<L>      24 Oct 2019 02:30  Phos  7.3     10-24  Mg     1.7     10-24    TPro  6.0  /  Alb  2.4<L>  /  TBili  0.4  /  DBili  x   /  AST  7   /  ALT  9   /  AlkPhos  91  10-23    Creatinine Trend: 4.59<--, 4.74<--, 4.66<--, 4.74<--, 4.87<--, 5.17<--      Arterial Blood Gas:  10-24 @ 15:39  7.31/44/52/21/80.9/-4.0  ABG lactate: --  Arterial Blood Gas:  10-23 @ 12:30  7.33/44/67/22/91.5/-2.6  ABG lactate: --  Arterial Blood Gas:  10-23 @ 12:00  7.35/43/94/23/97.2/-1.8  ABG lactate: 0.9        MICROBIOLOGY:     Culture - Blood (collected 23 Oct 2019 13:39)  Source: Peripheral Site 1  Preliminary Report (24 Oct 2019 13:40):    NO ORGANISMS ISOLATED    NO ORGANISMS ISOLATED AT 24 HOURS    GI PCR Panel, Stool (collected 22 Oct 2019 11:16)  Source: FECES        RADIOLOGY:  [ ] Reviewed and interpreted by me    PULMONARY FUNCTION TESTS:    EKG: CHIEF COMPLAINT: SOB, Bipap    HPI:    The patient is a 74 Y.O. pm h of HTN, HLD, COPD on 2-3 L at home T2DM, ESRD with fistula not yet being used, HFPEF with moderate MR, pHTN RVSP 53 who presents with hypoglycemia,, hypotension, respiratory failure, severe acidosis on bicarb gtt, ETT culture growing pseudomonas. Patient was initially admitted to the MICU s/p intoxation pressors, atb, diuresis with improvement. and transferred to the floor. RRT called yesterday, for increased WOB. Patient placed on bipap and laisix. Rapid at the RRT showing white out of the left side of the lung. Patient comfortable on bipap but would like to take a break. Net neg 1800cc output with lasix.     PAST MEDICAL & SURGICAL HISTORY:  ESRD (end stage renal disease): L arm AVF placed 12/2018  Hemorrhoids  GERD (gastroesophageal reflux disease)  Morbid obesity  Cataract  Diaphragmatic hernia  Cerebral infarction: 2011  CKD (chronic kidney disease)  Anemia  Diabetes mellitus: Insulin dependent. Type 2  CHF (congestive heart failure)  Hyperlipidemia  Hypertension  COPD (chronic obstructive pulmonary disease)  Chronic obstructive pulmonary disease, unspecified COPD type  Adult Idiopathic Generalized Osteoporosis  CAD (Coronary Artery Disease): 1 stent placed 11/7/18  DM (Diabetes Mellitus), Secondary  Hypertension  H/O coronary angiogram: [facility] 1 stent placed 11/7/2018      FAMILY HISTORY:  Family history of breast cancer (Grandparent)      SOCIAL HISTORY:  Smoking: [ ] Never Smoked [x] Former Smoker ( quit 2 years ago 60 pack   Substance Use: [ ] Never Used [ ] Used ____  EtOH Use:  Marital Status: [ ] Single [ ]  [ ]  [ ]   Sexual History:   Occupation:  Recent Travel:  Country of Birth:  Advance Directives:    Allergies    calcium acetate (Vomiting; Nausea; Chills)  wool-rash (Other)    Intolerances        HOME MEDICATIONS:  Home Medications:  amLODIPine 10 mg oral tablet: 1 tab(s) orally once a day (19 Oct 2019 02:12)  aspirin 81 mg oral tablet: 1 tab(s) orally once a day (19 Oct 2019 02:12)  atorvastatin 40 mg oral tablet: 1 tab(s) orally once a day (19 Oct 2019 02:12)  carvedilol 12.5 mg oral tablet: 1 tab(s) orally 2 times a day (19 Oct 2019 02:12)  clopidogrel 75 mg oral tablet: 1 tab(s) orally once a day (19 Oct 2019 02:12)  Dexilant 60 mg oral delayed release capsule: 1 cap(s) orally once a day (19 Oct 2019 02:12)  ferrous gluconate 324 mg (37.5 mg elemental iron) oral tablet: 1 tab(s) orally 3 times a day (19 Oct 2019 02:12)  furosemide 40 mg oral tablet: 1 tab(s) orally 2 times a day (19 Oct 2019 02:12)  gabapentin 300 mg oral tablet: 1 tab(s) orally 3 times a day (19 Oct 2019 02:12)  hydrALAZINE 100 mg oral tablet: 1 tab(s) orally 3 times a day (19 Oct 2019 02:12)  Levemir 100 units/mL subcutaneous solution: 10 unit(s) subcutaneous once a day (at bedtime) (19 Oct 2019 02:12)  metOLazone 2.5 mg oral tablet: 1 tab(s) orally once a day  30 minutes prior to furosemide (19 Oct 2019 02:12)  montelukast 10 mg oral tablet: 1 tab(s) orally once a day (19 Oct 2019 02:12)  oxygen 2L NC as needed:  (19 Oct 2019 02:12)  sevelamer:  (19 Oct 2019 02:12)      REVIEW OF SYSTEMS:  Constitutional: [ ] negative [ ] fevers [ ] chills [ ] weight loss [ ] weight gain  HEENT: [ ] negative [ ] dry eyes [ ] eye irritation [ ] postnasal drip [ ] nasal congestion  CV: [ ] negative  [ ] chest pain [ ] orthopnea [ ] palpitations [ ] murmur  Resp: [ ] negative [ ] cough [ ] shortness of breath [ ] dyspnea [ ] wheezing [ ] sputum [ ] hemoptysis  GI: [ ] negative [ ] nausea [ ] vomiting [ ] diarrhea [ ] constipation [ ] abd pain [ ] dysphagia   : [ ] negative [ ] dysuria [ ] nocturia [ ] hematuria [ ] increased urinary frequency  Musculoskeletal: [ ] negative [ ] back pain [ ] myalgias [ ] arthralgias [ ] fracture  Skin: [ ] negative [ ] rash [ ] itch  Neurological: [ ] negative [ ] headache [ ] dizziness [ ] syncope [ ] weakness [ ] numbness  Psychiatric: [ ] negative [ ] anxiety [ ] depression  Endocrine: [ ] negative [ ] diabetes [ ] thyroid problem  Hematologic/Lymphatic: [ ] negative [ ] anemia [ ] bleeding problem  Allergic/Immunologic: [ ] negative [ ] itchy eyes [ ] nasal discharge [ ] hives [ ] angioedema  [ ] All other systems negative  [ ] Unable to assess ROS because ________    OBJECTIVE:  ICU Vital Signs Last 24 Hrs  T(C): 37.1 (24 Oct 2019 06:00), Max: 37.1 (24 Oct 2019 06:00)  T(F): 98.8 (24 Oct 2019 06:00), Max: 98.8 (24 Oct 2019 06:00)  HR: 88 (24 Oct 2019 14:25) (68 - 98)  BP: 138/71 (24 Oct 2019 06:00) (138/71 - 148/50)  BP(mean): --  ABP: --  ABP(mean): --  RR: 20 (24 Oct 2019 06:00) (20 - 20)  SpO2: 92% (24 Oct 2019 13:47) (91% - 98%)        10-24 @ 07:01  -  10-24 @ 16:09  --------------------------------------------------------  IN: 0 mL / OUT: 1800 mL / NET: -1800 mL      CAPILLARY BLOOD GLUCOSE      POCT Blood Glucose.: 90 mg/dL (24 Oct 2019 12:15)      PHYSICAL EXAM:  General:   HEENT:   Lymph Nodes:  Neck:   Respiratory:   Cardiovascular:   Abdomen:   Extremities:   Skin:   Neurological:  Psychiatry:    LINES:     HOSPITAL MEDICATIONS:  Standing Meds:  acetylcysteine 20% for bronchoscopy 2 milliLiter(s) Nebulizer once  albuterol/ipratropium for Nebulization 3 milliLiter(s) Nebulizer every 6 hours  aspirin enteric coated 81 milliGRAM(s) Oral daily  calcium gluconate IVPB 1 Gram(s) IV Intermittent daily  dextrose 5%. 1000 milliLiter(s) IV Continuous <Continuous>  dextrose 50% Injectable 12.5 Gram(s) IV Push once  dextrose 50% Injectable 25 Gram(s) IV Push once  dextrose 50% Injectable 25 Gram(s) IV Push once  epoetin terence Injectable 46352 Unit(s) SubCutaneous <User Schedule>  ergocalciferol 91991 Unit(s) Oral <User Schedule>  furosemide   Injectable 80 milliGRAM(s) IV Push two times a day  insulin lispro (HumaLOG) corrective regimen sliding scale   SubCutaneous three times a day before meals  nystatin Powder 1 Application(s) Topical two times a day  pantoprazole  Injectable 40 milliGRAM(s) IV Push daily  piperacillin/tazobactam IVPB.. 3.375 Gram(s) IV Intermittent every 12 hours  sevelamer carbonate 800 milliGRAM(s) Oral three times a day with meals      PRN Meds:  dextrose 40% Gel 15 Gram(s) Oral once PRN  glucagon  Injectable 1 milliGRAM(s) IntraMuscular once PRN      LABS:                        8.4    12.83 )-----------( 190      ( 24 Oct 2019 02:30 )             28.1     Hgb Trend: 8.4<--, 8.0<--, 7.6<--, 7.5<--, 7.5<--  10-24    145  |  101  |  83<H>  ----------------------------<  89  3.3<L>   |  21<L>  |  4.59<H>    Ca    7.5<L>      24 Oct 2019 02:30  Phos  7.3     10-24  Mg     1.7     10-24    TPro  6.0  /  Alb  2.4<L>  /  TBili  0.4  /  DBili  x   /  AST  7   /  ALT  9   /  AlkPhos  91  10-23    Creatinine Trend: 4.59<--, 4.74<--, 4.66<--, 4.74<--, 4.87<--, 5.17<--      Arterial Blood Gas:  10-24 @ 15:39  7.31/44/52/21/80.9/-4.0  ABG lactate: --  Arterial Blood Gas:  10-23 @ 12:30  7.33/44/67/22/91.5/-2.6  ABG lactate: --  Arterial Blood Gas:  10-23 @ 12:00  7.35/43/94/23/97.2/-1.8  ABG lactate: 0.9        MICROBIOLOGY:     Culture - Blood (collected 23 Oct 2019 13:39)  Source: Peripheral Site 1  Preliminary Report (24 Oct 2019 13:40):    NO ORGANISMS ISOLATED    NO ORGANISMS ISOLATED AT 24 HOURS    GI PCR Panel, Stool (collected 22 Oct 2019 11:16)  Source: [facility]ES        RADIOLOGY:  [ ] Reviewed and interpreted by me    PULMONARY FUNCTION TESTS:    EKG: CHIEF COMPLAINT: SOB, Bipap    HPI:    The patient is a 74 Y.O. pm h of HTN, HLD, COPD on 2-3 L at home T2DM, ESRD with fistula not yet being used, HFPEF with moderate MR, pHTN RVSP 53 who presents with hypoglycemia,, hypotension, respiratory failure, severe acidosis on bicarb gtt, ETT culture growing pseudomonas. Patient was initially admitted to the MICU s/p intoxation pressors, atb, diuresis with improvement. and transferred to the floor. RRT called yesterday, for increased WOB. Patient placed on bipap and laisix. Rapid at the RRT showing white out of the left side of the lung. Patient comfortable on bipap but would like to take a break. Net neg 1800cc output with lasix.     PAST MEDICAL & SURGICAL HISTORY:  ESRD (end stage renal disease): L arm AVF placed 12/2018  Hemorrhoids  GERD (gastroesophageal reflux disease)  Morbid obesity  Cataract  Diaphragmatic hernia  Cerebral infarction: 2011  CKD (chronic kidney disease)  Anemia  Diabetes mellitus: Insulin dependent. Type 2  CHF (congestive heart failure)  Hyperlipidemia  Hypertension  COPD (chronic obstructive pulmonary disease)  Chronic obstructive pulmonary disease, unspecified COPD type  Adult Idiopathic Generalized Osteoporosis  CAD (Coronary Artery Disease): 1 stent placed 11/7/18  DM (Diabetes Mellitus), Secondary  Hypertension  H/O coronary angi[facility]: [facility] 1 stent placed 11/7/2018      FAMILY HISTORY:  Family history of breast cancer (Grandparent)      SOCIAL HISTORY:  Smoking: [ ] Never Smoked [x] Former Smoker ( quit 2 years ago 60 pack   Substance Use: None  EtOH Use: None    Allergies    calcium acetate (Vomiting; Nausea; Chills)  wool-rash (Other)    Intolerances        HOME MEDICATIONS:  Home Medications:  amLODIPine 10 mg oral tablet: 1 tab(s) orally once a day (19 Oct 2019 02:12)  aspirin 81 mg oral tablet: 1 tab(s) orally once a day (19 Oct 2019 02:12)  atorvastatin 40 mg oral tablet: 1 tab(s) orally once a day (19 Oct 2019 02:12)  carvedilol 12.5 mg oral tablet: 1 tab(s) orally 2 times a day (19 Oct 2019 02:12)  clopidogrel 75 mg oral tablet: 1 tab(s) orally once a day (19 Oct 2019 02:12)  Dexilant 60 mg oral delayed release capsule: 1 cap(s) orally once a day (19 Oct 2019 02:12)  ferrous gluconate 324 mg (37.5 mg elemental iron) oral tablet: 1 tab(s) orally 3 times a day (19 Oct 2019 02:12)  furosemide 40 mg oral tablet: 1 tab(s) orally 2 times a day (19 Oct 2019 02:12)  gabapentin 300 mg oral tablet: 1 tab(s) orally 3 times a day (19 Oct 2019 02:12)  hydrALAZINE 100 mg oral tablet: 1 tab(s) orally 3 times a day (19 Oct 2019 02:12)  Levemir 100 units/mL subcutaneous solution: 10 unit(s) subcutaneous once a day (at bedtime) (19 Oct 2019 02:12)  metOLazone 2.5 mg oral tablet: 1 tab(s) orally once a day  30 minutes prior to furosemide (19 Oct 2019 02:12)  montelukast 10 mg oral tablet: 1 tab(s) orally once a day (19 Oct 2019 02:12)  oxygen 2L NC as needed:  (19 Oct 2019 02:12)  sevelamer:  (19 Oct 2019 02:12)      REVIEW OF SYSTEMS:  Constitutional: [ x] negative [ ] fevers [ ] chills [ ] weight loss [ ] weight gain  HEENT: [x ] negative [ ] dry eyes [ ] eye irritation [ ] postnasal drip [ ] nasal congestion  CV: [ x] negative  [ ] chest pain [ ] orthopnea [ ] palpitations [ ] murmur  Resp: [ ] negative [ x] cough [ x] shortness of breath [ x] dyspnea [ ] wheezing [ ] sputum [ ] hemoptysis  GI: [ x] negative [ ] nausea [ ] vomiting [ ] diarrhea [ ] constipation [ ] abd pain [ ] dysphagia   : [ x] negative [ ] dysuria [ ] nocturia [ ] hematuria [ ] increased urinary frequency  Musculoskeletal: [x ] negative [ ] back pain [ ] myalgias [ ] arthralgias [ ] fracture  Skin: [ x] negative [ ] rash [ ] itch  Neurological: [ x] negative [ ] headache [ ] dizziness [ ] syncope [ ] weakness [ ] numbness  Psychiatric: [x ] negative [ ] anxiety [ ] depression  Endocrine: [ ] negative [ ] diabetes [ ] thyroid problem  Hematologic/Lymphatic: [ ] negative [ ] anemia [ ] bleeding problem  Allergic/Immunologic: [ ] negative [ ] itchy eyes [ ] nasal discharge [ ] hives [ ] angioedema  [ ] All other systems negative  [ ] Unable to assess ROS because ________    OBJECTIVE:  ICU Vital Signs Last 24 Hrs  T(C): 37.1 (24 Oct 2019 06:00), Max: 37.1 (24 Oct 2019 06:00)  T(F): 98.8 (24 Oct 2019 06:00), Max: 98.8 (24 Oct 2019 06:00)  HR: 88 (24 Oct 2019 14:25) (68 - 98)  BP: 138/71 (24 Oct 2019 06:00) (138/71 - 148/50)  BP(mean): --  ABP: --  ABP(mean): --  RR: 20 (24 Oct 2019 06:00) (20 - 20)  SpO2: 92% (24 Oct 2019 13:47) (91% - 98%)        10-24 @ 07:01  -  10-24 @ 16:09  --------------------------------------------------------  IN: 0 mL / OUT: 1800 mL / NET: -1800 mL      CAPILLARY BLOOD GLUCOSE      POCT Blood Glucose.: 90 mg/dL (24 Oct 2019 12:15)    PHYSICAL EXAM:    Constitutional: well-developed; well-groomed; well-nourished; no distress, Obese  Eyes: PERRL; EOMI  ENMT: Biapp in place  Neck:  Supple; no JVD;   MSK/Back: normal shape; ROM intact; strength intact, . Normal strength in all ext,  Respiratory: Decreased BS on the left, CTA on the right. No wheezing.   Cardiovascular: regular rate and rhythm  no rub , no murmur, no gallops.   Gastrointestinal: soft; no distention, normal BS, no TTP, no organomegaly, no ascites.  Extremities: no clubbing; no cyanosis; Trace LE edema   Neurological: alert and oriented x 3; responds to pain; responds to verbal commands; sensation intact: CN nerves grossly intact.   Skin: warm and dry; color normal: no rash: no ulcers  Psychiatric: Calm, no SI/HI      LINES:     HOSPITAL MEDICATIONS:  Standing Meds:  acetylcysteine 20% for bronchoscopy 2 milliLiter(s) Nebulizer once  albuterol/ipratropium for Nebulization 3 milliLiter(s) Nebulizer every 6 hours  aspirin enteric coated 81 milliGRAM(s) Oral daily  calcium gluconate IVPB 1 Gram(s) IV Intermittent daily  dextrose 5%. 1000 milliLiter(s) IV Continuous <Continuous>  dextrose 50% Injectable 12.5 Gram(s) IV Push once  dextrose 50% Injectable 25 Gram(s) IV Push once  dextrose 50% Injectable 25 Gram(s) IV Push once  epoetin terence Injectable 87442 Unit(s) SubCutaneous <User Schedule>  ergocalciferol 45057 Unit(s) Oral <User Schedule>  furosemide   Injectable 80 milliGRAM(s) IV Push two times a day  insulin lispro (HumaLOG) corrective regimen sliding scale   SubCutaneous three times a day before meals  nystatin Powder 1 Application(s) Topical two times a day  pantoprazole  Injectable 40 milliGRAM(s) IV Push daily  piperacillin/tazobactam IVPB.. 3.375 Gram(s) IV Intermittent every 12 hours  sevelamer carbonate 800 milliGRAM(s) Oral three times a day with meals      PRN Meds:  dextrose 40% Gel 15 Gram(s) Oral once PRN  glucagon  Injectable 1 milliGRAM(s) IntraMuscular once PRN      LABS:                        8.4    12.83 )-----------( 190      ( 24 Oct 2019 02:30 )             28.1     Hgb Trend: 8.4<--, 8.0<--, 7.6<--, 7.5<--, 7.5<--  10-24    145  |  101  |  83<H>  ----------------------------<  89  3.3<L>   |  21<L>  |  4.59<H>    Ca    7.5<L>      24 Oct 2019 02:30  Phos  7.3     10-24  Mg     1.7     10-24    TPro  6.0  /  Alb  2.4<L>  /  TBili  0.4  /  DBili  x   /  AST  7   /  ALT  9   /  AlkPhos  91  10-23    Creatinine Trend: 4.59<--, 4.74<--, 4.66<--, 4.74<--, 4.87<--, 5.17<--      Arterial Blood Gas:  10-24 @ 15:39  7.31/44/52/21/80.9/-4.0  ABG lactate: --  Arterial Blood Gas:  10-23 @ 12:30  7.33/44/67/22/91.5/-2.6  ABG lactate: --  Arterial Blood Gas:  10-23 @ 12:00  7.35/43/94/23/97.2/-1.8  ABG lactate: 0.9        MICROBIOLOGY:     Culture - Blood (collected 23 Oct 2019 13:39)  Source: Peripheral Site 1  Preliminary Report (24 Oct 2019 13:40):    NO ORGANISMS ISOLATED    NO ORGANISMS ISOLATED AT 24 HOURS    GI PCR Panel, Stool (collected 22 Oct 2019 11:16)  Source: FECES        RADIOLOGY:  [ ] Reviewed and interpreted by me    PULMONARY FUNCTION TESTS:    EKG:

## 2019-10-24 NOTE — PROGRESS NOTE ADULT - SUBJECTIVE AND OBJECTIVE BOX
[facility] NEPHROLOGY PRACTICE   MD CHARITY MCADAMS, DO FANI BROWN, JASSON CONNOLLY    TEL:  OFFICE: [phone]  DR ALCANTAR CELL: [phone]  DR. MORRIS CELL: [phone]  DR. BROWN CELL: [phone]  TENISHA BOATENG CELL: [phone]        Patient is a 74y old  Female who presents with a chief complaint of AMS and Hypotension (23 Oct 2019 16:40)      Patient seen and examined at bedside.     VITALS:  T(F): 98.8 (10-24-19 @ 06:00), Max: 98.8 (10-24-19 @ 06:00)  HR: 85 (10-24-19 @ 12:07)  BP: 138/71 (10-24-19 @ 06:00)  RR: 20 (10-24-19 @ 06:00)  SpO2: 97% (10-24-19 @ 12:07)  Wt(kg): --    10-24 @ 07:01  -  10-24 @ 12:50  --------------------------------------------------------  IN: 0 mL / OUT: 1800 mL / NET: -1800 mL          PHYSICAL EXAM:  Constitutional: on bipap  Neck: No JVD  Respiratory: + wheezes, rales or rhonchi R>L  Cardiovascular: S1, S2, RRR  Gastrointestinal: BS+, soft, NT/ND  Extremities: ++ peripheral edema    Hospital Medications:   MEDICATIONS  (STANDING):  albuterol/ipratropium for Nebulization 3 milliLiter(s) Nebulizer every 6 hours  aspirin enteric coated 81 milliGRAM(s) Oral daily  calcium gluconate IVPB 1 Gram(s) IV Intermittent daily  dextrose 5%. 1000 milliLiter(s) (50 mL/Hr) IV Continuous <Continuous>  dextrose 50% Injectable 12.5 Gram(s) IV Push once  dextrose 50% Injectable 25 Gram(s) IV Push once  dextrose 50% Injectable 25 Gram(s) IV Push once  ergocalciferol 52107 Unit(s) Oral <User Schedule>  furosemide   Injectable 80 milliGRAM(s) IV Push two times a day  insulin lispro (HumaLOG) corrective regimen sliding scale   SubCutaneous three times a day before meals  nystatin Powder 1 Application(s) Topical two times a day  pantoprazole  Injectable 40 milliGRAM(s) IV Push daily  piperacillin/tazobactam IVPB.. 3.375 Gram(s) IV Intermittent every 12 hours  sevelamer carbonate 800 milliGRAM(s) Oral three times a day with meals      LABS:  10-24    145  |  101  |  83<H>  ----------------------------<  89  3.3<L>   |  21<L>  |  4.59<H>    Ca    7.5<L>      24 Oct 2019 02:30  Phos  7.3     10-24  Mg     1.7     10-24    TPro  6.0  /  Alb  2.4<L>  /  TBili  0.4  /  DBili      /  AST  7   /  ALT  9   /  AlkPhos  91  10-23    Creatinine Trend: 4.59 <--, 4.74 <--, 4.66 <--, 4.74 <--, 4.87 <--, 5.17 <--, 5.34 <--, 5.24 <--, 5.16 <--, 5.28 <--, 5.39 <--    Phosphorus Level, Serum: 7.3 mg/dL (10-24 @ 02:30)                              8.4    12.83 )-----------( 190      ( 24 Oct 2019 02:30 )             28.1     Urine Studies:  Urinalysis - [10-19-19 @ 03:30]      Color YELLOW / Appearance CLEAR / SG 1.017 / pH 5.5      Gluc NEGATIVE / Ketone NEGATIVE  / Bili NEGATIVE / Urobili NORMAL       Blood NEGATIVE / Protein 10 / Leuk Est NEGATIVE / Nitrite NEGATIVE      RBC  / WBC  / Hyaline  / Gran  / Sq Epi  / Non Sq Epi  / Bacteria       Iron 27, TIBC 125, %sat --      [10-20-19 @ 02:40]  Ferritin 283.7      [10-20-19 @ 02:40]  .9 (Ca --)      [10-20-19 @ 02:40]   --  PTH -- (Ca 8.7)      [10-31-18 @ 09:24]   269  Vitamin D (25OH) 10.5      [10-20-19 @ 02:40]  HbA1c 5.2      [10-20-19 @ 02:40]  TSH 1.11      [10-18-19 @ 23:20]    HBsAg NEGATIVE      [10-24-19 @ 02:30]  HCV 0.16, Nonreactive Hepatitis C AB  S/CO Ratio                        Interpretation  < 1.00                                   Non-Reactive  1.00 - 4.99                         Weakly-Reactive  >= 5.00                                Reactive  Non-Reactive: Aperson with a non-reactive HCV antibody  result is considered uninfected.  No further action is  needed unless recent infection is suspected.  In these  cases, consider repeat testing later to detect  seroconversion..  Weakly-Reactive: HCV antibody test is abnormal, HCV RNA  Qualitative test will follow.  Reactive: HCV antibody test is abnormal, HCV RNA  Qualitative test will follow.  Note: HCV antibody testing is performed on the Abbott   system.      [10-19-19 @ 12:30]      RADIOLOGY & ADDITIONAL STUDIES:

## 2019-10-24 NOTE — PROGRESS NOTE ADULT - ASSESSMENT
74F h/o HTN, HLD, IDDM2, COPD (former smoker, 2L/min PRN at home), CHF (EF 12/2018 45-50%), ESRD not on dialysis admitted to [facility] for hypotensive, ams, hypoglycemia and hypothermia.     CKD stage 5 not yet on hd  s/p left avf 12/18, + thrill + bruit (never been used)  renal function has been stable  hypervolemic on lasix 80mg lasix daily  nonoliguric   consent for cvvh, IHD in chart from daughter  no urgent indication for dialysis currently, however is fluid overloaded, diurese with lasix. monitor bmp, if renal function worsen or patient not responding to lasix will need to start HD  plan had explained to daughter at bedside. all question answered.   please call if any question    hypocalcemia  pth elevated.  low vit d 25 level  start vit d 35978 weekly x8  start calcitriol 0.25 daily when phos is better   low albumin   continue renagel when eating  monitor    hyperphosphatemia  npo right now  allergy to phoslo  start renagel 800mg tid with meal  monitor    anemia  occult +  monitor hb  iron sat >20  epo [id number] tiw   transfuse as needed  consider Gi eval    acidosis  likely sec to renal failure +diarrhea  improving   Bicarb drip discontinued  monitor    hypotensive  ? sepsis   on  iv antibiotic  BP is better now  off pressor  monitor    Hypokalemia  supplemented  monitor    Sob  chest xray 10/23 noted  pulm called  f/u rec  continue lasix

## 2019-10-25 LAB
ANION GAP SERPL CALC-SCNC: 23 MMO/L — HIGH (ref 7–14)
BASE EXCESS BLDA CALC-SCNC: -4.1 MMOL/L — SIGNIFICANT CHANGE UP
BASOPHILS # BLD AUTO: 0.02 K/UL — SIGNIFICANT CHANGE UP (ref 0–0.2)
BASOPHILS NFR BLD AUTO: 0.2 % — SIGNIFICANT CHANGE UP (ref 0–2)
BLD GP AB SCN SERPL QL: NEGATIVE — SIGNIFICANT CHANGE UP
BUN SERPL-MCNC: 86 MG/DL — HIGH (ref 7–23)
CA-I BLDA-SCNC: 1.09 MMOL/L — LOW (ref 1.15–1.29)
CALCIUM SERPL-MCNC: 8.1 MG/DL — LOW (ref 8.4–10.5)
CHLORIDE SERPL-SCNC: 101 MMOL/L — SIGNIFICANT CHANGE UP (ref 98–107)
CO2 SERPL-SCNC: 21 MMOL/L — LOW (ref 22–31)
CREAT SERPL-MCNC: 4.76 MG/DL — HIGH (ref 0.5–1.3)
EOSINOPHIL # BLD AUTO: 0.05 K/UL — SIGNIFICANT CHANGE UP (ref 0–0.5)
EOSINOPHIL NFR BLD AUTO: 0.4 % — SIGNIFICANT CHANGE UP (ref 0–6)
GLUCOSE BLDA-MCNC: 111 MG/DL — HIGH (ref 70–99)
GLUCOSE SERPL-MCNC: 107 MG/DL — HIGH (ref 70–99)
HCO3 BLDA-SCNC: 21 MMOL/L — LOW (ref 22–26)
HCT VFR BLD CALC: 29.6 % — LOW (ref 34.5–45)
HCT VFR BLDA CALC: 26.3 % — LOW (ref 34.5–46.5)
HGB BLD-MCNC: 8.5 G/DL — LOW (ref 11.5–15.5)
HGB BLDA-MCNC: 8.5 G/DL — LOW (ref 11.5–15.5)
IMM GRANULOCYTES NFR BLD AUTO: 1.1 % — SIGNIFICANT CHANGE UP (ref 0–1.5)
LACTATE BLDA-SCNC: 1 MMOL/L — SIGNIFICANT CHANGE UP (ref 0.5–2)
LYMPHOCYTES # BLD AUTO: 0.54 K/UL — LOW (ref 1–3.3)
LYMPHOCYTES # BLD AUTO: 4.1 % — LOW (ref 13–44)
MAGNESIUM SERPL-MCNC: 1.8 MG/DL — SIGNIFICANT CHANGE UP (ref 1.6–2.6)
MCHC RBC-ENTMCNC: 27 PG — SIGNIFICANT CHANGE UP (ref 27–34)
MCHC RBC-ENTMCNC: 28.7 % — LOW (ref 32–36)
MCV RBC AUTO: 94 FL — SIGNIFICANT CHANGE UP (ref 80–100)
MONOCYTES # BLD AUTO: 0.68 K/UL — SIGNIFICANT CHANGE UP (ref 0–0.9)
MONOCYTES NFR BLD AUTO: 5.2 % — SIGNIFICANT CHANGE UP (ref 2–14)
NEUTROPHILS # BLD AUTO: 11.67 K/UL — HIGH (ref 1.8–7.4)
NEUTROPHILS NFR BLD AUTO: 89 % — HIGH (ref 43–77)
NRBC # FLD: 0.04 K/UL — SIGNIFICANT CHANGE UP (ref 0–0)
PCO2 BLDA: 42 MMHG — SIGNIFICANT CHANGE UP (ref 32–48)
PH BLDA: 7.32 PH — LOW (ref 7.35–7.45)
PHOSPHATE SERPL-MCNC: 8.3 MG/DL — HIGH (ref 2.5–4.5)
PLATELET # BLD AUTO: 215 K/UL — SIGNIFICANT CHANGE UP (ref 150–400)
PMV BLD: 9.8 FL — SIGNIFICANT CHANGE UP (ref 7–13)
PO2 BLDA: 75 MMHG — LOW (ref 83–108)
POTASSIUM BLDA-SCNC: 3.1 MMOL/L — LOW (ref 3.4–4.5)
POTASSIUM SERPL-MCNC: 3.4 MMOL/L — LOW (ref 3.5–5.3)
POTASSIUM SERPL-SCNC: 3.4 MMOL/L — LOW (ref 3.5–5.3)
RBC # BLD: 3.15 M/UL — LOW (ref 3.8–5.2)
RBC # FLD: 17.9 % — HIGH (ref 10.3–14.5)
RH IG SCN BLD-IMP: POSITIVE — SIGNIFICANT CHANGE UP
SAO2 % BLDA: 93.7 % — LOW (ref 95–99)
SODIUM BLDA-SCNC: 144 MMOL/L — SIGNIFICANT CHANGE UP (ref 136–146)
SODIUM SERPL-SCNC: 145 MMOL/L — SIGNIFICANT CHANGE UP (ref 135–145)
WBC # BLD: 13.1 K/UL — HIGH (ref 3.8–10.5)
WBC # FLD AUTO: 13.1 K/UL — HIGH (ref 3.8–10.5)

## 2019-10-25 PROCEDURE — 99233 SBSQ HOSP IP/OBS HIGH 50: CPT

## 2019-10-25 PROCEDURE — 99233 SBSQ HOSP IP/OBS HIGH 50: CPT | Mod: GC

## 2019-10-25 PROCEDURE — 71045 X-RAY EXAM CHEST 1 VIEW: CPT | Mod: 26

## 2019-10-25 RX ORDER — SODIUM CHLORIDE 9 MG/ML
4 INJECTION INTRAMUSCULAR; INTRAVENOUS; SUBCUTANEOUS THREE TIMES A DAY
Refills: 0 | Status: DISCONTINUED | OUTPATIENT
Start: 2019-10-25 | End: 2019-10-31

## 2019-10-25 RX ORDER — TETRACAINE/BENZOCAINE/BUTAMBEN 2%-14%-2%
1 OINTMENT (GRAM) TOPICAL DAILY
Refills: 0 | Status: DISCONTINUED | OUTPATIENT
Start: 2019-10-25 | End: 2019-10-31

## 2019-10-25 RX ORDER — POTASSIUM CHLORIDE 20 MEQ
10 PACKET (EA) ORAL
Refills: 0 | Status: COMPLETED | OUTPATIENT
Start: 2019-10-25 | End: 2019-10-25

## 2019-10-25 RX ADMIN — Medication 3 MILLILITER(S): at 10:34

## 2019-10-25 RX ADMIN — Medication 80 MILLIGRAM(S): at 07:02

## 2019-10-25 RX ADMIN — Medication 3 MILLILITER(S): at 03:10

## 2019-10-25 RX ADMIN — PANTOPRAZOLE SODIUM 40 MILLIGRAM(S): 20 TABLET, DELAYED RELEASE ORAL at 11:18

## 2019-10-25 RX ADMIN — Medication 100 MILLIEQUIVALENT(S): at 10:11

## 2019-10-25 RX ADMIN — Medication 3 MILLILITER(S): at 16:30

## 2019-10-25 RX ADMIN — Medication 3 MILLILITER(S): at 21:00

## 2019-10-25 RX ADMIN — Medication 200 GRAM(S): at 11:24

## 2019-10-25 RX ADMIN — PIPERACILLIN AND TAZOBACTAM 25 GRAM(S): 4; .5 INJECTION, POWDER, LYOPHILIZED, FOR SOLUTION INTRAVENOUS at 18:13

## 2019-10-25 RX ADMIN — NYSTATIN CREAM 1 APPLICATION(S): 100000 CREAM TOPICAL at 18:13

## 2019-10-25 RX ADMIN — Medication 80 MILLIGRAM(S): at 18:13

## 2019-10-25 RX ADMIN — PIPERACILLIN AND TAZOBACTAM 25 GRAM(S): 4; .5 INJECTION, POWDER, LYOPHILIZED, FOR SOLUTION INTRAVENOUS at 07:02

## 2019-10-25 RX ADMIN — Medication 100 MILLIEQUIVALENT(S): at 11:17

## 2019-10-25 RX ADMIN — NYSTATIN CREAM 1 APPLICATION(S): 100000 CREAM TOPICAL at 07:03

## 2019-10-25 NOTE — PROGRESS NOTE ADULT - ASSESSMENT
EKG SR     Echo : < from: Transthoracic Echocardiogram (10.19.19 @ 10:52) >  1. Mitral annular calcification. Tethered mitral valve  leaflets with normal opening. Moderate mitral  regurgitation.  2. Calcified trileaflet aortic valve with normal opening.  Minimal aortic regurgitation.  3. Moderately dilated left atrium.  LA volume index = 42  cc/m2.  4. Severe left ventricular enlargement.  5. Endocardium not well visualized; grossly low normal left  ventricular systolic function.  6. Moderate diastolic dysfunction (Stage II).  7. The right ventricle is not well visualized; grossly  normal right ventricular systolic function.  8. Normal tricuspid valve.  Moderate tricuspid  regurgitation.  9. Estimated pulmonary artery systolic pressure equals 53  mm Hg, assuming right atrial pressure equals 15  mm Hg,  consistent with moderate pulmonary hypertension.  10. Thickened pericardium inferior to the right ventricle.  Small pericardial effusion adjacent to the right atrium.  *** Compared with echocardiogram of 10/10/2016, results are  similar on today's study.    < end of copied text >      Assessment and Plan     1) Pulm edema: lasix 80 IV BID , Bipap , being switched to Optiflow     2) PNA on Vanc and Zosyn     2) CAD s/p PCX PCI: restart ASA 81, monitor H/H     3) Anemia : no obvious GIB, stool occult + , monitor H/H  Plavix on hold , restarted asa     4) CKD V : renal on board    5) Lt. Lung Collapse: t/t per pulm

## 2019-10-25 NOTE — PROGRESS NOTE ADULT - PROBLEM SELECTOR PLAN 4
sepsis resolved, now off pressures, hemodynamically stable.   sputum culture - pseudomonas  c/w zosyn total 14 days course (since 10/19/19)

## 2019-10-25 NOTE — PROGRESS NOTE ADULT - PROBLEM SELECTOR PLAN 8
DVT ppx: no AC due to concern of GIB. c/w SCD for now.  Diet: DASH/renal/DM  Dispo: need PT, likely rehab.
DVT ppx: no AC due to concern of GIB. c/w SCD for now.  Diet: DASH/renal/DM  Dispo: need PT, likely rehab.

## 2019-10-25 NOTE — PROGRESS NOTE ADULT - SUBJECTIVE AND OBJECTIVE BOX
[facility]OLOGY PRACT[facility]   MD CHARITY MCADAMS, DO FANI BROWN, JASSON CONNOLLY    TEL:  OFFICE: [phone]  DR ALCANTAR CELL: [phone]  DR. MORRIS CELL: [phone]  DR. BROWN CELL: [phone]  TENISHA BOATENG CELL: [phone]        Patient is a 74y old  Female who presents with a chief complaint of AMS and Hypotension (25 Oct 2019 11:02)      Patient seen and examined at bedside. +sob    VITALS:  T(F): 97.7 (10-25-19 @ 13:59), Max: 98.8 (10-24-19 @ 20:57)  HR: 83 (10-25-19 @ 10:35)  BP: 144/65 (10-25-19 @ 13:59)  RR: 24 (10-25-19 @ 13:59)  SpO2: 95% (10-25-19 @ 13:59)  Wt(kg): --    10-24 @ 07:01  -  10-25 @ 07:00  --------------------------------------------------------  IN: 450 mL / OUT: 2100 mL / NET: -1650 mL          PHYSICAL EXAM:  Constitutional: on bipap  Neck: No JVD  Respiratory: diffused rhonchi wheeze   Cardiovascular: S1, S2, RRR  Gastrointestinal: BS+, soft, NT/ND  Extremities: 1+ peripheral edema    Hospital Medications:   MEDICATIONS  (STANDING):  albuterol/ipratropium for Nebulization 3 milliLiter(s) Nebulizer every 6 hours  aspirin enteric coated 81 milliGRAM(s) Oral daily  calcium gluconate IVPB 1 Gram(s) IV Intermittent daily  dextrose 5%. 1000 milliLiter(s) (50 mL/Hr) IV Continuous <Continuous>  dextrose 50% Injectable 12.5 Gram(s) IV Push once  dextrose 50% Injectable 25 Gram(s) IV Push once  dextrose 50% Injectable 25 Gram(s) IV Push once  epoetin terence Injectable 42640 Unit(s) SubCutaneous <User Schedule>  ergocalciferol 13314 Unit(s) Oral <User Schedule>  furosemide   Injectable 80 milliGRAM(s) IV Push two times a day  insulin lispro (HumaLOG) corrective regimen sliding scale   SubCutaneous three times a day before meals  nystatin Powder 1 Application(s) Topical two times a day  pantoprazole  Injectable 40 milliGRAM(s) IV Push daily  piperacillin/tazobactam IVPB.. 3.375 Gram(s) IV Intermittent every 12 hours  sevelamer carbonate 800 milliGRAM(s) Oral three times a day with meals      LABS:  10-25    145  |  101  |  86<H>  ----------------------------<  107<H>  3.4<L>   |  21<L>  |  4.76<H>    Ca    8.1<L>      25 Oct 2019 08:05  Phos  8.3     10-25  Mg     1.8     10-25      Creatinine Trend: 4.76 <--, 4.59 <--, 4.74 <--, 4.66 <--, 4.74 <--, 4.87 <--, 5.17 <--, 5.34 <--, 5.24 <--, 5.16 <--, 5.28 <--, 5.39 <--    Phosphorus Level, Serum: 8.3 mg/dL (10-25 @ 08:05)                              8.5    13.10 )-----------( 215      ( 25 Oct 2019 08:05 )             29.6     Urine Studies:  Urinalysis - [10-19-19 @ 03:30]      Color YELLOW / Appearance CLEAR / SG 1.017 / pH 5.5      Gluc NEGATIVE / Ketone NEGATIVE  / Bili NEGATIVE / Urobili NORMAL       Blood NEGATIVE / Protein 10 / Leuk Est NEGATIVE / Nitrite NEGATIVE      RBC  / WBC  / Hyaline  / Gran  / Sq Epi  / Non Sq Epi  / Bacteria       Iron 27, TIBC 125, %sat --      [10-20-19 @ 02:40]  Ferritin 283.7      [10-20-19 @ 02:40]  .9 (Ca --)      [10-20-19 @ 02:40]   --  PTH -- (Ca 8.7)      [10-31-18 @ 09:24]   269  Vitamin D (25OH) 10.5      [10-20-19 @ 02:40]  HbA1c 5.2      [10-20-19 @ 02:40]  TSH 1.11      [10-18-19 @ 23:20]    HBsAg NEGATIVE      [10-24-19 @ 02:30]  HCV 0.16, Nonreactive Hepatitis C AB  S/CO Ratio                        Interpretation  < 1.00                                   Non-Reactive  1.00 - 4.99                         Weakly-Reactive  >= 5.00                                Reactive  Non-Reactive: Aperson with a non-reactive HCV antibody  result is considered uninfected.  No further action is  needed unless recent infection is suspected.  In these  cases, consider repeat testing later to detect  seroconversion..  Weakly-Reactive: HCV antibody test is abnormal, HCV RNA  Qualitative test will follow.  Reactive: HCV antibody test is abnormal, HCV RNA  Qualitative test will follow.  Note: HCV antibody testing is performed on the Abbott   system.      [10-19-19 @ 12:30]      RADIOLOGY & ADDITIONAL STUDIES:

## 2019-10-25 NOTE — PROGRESS NOTE ADULT - ASSESSMENT
74F h/o HTN, HLD, IDDM2, COPD (former smoker, 2L/min PRN at home), CHF (EF 12/2018 45-50%), ESRD not on dialysis admitted to [facility] for hypotensive, ams, hypoglycemia and hypothermia.     CKD stage 5 not yet on hd  s/p left avf 12/18, + thrill + bruit (never been used)  renal function has been stable  hypervolemic on lasix 80mg lasix daily  nonoliguric   despite aggressive diuresing, patient still with pulm effusion and edema, bipap depended. will initiate HD today.   consent for cvvh, IHD in chart from daughter  attempt to use AVF today, created 12/18.   please call if any question    hypocalcemia  pth elevated.  low vit d 25 level  start vit d 50201 weekly x8  start calcitriol 0.25 daily when phos is better   low albumin   continue renagel when eating  monitor    hyperphosphatemia  npo right now  allergy to phoslo  start renagel 800mg tid with meal  monitor    anemia  occult +  monitor hb  iron sat >20  epo [id number] tiw   transfuse as needed  consider Gi eval    acidosis  likely sec to renal failure +diarrhea  improving   Bicarb drip discontinued  monitor    hypotensive  ? sepsis   on  iv antibiotic  BP is better now  off pressor  monitor    Hypokalemia  supplemented  monitor    Sob  chest xray 10/23 noted  pulm following  will initiate HD today 74F h/o HTN, HLD, IDDM2, COPD (former smoker, 2L/min PRN at home), CHF (EF 12/2018 45-50%), ESRD not on dialysis admitted to [facility] for hypotensive, ams, hypoglycemia and hypothermia.     CKD stage 5 not yet on hd  s/p left avf 12/18, + thrill + bruit (never been used)  renal function has been stable  hypervolemic on lasix 80mg lasix IV BID  Pt nonoliguric   Despite aggressive diureses, patient remains hypervolemic with pulm edema and bipap dependence. Plan to initiate HD today.   consent for cvvh/ IHD in chart from daughter  Will attempt to use AVF today, created 12/18. If unsuccessful, pt will need alternative access for HD  please call if any question    Hypocalcemia  pth elevated.  low vit d 25 level  start vit d 67111 weekly x8  start calcitriol 0.25 daily when phos is better   low albumin   continue renagel when eating  monitor    Hyperphosphatemia  npo right now  allergy to phoslo  start renagel 800mg tid with meal  monitor    Anemia  occult +  monitor hb  iron sat >20  epo [id number] tiw   transfuse as needed  consider Gi eval    Acidosis  likely sec to renal failure +diarrhea  improving   Bicarb drip discontinued  monitor    Hypotensive  ? sepsis   on  iv antibiotic  BP is better now  off pressor  monitor    Hypokalemia  supplemented  monitor    Sob  chest xray 10/23 noted  pulm following  will initiate HD today

## 2019-10-25 NOTE — PROGRESS NOTE ADULT - PROBLEM SELECTOR PLAN 3
-RT shoulder-xray-suggestive of rotator cuff arthropathy  -appears to be more lethargic today with increased work of breathing and weakness generalized and effort dependent with diuresis would consider CT head to r/o Intracranial pathology

## 2019-10-25 NOTE — PROGRESS NOTE ADULT - ASSESSMENT
74 Y.O. pm h of HTN, HLD, COPD on 2-3 L at home T2DM, ESRD with fistula not yet being used, HFPEF with moderate MR, pHTN RVSP 53 who presents with hypoglycemia,, hypotension, respiratory failure, severe acidosis on bicarb gtt, Pna with likely pseudomonas, transferred out of the MICU with recurrent respiratory failure likely 2/2 to pulmonary edema and left opacity of the left hemithorax 2/2 to mucus plugging.     # Hypoxemic respiratory failure.   -  Left hemithorax opacified on cxr, consolidation pattern on Left side of the lung, with Blines on the right lung with small effusions. Repeat CXR still showing complete opacity of the L hemithorax.   - Multifactorial COPD, mucus plugging of the left lung and pulmonary edema from CHF.   - ABG showing high A-a gradient.   - C/W diuresis, f/u cards recs, Net negative 1900cc. Strict I+O.   - Duonebs q6 hours standing  - Patient need aggressive Chest PT 4 times a day. Chest vest q8.   - Place patient back on bipap for now. Will try to titrate back to Hi-flow today.   - C/W Zosyn for pseudmonas pna.   - CT chest but patient currently not stable enough for CT.   - Will try to get patient to the RCU for aggressive chest pt and secretion mobilization.     Dae Hyeon Kim [id number]  Pulmonary Critical Care Fellow  Pager [phone]

## 2019-10-25 NOTE — CHART NOTE - NSCHARTNOTEFT_GEN_A_CORE
Notified by nephrologist that HD RN was unable to access AVF, advised to consult Vascular surgery overnight for Shiley placement so that pt may be dialyzed tonight. Case discussed w/ Vascular surgery, will see pt. Pt and family updated.

## 2019-10-25 NOTE — PROGRESS NOTE ADULT - SUBJECTIVE AND OBJECTIVE BOX
Patient is a 74y old  Female who presents with a chief complaint of AMS and Hypotension (25 Oct 2019 09:59)      SUBJECTIVE / OVERNIGHT EVENTS: Pt on BiPAP tele NSR 80's with PVC's. seems more lethargic this AM. Son at bedside and aware that pt being transferred to RCU.     MEDICATIONS  (STANDING):  albuterol/ipratropium for Nebulization 3 milliLiter(s) Nebulizer every 6 hours  aspirin enteric coated 81 milliGRAM(s) Oral daily  calcium gluconate IVPB 1 Gram(s) IV Intermittent daily  dextrose 5%. 1000 milliLiter(s) (50 mL/Hr) IV Continuous <Continuous>  dextrose 50% Injectable 12.5 Gram(s) IV Push once  dextrose 50% Injectable 25 Gram(s) IV Push once  dextrose 50% Injectable 25 Gram(s) IV Push once  epoetin terence Injectable 26542 Unit(s) SubCutaneous <User Schedule>  ergocalciferol 44274 Unit(s) Oral <User Schedule>  furosemide   Injectable 80 milliGRAM(s) IV Push two times a day  insulin lispro (HumaLOG) corrective regimen sliding scale   SubCutaneous three times a day before meals  nystatin Powder 1 Application(s) Topical two times a day  pantoprazole  Injectable 40 milliGRAM(s) IV Push daily  piperacillin/tazobactam IVPB.. 3.375 Gram(s) IV Intermittent every 12 hours  potassium chloride  10 mEq/100 mL IVPB 10 milliEquivalent(s) IV Intermittent every 1 hour  sevelamer carbonate 800 milliGRAM(s) Oral three times a day with meals    MEDICATIONS  (PRN):  dextrose 40% Gel 15 Gram(s) Oral once PRN Blood Glucose LESS THAN 70 milliGRAM(s)/deciliter  glucagon  Injectable 1 milliGRAM(s) IntraMuscular once PRN Glucose LESS THAN 70 milligrams/deciliter        CAPILLARY BLOOD GLUCOSE      POCT Blood Glucose.: 115 mg/dL (25 Oct 2019 09:23)  POCT Blood Glucose.: 101 mg/dL (24 Oct 2019 22:14)  POCT Blood Glucose.: 109 mg/dL (24 Oct 2019 17:46)  POCT Blood Glucose.: 90 mg/dL (24 Oct 2019 12:15)    I&O's Summary    24 Oct 2019 07:01  -  25 Oct 2019 07:00  --------------------------------------------------------  IN: 450 mL / OUT: 2100 mL / NET: -1650 mL        T(C): 37.1 (10-25-19 @ 07:08), Max: 37.1 (10-24-19 @ 20:57)  HR: 83 (10-25-19 @ 10:35) (81 - 98)  BP: 121/65 (10-25-19 @ 07:08) (120/50 - 145/75)  RR: 18 (10-25-19 @ 07:08) (17 - 20)  SpO2: 92% (10-25-19 @ 08:17) (92% - 99%)    PHYSICAL EXAM:  GENERAL: lethargic: getting chest vest therapy  HEAD:  on BiPAP  EYES:  conjunctiva and sclera clear  NECK: Supple thick neck   CHEST/LUNG: LT decrease BS; RT rhonci  HEART: s1/s2  ABDOMEN: Soft, Nontender,obese; Bowel sounds present  EXTREMITIES:  anasarca  PSYCH: responds to verbal stimuli by shaking head  NEUROLOGY: b/l distal UE 5/5 b/l LE with minimal effort    LABS:                        8.5    13.10 )-----------( 215      ( 25 Oct 2019 08:05 )             29.6     10-25    145  |  101  |  86<H>  ----------------------------<  107<H>  3.4<L>   |  21<L>  |  4.76<H>    Ca    8.1<L>      25 Oct 2019 08:05  Phos  8.3     10-25  Mg     1.8     10-25    TPro  6.0  /  Alb  2.4<L>  /  TBili  0.4  /  DBili  x   /  AST  7   /  ALT  9   /  AlkPhos  91  10-23                RADIOLOGY & ADDITIONAL TESTS:    Imaging Personally Reviewed: < from: Xray Chest 1 View- PORTABLE-Routine (10.25.19 @ 08:01) >  IMPRESSION:    Left hemithorax opacification, likely combination of pleural effusion and   atelectasis, unchanged. Pulmonary edema. Small right pleural effusion. No   pneumothorax.     < end of copied text >    < from: Xray Chest 1 View- PORTABLE-Routine (10.25.19 @ 08:01) >  IMPRESSION:    Left hemithorax opacification, likely combination of pleural effusion and   atelectasis, unchanged. Pulmonary edema. Small right pleural effusion. No   pneumothorax.       < end of copied text >  < from: Xray Shoulder 2 Views, Right (10.24.19 @ 17:11) >  IMPRESSION:  No fractures, dislocations, or AC separation.    Mild AC joint arthrosis. Glenohumeral joint space inadequately visualized   due to suboptimal positioning.    Humerus high riding relative to glenoid with diminished subacromial space   suggesting degree of rotator cuff arthropathy.     Generalized osteopenia otherwise no discrete lytic or blastic lesions.     No periarticular soft tissue calcifications.    < end of copied text >    Consultant(s) Notes Reviewed:      Care Discussed with Consultants/Other Providers: Patient is a 74y old  Female who presents with a chief complaint of AMS and Hypotension (25 Oct 2019 09:59)      SUBJECTIVE / OVERNIGHT EVENTS: Pt initially on Hi flow O2 placed on BiPAP due to increased WOB. seems more lethargic this AM. tele NSR 80's with PVC's.  Son at bedside and aware that pt being transferred to RCU.     MEDICATIONS  (STANDING):  albuterol/ipratropium for Nebulization 3 milliLiter(s) Nebulizer every 6 hours  aspirin enteric coated 81 milliGRAM(s) Oral daily  calcium gluconate IVPB 1 Gram(s) IV Intermittent daily  dextrose 5%. 1000 milliLiter(s) (50 mL/Hr) IV Continuous <Continuous>  dextrose 50% Injectable 12.5 Gram(s) IV Push once  dextrose 50% Injectable 25 Gram(s) IV Push once  dextrose 50% Injectable 25 Gram(s) IV Push once  epoetin terence Injectable 62276 Unit(s) SubCutaneous <User Schedule>  ergocalciferol 38801 Unit(s) Oral <User Schedule>  furosemide   Injectable 80 milliGRAM(s) IV Push two times a day  insulin lispro (HumaLOG) corrective regimen sliding scale   SubCutaneous three times a day before meals  nystatin Powder 1 Application(s) Topical two times a day  pantoprazole  Injectable 40 milliGRAM(s) IV Push daily  piperacillin/tazobactam IVPB.. 3.375 Gram(s) IV Intermittent every 12 hours  potassium chloride  10 mEq/100 mL IVPB 10 milliEquivalent(s) IV Intermittent every 1 hour  sevelamer carbonate 800 milliGRAM(s) Oral three times a day with meals    MEDICATIONS  (PRN):  dextrose 40% Gel 15 Gram(s) Oral once PRN Blood Glucose LESS THAN 70 milliGRAM(s)/deciliter  glucagon  Injectable 1 milliGRAM(s) IntraMuscular once PRN Glucose LESS THAN 70 milligrams/deciliter        CAPILLARY BLOOD GLUCOSE      POCT Blood Glucose.: 115 mg/dL (25 Oct 2019 09:23)  POCT Blood Glucose.: 101 mg/dL (24 Oct 2019 22:14)  POCT Blood Glucose.: 109 mg/dL (24 Oct 2019 17:46)  POCT Blood Glucose.: 90 mg/dL (24 Oct 2019 12:15)    I&O's Summary    24 Oct 2019 07:01  -  25 Oct 2019 07:00  --------------------------------------------------------  IN: 450 mL / OUT: 2100 mL / NET: -1650 mL        T(C): 37.1 (10-25-19 @ 07:08), Max: 37.1 (10-24-19 @ 20:57)  HR: 83 (10-25-19 @ 10:35) (81 - 98)  BP: 121/65 (10-25-19 @ 07:08) (120/50 - 145/75)  RR: 18 (10-25-19 @ 07:08) (17 - 20)  SpO2: 92% (10-25-19 @ 08:17) (92% - 99%)    PHYSICAL EXAM:  GENERAL: lethargic: getting chest vest therapy  HEAD:  on BiPAP  EYES:  conjunctiva and sclera clear  NECK: Supple thick neck   CHEST/LUNG: LT decrease BS; RT rhonci  HEART: s1/s2  ABDOMEN: Soft, Nontender,obese; Bowel sounds present  EXTREMITIES:  anasarca  PSYCH: responds to verbal stimuli by shaking head  NEUROLOGY: b/l distal UE 5/5 b/l LE with minimal effort    LABS:                        8.5    13.10 )-----------( 215      ( 25 Oct 2019 08:05 )             29.6     10-25    145  |  101  |  86<H>  ----------------------------<  107<H>  3.4<L>   |  21<L>  |  4.76<H>    Ca    8.1<L>      25 Oct 2019 08:05  Phos  8.3     10-25  Mg     1.8     10-25    TPro  6.0  /  Alb  2.4<L>  /  TBili  0.4  /  DBili  x   /  AST  7   /  ALT  9   /  AlkPhos  91  10-23                RADIOLOGY & ADDITIONAL TESTS:    Imaging Personally Reviewed: < from: Xray Chest 1 View- PORTABLE-Routine (10.25.19 @ 08:01) >  IMPRESSION:    Left hemithorax opacification, likely combination of pleural effusion and   atelectasis, unchanged. Pulmonary edema. Small right pleural effusion. No   pneumothorax.     < end of copied text >    < from: Xray Chest 1 View- PORTABLE-Routine (10.25.19 @ 08:01) >  IMPRESSION:    Left hemithorax opacification, likely combination of pleural effusion and   atelectasis, unchanged. Pulmonary edema. Small right pleural effusion. No   pneumothorax.       < end of copied text >  < from: Xray Shoulder 2 Views, Right (10.24.19 @ 17:11) >  IMPRESSION:  No fractures, dislocations, or AC separation.    Mild AC joint arthrosis. Glenohumeral joint space inadequately visualized   due to suboptimal positioning.    Humerus high riding relative to glenoid with diminished subacromial space   suggesting degree of rotator cuff arthropathy.     Generalized osteopenia otherwise no discrete lytic or blastic lesions.     No periarticular soft tissue calcifications.    < end of copied text >    Consultant(s) Notes Reviewed:      Care Discussed with Consultants/Other Providers:

## 2019-10-25 NOTE — PROGRESS NOTE ADULT - SUBJECTIVE AND OBJECTIVE BOX
Gregor Barrow MD  Interventional Cardiology / Endovascular Specialist  [place] Office : [phone] [place] [address] [place] [address]  Tel:   [place] Office : [phone] [facility] [place] [address]  Tel: [phone]  Cell : [phone]    HISTORY OF PRESENTING ILLNESS:    74F h/o HTN, HLD, IDDM2, COPD (former smoker, 2L/min PRN at home), CHF (EF 12/2018 45-50%), ESRD not on dialysis, p/w AMS and hypotension, intubated and sedated found to have a PNA , Extubated and transferred to floors, S/P RRT for resp distress and Hypoxia , CXR with Left Lung Collapse,  protecting airway on Bipap     MEDICATIONS:  aspirin enteric coated 81 milliGRAM(s) Oral daily  furosemide   Injectable 80 milliGRAM(s) IV Push two times a day    piperacillin/tazobactam IVPB.. 3.375 Gram(s) IV Intermittent every 12 hours    albuterol/ipratropium for Nebulization 3 milliLiter(s) Nebulizer every 6 hours      pantoprazole  Injectable 40 milliGRAM(s) IV Push daily    dextrose 40% Gel 15 Gram(s) Oral once PRN  dextrose 50% Injectable 12.5 Gram(s) IV Push once  dextrose 50% Injectable 25 Gram(s) IV Push once  dextrose 50% Injectable 25 Gram(s) IV Push once  glucagon  Injectable 1 milliGRAM(s) IntraMuscular once PRN  insulin lispro (HumaLOG) corrective regimen sliding scale   SubCutaneous three times a day before meals    calcium gluconate IVPB 1 Gram(s) IV Intermittent daily  dextrose 5%. 1000 milliLiter(s) IV Continuous <Continuous>  epoetin terence Injectable 21023 Unit(s) SubCutaneous <User Schedule>  ergocalciferol 07096 Unit(s) Oral <User Schedule>  nystatin Powder 1 Application(s) Topical two times a day  potassium chloride  10 mEq/100 mL IVPB 10 milliEquivalent(s) IV Intermittent every 1 hour      PAST MEDICAL/SURGICAL HISTORY  PAST MEDICAL & SURGICAL HISTORY:  ESRD (end stage renal disease): L arm AVF placed 12/2018  Hemorrhoids  GERD (gastroesophageal reflux disease)  Morbid obesity  Cataract  Diaphragmatic hernia  Cerebral infarction: 2011  CKD (chronic kidney disease)  Anemia  Diabetes mellitus: Insulin dependent. Type 2  CHF (congestive heart failure)  Hyperlipidemia  Hypertension  COPD (chronic obstructive pulmonary disease)  Chronic obstructive pulmonary disease, unspecified COPD type  Adult Idiopathic Generalized Osteoporosis  CAD (Coronary Artery Disease): 1 stent placed 11/7/18  DM (Diabetes Mellitus), Secondary  Hypertension  H/O coronary angiogram: [facility] 1 stent placed 11/7/2018      SOCIAL HISTORY: Substance Use (street drugs): ( x ) never used  (  ) other:    FAMILY HISTORY:  Family history of breast cancer (Grandparent)      REVIEW OF SYSTEMS:  unable to obtain     PHYSICAL EXAM:  T(C): 37.1 (10-25-19 @ 07:08), Max: 37.1 (10-24-19 @ 20:57)  HR: 95 (10-25-19 @ 08:17) (81 - 98)  BP: 121/65 (10-25-19 @ 07:08) (120/50 - 145/75)  RR: 18 (10-25-19 @ 07:08) (17 - 20)  SpO2: 92% (10-25-19 @ 08:17) (92% - 99%)  Wt(kg): --  I&O's Summary    24 Oct 2019 07:01  -  25 Oct 2019 07:00  --------------------------------------------------------  IN: 450 mL / OUT: 2100 mL / NET: -1650 mL      GENERAL: Obese  on BiPAP  EYES: EOMI, PERRLA, conjunctiva and sclera clear  ENMT: on Biapa   Cardiovascular: Normal S1 S2, No JVD, No murmurs, No edema  Respiratory: b/l basal creps 	  Gastrointestinal:  Soft, Non-tender, + BS	  Extremities: No clubbing, cyanosis or edema  LYMPH: No lymphadenopathy noted                                      8.5    13.10 )-----------( 215      ( 25 Oct 2019 08:05 )             29.6     10-25    145  |  101  |  86<H>  ----------------------------<  107<H>  3.4<L>   |  21<L>  |  4.76<H>    Ca    8.1<L>      25 Oct 2019 08:05  Phos  8.3     10-25  Mg     1.8     10-25    TPro  6.0  /  Alb  2.4<L>  /  TBili  0.4  /  DBili  x   /  AST  7   /  ALT  9   /  AlkPhos  91  10-23    proBNP:   Lipid Profile:   HgA1c:   TSH:     Consultant(s) Notes Reviewed:  [x ] YES  [ ] NO    Care Discussed with Consultants/Other Providers [ x] YES  [ ] NO    Imaging Personally Reviewed independently:  [x] YES  [ ] NO    All labs, radiologic studies, vitals, orders and medications list reviewed. Patient is seen and examined at bedside. Case discussed with medical team.

## 2019-10-25 NOTE — PROGRESS NOTE ADULT - SUBJECTIVE AND OBJECTIVE BOX
Interval Events: Patient was seen and examined at bedside.     Patient tolerated Hi-flow most of the day yesterday on high settings. Flow of 60-65 with FIO2 %. Patient this morning was having increased wob, lot of secretions and need to be placed back on bipap. AM CXR Still showing L sided opacification.     REVIEW OF SYSTEMS:  Constitutional: [ ] fevers [ ] chills [ ] weight loss [ ] weight gain  CV: [ ] chest pain [ ] orthopnea [ ] palpitations [ ] murmur  Resp: [ ] cough [ ] shortness of breath [ ] dyspnea [ ] wheezing [ ] sputum [ ] hemoptysis  [ ] All other systems negative  [x ] Unable to assess ROS because patient more lethargic this morning     OBJECTIVE:  ICU Vital Signs Last 24 Hrs  T(C): 37.1 (25 Oct 2019 07:08), Max: 37.1 (24 Oct 2019 20:57)  T(F): 98.8 (25 Oct 2019 07:08), Max: 98.8 (24 Oct 2019 20:57)  HR: 83 (25 Oct 2019 10:35) (81 - 98)  BP: 121/65 (25 Oct 2019 07:08) (120/50 - 145/75)  BP(mean): --  ABP: --  ABP(mean): --  RR: 24 (25 Oct 2019 10:00) (17 - 24)  SpO2: 95% (25 Oct 2019 10:00) (92% - 99%)        10-24 @ 07:01  -  10-25 @ 07:00  --------------------------------------------------------  IN: 450 mL / OUT: 2100 mL / NET: -1650 mL      CAPILLARY BLOOD GLUCOSE      POCT Blood Glucose.: 115 mg/dL (25 Oct 2019 09:23)    PHYSICAL EXAM:    Constitutional: well-developed; well-groomed; well-nourished; no distress, Obese  Eyes: PERRL; EOMI  ENMT: Biapp in place  Neck:  Supple; no JVD;   MSK/Back: normal shape; ROM intact; strength intact, . Normal strength in all ext,  Respiratory: Decreased BS on the left, CTA on the right. No wheezing.   Cardiovascular: regular rate and rhythm  no rub , no murmur, no gallops.   Gastrointestinal: soft; no distention, normal BS, no TTP, no organomegaly, no ascites.  Extremities: no clubbing; no cyanosis; Trace LE edema   Neurological: alert and oriented x 3; responds to pain; responds to verbal commands; sensation intact: CN nerves grossly intact.   Skin: warm and dry; color normal: no rash: no ulcers  Psychiatric: Calm, no SI/HI          HOSPITAL MEDICATIONS:  MEDICATIONS  (STANDING):  albuterol/ipratropium for Nebulization 3 milliLiter(s) Nebulizer every 6 hours  aspirin enteric coated 81 milliGRAM(s) Oral daily  calcium gluconate IVPB 1 Gram(s) IV Intermittent daily  dextrose 5%. 1000 milliLiter(s) (50 mL/Hr) IV Continuous <Continuous>  dextrose 50% Injectable 12.5 Gram(s) IV Push once  dextrose 50% Injectable 25 Gram(s) IV Push once  dextrose 50% Injectable 25 Gram(s) IV Push once  epoetin terence Injectable 45965 Unit(s) SubCutaneous <User Schedule>  ergocalciferol 54147 Unit(s) Oral <User Schedule>  furosemide   Injectable 80 milliGRAM(s) IV Push two times a day  insulin lispro (HumaLOG) corrective regimen sliding scale   SubCutaneous three times a day before meals  nystatin Powder 1 Application(s) Topical two times a day  pantoprazole  Injectable 40 milliGRAM(s) IV Push daily  piperacillin/tazobactam IVPB.. 3.375 Gram(s) IV Intermittent every 12 hours  potassium chloride  10 mEq/100 mL IVPB 10 milliEquivalent(s) IV Intermittent every 1 hour  sevelamer carbonate 800 milliGRAM(s) Oral three times a day with meals    MEDICATIONS  (PRN):  dextrose 40% Gel 15 Gram(s) Oral once PRN Blood Glucose LESS THAN 70 milliGRAM(s)/deciliter  glucagon  Injectable 1 milliGRAM(s) IntraMuscular once PRN Glucose LESS THAN 70 milligrams/deciliter      LABS:                        8.5    13.10 )-----------( 215      ( 25 Oct 2019 08:05 )             29.6     Hgb Trend: 8.5<--, 8.4<--, 8.0<--, 7.6<--, 7.5<--  10-25    145  |  101  |  86<H>  ----------------------------<  107<H>  3.4<L>   |  21<L>  |  4.76<H>    Ca    8.1<L>      25 Oct 2019 08:05  Phos  8.3     10-25  Mg     1.8     10-25    TPro  6.0  /  Alb  2.4<L>  /  TBili  0.4  /  DBili  x   /  AST  7   /  ALT  9   /  AlkPhos  91  10-23    Creatinine Trend: 4.76<--, 4.59<--, 4.74<--, 4.66<--, 4.74<--, 4.87<--      Arterial Blood Gas:  10-25 @ 09:58  7.32/42/75/21/93.7/-4.1  ABG lactate: 1.0  Arterial Blood Gas:  10-24 @ 15:39  7.31/44/52/21/80.9/-4.0  ABG lactate: --  Arterial Blood Gas:  10-23 @ 12:30  7.33/44/67/22/91.5/-2.6  ABG lactate: --  Arterial Blood Gas:  10-23 @ 12:00  7.35/43/94/23/97.2/-1.8  ABG lactate: 0.9      MICROBIOLOGY:     RADIOLOGY:  [ ] Reviewed and interpreted by me

## 2019-10-25 NOTE — PROGRESS NOTE ADULT - PROBLEM SELECTOR PLAN 2
TTE on this admission with EF55% and Grade II diastolic dysfunction. dilated LV  c/w Lasix 80mg IVP bid.   currently w/o buhs w/ neg fluid balance  PrimaFit for strict I/O

## 2019-10-25 NOTE — PROGRESS NOTE ADULT - ATTENDING COMMENTS
Left lung atelectasis due to mucus plugging and worsening hypoxemia this morning.  Somewhat improved early afternoon on bilevel as lung apex showing signs of aeration on POCUS with improving oxygen requirements.  Need aggressive chest PT/duonebs/suction.  Would like to avoid prolonged use of bilevel as it can worsen the mucus plugging. Switch to HFNC while away.  Continues to require aggressive diuresis as patient is also fluid overloaded.  Patient being transferred to RCU for closer monitoring.

## 2019-10-25 NOTE — PROGRESS NOTE ADULT - PROBLEM SELECTOR PLAN 1
Possibly secondary to lung collapse mucous plug /PNA/diastolic HF  h/o COPD with 2L NC at home  acute respiratory failure due to sepsis, extubated 10/22  CXR with increase left lung opacity, ABG with hypoxemia only.  c/w IV lasix for diuresis neg fluid balance   chest PT   CXR unchanged   ABG reviewed this AM 7.32/42/75/93  CT chest as per pulm    case d/w Pulm fellow transfer to RCU as pt unchanged continues to be dyspneic will need aggressive Chest PT poss bronch and possibly reintubation

## 2019-10-26 DIAGNOSIS — I25.10 ATHEROSCLEROTIC HEART DISEASE OF NATIVE CORONARY ARTERY WITHOUT ANGINA PECTORIS: ICD-10-CM

## 2019-10-26 DIAGNOSIS — I10 ESSENTIAL (PRIMARY) HYPERTENSION: ICD-10-CM

## 2019-10-26 LAB
ANION GAP SERPL CALC-SCNC: 25 MMO/L — HIGH (ref 7–14)
APTT BLD: 26.8 SEC — LOW (ref 27.5–36.3)
BASOPHILS # BLD AUTO: 0.01 K/UL — SIGNIFICANT CHANGE UP (ref 0–0.2)
BASOPHILS NFR BLD AUTO: 0.1 % — SIGNIFICANT CHANGE UP (ref 0–2)
BUN SERPL-MCNC: 89 MG/DL — HIGH (ref 7–23)
CALCIUM SERPL-MCNC: 8.1 MG/DL — LOW (ref 8.4–10.5)
CHLORIDE SERPL-SCNC: 101 MMOL/L — SIGNIFICANT CHANGE UP (ref 98–107)
CO2 SERPL-SCNC: 22 MMOL/L — SIGNIFICANT CHANGE UP (ref 22–31)
CREAT SERPL-MCNC: 4.71 MG/DL — HIGH (ref 0.5–1.3)
EOSINOPHIL # BLD AUTO: 0.14 K/UL — SIGNIFICANT CHANGE UP (ref 0–0.5)
EOSINOPHIL NFR BLD AUTO: 1.3 % — SIGNIFICANT CHANGE UP (ref 0–6)
GLUCOSE SERPL-MCNC: 100 MG/DL — HIGH (ref 70–99)
HCT VFR BLD CALC: 24.7 % — LOW (ref 34.5–45)
HGB BLD-MCNC: 7.5 G/DL — LOW (ref 11.5–15.5)
IMM GRANULOCYTES NFR BLD AUTO: 0.9 % — SIGNIFICANT CHANGE UP (ref 0–1.5)
INR BLD: 1.25 — HIGH (ref 0.88–1.17)
LYMPHOCYTES # BLD AUTO: 0.57 K/UL — LOW (ref 1–3.3)
LYMPHOCYTES # BLD AUTO: 5.3 % — LOW (ref 13–44)
MAGNESIUM SERPL-MCNC: 1.7 MG/DL — SIGNIFICANT CHANGE UP (ref 1.6–2.6)
MCHC RBC-ENTMCNC: 28 PG — SIGNIFICANT CHANGE UP (ref 27–34)
MCHC RBC-ENTMCNC: 30.4 % — LOW (ref 32–36)
MCV RBC AUTO: 92.2 FL — SIGNIFICANT CHANGE UP (ref 80–100)
MONOCYTES # BLD AUTO: 0.74 K/UL — SIGNIFICANT CHANGE UP (ref 0–0.9)
MONOCYTES NFR BLD AUTO: 6.9 % — SIGNIFICANT CHANGE UP (ref 2–14)
NEUTROPHILS # BLD AUTO: 9.24 K/UL — HIGH (ref 1.8–7.4)
NEUTROPHILS NFR BLD AUTO: 85.5 % — HIGH (ref 43–77)
NRBC # FLD: 0.03 K/UL — SIGNIFICANT CHANGE UP (ref 0–0)
PHOSPHATE SERPL-MCNC: 7.8 MG/DL — HIGH (ref 2.5–4.5)
PLATELET # BLD AUTO: 219 K/UL — SIGNIFICANT CHANGE UP (ref 150–400)
PMV BLD: 10 FL — SIGNIFICANT CHANGE UP (ref 7–13)
POTASSIUM SERPL-MCNC: 3.3 MMOL/L — LOW (ref 3.5–5.3)
POTASSIUM SERPL-SCNC: 3.3 MMOL/L — LOW (ref 3.5–5.3)
PROTHROM AB SERPL-ACNC: 14.4 SEC — HIGH (ref 9.8–13.1)
RBC # BLD: 2.68 M/UL — LOW (ref 3.8–5.2)
RBC # FLD: 17.5 % — HIGH (ref 10.3–14.5)
SODIUM SERPL-SCNC: 148 MMOL/L — HIGH (ref 135–145)
WBC # BLD: 10.8 K/UL — HIGH (ref 3.8–10.5)
WBC # FLD AUTO: 10.8 K/UL — HIGH (ref 3.8–10.5)

## 2019-10-26 PROCEDURE — 99233 SBSQ HOSP IP/OBS HIGH 50: CPT | Mod: GC

## 2019-10-26 PROCEDURE — 71045 X-RAY EXAM CHEST 1 VIEW: CPT | Mod: 26

## 2019-10-26 PROCEDURE — 99223 1ST HOSP IP/OBS HIGH 75: CPT

## 2019-10-26 RX ORDER — BUMETANIDE 0.25 MG/ML
1.5 INJECTION INTRAMUSCULAR; INTRAVENOUS
Qty: 20 | Refills: 0 | Status: DISCONTINUED | OUTPATIENT
Start: 2019-10-26 | End: 2019-10-30

## 2019-10-26 RX ORDER — CHLORHEXIDINE GLUCONATE 213 G/1000ML
1 SOLUTION TOPICAL DAILY
Refills: 0 | Status: DISCONTINUED | OUTPATIENT
Start: 2019-10-26 | End: 2019-10-31

## 2019-10-26 RX ADMIN — BUMETANIDE 2.5 MG/HR: 0.25 INJECTION INTRAMUSCULAR; INTRAVENOUS at 18:11

## 2019-10-26 RX ADMIN — Medication 3 MILLILITER(S): at 15:28

## 2019-10-26 RX ADMIN — ERYTHROPOIETIN 10000 UNIT(S): 10000 INJECTION, SOLUTION INTRAVENOUS; SUBCUTANEOUS at 21:51

## 2019-10-26 RX ADMIN — NYSTATIN CREAM 1 APPLICATION(S): 100000 CREAM TOPICAL at 17:34

## 2019-10-26 RX ADMIN — SODIUM CHLORIDE 4 MILLILITER(S): 9 INJECTION INTRAMUSCULAR; INTRAVENOUS; SUBCUTANEOUS at 09:00

## 2019-10-26 RX ADMIN — SODIUM CHLORIDE 4 MILLILITER(S): 9 INJECTION INTRAMUSCULAR; INTRAVENOUS; SUBCUTANEOUS at 15:29

## 2019-10-26 RX ADMIN — PIPERACILLIN AND TAZOBACTAM 25 GRAM(S): 4; .5 INJECTION, POWDER, LYOPHILIZED, FOR SOLUTION INTRAVENOUS at 17:34

## 2019-10-26 RX ADMIN — SEVELAMER CARBONATE 800 MILLIGRAM(S): 2400 POWDER, FOR SUSPENSION ORAL at 11:32

## 2019-10-26 RX ADMIN — NYSTATIN CREAM 1 APPLICATION(S): 100000 CREAM TOPICAL at 05:46

## 2019-10-26 RX ADMIN — Medication 3 MILLILITER(S): at 03:28

## 2019-10-26 RX ADMIN — Medication 200 GRAM(S): at 12:46

## 2019-10-26 RX ADMIN — PANTOPRAZOLE SODIUM 40 MILLIGRAM(S): 20 TABLET, DELAYED RELEASE ORAL at 11:31

## 2019-10-26 RX ADMIN — Medication 80 MILLIGRAM(S): at 05:46

## 2019-10-26 RX ADMIN — PIPERACILLIN AND TAZOBACTAM 25 GRAM(S): 4; .5 INJECTION, POWDER, LYOPHILIZED, FOR SOLUTION INTRAVENOUS at 05:47

## 2019-10-26 RX ADMIN — Medication 81 MILLIGRAM(S): at 11:32

## 2019-10-26 RX ADMIN — Medication 3 MILLILITER(S): at 09:00

## 2019-10-26 NOTE — CHART NOTE - NSCHARTNOTEFT_GEN_A_CORE
R BRUNO Patino placed at bedside by Vascular team. Per radiologist, CXR confirmed placement. Spoke w/ Nephrologist Dr. Brown, who will contact on-call HD team to dialyze pt.

## 2019-10-26 NOTE — CONSULT NOTE ADULT - SUBJECTIVE AND OBJECTIVE BOX
Vascular Surgery Consult    Consulting surgical team: C Team Surgery  Consulting attending: Dr. Walker    HPI:    74F w/ HTN, HLD, DM, COPD, CHF (EF45%), ESRD not on dialysis via L AVF, p/w AMS and hypotension, and for the past week has been having cough s/ sputum and diarrhea. Pt had been admitted to MICU. Tonight, pt was not able to get dialyzed through L AVF. Vascular Surgery consulted for shiley placement      PAST MEDICAL HISTORY:  ESRD (end stage renal disease)  Hemorrhoids  GERD (gastroesophageal reflux disease)  GERD (gastroesophageal reflux disease)  Morbid obesity  Cataract  Diaphragmatic hernia  Cerebral infarction  CKD (chronic kidney disease)  Anemia  Diabetes mellitus  CHF (congestive heart failure)  Hyperlipidemia  Hypertension  COPD (chronic obstructive pulmonary disease)  Chronic obstructive pulmonary disease, unspecified COPD type  Adult Idiopathic Generalized Osteoporosis  CAD (Coronary Artery Disease)  DM (Diabetes Mellitus), Secondary  Hypertension      PAST SURGICAL HISTORY:  H/O coronary angiogram  No significant past surgical history  No significant past surgical history      MEDICATIONS:  albuterol/ipratropium for Nebulization 3 milliLiter(s) Nebulizer every 6 hours  aspirin enteric coated 81 milliGRAM(s) Oral daily  calcium gluconate IVPB 1 Gram(s) IV Intermittent daily  dextrose 40% Gel 15 Gram(s) Oral once PRN  dextrose 5%. 1000 milliLiter(s) IV Continuous <Continuous>  dextrose 50% Injectable 12.5 Gram(s) IV Push once  dextrose 50% Injectable 25 Gram(s) IV Push once  dextrose 50% Injectable 25 Gram(s) IV Push once  epoetin terence Injectable 52598 Unit(s) SubCutaneous <User Schedule>  ergocalciferol 57397 Unit(s) Oral <User Schedule>  furosemide   Injectable 80 milliGRAM(s) IV Push two times a day  glucagon  Injectable 1 milliGRAM(s) IntraMuscular once PRN  insulin lispro (HumaLOG) corrective regimen sliding scale   SubCutaneous three times a day before meals  nystatin Powder 1 Application(s) Topical two times a day  pantoprazole  Injectable 40 milliGRAM(s) IV Push daily  piperacillin/tazobactam IVPB.. 3.375 Gram(s) IV Intermittent every 12 hours  sevelamer carbonate 800 milliGRAM(s) Oral three times a day with meals  sodium chloride 3%  Inhalation 4 milliLiter(s) Inhalation three times a day  tetracaine/benzocaine/butamben Spray 1 Spray(s) Topical daily PRN      ALLERGIES:  calcium acetate (Vomiting; Nausea; Chills)  wool-rash (Other)      VITALS & I/Os:  Vital Signs Last 24 Hrs  T(C): 36.5 (25 Oct 2019 13:59), Max: 37.1 (25 Oct 2019 07:08)  T(F): 97.7 (25 Oct 2019 13:59), Max: 98.8 (25 Oct 2019 07:08)  HR: 76 (25 Oct 2019 23:55) (73 - 95)  BP: 144/65 (25 Oct 2019 13:59) (121/65 - 144/65)  BP(mean): --  RR: 24 (25 Oct 2019 13:59) (18 - 24)  SpO2: 96% (25 Oct 2019 23:55) (92% - 99%)    I&O's Summary    24 Oct 2019 07:01  -  25 Oct 2019 07:00  --------------------------------------------------------  IN: 450 mL / OUT: 2100 mL / NET: -1650 mL        PHYSICAL EXAM:  General: No acute distress, on CPAP  Respiratory: Nonlabored  Cardiovascular: RRR  Abdominal: Soft, ND/NT, No rebound or guarding. No organomegaly, no palpable mass.  Extremities: Warm    LABS:                        8.5    13.10 )-----------( 215      ( 25 Oct 2019 08:05 )             29.6     10-25    145  |  101  |  86<H>  ----------------------------<  107<H>  3.4<L>   |  21<L>  |  4.76<H>    Ca    8.1<L>      25 Oct 2019 08:05  Phos  8.3     10-25  Mg     1.8     10-25      Lactate:      ABG - ( 25 Oct 2019 09:58 )  pH, Arterial: 7.32  pH, Blood: x     /  pCO2: 42    /  pO2: 75    / HCO3: 21    / Base Excess: -4.1  /  SaO2: 93.7

## 2019-10-26 NOTE — PROGRESS NOTE ADULT - ASSESSMENT
74 Y.O. pm h of HTN, HLD, COPD on 2-3 L at home T2DM, ESRD with fistula not yet being used, HFPEF with moderate MR, pHTN RVSP 53 who presents with hypoglycemia,, hypotension, respiratory failure, severe acidosis on bicarb gtt, Pna with likely pseudomonas, transferred out of the MICU with recurrent respiratory failure likely 2/2 to pulmonary edema and left opacity of the left hemithorax 2/2 to mucus plugging.     # Hypoxemic respiratory failure.   -  Left hemithorax opacified on cxr, consolidation pattern on Left side of the lung, with Blines on the right lung with small effusions. Repeat CXR still showing complete opacity of the L hemithorax.   - Multifactorial COPD, mucus plugging of the left lung and pulmonary edema from CHF.   - ABG showing high A-a gradient.   - C/W diuresis, f/u cards recs, Net negative 1900cc. Strict I+O.   - Duonebs q6 hours standing  - Patient need aggressive Chest PT 4 times a day. Chest vest q8.   - Place patient back on bipap for now. Will try to titrate back to Hi-flow today.   - C/W Zosyn for pseudmonas pna.   - CT chest but patient currently not stable enough for CT.   - Will try to get patient to the RCU for aggressive chest pt and secretion mobilization.     Dae Hyeon Kim MD-PGY5  Pulmonary Critical Care Fellow  Pager [phone] 74 Y.O. pm h of HTN, HLD, COPD on 2-3 L at home T2DM, ESRD with fistula not yet being used, HFPEF with moderate MR, pHTN RVSP 53 who presents with hypoglycemia,, hypotension, respiratory failure, severe acidosis on bicarb gtt, Pna with likely pseudomonas, transferred out of the MICU with recurrent respiratory failure likely 2/2 to pulmonary edema and left opacity of the left hemithorax 2/2 to mucus plugging.

## 2019-10-26 NOTE — CONSULT NOTE ADULT - ASSESSMENT
74F with PMH HTN, HLD, IDDM2, COPD (former smoker, 2L/min PRN at home), CHF (EF 12/2018 45-50%), ESRD not on dialysis, p/w acute metabolic encephalopathy. Initially found to be hypoglycemia, hypotension, bradycardia, anemia, acidosis, or uremia, admitted to MICU for septic shock management.    - May use RIJ shiley for HD access, after position confirmed by CXR  - Please page [phone] w/ questions  - Discussed w/ Vascular Surgery fellow on call      JAMARCUS Hunter PGY-2  C Team  [phone]

## 2019-10-26 NOTE — PROGRESS NOTE ADULT - ATTENDING COMMENTS
pt umesh nd examine d  d/w the team    agree with above  Left lung atelectasis due to mucus plugging and worsening hypoxemia this morning.  alert awake on   on bilevel as lung apex showing signs of aeration on POCUS with improving oxygen requirements.  Need aggressive chest PT/duonebs/suction.  Would like to avoid prolonged use of bilevel as it can worsen the mucus plugging. Switch to HFNC while awake.  Continues to require aggressive diuresis as patient is also fluid overloaded.  was  transferred to RCU for closer monitoring.    cxr in am pt seen  and examined  d/w the team    agree with above  Left lung atelectasis due to mucus plugging and worsening hypoxemia this morning.  alert awake on   on bilevel as HAS LEFT LUNG ATELECTASIS ??MUCUS PLUG   Need aggressive chest PT/duonebs/suction.  . TRY TO SEE IF POSSIBLE Switch to HFNC while awake.   HD  AS PER RENAL   was  transferred to RCU for closer monitoring.    cxr in am

## 2019-10-26 NOTE — PROCEDURE NOTE - NSINFORMCONSENT_GEN_A_CORE
Benefits, risks, and possible complications of procedure explained to patient/caregiver who verbalized understanding and gave verbal consent./daughters at bedside
Benefits, risks, and possible complications of procedure explained to patient/caregiver who verbalized understanding and gave written consent.
Benefits, risks, and possible complications of procedure explained to patient/caregiver who verbalized understanding and gave verbal consent.
This was an emergent procedure.

## 2019-10-26 NOTE — PROGRESS NOTE ADULT - SUBJECTIVE AND OBJECTIVE BOX
CHIEF COMPLAINT:    Interval Events:    REVIEW OF SYSTEMS:  Constitutional:   Eyes:  ENT:  CV:  Resp:  GI:  :  MSK:  Integumentary:  Neurological:  Psychiatric:  Endocrine:  Hematologic/Lymphatic:  Allergic/Immunologic:  [ ] All other systems negative  [ ] Unable to assess ROS because ________    OBJECTIVE:  ICU Vital Signs Last 24 Hrs  T(C): 37.1 (26 Oct 2019 06:25), Max: 37.1 (26 Oct 2019 00:00)  T(F): 98.8 (26 Oct 2019 06:25), Max: 98.8 (26 Oct 2019 00:00)  HR: 85 (26 Oct 2019 07:21) (73 - 95)  BP: 135/51 (26 Oct 2019 06:25) (133/62 - 148/72)  BP(mean): --  ABP: --  ABP(mean): --  RR: 21 (26 Oct 2019 06:25) (21 - 24)  SpO2: 96% (26 Oct 2019 07:21) (92% - 99%)        10-25 @ 07:01  -  10-26 @ 07:00  --------------------------------------------------------  IN: 700 mL / OUT: 2175 mL / NET: -1475 mL      CAPILLARY BLOOD GLUCOSE      POCT Blood Glucose.: 111 mg/dL (25 Oct 2019 16:45)      PHYSICAL EXAM:  General:   HEENT:   Lymph Nodes:  Neck:   Respiratory:   Cardiovascular:   Abdomen:   Extremities:   Skin:   Neurological:  Psychiatry:    HOSPITAL MEDICATIONS:  MEDICATIONS  (STANDING):  albuterol/ipratropium for Nebulization 3 milliLiter(s) Nebulizer every 6 hours  aspirin enteric coated 81 milliGRAM(s) Oral daily  calcium gluconate IVPB 1 Gram(s) IV Intermittent daily  dextrose 5%. 1000 milliLiter(s) (50 mL/Hr) IV Continuous <Continuous>  dextrose 50% Injectable 12.5 Gram(s) IV Push once  dextrose 50% Injectable 25 Gram(s) IV Push once  dextrose 50% Injectable 25 Gram(s) IV Push once  epoetin terence Injectable 55329 Unit(s) SubCutaneous <User Schedule>  ergocalciferol 73132 Unit(s) Oral <User Schedule>  furosemide   Injectable 80 milliGRAM(s) IV Push two times a day  insulin lispro (HumaLOG) corrective regimen sliding scale   SubCutaneous three times a day before meals  nystatin Powder 1 Application(s) Topical two times a day  pantoprazole  Injectable 40 milliGRAM(s) IV Push daily  piperacillin/tazobactam IVPB.. 3.375 Gram(s) IV Intermittent every 12 hours  sevelamer carbonate 800 milliGRAM(s) Oral three times a day with meals  sodium chloride 3%  Inhalation 4 milliLiter(s) Inhalation three times a day    MEDICATIONS  (PRN):  dextrose 40% Gel 15 Gram(s) Oral once PRN Blood Glucose LESS THAN 70 milliGRAM(s)/deciliter  glucagon  Injectable 1 milliGRAM(s) IntraMuscular once PRN Glucose LESS THAN 70 milligrams/deciliter  tetracaine/benzocaine/butamben Spray 1 Spray(s) Topical daily PRN AVF      LABS:                        7.5    10.80 )-----------( 219      ( 26 Oct 2019 04:10 )             24.7     10-26    148<H>  |  101  |  89<H>  ----------------------------<  100<H>  3.3<L>   |  22  |  4.71<H>    Ca    8.1<L>      26 Oct 2019 04:10  Phos  7.8     10-26  Mg     1.7     10-26      PT/INR - ( 26 Oct 2019 01:20 )   PT: 14.4 SEC;   INR: 1.25          PTT - ( 26 Oct 2019 01:20 )  PTT:26.8 SEC    Arterial Blood Gas:  10-25 @ 09:58  7.32/42/75/21/93.7/-4.1  ABG lactate: 1.0  Arterial Blood Gas:  10-24 @ 15:39  7.31/44/52/21/80.9/-4.0  ABG lactate: --        MICROBIOLOGY:     RADIOLOGY:  [ ] Reviewed and interpreted by me    PULMONARY FUNCTION TESTS:    EKG: CHIEF COMPLAINT:Patient is a 74y old  Female who presents with a chief complaint of AMS and Hypotension (26 Oct 2019 07:45)      Interval Events: HD overnight     REVIEW OF SYSTEMS:  Constitutional: No fever /chills/pain   CV: Denies   Resp: + ARNOLD   GI: Denies 	  MSK: Weakness   [x ] All other systems negative  [ ] Unable to assess ROS because ________    OBJECTIVE:  ICU Vital Signs Last 24 Hrs  T(C): 37.1 (26 Oct 2019 06:25), Max: 37.1 (26 Oct 2019 00:00)  T(F): 98.8 (26 Oct 2019 06:25), Max: 98.8 (26 Oct 2019 00:00)  HR: 85 (26 Oct 2019 07:21) (73 - 95)  BP: 135/51 (26 Oct 2019 06:25) (133/62 - 148/72)  BP(mean): --  ABP: --  ABP(mean): --  RR: 21 (26 Oct 2019 06:25) (21 - 24)  SpO2: 96% (26 Oct 2019 07:21) (92% - 99%)        10-25 @ 07:01  -  10-26 @ 07:00  --------------------------------------------------------  IN: 700 mL / OUT: 2175 mL / NET: -1475 mL      CAPILLARY BLOOD GLUCOSE      POCT Blood Glucose.: 111 mg/dL (25 Oct 2019 16:45)      PHYSICAL EXAM:  General:   HEENT:   Lymph Nodes:  Neck:   Respiratory:   Cardiovascular:   Abdomen:   Extremities:   Skin:   Neurological:  Psychiatry:    HOSPITAL MEDICATIONS:  MEDICATIONS  (STANDING):  albuterol/ipratropium for Nebulization 3 milliLiter(s) Nebulizer every 6 hours  aspirin enteric coated 81 milliGRAM(s) Oral daily  calcium gluconate IVPB 1 Gram(s) IV Intermittent daily  dextrose 5%. 1000 milliLiter(s) (50 mL/Hr) IV Continuous <Continuous>  dextrose 50% Injectable 12.5 Gram(s) IV Push once  dextrose 50% Injectable 25 Gram(s) IV Push once  dextrose 50% Injectable 25 Gram(s) IV Push once  epoetin terence Injectable 91362 Unit(s) SubCutaneous <User Schedule>  ergocalciferol 36233 Unit(s) Oral <User Schedule>  furosemide   Injectable 80 milliGRAM(s) IV Push two times a day  insulin lispro (HumaLOG) corrective regimen sliding scale   SubCutaneous three times a day before meals  nystatin Powder 1 Application(s) Topical two times a day  pantoprazole  Injectable 40 milliGRAM(s) IV Push daily  piperacillin/tazobactam IVPB.. 3.375 Gram(s) IV Intermittent every 12 hours  sevelamer carbonate 800 milliGRAM(s) Oral three times a day with meals  sodium chloride 3%  Inhalation 4 milliLiter(s) Inhalation three times a day    MEDICATIONS  (PRN):  dextrose 40% Gel 15 Gram(s) Oral once PRN Blood Glucose LESS THAN 70 milliGRAM(s)/deciliter  glucagon  Injectable 1 milliGRAM(s) IntraMuscular once PRN Glucose LESS THAN 70 milligrams/deciliter  tetracaine/benzocaine/butamben Spray 1 Spray(s) Topical daily PRN AVF      LABS:                        7.5    10.80 )-----------( 219      ( 26 Oct 2019 04:10 )             24.7     10-26    148<H>  |  101  |  89<H>  ----------------------------<  100<H>  3.3<L>   |  22  |  4.71<H>    Ca    8.1<L>      26 Oct 2019 04:10  Phos  7.8     10-26  Mg     1.7     10-26      PT/INR - ( 26 Oct 2019 01:20 )   PT: 14.4 SEC;   INR: 1.25          PTT - ( 26 Oct 2019 01:20 )  PTT:26.8 SEC    Arterial Blood Gas:  10-25 @ 09:58  7.32/42/75/21/93.7/-4.1  ABG lactate: 1.0  Arterial Blood Gas:  10-24 @ 15:39  7.31/44/52/21/80.9/-4.0  ABG lactate: --        MICROBIOLOGY:     RADIOLOGY:  [ ] Reviewed and interpreted by me    PULMONARY FUNCTION TESTS:    EKG: CHIEF COMPLAINT:Patient is a 74y old  Female who presents with a chief complaint of AMS and Hypotension (26 Oct 2019 07:45)      Interval Events: HD overnight     REVIEW OF SYSTEMS:  Constitutional: No fever /chills/pain   CV: Denies   Resp: + ARNOLD   GI: Denies 	  MSK: Weakness   [x ] All other systems negative  [ ] Unable to assess ROS because ________    OBJECTIVE:  ICU Vital Signs Last 24 Hrs  T(C): 37.1 (26 Oct 2019 06:25), Max: 37.1 (26 Oct 2019 00:00)  T(F): 98.8 (26 Oct 2019 06:25), Max: 98.8 (26 Oct 2019 00:00)  HR: 85 (26 Oct 2019 07:21) (73 - 95)  BP: 135/51 (26 Oct 2019 06:25) (133/62 - 148/72)  BP(mean): --  ABP: --  ABP(mean): --  RR: 21 (26 Oct 2019 06:25) (21 - 24)  SpO2: 96% (26 Oct 2019 07:21) (92% - 99%)        10-25 @ 07:01  -  10-26 @ 07:00  --------------------------------------------------------  IN: 700 mL / OUT: 2175 mL / NET: -1475 mL      CAPILLARY BLOOD GLUCOSE      POCT Blood Glucose.: 111 mg/dL (25 Oct 2019 16:45)      HOSPITAL MEDICATIONS:  MEDICATIONS  (STANDING):  albuterol/ipratropium for Nebulization 3 milliLiter(s) Nebulizer every 6 hours  aspirin enteric coated 81 milliGRAM(s) Oral daily  calcium gluconate IVPB 1 Gram(s) IV Intermittent daily  dextrose 5%. 1000 milliLiter(s) (50 mL/Hr) IV Continuous <Continuous>  dextrose 50% Injectable 12.5 Gram(s) IV Push once  dextrose 50% Injectable 25 Gram(s) IV Push once  dextrose 50% Injectable 25 Gram(s) IV Push once  epoetin terence Injectable 01702 Unit(s) SubCutaneous <User Schedule>  ergocalciferol 48815 Unit(s) Oral <User Schedule>  furosemide   Injectable 80 milliGRAM(s) IV Push two times a day  insulin lispro (HumaLOG) corrective regimen sliding scale   SubCutaneous three times a day before meals  nystatin Powder 1 Application(s) Topical two times a day  pantoprazole  Injectable 40 milliGRAM(s) IV Push daily  piperacillin/tazobactam IVPB.. 3.375 Gram(s) IV Intermittent every 12 hours  sevelamer carbonate 800 milliGRAM(s) Oral three times a day with meals  sodium chloride 3%  Inhalation 4 milliLiter(s) Inhalation three times a day    MEDICATIONS  (PRN):  dextrose 40% Gel 15 Gram(s) Oral once PRN Blood Glucose LESS THAN 70 milliGRAM(s)/deciliter  glucagon  Injectable 1 milliGRAM(s) IntraMuscular once PRN Glucose LESS THAN 70 milligrams/deciliter  tetracaine/benzocaine/butamben Spray 1 Spray(s) Topical daily PRN AVF      LABS:                        7.5    10.80 )-----------( 219      ( 26 Oct 2019 04:10 )             24.7     10-26    148<H>  |  101  |  89<H>  ----------------------------<  100<H>  3.3<L>   |  22  |  4.71<H>    Ca    8.1<L>      26 Oct 2019 04:10  Phos  7.8     10-26  Mg     1.7     10-26      PT/INR - ( 26 Oct 2019 01:20 )   PT: 14.4 SEC;   INR: 1.25          PTT - ( 26 Oct 2019 01:20 )  PTT:26.8 SEC    Arterial Blood Gas:  10-25 @ 09:58  7.32/42/75/21/93.7/-4.1  ABG lactate: 1.0  Arterial Blood Gas:  10-24 @ 15:39  7.31/44/52/21/80.9/-4.0  ABG lactate: --        MICROBIOLOGY:     RADIOLOGY:  [ ] Reviewed and interpreted by me    PULMONARY FUNCTION TESTS:    EKG:

## 2019-10-26 NOTE — PROGRESS NOTE ADULT - ASSESSMENT
74F h/o HTN, HLD, IDDM2, COPD (former smoker, 2L/min PRN at home), CHF (EF 12/2018 45-50%), ESRD not on dialysis admitted to micu for hypotensive, ams, hypoglycemia and hypothermia.     CKD stage 5 not yet on hd  s/p left avf 12/18, + thrill + bruit (never been used)  renal function has been stable  consent for cvvh/ IHD in chart from daughter    - HD RN attempted to use LAVF unsuccessfully yesterday  - GOKUL laura placed by vascular and HD #1 initiated 10/26/19    - remains volume overloaded today    - recommend starting bumex drip    - will have HD #2 later today    - monitor I/O's closely    Hypocalcemia  pth elevated.  low vit d 25 level  start vit d 54054 weekly x8  start calcitriol 0.25 daily when phos is better   low albumin   continue renagel when eating  monitor    Hyperphosphatemia  npo right now  allergy to phoslo  start renagel 800mg tid with meal  monitor    Anemia  occult +  monitor hb  iron sat >20  epo [id number] tiw   transfuse as needed  consider Gi eval    Acidosis  likely sec to renal failure +diarrhea  improved, will continue to improve w/ HD  monitor    Hypotensive  ? sepsis   on  iv antibiotic  BP is normal now  - goal BP <130/80  monitor    Hypokalemia  supplemented  monitor

## 2019-10-26 NOTE — PROCEDURE NOTE - NSPOSTPRCRAD_GEN_A_CORE
no pneumothorax/central line located in the superior vena cava
central line located in the superior vena cava

## 2019-10-26 NOTE — PROCEDURE NOTE - NSINDICATIONS_GEN_A_CORE
critical illness/venous access
critical patient/monitoring purposes
dialysis/CRRT
mental status change

## 2019-10-26 NOTE — PROGRESS NOTE ADULT - SUBJECTIVE AND OBJECTIVE BOX
[facility] [place] [facility]   MD Edel Power D.O. Fatima Sheikh, D.O. Ruoro Wong, PA    From 7 AM - 5 PM:  OFFICE: [phone]  Dr. Morales cell: [phone]  Dr. Brown cell: [phone]  Dr. Jose cell: [phone]  JASSON Calvillo cell: [phone]    From 5 PM - 7 AM: Answering Service: [phone]        RENAL FOLLOW UP NOTE  --------------------------------------------------------------------------------  HPI: Pt seen and examined. Is on bipap and unable to contribute to HPI. Per son, she looks better. Unable to speak full sentences still.     Had HD overnight/early this AM, 1L UF removed.         PAST HISTORY  --------------------------------------------------------------------------------  No significant changes to PMH, PSH, FHx, SHx, unless otherwise noted    ALLERGIES & MEDICATIONS  --------------------------------------------------------------------------------  Allergies    calcium acetate (Vomiting; Nausea; Chills)  wool-rash (Other)    Intolerances      Standing Inpatient Medications  albuterol/ipratropium for Nebulization 3 milliLiter(s) Nebulizer every 6 hours  aspirin enteric coated 81 milliGRAM(s) Oral daily  calcium gluconate IVPB 1 Gram(s) IV Intermittent daily  chlorhexidine 4% Liquid 1 Application(s) Topical daily  dextrose 5%. 1000 milliLiter(s) IV Continuous <Continuous>  dextrose 50% Injectable 12.5 Gram(s) IV Push once  dextrose 50% Injectable 25 Gram(s) IV Push once  dextrose 50% Injectable 25 Gram(s) IV Push once  epoetin terence Injectable 50180 Unit(s) SubCutaneous <User Schedule>  ergocalciferol 14902 Unit(s) Oral <User Schedule>  furosemide   Injectable 80 milliGRAM(s) IV Push two times a day  insulin lispro (HumaLOG) corrective regimen sliding scale   SubCutaneous three times a day before meals  nystatin Powder 1 Application(s) Topical two times a day  pantoprazole  Injectable 40 milliGRAM(s) IV Push daily  piperacillin/tazobactam IVPB.. 3.375 Gram(s) IV Intermittent every 12 hours  sevelamer carbonate 800 milliGRAM(s) Oral three times a day with meals  sodium chloride 3%  Inhalation 4 milliLiter(s) Inhalation three times a day    PRN Inpatient Medications  dextrose 40% Gel 15 Gram(s) Oral once PRN  glucagon  Injectable 1 milliGRAM(s) IntraMuscular once PRN  tetracaine/benzocaine/butamben Spray 1 Spray(s) Topical daily PRN      REVIEW OF SYSTEMS  --------------------------------------------------------------------------------  unable to obtain    VITALS/PHYSICAL EXAM  --------------------------------------------------------------------------------  T(C): 37.3 (10-26-19 @ 12:52), Max: 37.3 (10-26-19 @ 12:52)  HR: 81 (10-26-19 @ 15:34) (76 - 91)  BP: 142/70 (10-26-19 @ 12:52) (133/62 - 148/72)  RR: 21 (10-26-19 @ 12:52) (21 - 22)  SpO2: 95% (10-26-19 @ 15:31) (92% - 99%)  Wt(kg): --        10-25-19 @ 07:01  -  10-26-19 @ 07:00  --------------------------------------------------------  IN: 700 mL / OUT: 2175 mL / NET: -1475 mL      Physical Exam:  	Gen: moderate resp distress. on bipap  	HEENT: MMM  	Pulm: B/L rales, diminished breath sounds on left.  	CV: S1S2, + murmur  	Abd: Soft, +BS  	Ext: No LE edema B/L              Neuro: Awake   	Skin: Warm and Dry   	Vascular access: GOKUL laura in place, dressing c/d/i.    LABS/STUDIES  --------------------------------------------------------------------------------              7.5    10.80 >-----------<  219      [10-26-19 @ 04:10]              24.7     148  |  101  |  89  ----------------------------<  100      [10-26-19 @ 04:10]  3.3   |  22  |  4.71        Ca     8.1     [10-26-19 @ 04:10]      Mg     1.7     [10-26-19 @ 04:10]      Phos  7.8     [10-26-19 @ 04:10]      PT/INR: PT 14.4 , INR 1.25       [10-26-19 @ 01:20]  PTT: 26.8       [10-26-19 @ 01:20]      Creatinine Trend:  SCr 4.71 [10-26 @ 04:10]  SCr 4.76 [10-25 @ 08:05]  SCr 4.59 [10-24 @ 02:30]  SCr 4.74 [10-23 @ 12:10]  SCr 4.66 [10-23 @ 07:00]    Urinalysis - [10-19-19 @ 03:30]      Color YELLOW / Appearance CLEAR / SG 1.017 / pH 5.5      Gluc NEGATIVE / Ketone NEGATIVE  / Bili NEGATIVE / Urobili NORMAL       Blood NEGATIVE / Protein 10 / Leuk Est NEGATIVE / Nitrite NEGATIVE      RBC  / WBC  / Hyaline  / Gran  / Sq Epi  / Non Sq Epi  / Bacteria       Iron 27, TIBC 125, %sat --      [10-20-19 @ 02:40]  Ferritin 283.7      [10-20-19 @ 02:40]  .9 (Ca --)      [10-20-19 @ 02:40]   --  PTH -- (Ca 8.7)      [10-31-18 @ 09:24]   269  Vitamin D (25OH) 10.5      [10-20-19 @ 02:40]  HbA1c 5.2      [10-20-19 @ 02:40]  TSH 1.11      [10-18-19 @ 23:20]    HBsAg NEGATIVE      [10-24-19 @ 02:30]  HCV 0.16, Nonreactive Hepatitis C AB  S/CO Ratio                        Interpretation  < 1.00                                   Non-Reactive  1.00 - 4.99                         Weakly-Reactive  >= 5.00                                Reactive  Non-Reactive: Aperson with a non-reactive HCV antibody  result is considered uninfected.  No further action is  needed unless recent infection is suspected.  In these  cases, consider repeat testing later to detect  seroconversion..  Weakly-Reactive: HCV antibody test is abnormal, HCV RNA  Qualitative test will follow.  Reactive: HCV antibody test is abnormal, HCV RNA  Qualitative test will follow.  Note: HCV antibody testing is performed on the [facility] system.      [10-19-19 @ 12:30]

## 2019-10-26 NOTE — PROGRESS NOTE ADULT - PROBLEM SELECTOR PLAN 2
- LUE fistula not working and dialysis needed sec to fluid overload   - Vascular called and GOKUL laura placed   - Pt received urgent HD overnight   - lasix bid 80mg   - epogen per nephrology   - To receive HD again today

## 2019-10-26 NOTE — PROGRESS NOTE ADULT - PROBLEM SELECTOR PLAN 1
-L hemithoracic opacity noted  - aggressive chest PT, saline nebs, chest vest, metanebs   - high flow day/bipap hs

## 2019-10-26 NOTE — CONSULT NOTE ADULT - SUBJECTIVE AND OBJECTIVE BOX
Vascular Surgery Consult    Consulting surgical team: C Team Surgery  Consulting attending: Dr. Walker    HPI:    74F w/ HTN, HLD, DM, COPD, CHF (EF45%), ESRD not on dialysis via L AVF, p/w AMS and hypotension, and for the past week has been having cough s/ sputum and diarrhea. Pt had been admitted to MICU. Tonight, pt was not able to get dialyzed through L AVF. Vascular Surgery consulted for shiley placement      PAST MEDICAL HISTORY:  ESRD (end stage renal disease)  Hemorrhoids  GERD (gastroesophageal reflux disease)  GERD (gastroesophageal reflux disease)  Morbid obesity  Cataract  Diaphragmatic hernia  Cerebral infarction  CKD (chronic kidney disease)  Anemia  Diabetes mellitus  CHF (congestive heart failure)  Hyperlipidemia  Hypertension  COPD (chronic obstructive pulmonary disease)  Chronic obstructive pulmonary disease, unspecified COPD type  Adult Idiopathic Generalized Osteoporosis  CAD (Coronary Artery Disease)  DM (Diabetes Mellitus), Secondary  Hypertension      PAST SURGICAL HISTORY:  H/O coronary angiogram  No significant past surgical history  No significant past surgical history      MEDICATIONS:  albuterol/ipratropium for Nebulization 3 milliLiter(s) Nebulizer every 6 hours  aspirin enteric coated 81 milliGRAM(s) Oral daily  calcium gluconate IVPB 1 Gram(s) IV Intermittent daily  dextrose 40% Gel 15 Gram(s) Oral once PRN  dextrose 5%. 1000 milliLiter(s) IV Continuous <Continuous>  dextrose 50% Injectable 12.5 Gram(s) IV Push once  dextrose 50% Injectable 25 Gram(s) IV Push once  dextrose 50% Injectable 25 Gram(s) IV Push once  epoetin terence Injectable 31409 Unit(s) SubCutaneous <User Schedule>  ergocalciferol 61500 Unit(s) Oral <User Schedule>  furosemide   Injectable 80 milliGRAM(s) IV Push two times a day  glucagon  Injectable 1 milliGRAM(s) IntraMuscular once PRN  insulin lispro (HumaLOG) corrective regimen sliding scale   SubCutaneous three times a day before meals  nystatin Powder 1 Application(s) Topical two times a day  pantoprazole  Injectable 40 milliGRAM(s) IV Push daily  piperacillin/tazobactam IVPB.. 3.375 Gram(s) IV Intermittent every 12 hours  sevelamer carbonate 800 milliGRAM(s) Oral three times a day with meals  sodium chloride 3%  Inhalation 4 milliLiter(s) Inhalation three times a day  tetracaine/benzocaine/butamben Spray 1 Spray(s) Topical daily PRN      ALLERGIES:  calcium acetate (Vomiting; Nausea; Chills)  wool-rash (Other)      VITALS & I/Os:  Vital Signs Last 24 Hrs  T(C): 36.5 (25 Oct 2019 13:59), Max: 37.1 (25 Oct 2019 07:08)  T(F): 97.7 (25 Oct 2019 13:59), Max: 98.8 (25 Oct 2019 07:08)  HR: 76 (25 Oct 2019 23:55) (73 - 95)  BP: 144/65 (25 Oct 2019 13:59) (121/65 - 144/65)  BP(mean): --  RR: 24 (25 Oct 2019 13:59) (18 - 24)  SpO2: 96% (25 Oct 2019 23:55) (92% - 99%)    I&O's Summary    24 Oct 2019 07:01  -  25 Oct 2019 07:00  --------------------------------------------------------  IN: 450 mL / OUT: 2100 mL / NET: -1650 mL        PHYSICAL EXAM:  General: No acute distress, on CPAP  Respiratory: Nonlabored  Cardiovascular: RRR  Abdominal: Soft, ND/NT, No rebound or guarding. No organomegaly, no palpable mass.  Extremities: Warm    LABS:                        8.5    13.10 )-----------( 215      ( 25 Oct 2019 08:05 )             29.6     10-25    145  |  101  |  86<H>  ----------------------------<  107<H>  3.4<L>   |  21<L>  |  4.76<H>    Ca    8.1<L>      25 Oct 2019 08:05  Phos  8.3     10-25  Mg     1.8     10-25      Lactate:      ABG - ( 25 Oct 2019 09:58 )  pH, Arterial: 7.32  pH, Blood: x     /  pCO2: 42    /  pO2: 75    / HCO3: 21    / Base Excess: -4.1  /  SaO2: 93.7 Vascular Surgery Consult    Consulting surgical team: C Team Surgery  Consulting attending: Dr. Johnson    HPI:    74F w/ HTN, HLD, DM, COPD, CHF (EF45%), ESRD not on dialysis via L AVF, p/w AMS and hypotension, and for the past week has been having cough s/ sputum and diarrhea. Pt had been admitted to MICU. Tonight, pt was not able to get dialyzed through L AVF. Vascular Surgery consulted for shiley placement      PAST MEDICAL HISTORY:  ESRD (end stage renal disease)  Hemorrhoids  GERD (gastroesophageal reflux disease)  GERD (gastroesophageal reflux disease)  Morbid obesity  Cataract  Diaphragmatic hernia  Cerebral infarction  CKD (chronic kidney disease)  Anemia  Diabetes mellitus  CHF (congestive heart failure)  Hyperlipidemia  Hypertension  COPD (chronic obstructive pulmonary disease)  Chronic obstructive pulmonary disease, unspecified COPD type  Adult Idiopathic Generalized Osteoporosis  CAD (Coronary Artery Disease)  DM (Diabetes Mellitus), Secondary  Hypertension      PAST SURGICAL HISTORY:  H/O coronary angiogram  No significant past surgical history  No significant past surgical history      MEDICATIONS:  albuterol/ipratropium for Nebulization 3 milliLiter(s) Nebulizer every 6 hours  aspirin enteric coated 81 milliGRAM(s) Oral daily  calcium gluconate IVPB 1 Gram(s) IV Intermittent daily  dextrose 40% Gel 15 Gram(s) Oral once PRN  dextrose 5%. 1000 milliLiter(s) IV Continuous <Continuous>  dextrose 50% Injectable 12.5 Gram(s) IV Push once  dextrose 50% Injectable 25 Gram(s) IV Push once  dextrose 50% Injectable 25 Gram(s) IV Push once  epoetin terence Injectable 20922 Unit(s) SubCutaneous <User Schedule>  ergocalciferol 36318 Unit(s) Oral <User Schedule>  furosemide   Injectable 80 milliGRAM(s) IV Push two times a day  glucagon  Injectable 1 milliGRAM(s) IntraMuscular once PRN  insulin lispro (HumaLOG) corrective regimen sliding scale   SubCutaneous three times a day before meals  nystatin Powder 1 Application(s) Topical two times a day  pantoprazole  Injectable 40 milliGRAM(s) IV Push daily  piperacillin/tazobactam IVPB.. 3.375 Gram(s) IV Intermittent every 12 hours  sevelamer carbonate 800 milliGRAM(s) Oral three times a day with meals  sodium chloride 3%  Inhalation 4 milliLiter(s) Inhalation three times a day  tetracaine/benzocaine/butamben Spray 1 Spray(s) Topical daily PRN      ALLERGIES:  calcium acetate (Vomiting; Nausea; Chills)  wool-rash (Other)      VITALS & I/Os:  Vital Signs Last 24 Hrs  T(C): 36.5 (25 Oct 2019 13:59), Max: 37.1 (25 Oct 2019 07:08)  T(F): 97.7 (25 Oct 2019 13:59), Max: 98.8 (25 Oct 2019 07:08)  HR: 76 (25 Oct 2019 23:55) (73 - 95)  BP: 144/65 (25 Oct 2019 13:59) (121/65 - 144/65)  BP(mean): --  RR: 24 (25 Oct 2019 13:59) (18 - 24)  SpO2: 96% (25 Oct 2019 23:55) (92% - 99%)    I&O's Summary    24 Oct 2019 07:01  -  25 Oct 2019 07:00  --------------------------------------------------------  IN: 450 mL / OUT: 2100 mL / NET: -1650 mL        PHYSICAL EXAM:  General: No acute distress, on CPAP  Respiratory: Nonlabored  Cardiovascular: RRR  Abdominal: Soft, ND/NT, No rebound or guarding. No organomegaly, no palpable mass.  Extremities: Warm    LABS:                        8.5    13.10 )-----------( 215      ( 25 Oct 2019 08:05 )             29.6     10-25    145  |  101  |  86<H>  ----------------------------<  107<H>  3.4<L>   |  21<L>  |  4.76<H>    Ca    8.1<L>      25 Oct 2019 08:05  Phos  8.3     10-25  Mg     1.8     10-25      Lactate:      ABG - ( 25 Oct 2019 09:58 )  pH, Arterial: 7.32  pH, Blood: x     /  pCO2: 42    /  pO2: 75    / HCO3: 21    / Base Excess: -4.1  /  SaO2: 93.7 Vascular Surgery Consult    Consulting surgical team: C Team Surgery  Consulting attending: Dr. Walker    HPI:    74F w/ HTN, HLD, DM, COPD, CHF (EF45%), ESRD not on dialysis via L AVF, p/w AMS and hypotension, and for the past week has been having cough s/ sputum and diarrhea. Pt had been admitted to MICU. Tonight, pt was not able to get dialyzed through L AVF. Vascular Surgery consulted for shiley placement      PAST MEDICAL HISTORY:  ESRD (end stage renal disease)  Hemorrhoids  GERD (gastroesophageal reflux disease)  GERD (gastroesophageal reflux disease)  Morbid obesity  Cataract  Diaphragmatic hernia  Cerebral infarction  CKD (chronic kidney disease)  Anemia  Diabetes mellitus  CHF (congestive heart failure)  Hyperlipidemia  Hypertension  COPD (chronic obstructive pulmonary disease)  Chronic obstructive pulmonary disease, unspecified COPD type  Adult Idiopathic Generalized Osteoporosis  CAD (Coronary Artery Disease)  DM (Diabetes Mellitus), Secondary  Hypertension      PAST SURGICAL HISTORY:  H/O coronary angiogram  No significant past surgical history  No significant past surgical history      MEDICATIONS:  albuterol/ipratropium for Nebulization 3 milliLiter(s) Nebulizer every 6 hours  aspirin enteric coated 81 milliGRAM(s) Oral daily  calcium gluconate IVPB 1 Gram(s) IV Intermittent daily  dextrose 40% Gel 15 Gram(s) Oral once PRN  dextrose 5%. 1000 milliLiter(s) IV Continuous <Continuous>  dextrose 50% Injectable 12.5 Gram(s) IV Push once  dextrose 50% Injectable 25 Gram(s) IV Push once  dextrose 50% Injectable 25 Gram(s) IV Push once  epoetin terence Injectable 20506 Unit(s) SubCutaneous <User Schedule>  ergocalciferol 83837 Unit(s) Oral <User Schedule>  furosemide   Injectable 80 milliGRAM(s) IV Push two times a day  glucagon  Injectable 1 milliGRAM(s) IntraMuscular once PRN  insulin lispro (HumaLOG) corrective regimen sliding scale   SubCutaneous three times a day before meals  nystatin Powder 1 Application(s) Topical two times a day  pantoprazole  Injectable 40 milliGRAM(s) IV Push daily  piperacillin/tazobactam IVPB.. 3.375 Gram(s) IV Intermittent every 12 hours  sevelamer carbonate 800 milliGRAM(s) Oral three times a day with meals  sodium chloride 3%  Inhalation 4 milliLiter(s) Inhalation three times a day  tetracaine/benzocaine/butamben Spray 1 Spray(s) Topical daily PRN      ALLERGIES:  calcium acetate (Vomiting; Nausea; Chills)  wool-rash (Other)      VITALS & I/Os:  Vital Signs Last 24 Hrs  T(C): 36.5 (25 Oct 2019 13:59), Max: 37.1 (25 Oct 2019 07:08)  T(F): 97.7 (25 Oct 2019 13:59), Max: 98.8 (25 Oct 2019 07:08)  HR: 76 (25 Oct 2019 23:55) (73 - 95)  BP: 144/65 (25 Oct 2019 13:59) (121/65 - 144/65)  BP(mean): --  RR: 24 (25 Oct 2019 13:59) (18 - 24)  SpO2: 96% (25 Oct 2019 23:55) (92% - 99%)    I&O's Summary    24 Oct 2019 07:01  -  25 Oct 2019 07:00  --------------------------------------------------------  IN: 450 mL / OUT: 2100 mL / NET: -1650 mL        PHYSICAL EXAM:  General: No acute distress, on CPAP  Respiratory: Nonlabored  Cardiovascular: RRR  Abdominal: Soft, ND/NT, No rebound or guarding. No organomegaly, no palpable mass.  Extremities: Warm    LABS:                        8.5    13.10 )-----------( 215      ( 25 Oct 2019 08:05 )             29.6     10-25    145  |  101  |  86<H>  ----------------------------<  107<H>  3.4<L>   |  21<L>  |  4.76<H>    Ca    8.1<L>      25 Oct 2019 08:05  Phos  8.3     10-25  Mg     1.8     10-25      Lactate:      ABG - ( 25 Oct 2019 09:58 )  pH, Arterial: 7.32  pH, Blood: x     /  pCO2: 42    /  pO2: 75    / HCO3: 21    / Base Excess: -4.1  /  SaO2: 93.7

## 2019-10-26 NOTE — PROCEDURE NOTE - NSPROCDETAILS_GEN_ALL_CORE
connected to ventilator/patient pre-oxygenated, tube inserted, placement confirmed
guidewire recovered/sterile technique, catheter placed/lumen(s) aspirated and flushed/sterile dressing applied/ultrasound guidance
location identified, draped/prepped, sterile technique used, needle inserted/introduced/sutured in place/all materials/supplies accounted for at end of procedure/positive blood return obtained via catheter/hemostasis with direct pressure, dressing applied/Seldinger technique/ultrasound guidance/connected to a pressurized flush line
sterile dressing applied/sterile technique, catheter placed/ultrasound guidance/guidewire recovered/lumen(s) aspirated and flushed

## 2019-10-26 NOTE — CHART NOTE - NSCHARTNOTEFT_GEN_A_CORE
On call nephrology requesting dc lasix and start bumex 0.5mg /hr iv .  Pt will require strict I/O and female urine incont device ordered for pt as well as daily wts.  Pt also to receive additional fluid removal with Dialysis today - orders per nephrology .   Fu cxr in am

## 2019-10-26 NOTE — CONSULT NOTE ADULT - ASSESSMENT
74F with PMH HTN, HLD, IDDM2, COPD (former smoker, 2L/min PRN at home), CHF (EF 12/2018 45-50%), ESRD not on dialysis, p/w acute metabolic encephalopathy. Initially found to be hypoglycemia, hypotension, bradycardia, anemia, acidosis, or uremia, admitted to MICU for septic shock management.    - May use RIJ shiley for HD access, after position confirmed by CXR  - Please page [phone] w/ questions  - Discussed w/ Vascular Surgery fellow on call      JAMARCUS Hunter PGY-2  C Team  [id number]

## 2019-10-27 LAB
ANION GAP SERPL CALC-SCNC: 22 MMO/L — HIGH (ref 7–14)
BASOPHILS # BLD AUTO: 0.01 K/UL — SIGNIFICANT CHANGE UP (ref 0–0.2)
BASOPHILS NFR BLD AUTO: 0.1 % — SIGNIFICANT CHANGE UP (ref 0–2)
BUN SERPL-MCNC: 49 MG/DL — HIGH (ref 7–23)
CALCIUM SERPL-MCNC: 7.9 MG/DL — LOW (ref 8.4–10.5)
CHLORIDE SERPL-SCNC: 93 MMOL/L — LOW (ref 98–107)
CO2 SERPL-SCNC: 22 MMOL/L — SIGNIFICANT CHANGE UP (ref 22–31)
CREAT SERPL-MCNC: 2.98 MG/DL — HIGH (ref 0.5–1.3)
EOSINOPHIL # BLD AUTO: 0.34 K/UL — SIGNIFICANT CHANGE UP (ref 0–0.5)
EOSINOPHIL NFR BLD AUTO: 3.3 % — SIGNIFICANT CHANGE UP (ref 0–6)
GLUCOSE SERPL-MCNC: 307 MG/DL — HIGH (ref 70–99)
HCT VFR BLD CALC: 24.9 % — LOW (ref 34.5–45)
HGB BLD-MCNC: 7.5 G/DL — LOW (ref 11.5–15.5)
IMM GRANULOCYTES NFR BLD AUTO: 0.7 % — SIGNIFICANT CHANGE UP (ref 0–1.5)
LYMPHOCYTES # BLD AUTO: 0.48 K/UL — LOW (ref 1–3.3)
LYMPHOCYTES # BLD AUTO: 4.7 % — LOW (ref 13–44)
MAGNESIUM SERPL-MCNC: 1.7 MG/DL — SIGNIFICANT CHANGE UP (ref 1.6–2.6)
MCHC RBC-ENTMCNC: 28 PG — SIGNIFICANT CHANGE UP (ref 27–34)
MCHC RBC-ENTMCNC: 30.1 % — LOW (ref 32–36)
MCV RBC AUTO: 92.9 FL — SIGNIFICANT CHANGE UP (ref 80–100)
MONOCYTES # BLD AUTO: 0.62 K/UL — SIGNIFICANT CHANGE UP (ref 0–0.9)
MONOCYTES NFR BLD AUTO: 6.1 % — SIGNIFICANT CHANGE UP (ref 2–14)
NEUTROPHILS # BLD AUTO: 8.66 K/UL — HIGH (ref 1.8–7.4)
NEUTROPHILS NFR BLD AUTO: 85.1 % — HIGH (ref 43–77)
NRBC # FLD: 0 K/UL — SIGNIFICANT CHANGE UP (ref 0–0)
OB PNL STL: NEGATIVE — SIGNIFICANT CHANGE UP
PHOSPHATE SERPL-MCNC: 4.3 MG/DL — SIGNIFICANT CHANGE UP (ref 2.5–4.5)
PLATELET # BLD AUTO: 246 K/UL — SIGNIFICANT CHANGE UP (ref 150–400)
PMV BLD: 9.9 FL — SIGNIFICANT CHANGE UP (ref 7–13)
POTASSIUM SERPL-MCNC: 3 MMOL/L — LOW (ref 3.5–5.3)
POTASSIUM SERPL-SCNC: 3 MMOL/L — LOW (ref 3.5–5.3)
RBC # BLD: 2.68 M/UL — LOW (ref 3.8–5.2)
RBC # FLD: 17.4 % — HIGH (ref 10.3–14.5)
SODIUM SERPL-SCNC: 137 MMOL/L — SIGNIFICANT CHANGE UP (ref 135–145)
SPECIMEN SOURCE: SIGNIFICANT CHANGE UP
WBC # BLD: 10.18 K/UL — SIGNIFICANT CHANGE UP (ref 3.8–10.5)
WBC # FLD AUTO: 10.18 K/UL — SIGNIFICANT CHANGE UP (ref 3.8–10.5)

## 2019-10-27 PROCEDURE — 71045 X-RAY EXAM CHEST 1 VIEW: CPT | Mod: 26

## 2019-10-27 PROCEDURE — 99233 SBSQ HOSP IP/OBS HIGH 50: CPT | Mod: GC

## 2019-10-27 RX ORDER — POTASSIUM CHLORIDE 20 MEQ
10 PACKET (EA) ORAL
Refills: 0 | Status: COMPLETED | OUTPATIENT
Start: 2019-10-27 | End: 2019-10-27

## 2019-10-27 RX ORDER — SODIUM CHLORIDE 0.65 %
2 AEROSOL, SPRAY (ML) NASAL
Refills: 0 | Status: DISCONTINUED | OUTPATIENT
Start: 2019-10-27 | End: 2019-10-31

## 2019-10-27 RX ADMIN — Medication 100 MILLIEQUIVALENT(S): at 11:59

## 2019-10-27 RX ADMIN — PANTOPRAZOLE SODIUM 40 MILLIGRAM(S): 20 TABLET, DELAYED RELEASE ORAL at 12:00

## 2019-10-27 RX ADMIN — BUMETANIDE 7.5 MG/HR: 0.25 INJECTION INTRAMUSCULAR; INTRAVENOUS at 18:11

## 2019-10-27 RX ADMIN — SEVELAMER CARBONATE 800 MILLIGRAM(S): 2400 POWDER, FOR SUSPENSION ORAL at 17:25

## 2019-10-27 RX ADMIN — Medication 3 MILLILITER(S): at 22:14

## 2019-10-27 RX ADMIN — NYSTATIN CREAM 1 APPLICATION(S): 100000 CREAM TOPICAL at 17:25

## 2019-10-27 RX ADMIN — PIPERACILLIN AND TAZOBACTAM 25 GRAM(S): 4; .5 INJECTION, POWDER, LYOPHILIZED, FOR SOLUTION INTRAVENOUS at 06:06

## 2019-10-27 RX ADMIN — Medication 3 MILLILITER(S): at 03:00

## 2019-10-27 RX ADMIN — NYSTATIN CREAM 1 APPLICATION(S): 100000 CREAM TOPICAL at 06:06

## 2019-10-27 RX ADMIN — Medication 100 MILLIEQUIVALENT(S): at 14:09

## 2019-10-27 RX ADMIN — CHLORHEXIDINE GLUCONATE 1 APPLICATION(S): 213 SOLUTION TOPICAL at 11:59

## 2019-10-27 RX ADMIN — SODIUM CHLORIDE 4 MILLILITER(S): 9 INJECTION INTRAMUSCULAR; INTRAVENOUS; SUBCUTANEOUS at 22:15

## 2019-10-27 RX ADMIN — SODIUM CHLORIDE 4 MILLILITER(S): 9 INJECTION INTRAMUSCULAR; INTRAVENOUS; SUBCUTANEOUS at 07:30

## 2019-10-27 RX ADMIN — Medication 3 MILLILITER(S): at 09:44

## 2019-10-27 RX ADMIN — PIPERACILLIN AND TAZOBACTAM 25 GRAM(S): 4; .5 INJECTION, POWDER, LYOPHILIZED, FOR SOLUTION INTRAVENOUS at 17:24

## 2019-10-27 RX ADMIN — Medication 200 GRAM(S): at 11:59

## 2019-10-27 RX ADMIN — BUMETANIDE 5 MG/HR: 0.25 INJECTION INTRAMUSCULAR; INTRAVENOUS at 12:46

## 2019-10-27 RX ADMIN — BUMETANIDE 7.5 MG/HR: 0.25 INJECTION INTRAMUSCULAR; INTRAVENOUS at 19:33

## 2019-10-27 RX ADMIN — BUMETANIDE 2.5 MG/HR: 0.25 INJECTION INTRAMUSCULAR; INTRAVENOUS at 07:10

## 2019-10-27 RX ADMIN — Medication 3 MILLILITER(S): at 15:57

## 2019-10-27 RX ADMIN — SODIUM CHLORIDE 4 MILLILITER(S): 9 INJECTION INTRAMUSCULAR; INTRAVENOUS; SUBCUTANEOUS at 15:57

## 2019-10-27 NOTE — PROGRESS NOTE ADULT - SUBJECTIVE AND OBJECTIVE BOX
[facility]   MD Edel Power D.O. Fatima Sheikh, D.O. Ruoro Wong, PA    From 7 AM - 5 PM:  OFFICE: [phone]  Dr. Morales cell: [phone]  Dr. Brown cell: [phone]  Dr. Jose cell: [phone]  JASSON Calvillo cell: [phone]    From 5 PM - 7 AM: Answering Service: [phone]        RENAL FOLLOW UP NOTE  --------------------------------------------------------------------------------  HPI: Pt seen and examined. On BiPAP this AM. Daughter at bedside. Pt denies any pain, nausea, vomiting. Has SOB on BiPAP.     Is on Bumex 0.5 mg/hr and made 300 mL since starting it yesterday evening. Had HD #2 yesterday w/ 2.5L UF removed. Tolerated well, BP stable. Denies any h/a, nausea, vomiting, dizziness during or after HD.      PAST HISTORY  --------------------------------------------------------------------------------  No significant changes to PMH, PSH, FHx, SHx, unless otherwise noted    ALLERGIES & MEDICATIONS  --------------------------------------------------------------------------------  Allergies    calcium acetate (Vomiting; Nausea; Chills)  wool-rash (Other)    Intolerances      Standing Inpatient Medications  albuterol/ipratropium for Nebulization 3 milliLiter(s) Nebulizer every 6 hours  aspirin enteric coated 81 milliGRAM(s) Oral daily  buMETAnide Infusion 1.5 mG/Hr IV Continuous <Continuous>  calcium gluconate IVPB 1 Gram(s) IV Intermittent daily  chlorhexidine 4% Liquid 1 Application(s) Topical daily  dextrose 5%. 1000 milliLiter(s) IV Continuous <Continuous>  dextrose 50% Injectable 12.5 Gram(s) IV Push once  dextrose 50% Injectable 25 Gram(s) IV Push once  dextrose 50% Injectable 25 Gram(s) IV Push once  epoetin terence Injectable 22753 Unit(s) SubCutaneous <User Schedule>  ergocalciferol 56486 Unit(s) Oral <User Schedule>  insulin lispro (HumaLOG) corrective regimen sliding scale   SubCutaneous three times a day before meals  nystatin Powder 1 Application(s) Topical two times a day  pantoprazole  Injectable 40 milliGRAM(s) IV Push daily  piperacillin/tazobactam IVPB.. 3.375 Gram(s) IV Intermittent every 12 hours  sevelamer carbonate 800 milliGRAM(s) Oral three times a day with meals  sodium chloride 3%  Inhalation 4 milliLiter(s) Inhalation three times a day    PRN Inpatient Medications  dextrose 40% Gel 15 Gram(s) Oral once PRN  glucagon  Injectable 1 milliGRAM(s) IntraMuscular once PRN  sodium chloride 0.65% Nasal 2 Spray(s) Both Nostrils four times a day PRN  tetracaine/benzocaine/butamben Spray 1 Spray(s) Topical daily PRN      REVIEW OF SYSTEMS  --------------------------------------------------------------------------------  General: no fever  CVS: no chest pain  RESP: no sob, no cough  ABD: no abdominal pain  : no dysuria,  MSK: no edema     VITALS/PHYSICAL EXAM  --------------------------------------------------------------------------------  T(C): 36.9 (10-27-19 @ 14:21), Max: 37.8 (10-26-19 @ 19:30)  HR: 100 (10-27-19 @ 15:58) (78 - 100)  BP: 149/86 (10-27-19 @ 14:21) (130/65 - 149/86)  RR: 26 (10-27-19 @ 14:21) (17 - 26)  SpO2: 90% (10-27-19 @ 15:58) (90% - 97%)  Wt(kg): --        10-26-19 @ 07:01  -  10-27-19 @ 07:00  --------------------------------------------------------  IN: 830 mL / OUT: 3300 mL / NET: -2470 mL    10-27-19 @ 07:01  -  10-27-19 @ 17:29  --------------------------------------------------------  IN: 535 mL / OUT: 300 mL / NET: 235 mL      Physical Exam:  	Gen: NAD, on bipap  	HEENT: MMM  	Pulm: B/L rales, diminished breath sounds on left.  	CV: S1S2, + murmur  	Abd: Soft, +BS  	Ext: No LE edema B/L              Neuro: Awake   	Skin: Warm and Dry   	Vascular access: GOKUL laura in place, dressing c/d/i.    LABS/STUDIES  --------------------------------------------------------------------------------              7.5    10.18 >-----------<  246      [10-27-19 @ 10:39]              24.9     137  |  93  |  49  ----------------------------<  307      [10-27-19 @ 10:39]  3.0   |  22  |  2.98        Ca     7.9     [10-27-19 @ 10:39]      Mg     1.7     [10-27-19 @ 10:39]      Phos  4.3     [10-27-19 @ 10:39]      PT/INR: PT 14.4 , INR 1.25       [10-26-19 @ 01:20]  PTT: 26.8       [10-26-19 @ 01:20]      Creatinine Trend:  SCr 2.98 [10-27 @ 10:39]  SCr 4.71 [10-26 @ 04:10]  SCr 4.76 [10-25 @ 08:05]  SCr 4.59 [10-24 @ 02:30]  SCr 4.74 [10-23 @ 12:10]    Urinalysis - [10-19-19 @ 03:30]      Color YELLOW / Appearance CLEAR / SG 1.017 / pH 5.5      Gluc NEGATIVE / Ketone NEGATIVE  / Bili NEGATIVE / Urobili NORMAL       Blood NEGATIVE / Protein 10 / Leuk Est NEGATIVE / Nitrite NEGATIVE      RBC  / WBC  / Hyaline  / Gran  / Sq Epi  / Non Sq Epi  / Bacteria       Iron 27, TIBC 125, %sat --      [10-20-19 @ 02:40]  Ferritin 283.7      [10-20-19 @ 02:40]  .9 (Ca --)      [10-20-19 @ 02:40]   --  PTH -- (Ca 8.7)      [10-31-18 @ 09:24]   269  Vitamin D (25OH) 10.5      [10-20-19 @ 02:40]  HbA1c 5.2      [10-20-19 @ 02:40]  TSH 1.11      [10-18-19 @ 23:20]    HBsAg NEGATIVE      [10-24-19 @ 02:30]  HCV 0.16, Nonreactive Hepatitis C AB  S/CO Ratio                        Interpretation  < 1.00                                   Non-Reactive  1.00 - 4.99                         Weakly-Reactive  >= 5.00                                Reactive  Non-Reactive: Aperson with a non-reactive HCV antibody  result is considered uninfected.  No further action is  needed unless recent infection is suspected.  In these  cases, consider repeat testing later to detect  seroconversion..  Weakly-Reactive: HCV antibody test is abnormal, HCV RNA  Qualitative test will follow.  Reactive: HCV antibody test is abnormal, HCV RNA  Qualitative test will follow.  Note: HCV antibody testing is performed on the [employer]   system.      [10-19-19 @ 12:30]

## 2019-10-27 NOTE — PROGRESS NOTE ADULT - ASSESSMENT
EKG SR     Echo : < from: Transthoracic Echocardiogram (10.19.19 @ 10:52) >  1. Mitral annular calcification. Tethered mitral valve  leaflets with normal opening. Moderate mitral  regurgitation.  2. Calcified trileaflet aortic valve with normal opening.  Minimal aortic regurgitation.  3. Moderately dilated left atrium.  LA volume index = 42  cc/m2.  4. Severe left ventricular enlargement.  5. Endocardium not well visualized; grossly low normal left  ventricular systolic function.  6. Moderate diastolic dysfunction (Stage II).  7. The right ventricle is not well visualized; grossly  normal right ventricular systolic function.  8. Normal tricuspid valve.  Moderate tricuspid  regurgitation.  9. Estimated pulmonary artery systolic pressure equals 53  mm Hg, assuming right atrial pressure equals 15  mm Hg,  consistent with moderate pulmonary hypertension.  10. Thickened pericardium inferior to the right ventricle.  Small pericardial effusion adjacent to the right atrium.  *** Compared with echocardiogram of 10/10/2016, results are  similar on today's study.    < end of copied text >      Assessment and Plan     1) Pulm edema: s/p HD now on Bumex drip  , Bipap     2) PNA on Vanc and Zosyn     2) CAD s/p PCX PCI: restart ASA 81, monitor H/H     3) Anemia : no obvious GIB, stool occult + , monitor H/H  Plavix on hold , restarted asa     4) CKD V : renal on board    5) Lt. Lung Collapse: t/t per pulm

## 2019-10-27 NOTE — PROGRESS NOTE ADULT - SUBJECTIVE AND OBJECTIVE BOX
Vascular Surgery Consult - Daily Progress Note    HPI:  74F w/ HTN, HLD, DM, COPD, CHF (EF45%), ESRD not on dialysis via L AVF, p/w AMS and hypotension, and for the past week has been having cough s/ sputum and diarrhea. Pt had been admitted to MICU. Tonight, pt was not able to get dialyzed through L AVF. Vascular Surgery consulted for shiley placement. Shiley placed and pt tolerated HD without issue.     Interval / 24 Hour Events:  Pt underwent 2nd session of HD, no issues. Remains volume overloaded, on bumex gtt. Pt with nonfunctioning LUE AVF pending ultrasound for evaluation.    ** VITAL SIGNS / I&O's **    T(C): 36.6 (10-27-19 @ 21:11), Max: 37 (10-27-19 @ 06:04)  T(F): 97.8 (10-27-19 @ 21:11), Max: 98.6 (10-27-19 @ 06:04)  HR: 94 (10-27-19 @ 21:11) (78 - 106)  BP: 140/74 (10-27-19 @ 21:11) (130/65 - 151/73)  RR: 20 (10-27-19 @ 21:11) (18 - 26)  SpO2: 92% (10-27-19 @ 21:11) (88% - 97%)      26 Oct 2019 07:01  -  27 Oct 2019 07:00  --------------------------------------------------------  IN:    bumetanide Infusion: 30 mL    IV PiggyBack: 300 mL    Other: 500 mL  Total IN: 830 mL    OUT:    Other: 3000 mL    Voided: 300 mL  Total OUT: 3300 mL    Total NET: -2470 mL      27 Oct 2019 07:01  -  27 Oct 2019 21:22  --------------------------------------------------------  IN:    IV PiggyBack: 212.5 mL    IV PiggyBack: 322.5 mL  Total IN: 535 mL    OUT:    Voided: 300 mL  Total OUT: 300 mL    Total NET: 235 mL          ** PHYSICAL EXAM **    General: No acute distress   Neurologic: GCS 15, AAOx3  Respiratory: Normal respiratory effort.   CVS: RRR  Abdomen: Abdomen soft, NT/ND  Ext: CALDERA   Vascular: LUE AVF with soft palpable thrill, pt with 2+ LE edema  Skin: Warm, dry, intact, no lesions or erythema     ** LABS **                 7.5    10.18  )----------(  246       ( 27 Oct 2019 10:39 )               24.9      137    |  93     |  49     ----------------------------<  307        ( 27 Oct 2019 10:39 )  3.0     |  22     |  2.98     Ca    7.9        ( 27 Oct 2019 10:39 )  Phos  4.3       ( 27 Oct 2019 10:39 )  Mg     1.7       ( 27 Oct 2019 10:39 )          CAPILLARY BLOOD GLUCOSE          **RADS**

## 2019-10-27 NOTE — PROGRESS NOTE ADULT - PROBLEM SELECTOR PLAN 2
- LUE fistula not working and dialysis needed sec to fluid overload   - Vascular called and GOKUL laura placed   - Pt received urgent HD overnight   - lasix bid 80mg   - epogen per nephrology   - To receive HD again today - LUE fistula not working and dialysis needed sec to fluid overload   - Vascular called and GOKUL laura placed   - Pt received urgent HD overnight and 10/26   - bumex gtt started yesterday and increased today to 1mg   - per nephrology consider UF today if uo does not increase   - lasix bid 80mg   - epogen per nephrology   - To receive HD again today

## 2019-10-27 NOTE — PROGRESS NOTE ADULT - ASSESSMENT
ASSESSMENT:  74F with acute metabolic encephalopathy, volume overload and uremia, admitted to MICU for septic shock management. Vascular consulted for valentín placed and functioning.       PLAN:  - Please obtain ultrasound of LUE AVF  - Vascular will follow      Jose Gibbons, PGY 2  [phone]

## 2019-10-27 NOTE — PROGRESS NOTE ADULT - ASSESSMENT
74 Y.O. pm h of HTN, HLD, COPD on 2-3 L at home T2DM, ESRD with fistula not yet being used, HFPEF with moderate MR, pHTN RVSP 53 who presents with hypoglycemia,, hypotension, respiratory failure, severe acidosis on bicarb gtt, Pna with likely pseudomonas, transferred out of the MICU with recurrent respiratory failure likely 2/2 to pulmonary edema and left opacity of the left hemithorax 2/2 to mucus plugging.

## 2019-10-27 NOTE — PROGRESS NOTE ADULT - NSHPATTENDINGPLANDISCUSS_GEN_ALL_CORE
Patient's daughter at bedside and ACP
primary
MICU Team
MICU Team
Family, team, HD unit
son
Team
rcu
ACP/family at bedside
ACP
rcu

## 2019-10-27 NOTE — PROGRESS NOTE ADULT - PROBLEM SELECTOR PLAN 1
-L hemithoracic opacity noted  - aggressive chest PT, saline nebs, chest vest, metanebs   - high flow day/bipap hs -L hemithoracic opacity noted  - aggressive chest PT, saline nebs, chest vest, metanebs   - high flow day/bipap hs and prn

## 2019-10-27 NOTE — PROGRESS NOTE ADULT - SUBJECTIVE AND OBJECTIVE BOX
CHIEF COMPLAINT: Patient is a 74y old  Female who presents with a chief complaint of AMS and Hypotension (26 Oct 2019 16:59)      Interval Events: HD done last pm     REVIEW OF SYSTEMS:  Constitutional:   Eyes:  ENT:  CV:  Resp:  GI:  :  MSK:  Integumentary:  Neurological:  Psychiatric:  Endocrine:  Hematologic/Lymphatic:  Allergic/Immunologic:  [ ] All other systems negative  [ ] Unable to assess ROS because ________    OBJECTIVE:  ICU Vital Signs Last 24 Hrs  T(C): 37 (27 Oct 2019 06:04), Max: 37.8 (26 Oct 2019 19:30)  T(F): 98.6 (27 Oct 2019 06:04), Max: 100.1 (26 Oct 2019 19:30)  HR: 90 (27 Oct 2019 07:30) (77 - 92)  BP: 130/65 (27 Oct 2019 06:04) (130/65 - 148/60)  BP(mean): --  ABP: --  ABP(mean): --  RR: 18 (27 Oct 2019 06:04) (17 - 21)  SpO2: 97% (27 Oct 2019 07:30) (92% - 97%)        10-26 @ 07:01  -  10-27 @ 07:00  --------------------------------------------------------  IN: 830 mL / OUT: 3300 mL / NET: -2470 mL      CAPILLARY BLOOD GLUCOSE      POCT Blood Glucose.: 93 mg/dL (26 Oct 2019 22:05)      PHYSICAL EXAM:  General:   HEENT:   Lymph Nodes:  Neck:   Respiratory:   Cardiovascular:   Abdomen:   Extremities:   Skin:   Neurological:  Psychiatry:    HOSPITAL MEDICATIONS:  MEDICATIONS  (STANDING):  albuterol/ipratropium for Nebulization 3 milliLiter(s) Nebulizer every 6 hours  aspirin enteric coated 81 milliGRAM(s) Oral daily  buMETAnide Infusion 0.5 mG/Hr (2.5 mL/Hr) IV Continuous <Continuous>  calcium gluconate IVPB 1 Gram(s) IV Intermittent daily  chlorhexidine 4% Liquid 1 Application(s) Topical daily  dextrose 5%. 1000 milliLiter(s) (50 mL/Hr) IV Continuous <Continuous>  dextrose 50% Injectable 12.5 Gram(s) IV Push once  dextrose 50% Injectable 25 Gram(s) IV Push once  dextrose 50% Injectable 25 Gram(s) IV Push once  epoetin terence Injectable 17938 Unit(s) SubCutaneous <User Schedule>  ergocalciferol 83480 Unit(s) Oral <User Schedule>  insulin lispro (HumaLOG) corrective regimen sliding scale   SubCutaneous three times a day before meals  nystatin Powder 1 Application(s) Topical two times a day  pantoprazole  Injectable 40 milliGRAM(s) IV Push daily  piperacillin/tazobactam IVPB.. 3.375 Gram(s) IV Intermittent every 12 hours  sevelamer carbonate 800 milliGRAM(s) Oral three times a day with meals  sodium chloride 3%  Inhalation 4 milliLiter(s) Inhalation three times a day    MEDICATIONS  (PRN):  dextrose 40% Gel 15 Gram(s) Oral once PRN Blood Glucose LESS THAN 70 milliGRAM(s)/deciliter  glucagon  Injectable 1 milliGRAM(s) IntraMuscular once PRN Glucose LESS THAN 70 milligrams/deciliter  tetracaine/benzocaine/butamben Spray 1 Spray(s) Topical daily PRN AVF      LABS:                        7.5    10.80 )-----------( 219      ( 26 Oct 2019 04:10 )             24.7     10-26    148<H>  |  101  |  89<H>  ----------------------------<  100<H>  3.3<L>   |  22  |  4.71<H>    Ca    8.1<L>      26 Oct 2019 04:10  Phos  7.8     10-26  Mg     1.7     10-26      PT/INR - ( 26 Oct 2019 01:20 )   PT: 14.4 SEC;   INR: 1.25          PTT - ( 26 Oct 2019 01:20 )  PTT:26.8 SEC    Arterial Blood Gas:  10-25 @ 09:58  7.32/42/75/21/93.7/-4.1  ABG lactate: 1.0        MICROBIOLOGY:     RADIOLOGY:  [ ] Reviewed and interpreted by me    PULMONARY FUNCTION TESTS:    EKG: CHIEF COMPLAINT: Patient is a 74y old  Female who presents with a chief complaint of AMS and Hypotension (26 Oct 2019 16:59)      Interval Events: HD done last pm     REVIEW OF SYSTEMS:  Constitutional: No chest pain   CV: denies   Resp: + ARNOLD   GI: Denies   [x ] All other systems negative  [ ] Unable to assess ROS because ________    OBJECTIVE:  ICU Vital Signs Last 24 Hrs  T(C): 37 (27 Oct 2019 06:04), Max: 37.8 (26 Oct 2019 19:30)  T(F): 98.6 (27 Oct 2019 06:04), Max: 100.1 (26 Oct 2019 19:30)  HR: 90 (27 Oct 2019 07:30) (77 - 92)  BP: 130/65 (27 Oct 2019 06:04) (130/65 - 148/60)  BP(mean): --  ABP: --  ABP(mean): --  RR: 18 (27 Oct 2019 06:04) (17 - 21)  SpO2: 97% (27 Oct 2019 07:30) (92% - 97%)        10-26 @ 07:01  -  10-27 @ 07:00  --------------------------------------------------------  IN: 830 mL / OUT: 3300 mL / NET: -2470 mL      CAPILLARY BLOOD GLUCOSE      POCT Blood Glucose.: 93 mg/dL (26 Oct 2019 22:05)    HOSPITAL MEDICATIONS:  MEDICATIONS  (STANDING):  albuterol/ipratropium for Nebulization 3 milliLiter(s) Nebulizer every 6 hours  aspirin enteric coated 81 milliGRAM(s) Oral daily  buMETAnide Infusion 0.5 mG/Hr (2.5 mL/Hr) IV Continuous <Continuous>  calcium gluconate IVPB 1 Gram(s) IV Intermittent daily  chlorhexidine 4% Liquid 1 Application(s) Topical daily  dextrose 5%. 1000 milliLiter(s) (50 mL/Hr) IV Continuous <Continuous>  dextrose 50% Injectable 12.5 Gram(s) IV Push once  dextrose 50% Injectable 25 Gram(s) IV Push once  dextrose 50% Injectable 25 Gram(s) IV Push once  epoetin terence Injectable 92964 Unit(s) SubCutaneous <User Schedule>  ergocalciferol 38273 Unit(s) Oral <User Schedule>  insulin lispro (HumaLOG) corrective regimen sliding scale   SubCutaneous three times a day before meals  nystatin Powder 1 Application(s) Topical two times a day  pantoprazole  Injectable 40 milliGRAM(s) IV Push daily  piperacillin/tazobactam IVPB.. 3.375 Gram(s) IV Intermittent every 12 hours  sevelamer carbonate 800 milliGRAM(s) Oral three times a day with meals  sodium chloride 3%  Inhalation 4 milliLiter(s) Inhalation three times a day    MEDICATIONS  (PRN):  dextrose 40% Gel 15 Gram(s) Oral once PRN Blood Glucose LESS THAN 70 milliGRAM(s)/deciliter  glucagon  Injectable 1 milliGRAM(s) IntraMuscular once PRN Glucose LESS THAN 70 milligrams/deciliter  tetracaine/benzocaine/butamben Spray 1 Spray(s) Topical daily PRN AVF      LABS:                        7.5    10.80 )-----------( 219      ( 26 Oct 2019 04:10 )             24.7     10-26    148<H>  |  101  |  89<H>  ----------------------------<  100<H>  3.3<L>   |  22  |  4.71<H>    Ca    8.1<L>      26 Oct 2019 04:10  Phos  7.8     10-26  Mg     1.7     10-26      PT/INR - ( 26 Oct 2019 01:20 )   PT: 14.4 SEC;   INR: 1.25          PTT - ( 26 Oct 2019 01:20 )  PTT:26.8 SEC    Arterial Blood Gas:  10-25 @ 09:58  7.32/42/75/21/93.7/-4.1  ABG lactate: 1.0        MICROBIOLOGY:     RADIOLOGY:  [ ] Reviewed and interpreted by me    PULMONARY FUNCTION TESTS:    EKG:

## 2019-10-27 NOTE — PROGRESS NOTE ADULT - SUBJECTIVE AND OBJECTIVE BOX
Gregor Barrow MD  Interventional Cardiology / Endovascular Specialist  [place] Office : [phone] [place] [address] [place] [address]  Tel:   [place] Office : [address] [facility] [place] [address]  Tel: [phone]  Cell : [phone]    HISTORY OF PRESENTING ILLNESS:      74F h/o HTN, HLD, IDDM2, COPD (former smoker, 2L/min PRN at home), CHF (EF 12/2018 45-50%), ESRD not on dialysis, p/w AMS and hypotension, intubated and sedated found to have a PNA , Extubated and transferred to floors, S/P RRT for resp distress and Hypoxia , CXR with Left Lung Collapse,  protecting airway on Bipap  	  MEDICATIONS:  aspirin enteric coated 81 milliGRAM(s) Oral daily  buMETAnide Infusion 1 mG/Hr IV Continuous <Continuous>  metolazone 5 milliGRAM(s) Oral once    piperacillin/tazobactam IVPB.. 3.375 Gram(s) IV Intermittent every 12 hours    albuterol/ipratropium for Nebulization 3 milliLiter(s) Nebulizer every 6 hours  sodium chloride 3%  Inhalation 4 milliLiter(s) Inhalation three times a day      pantoprazole  Injectable 40 milliGRAM(s) IV Push daily    dextrose 40% Gel 15 Gram(s) Oral once PRN  dextrose 50% Injectable 12.5 Gram(s) IV Push once  dextrose 50% Injectable 25 Gram(s) IV Push once  dextrose 50% Injectable 25 Gram(s) IV Push once  glucagon  Injectable 1 milliGRAM(s) IntraMuscular once PRN  insulin lispro (HumaLOG) corrective regimen sliding scale   SubCutaneous three times a day before meals    calcium gluconate IVPB 1 Gram(s) IV Intermittent daily  chlorhexidine 4% Liquid 1 Application(s) Topical daily  dextrose 5%. 1000 milliLiter(s) IV Continuous <Continuous>  epoetin terence Injectable 50127 Unit(s) SubCutaneous <User Schedule>  ergocalciferol 94609 Unit(s) Oral <User Schedule>  nystatin Powder 1 Application(s) Topical two times a day  potassium chloride  10 mEq/100 mL IVPB 10 milliEquivalent(s) IV Intermittent every 1 hour  tetracaine/benzocaine/butamben Spray 1 Spray(s) Topical daily PRN      PAST MEDICAL/SURGICAL HISTORY  PAST MEDICAL & SURGICAL HISTORY:  ESRD (end stage renal disease): L arm AVF placed 12/2018  Hemorrhoids  GERD (gastroesophageal reflux disease)  Morbid obesity  Cataract  Diaphragmatic hernia  Cerebral infarction: 2011  CKD (chronic kidney disease)  Anemia  Diabetes mellitus: Insulin dependent. Type 2  CHF (congestive heart failure)  Hyperlipidemia  Hypertension  COPD (chronic obstructive pulmonary disease)  Chronic obstructive pulmonary disease, unspecified COPD type  Adult Idiopathic Generalized Osteoporosis  CAD (Coronary Artery Disease): 1 stent placed 11/7/18  DM (Diabetes Mellitus), Secondary  Hypertension  H/O coronary angi[facility]: [facility] 1 stent placed 11/7/2018      SOCIAL HISTORY: Substance Use (street drugs): ( x ) never used  (  ) other:    FAMILY HISTORY:  Family history of breast cancer (Grandparent)      REVIEW OF SYSTEMS:  CONSTITUTIONAL: No fever, weight loss, or fatigue  EYES: No eye pain, visual disturbances, or discharge  ENMT:  No difficulty hearing, tinnitus, vertigo; No sinus or throat pain  BREASTS: No pain, masses, or nipple discharge  GASTROINTESTINAL: No abdominal or epigastric pain. No nausea, vomiting, or hematemesis; No diarrhea or constipation. No melena or hematochezia.  GENITOURINARY: No dysuria, frequency, hematuria, or incontinence  NEUROLOGICAL: No headaches, memory loss, loss of strength, numbness, or tremors  ENDOCRINE: No heat or cold intolerance; No hair loss  MUSCULOSKELETAL: No joint pain or swelling; No muscle, back, or extremity pain  PSYCHIATRIC: No depression, anxiety, mood swings, or difficulty sleeping  HEME/LYMPH: No easy bruising, or bleeding gums  All others negative    PHYSICAL EXAM:  T(C): 37 (10-27-19 @ 06:04), Max: 37.8 (10-26-19 @ 19:30)  HR: 96 (10-27-19 @ 10:53) (78 - 96)  BP: 130/65 (10-27-19 @ 06:04) (130/65 - 148/60)  RR: 18 (10-27-19 @ 06:04) (17 - 21)  SpO2: 94% (10-27-19 @ 10:53) (91% - 97%)  Wt(kg): --  I&O's Summary    26 Oct 2019 07:01  -  27 Oct 2019 07:00  --------------------------------------------------------  IN: 830 mL / OUT: 3300 mL / NET: -2470 mL    27 Oct 2019 07:01  -  27 Oct 2019 12:36  --------------------------------------------------------  IN: 212.5 mL / OUT: 0 mL / NET: 212.5 mL      GENERAL: Obese  on BiPAP  EYES: EOMI, PERRLA, conjunctiva and sclera clear  ENMT: on Biapa   Cardiovascular: Normal S1 S2, No JVD, No murmurs, No edema  Respiratory: b/l basal creps 	  Gastrointestinal:  Soft, Non-tender, + BS	  Extremities: No clubbing, cyanosis or edema  LYMPH: No lymphadenopathy noted                                7.5    10.18 )-----------( 246      ( 27 Oct 2019 10:39 )             24.9     10-27    137  |  93<L>  |  49<H>  ----------------------------<  307<H>  3.0<L>   |  22  |  2.98<H>    Ca    7.9<L>      27 Oct 2019 10:39  Phos  4.3     10-27  Mg     1.7     10-27      proBNP:   Lipid Profile:   HgA1c:   TSH:     Consultant(s) Notes Reviewed:  [x ] YES  [ ] NO    Care Discussed with Consultants/Other Providers [ x] YES  [ ] NO    Imaging Personally Reviewed independently:  [x] YES  [ ] NO    All labs, radiologic studies, vitals, orders and medications list reviewed. Patient is seen and examined at bedside. Case discussed with medical team.

## 2019-10-27 NOTE — PROGRESS NOTE ADULT - ASSESSMENT
74F h/o HTN, HLD, IDDM2, COPD (former smoker, 2L/min PRN at home), CHF (EF 12/2018 45-50%), ESRD not on dialysis admitted to [facility] for hypotensive, ams, hypoglycemia and hypothermia.     CKD stage 5 not yet on hd  s/p left avf 12/18, + thrill + bruit (never been used)  renal function has been stable  consent for cvvh/ IHD in chart from daughter    - HD RN attempted to use LAVF unsuccessfully yesterday  - GOKUL laura placed by vascular and HD #1 initiated 10/25/19  - had HD #2 10/26/19 2/ 2.5L UF removed    - remains volume overloaded today    - recommend increasing bumex drip to 1.5 mg/hr and adding metolazone 5 mg BID    - will reassess HD/PUF needs later in the day    - monitor I/O's closely    Hypocalcemia  pth elevated.  low vit d 25 level  start vit d 09280 weekly x8  start calcitriol 0.25 daily when phos is better   low albumin   continue renagel when eating  monitor    Hyperphosphatemia  npo right now  allergy to phoslo  start renagel 800mg tid with meal  monitor    Anemia  occult +  monitor hb  iron sat >20  epo [id number] tiw   transfuse as needed  consider Gi eval    Acidosis  likely sec to renal failure +diarrhea  improved, will continue to improve w/ HD  monitor    Hypotensive  ? sepsis   on  iv antibiotic  BP is normal now  - goal BP <130/80  monitor    Hypokalemia  supplemented  monitor 74F h/o HTN, HLD, IDDM2, COPD (former smoker, 2L/min PRN at home), CHF (EF 12/2018 45-50%), ESRD not on dialysis admitted to [facility] for hypotensive, ams, hypoglycemia and hypothermia.     CKD stage 5 not yet on hd  s/p left avf 12/18, + thrill + bruit (never been used)  renal function has been stable  consent for cvvh/ IHD in chart from daughter    - HD RN attempted to use LAVF unsuccessfully yesterday  - GOKUL laura placed by vascular and HD #1 initiated 10/25/19  - had HD #2 10/26/19 2/ 2.5L UF removed    - remains volume overloaded today    - recommend increasing bumex drip to 1.5 mg/hr and adding metolazone 5 mg BID    - will reassess HD/PUF needs later in the day    - monitor I/O's closely    Hypocalcemia  pth elevated.  low vit d 25 level  start vit d 91282 weekly x8  start calcitriol 0.25 daily when phos is better   low albumin   continue renagel when eating  monitor    Hyperphosphatemia  npo right now  allergy to phoslo  start renagel 800mg tid with meal  monitor    Anemia  occult +  monitor hb  iron sat >20  epo [id number] tiw   transfuse as needed  consider Gi eval    Acidosis  likely sec to renal failure +diarrhea  improved, will continue to improve w/ HD  monitor    Hypotensive  ? sepsis   on  iv antibiotic  BP is normal now  - goal BP <130/80  monitor    Hypokalemia  supplemented  monitor    ADDENDUM: Discussed w/ primary team, made only 300 mL despite high dose diuretics today. Still BiPAP dependent and has increased work of breathing this afternoon. Will have PUF today for 2.5 hrs w/ goal UF 2-3 L (HD RN called and aware of case). Continue Bumex 1.5 mg/hr and metolazone 5 mg BID. Monitor I/O's closely/accurately. Weigh daily.

## 2019-10-27 NOTE — CHART NOTE - NSCHARTNOTEFT_GEN_A_CORE
Pt seen and discussed with Dr. Brown regarding poor urine output and fluid overload.    PT with bumex gtt increased to 1mg/hr with zaroxlyn 5mg given.  Pt with 300cc urine over next 5 hours   Still requiring High /flow/bipap during day + ARNOLD   Per Dr. Brown will increase bumec to 1.5 mg /hr , zaroxlyn 5 bid   Pt also will receive UF tonight for additional fluid removal   Nursing aware .   Continue with Chest PT/coughing deep breathing /suctionging during day   Per RT pt unable to tolerate acapella/metaneb and chest vest   Aggressive chest PT   FU CXR in am

## 2019-10-27 NOTE — PROGRESS NOTE ADULT - ATTENDING COMMENTS
pt seen  and examined  d/w the team    agree with above  Left lung atelectasis due to mucus plugging and worsening hypoxemia this morning.  alert awake on   on bilevel-   Need aggressive chest PT/duonebs/suction.  Would like to avoid prolonged use of bilevel as it can worsen the mucus plugging. Switch to HFNC while awake.  Continues to require aggressive diuresis as patient is also fluid overloaded.---F/U BY NEPHROLOGY  was  transferred to R[facility] for closer monitoring.    cxr in am. pt seen  and examined  d/w the team    agree with above  Left lung atelectasis due to mucus plugging and worsening hypoxemia this morning.  alert awake on   on bilevel-   Need aggressive chest PT/duonebs/suction.  Would like to avoid prolonged use of bilevel as it can worsen the mucus plugging. Switch to HFNC while awake.  BUMEX DRIP ZAROXYLIN ---.--HD  AS PER RENAL---HD DONE YESTERDAY -F/U BY NEPHROLOGY  was  transferred to RCU for closer monitoring.    cxr in am.

## 2019-10-28 DIAGNOSIS — R53.2 FUNCTIONAL QUADRIPLEGIA: ICD-10-CM

## 2019-10-28 DIAGNOSIS — R06.02 SHORTNESS OF BREATH: ICD-10-CM

## 2019-10-28 DIAGNOSIS — J44.9 CHRONIC OBSTRUCTIVE PULMONARY DISEASE, UNSPECIFIED: ICD-10-CM

## 2019-10-28 DIAGNOSIS — Z51.5 ENCOUNTER FOR PALLIATIVE CARE: ICD-10-CM

## 2019-10-28 LAB
ANION GAP SERPL CALC-SCNC: 22 MMO/L — HIGH (ref 7–14)
BACTERIA BLD CULT: SIGNIFICANT CHANGE UP
BACTERIA NPH CULT: SIGNIFICANT CHANGE UP
BASOPHILS # BLD AUTO: 0.03 K/UL — SIGNIFICANT CHANGE UP (ref 0–0.2)
BASOPHILS NFR BLD AUTO: 0.3 % — SIGNIFICANT CHANGE UP (ref 0–2)
BUN SERPL-MCNC: 58 MG/DL — HIGH (ref 7–23)
CALCIUM SERPL-MCNC: 8.5 MG/DL — SIGNIFICANT CHANGE UP (ref 8.4–10.5)
CHLORIDE SERPL-SCNC: 98 MMOL/L — SIGNIFICANT CHANGE UP (ref 98–107)
CO2 SERPL-SCNC: 19 MMOL/L — LOW (ref 22–31)
CREAT SERPL-MCNC: 3.3 MG/DL — HIGH (ref 0.5–1.3)
EOSINOPHIL # BLD AUTO: 0.13 K/UL — SIGNIFICANT CHANGE UP (ref 0–0.5)
EOSINOPHIL NFR BLD AUTO: 1.3 % — SIGNIFICANT CHANGE UP (ref 0–6)
GLUCOSE SERPL-MCNC: 121 MG/DL — HIGH (ref 70–99)
HCT VFR BLD CALC: 31.2 % — LOW (ref 34.5–45)
HGB BLD-MCNC: 8.7 G/DL — LOW (ref 11.5–15.5)
IMM GRANULOCYTES NFR BLD AUTO: 1 % — SIGNIFICANT CHANGE UP (ref 0–1.5)
LYMPHOCYTES # BLD AUTO: 0.57 K/UL — LOW (ref 1–3.3)
LYMPHOCYTES # BLD AUTO: 5.7 % — LOW (ref 13–44)
MAGNESIUM SERPL-MCNC: 1.8 MG/DL — SIGNIFICANT CHANGE UP (ref 1.6–2.6)
MCHC RBC-ENTMCNC: 27.9 % — LOW (ref 32–36)
MCHC RBC-ENTMCNC: 28.3 PG — SIGNIFICANT CHANGE UP (ref 27–34)
MCV RBC AUTO: 101.6 FL — HIGH (ref 80–100)
MONOCYTES # BLD AUTO: 0.57 K/UL — SIGNIFICANT CHANGE UP (ref 0–0.9)
MONOCYTES NFR BLD AUTO: 5.7 % — SIGNIFICANT CHANGE UP (ref 2–14)
NEUTROPHILS # BLD AUTO: 8.61 K/UL — HIGH (ref 1.8–7.4)
NEUTROPHILS NFR BLD AUTO: 86 % — HIGH (ref 43–77)
NRBC # FLD: 0.03 K/UL — SIGNIFICANT CHANGE UP (ref 0–0)
PHOSPHATE SERPL-MCNC: 5.6 MG/DL — HIGH (ref 2.5–4.5)
PLATELET # BLD AUTO: 316 K/UL — SIGNIFICANT CHANGE UP (ref 150–400)
PMV BLD: 10.8 FL — SIGNIFICANT CHANGE UP (ref 7–13)
POTASSIUM SERPL-MCNC: 4.2 MMOL/L — SIGNIFICANT CHANGE UP (ref 3.5–5.3)
POTASSIUM SERPL-SCNC: 4.2 MMOL/L — SIGNIFICANT CHANGE UP (ref 3.5–5.3)
RBC # BLD: 3.07 M/UL — LOW (ref 3.8–5.2)
RBC # FLD: 18 % — HIGH (ref 10.3–14.5)
SODIUM SERPL-SCNC: 139 MMOL/L — SIGNIFICANT CHANGE UP (ref 135–145)
WBC # BLD: 10.01 K/UL — SIGNIFICANT CHANGE UP (ref 3.8–10.5)
WBC # FLD AUTO: 10.01 K/UL — SIGNIFICANT CHANGE UP (ref 3.8–10.5)

## 2019-10-28 PROCEDURE — 99233 SBSQ HOSP IP/OBS HIGH 50: CPT | Mod: GC

## 2019-10-28 PROCEDURE — 71045 X-RAY EXAM CHEST 1 VIEW: CPT | Mod: 26

## 2019-10-28 PROCEDURE — 76604 US EXAM CHEST: CPT | Mod: 26,GC

## 2019-10-28 RX ADMIN — PIPERACILLIN AND TAZOBACTAM 25 GRAM(S): 4; .5 INJECTION, POWDER, LYOPHILIZED, FOR SOLUTION INTRAVENOUS at 06:09

## 2019-10-28 RX ADMIN — Medication 81 MILLIGRAM(S): at 11:48

## 2019-10-28 RX ADMIN — SEVELAMER CARBONATE 800 MILLIGRAM(S): 2400 POWDER, FOR SUSPENSION ORAL at 11:48

## 2019-10-28 RX ADMIN — NYSTATIN CREAM 1 APPLICATION(S): 100000 CREAM TOPICAL at 06:09

## 2019-10-28 RX ADMIN — BUMETANIDE 7.5 MG/HR: 0.25 INJECTION INTRAMUSCULAR; INTRAVENOUS at 19:32

## 2019-10-28 RX ADMIN — PIPERACILLIN AND TAZOBACTAM 25 GRAM(S): 4; .5 INJECTION, POWDER, LYOPHILIZED, FOR SOLUTION INTRAVENOUS at 21:11

## 2019-10-28 RX ADMIN — PANTOPRAZOLE SODIUM 40 MILLIGRAM(S): 20 TABLET, DELAYED RELEASE ORAL at 11:48

## 2019-10-28 RX ADMIN — SODIUM CHLORIDE 4 MILLILITER(S): 9 INJECTION INTRAMUSCULAR; INTRAVENOUS; SUBCUTANEOUS at 09:26

## 2019-10-28 RX ADMIN — Medication 3 MILLILITER(S): at 15:18

## 2019-10-28 RX ADMIN — Medication 3 MILLILITER(S): at 04:55

## 2019-10-28 RX ADMIN — NYSTATIN CREAM 1 APPLICATION(S): 100000 CREAM TOPICAL at 17:23

## 2019-10-28 RX ADMIN — Medication 3 MILLILITER(S): at 23:00

## 2019-10-28 RX ADMIN — SODIUM CHLORIDE 4 MILLILITER(S): 9 INJECTION INTRAMUSCULAR; INTRAVENOUS; SUBCUTANEOUS at 23:01

## 2019-10-28 RX ADMIN — CHLORHEXIDINE GLUCONATE 1 APPLICATION(S): 213 SOLUTION TOPICAL at 11:49

## 2019-10-28 RX ADMIN — Medication 200 GRAM(S): at 11:48

## 2019-10-28 RX ADMIN — SODIUM CHLORIDE 4 MILLILITER(S): 9 INJECTION INTRAMUSCULAR; INTRAVENOUS; SUBCUTANEOUS at 15:18

## 2019-10-28 RX ADMIN — BUMETANIDE 7.5 MG/HR: 0.25 INJECTION INTRAMUSCULAR; INTRAVENOUS at 07:32

## 2019-10-28 RX ADMIN — Medication 3 MILLILITER(S): at 09:26

## 2019-10-28 NOTE — CONSULT NOTE ADULT - PROBLEM SELECTOR RECOMMENDATION 9
Secondary to COPD. Currently on HIflow NC at 90%. Might benefit from low dose opioids to help with SOB once goals are established. Continue care as per primary team. Secondary to COPD. Currently on High flow NC at 90%. Might benefit from low dose opioids to help with SOB once goals are established. Continue care as per primary team.

## 2019-10-28 NOTE — CONSULT NOTE ADULT - ASSESSMENT
74 year old woman with SOB, functional quadriplegia, COPD, ESRD on dialysis and encounter for palliative care.

## 2019-10-28 NOTE — CONSULT NOTE ADULT - ATTENDING COMMENTS
Acute hypoxemic respiratory failure currently on bilevel.  CXR shows white out off left lung likely due to mucus plugging.  Recommend aggressive chest PT - order chest vest, duonebs.   Discontinue bilevel and switch to HFNC.  Repeat CXR in AM.
Please order duplex of left forearm fistula.
Pt seen with NP.  Agree with above plan.  Given pts prior history of reluctance to f/u with appointments and preference to staying home, would recommend to have hospice services, w/o HD.   Ongoing GOC, will set up family meeting.

## 2019-10-28 NOTE — PROGRESS NOTE ADULT - ASSESSMENT
74F h/o HTN, HLD, IDDM2, COPD (former smoker, 2L/min PRN at home), CHF (EF 12/2018 45-50%), ESRD not on dialysis admitted to [facility] for hypotensive, ams, hypoglycemia and hypothermia.     CKD stage 5 not yet on hd  s/p left avf 12/18, + thrill + bruit   consent for cvvh/ IHD in chart from daughter    initiated HD 10/25 sec to fluid overload  AVF not functioning f/u vascular   right femoral shiley placed 10/25. received HDx2 and had puf 10/2  on bumex drip and metolazone  will plan for HD again today, UF as tolerated   - monitor I/O's closely    Hypocalcemia  pth elevated.  low vit d 25 level  start vit d 99755 weekly x8  start calcitriol 0.25 daily when phos is better   low albumin   continue renagel when eating  monitor    Hyperphosphatemia  npo right now  allergy to phoslo  start renagel 800mg tid with meal  monitor    Anemia  occult +  monitor hb  iron sat >20  epo [id number] tiw   transfuse as needed  consider Gi eval    Acidosis  likely sec to renal failure +diarrhea  improved, will continue to improve w/ HD  monitor    Hypotensive  ? sepsis   on  iv antibiotic  BP is normal now  - goal BP <130/80  monitor    Hypokalemia  supplemented  monitor

## 2019-10-28 NOTE — PROGRESS NOTE ADULT - PROBLEM SELECTOR PLAN 1
-L hemithoracic opacity noted  - aggressive chest PT, saline nebs, chest vest, metanebs   - high flow day/bipap hs and prn -L hemithoracic opacity noted  - aggressive chest PT, saline nebs, chest vest, metanebs   - high flow day/bipap hs and prn  - Palliative consult

## 2019-10-28 NOTE — CHART NOTE - NSCHARTNOTEFT_GEN_A_CORE
Source: RN and EMC reviewed  Current Diet : Diet, Soft:   Consistent Carbohydrate {Evening Snack} (CSTCHOSN)  DASH/TLC {Sodium & Cholesterol Restricted} (DASH) (10-22-19 @ 20:02)    PO intake:  < 50% [x ]   Current Weight: 10/27 90.3 kg    Pt now with pulmonary edema/PNA, s/p complete lung collapse on 10/23. Spoke with RN and saw pt who was on Bipap and nonverbal. RN stated pt is taking very little PO - mostly applesauce to take medication and requires total assistance with feeding. Edema has worsened to 2+ L/R leg and R arm and 3+ generalized and L arm. Wt has also significantly decreased by 14% since admission suspected from ongoing poor oral intake plus 2.5 liter UF removed during HD with continuing medical diuresis, as per Renal. Pt does not appear to be able to meet her nutrition needs orally and would benefit from alternative nutrition support as she is at severe risk for malnutrition due to ongoing poor PO intake and worsening edema along with remarkable loss in wt.    __________________ Pertinent Medications__________________   MEDICATIONS  (STANDING):  albuterol/ipratropium for Nebulization 3 milliLiter(s) Nebulizer every 6 hours  aspirin enteric coated 81 milliGRAM(s) Oral daily  buMETAnide Infusion 1.5 mG/Hr (7.5 mL/Hr) IV Continuous <Continuous>  calcium gluconate IVPB 1 Gram(s) IV Intermittent daily  chlorhexidine 4% Liquid 1 Application(s) Topical daily  dextrose 5%. 1000 milliLiter(s) (50 mL/Hr) IV Continuous <Continuous>  dextrose 50% Injectable 12.5 Gram(s) IV Push once  dextrose 50% Injectable 25 Gram(s) IV Push once  dextrose 50% Injectable 25 Gram(s) IV Push once  epoetin terence Injectable 43991 Unit(s) SubCutaneous <User Schedule>  ergocalciferol 50329 Unit(s) Oral <User Schedule>  insulin lispro (HumaLOG) corrective regimen sliding scale   SubCutaneous three times a day before meals  metolazone 5 milliGRAM(s) Oral <User Schedule>  nystatin Powder 1 Application(s) Topical two times a day  pantoprazole  Injectable 40 milliGRAM(s) IV Push daily  piperacillin/tazobactam IVPB.. 3.375 Gram(s) IV Intermittent every 12 hours  sevelamer carbonate 800 milliGRAM(s) Oral three times a day with meals  sodium chloride 3%  Inhalation 4 milliLiter(s) Inhalation three times a day    MEDICATIONS  (PRN):  dextrose 40% Gel 15 Gram(s) Oral once PRN Blood Glucose LESS THAN 70 milliGRAM(s)/deciliter  glucagon  Injectable 1 milliGRAM(s) IntraMuscular once PRN Glucose LESS THAN 70 milligrams/deciliter  sodium chloride 0.65% Nasal 2 Spray(s) Both Nostrils four times a day PRN Nasal Congestion  tetracaine/benzocaine/butamben Spray 1 Spray(s) Topical daily PRN AVF      __________________ Pertinent Labs__________________   10-28 Na139 mmol/L Glu 121 mg/dL<H> K+ 4.2 mmol/L Cr  3.30 mg/dL<H> BUN 58 mg/dL<H> 10-28 Phos 5.6 mg/dL<H> 10-23 Alb 2.4 g/dL<L> 10-20 [id number] 5.2 %    Skin: Intact    Estimated Needs:   [x ] no change since previous assessment    Previous Nutrition Diagnosis:     Severe Malnutrition     Nutrition Diagnosis is [x ] ongoing      Recommend:    1). Consider alternative means of nutrition as pt unable to meet her nutrition needs.  2). Monitor weights, labs, BM's, skin integrity and edema.  3). Follow pt as per protocol.

## 2019-10-28 NOTE — CONSULT NOTE ADULT - CONSULT REQUESTED DATE/TIME
19-Oct-2019 10:03
24-Oct-2019 13:06
26-Oct-2019 01:44
26-Oct-2019 02:37
28-Oct-2019 15:14
20-Oct-2019 11:14

## 2019-10-28 NOTE — PROGRESS NOTE ADULT - ASSESSMENT
EKG SR     Echo : < from: Transthoracic Echocardiogram (10.19.19 @ 10:52) >  1. Mitral annular calcification. Tethered mitral valve  leaflets with normal opening. Moderate mitral  regurgitation.  2. Calcified trileaflet aortic valve with normal opening.  Minimal aortic regurgitation.  3. Moderately dilated left atrium.  LA volume index = 42  cc/m2.  4. Severe left ventricular enlargement.  5. Endocardium not well visualized; grossly low normal left  ventricular systolic function.  6. Moderate diastolic dysfunction (Stage II).  7. The right ventricle is not well visualized; grossly  normal right ventricular systolic function.  8. Normal tricuspid valve.  Moderate tricuspid  regurgitation.  9. Estimated pulmonary artery systolic pressure equals 53  mm Hg, assuming right atrial pressure equals 15  mm Hg,  consistent with moderate pulmonary hypertension.  10. Thickened pericardium inferior to the right ventricle.  Small pericardial effusion adjacent to the right atrium.  *** Compared with echocardiogram of 10/10/2016, results are  similar on today's study.    < end of copied text >      Assessment and Plan     1) Pulm edema: s/p HD now on Bumex drip  , optiflow and PRN Biapap     2) PNA on Vanc and Zosyn     2) CAD s/p PCX PCI: restart ASA 81, monitor H/H     3) Anemia : no obvious GIB, stool occult + , monitor H/H  Plavix on hold , restarted asa     4) CKD V : renal on board    5) Lt. Lung Collapse: t/t per pulm

## 2019-10-28 NOTE — CONSULT NOTE ADULT - PROBLEM SELECTOR RECOMMENDATION 4
patient with newly diagnosed ESRD  and on new dialysis while inpatient. Family unsure how they will get her to dialysis. Ongoing goals of care with family. patient with newly diagnosed ESRD and on new dialysis while inpatient. Family unsure how they will get her to dialysis. Ongoing goals of care with family.

## 2019-10-28 NOTE — PROGRESS NOTE ADULT - SUBJECTIVE AND OBJECTIVE BOX
Gregor Barrow MD  Interventional Cardiology / Endovascular Specialist  [place] Office : [address] [place] [address] [place] [address]  Tel:   [place] Office : [address] [facility] [place] [address]  Tel: [phone]  Cell : [phone]    HISTORY OF PRESENTING ILLNESS:    74F h/o HTN, HLD, IDDM2, COPD (former smoker, 2L/min PRN at home), CHF (EF 12/2018 45-50%), ESRD not on dialysis, p/w AMS and hypotension, intubated and sedated found to have a PNA , Extubated and transferred to floors, S/P RRT for resp distress and Hypoxia , CXR with Left Lung Collapse,  protecting airway on Bipap  	  MEDICATIONS:  aspirin enteric coated 81 milliGRAM(s) Oral daily  buMETAnide Infusion 1.5 mG/Hr IV Continuous <Continuous>  metolazone 5 milliGRAM(s) Oral <User Schedule>    piperacillin/tazobactam IVPB.. 3.375 Gram(s) IV Intermittent every 12 hours    albuterol/ipratropium for Nebulization 3 milliLiter(s) Nebulizer every 6 hours  sodium chloride 3%  Inhalation 4 milliLiter(s) Inhalation three times a day      pantoprazole  Injectable 40 milliGRAM(s) IV Push daily    dextrose 40% Gel 15 Gram(s) Oral once PRN  dextrose 50% Injectable 12.5 Gram(s) IV Push once  dextrose 50% Injectable 25 Gram(s) IV Push once  dextrose 50% Injectable 25 Gram(s) IV Push once  glucagon  Injectable 1 milliGRAM(s) IntraMuscular once PRN  insulin lispro (HumaLOG) corrective regimen sliding scale   SubCutaneous three times a day before meals    calcium gluconate IVPB 1 Gram(s) IV Intermittent daily  chlorhexidine 4% Liquid 1 Application(s) Topical daily  dextrose 5%. 1000 milliLiter(s) IV Continuous <Continuous>  epoetin terence Injectable 36178 Unit(s) SubCutaneous <User Schedule>  ergocalciferol 31156 Unit(s) Oral <User Schedule>  nystatin Powder 1 Application(s) Topical two times a day  sodium chloride 0.65% Nasal 2 Spray(s) Both Nostrils four times a day PRN  tetracaine/benzocaine/butamben Spray 1 Spray(s) Topical daily PRN      PAST MEDICAL/SURGICAL HISTORY  PAST MEDICAL & SURGICAL HISTORY:  ESRD (end stage renal disease): L arm AVF placed 12/2018  Hemorrhoids  GERD (gastroesophageal reflux disease)  Morbid obesity  Cataract  Diaphragmatic hernia  Cerebral infarction: 2011  CKD (chronic kidney disease)  Anemia  Diabetes mellitus: Insulin dependent. Type 2  CHF (congestive heart failure)  Hyperlipidemia  Hypertension  COPD (chronic obstructive pulmonary disease)  Chronic obstructive pulmonary disease, unspecified COPD type  Adult Idiopathic Generalized Osteoporosis  CAD (Coronary Artery Disease): 1 stent placed 11/7/18  DM (Diabetes Mellitus), Secondary  Hypertension  H/O coronary angiogram: [facility] 1 stent placed 11/7/2018      SOCIAL HISTORY: Substance Use (street drugs): ( x ) never used  (  ) other:    FAMILY HISTORY:  Family history of breast cancer (Grandparent)      REVIEW OF SYSTEMS:  unable to obtain     PHYSICAL EXAM:  T(C): 36.3 (10-28-19 @ 06:03), Max: 36.9 (10-27-19 @ 14:21)  HR: 98 (10-28-19 @ 10:57) (92 - 110)  BP: 127/69 (10-28-19 @ 06:03) (127/69 - 151/73)  RR: 19 (10-28-19 @ 06:03) (18 - 26)  SpO2: 93% (10-28-19 @ 10:57) (88% - 95%)  Wt(kg): --  I&O's Summary    27 Oct 2019 07:01  -  28 Oct 2019 07:00  --------------------------------------------------------  IN: 1325 mL / OUT: 4000 mL / NET: -2675 mL    28 Oct 2019 07:01  -  28 Oct 2019 13:29  --------------------------------------------------------  IN: 137.5 mL / OUT: 50 mL / NET: 87.5 mL        GENERAL: Obese  on BiPAP  EYES: EOMI, PERRLA, conjunctiva and sclera clear  ENMT: on Biapa   Cardiovascular: Normal S1 S2, No JVD, No murmurs, No edema  Respiratory: b/l basal creps 	  Gastrointestinal:  Soft, Non-tender, + BS	  Extremities: No clubbing, cyanosis or edema  LYMPH: No lymphadenopathy noted                                  8.7    10.01 )-----------( 316      ( 28 Oct 2019 04:30 )             31.2     10-28    139  |  98  |  58<H>  ----------------------------<  121<H>  4.2   |  19<L>  |  3.30<H>    Ca    8.5      28 Oct 2019 04:30  Phos  5.6     10-28  Mg     1.8     10-28      proBNP:   Lipid Profile:   HgA1c:   TSH:     Consultant(s) Notes Reviewed:  [x ] YES  [ ] NO    Care Discussed with Consultants/Other Providers [ x] YES  [ ] NO    Imaging Personally Reviewed independently:  [x] YES  [ ] NO    All labs, radiologic studies, vitals, orders and medications list reviewed. Patient is seen and examined at bedside. Case discussed with medical team.

## 2019-10-28 NOTE — CONSULT NOTE ADULT - PROBLEM SELECTOR RECOMMENDATION 5
Palliative care was introduced to daughter at bedside. She understands that her mother is not well and will see when the rest of her siblings are available for an interdisciplinary meeting to discuss her mother goals of care. Emotional support was provided.

## 2019-10-28 NOTE — PROGRESS NOTE ADULT - SUBJECTIVE AND OBJECTIVE BOX
Vascular Surgery Progress Note     Subjective/24hour Events: No acute events overnight. Pain controlled. Tolerating diet. No N/V. No CP or SOB.    Vital Signs:  Vital Signs Last 24 Hrs  T(C): 36.6 (28 Oct 2019 16:50), Max: 36.8 (27 Oct 2019 20:00)  T(F): 97.9 (28 Oct 2019 16:50), Max: 98.2 (27 Oct 2019 20:00)  HR: 20 (28 Oct 2019 16:50) (20 - 110)  BP: 144/58 (28 Oct 2019 16:50) (127/64 - 151/73)  BP(mean): --  RR: 20 (28 Oct 2019 16:50) (18 - 22)  SpO2: 90% (28 Oct 2019 16:50) (88% - 95%)    CAPILLARY BLOOD GLUCOSE      POCT Blood Glucose.: 139 mg/dL (28 Oct 2019 16:48)  POCT Blood Glucose.: 138 mg/dL (28 Oct 2019 11:36)  POCT Blood Glucose.: 135 mg/dL (28 Oct 2019 07:46)  POCT Blood Glucose.: 141 mg/dL (27 Oct 2019 21:56)      I&O's Detail    27 Oct 2019 07:01  -  28 Oct 2019 07:00  --------------------------------------------------------  IN:    bumetanide Infusion: 90 mL    IV PiggyBack: 522.5 mL    IV PiggyBack: 212.5 mL    Other: 500 mL  Total IN: 1325 mL    OUT:    Other: 3500 mL    Voided: 500 mL  Total OUT: 4000 mL    Total NET: -2675 mL      28 Oct 2019 07:01  -  28 Oct 2019 17:48  --------------------------------------------------------  IN:    bumetanide Infusion: 82.5 mL    IV PiggyBack: 100 mL  Total IN: 182.5 mL    OUT:    Voided: 50 mL  Total OUT: 50 mL    Total NET: 132.5 mL            MEDICATIONS  (STANDING):  albuterol/ipratropium for Nebulization 3 milliLiter(s) Nebulizer every 6 hours  aspirin enteric coated 81 milliGRAM(s) Oral daily  buMETAnide Infusion 1.5 mG/Hr (7.5 mL/Hr) IV Continuous <Continuous>  calcium gluconate IVPB 1 Gram(s) IV Intermittent daily  chlorhexidine 4% Liquid 1 Application(s) Topical daily  dextrose 5%. 1000 milliLiter(s) (50 mL/Hr) IV Continuous <Continuous>  dextrose 50% Injectable 12.5 Gram(s) IV Push once  dextrose 50% Injectable 25 Gram(s) IV Push once  dextrose 50% Injectable 25 Gram(s) IV Push once  epoetin terence Injectable 29954 Unit(s) SubCutaneous <User Schedule>  ergocalciferol 13535 Unit(s) Oral <User Schedule>  insulin lispro (HumaLOG) corrective regimen sliding scale   SubCutaneous three times a day before meals  metolazone 5 milliGRAM(s) Oral <User Schedule>  nystatin Powder 1 Application(s) Topical two times a day  pantoprazole  Injectable 40 milliGRAM(s) IV Push daily  piperacillin/tazobactam IVPB.. 3.375 Gram(s) IV Intermittent every 12 hours  sevelamer carbonate 800 milliGRAM(s) Oral three times a day with meals  sodium chloride 3%  Inhalation 4 milliLiter(s) Inhalation three times a day    MEDICATIONS  (PRN):  dextrose 40% Gel 15 Gram(s) Oral once PRN Blood Glucose LESS THAN 70 milliGRAM(s)/deciliter  glucagon  Injectable 1 milliGRAM(s) IntraMuscular once PRN Glucose LESS THAN 70 milligrams/deciliter  sodium chloride 0.65% Nasal 2 Spray(s) Both Nostrils four times a day PRN Nasal Congestion  tetracaine/benzocaine/butamben Spray 1 Spray(s) Topical daily PRN AVF        Physical Exam:  Gen: NAD  LS: nml respiratory effort  Card: pulse regularly present  GI: abd soft, nontender  Ext: warm      Labs:    10-28    139  |  98  |  58<H>  ----------------------------<  121<H>  4.2   |  19<L>  |  3.30<H>    Ca    8.5      28 Oct 2019 04:30  Phos  5.6     10-28  Mg     1.8     10-28                              8.7    10.01 )-----------( 316      ( 28 Oct 2019 04:30 )             31.2               Imaging:  EXAM:  XR CHEST PORTABLE ROUTINE 1V      PROCEDURE DATE:  Oct 28 2019     INTERPRETATION:  CLINICAL INFORMATION: Left lobe opacity.    TECHNIQUE: AP view of the chest.    COMPARISON: Chest radiograph from 10/27/2019.    IMPRESSION:    Right IJ central venous catheter terminates in the SVC. Redemonstration   of left hemithorax complete opacification, mildly increased from   10/27/2019 with some associated volume loss suggestive of left lung   atelectasis. Right lung interstitial opacities may be due to pulmonary   edema, increased. Vascular Surgery Progress Note     Subjective/24hour Events: No acute events overnight. Pain controlled. Tolerating soft diet. No N/V. No CP or SOB.    Vital Signs:  Vital Signs Last 24 Hrs  T(C): 36.6 (28 Oct 2019 16:50), Max: 36.8 (27 Oct 2019 20:00)  T(F): 97.9 (28 Oct 2019 16:50), Max: 98.2 (27 Oct 2019 20:00)  HR: 20 (28 Oct 2019 16:50) (20 - 110)  BP: 144/58 (28 Oct 2019 16:50) (127/64 - 151/73)  BP(mean): --  RR: 20 (28 Oct 2019 16:50) (18 - 22)  SpO2: 90% (28 Oct 2019 16:50) (88% - 95%)    CAPILLARY BLOOD GLUCOSE      POCT Blood Glucose.: 139 mg/dL (28 Oct 2019 16:48)  POCT Blood Glucose.: 138 mg/dL (28 Oct 2019 11:36)  POCT Blood Glucose.: 135 mg/dL (28 Oct 2019 07:46)  POCT Blood Glucose.: 141 mg/dL (27 Oct 2019 21:56)      I&O's Detail    27 Oct 2019 07:01  -  28 Oct 2019 07:00  --------------------------------------------------------  IN:    bumetanide Infusion: 90 mL    IV PiggyBack: 522.5 mL    IV PiggyBack: 212.5 mL    Other: 500 mL  Total IN: 1325 mL    OUT:    Other: 3500 mL    Voided: 500 mL  Total OUT: 4000 mL    Total NET: -2675 mL      28 Oct 2019 07:01  -  28 Oct 2019 17:48  --------------------------------------------------------  IN:    bumetanide Infusion: 82.5 mL    IV PiggyBack: 100 mL  Total IN: 182.5 mL    OUT:    Voided: 50 mL  Total OUT: 50 mL    Total NET: 132.5 mL            MEDICATIONS  (STANDING):  albuterol/ipratropium for Nebulization 3 milliLiter(s) Nebulizer every 6 hours  aspirin enteric coated 81 milliGRAM(s) Oral daily  buMETAnide Infusion 1.5 mG/Hr (7.5 mL/Hr) IV Continuous <Continuous>  calcium gluconate IVPB 1 Gram(s) IV Intermittent daily  chlorhexidine 4% Liquid 1 Application(s) Topical daily  dextrose 5%. 1000 milliLiter(s) (50 mL/Hr) IV Continuous <Continuous>  dextrose 50% Injectable 12.5 Gram(s) IV Push once  dextrose 50% Injectable 25 Gram(s) IV Push once  dextrose 50% Injectable 25 Gram(s) IV Push once  epoetin terence Injectable 28071 Unit(s) SubCutaneous <User Schedule>  ergocalciferol 81340 Unit(s) Oral <User Schedule>  insulin lispro (HumaLOG) corrective regimen sliding scale   SubCutaneous three times a day before meals  metolazone 5 milliGRAM(s) Oral <User Schedule>  nystatin Powder 1 Application(s) Topical two times a day  pantoprazole  Injectable 40 milliGRAM(s) IV Push daily  piperacillin/tazobactam IVPB.. 3.375 Gram(s) IV Intermittent every 12 hours  sevelamer carbonate 800 milliGRAM(s) Oral three times a day with meals  sodium chloride 3%  Inhalation 4 milliLiter(s) Inhalation three times a day    MEDICATIONS  (PRN):  dextrose 40% Gel 15 Gram(s) Oral once PRN Blood Glucose LESS THAN 70 milliGRAM(s)/deciliter  glucagon  Injectable 1 milliGRAM(s) IntraMuscular once PRN Glucose LESS THAN 70 milligrams/deciliter  sodium chloride 0.65% Nasal 2 Spray(s) Both Nostrils four times a day PRN Nasal Congestion  tetracaine/benzocaine/butamben Spray 1 Spray(s) Topical daily PRN AVF        Physical Exam:  Gen: NAD  HEENT: on high flow nasal cannula   LS: nml respiratory effort  Card: pulse regularly present  GI: abd soft, nontender  Ext: warm, RUE w/ palpable radial and ulnar pulses, LUE w/ 2+ edema and palpable radial and ulnar pulses      Labs:    10-28    139  |  98  |  58<H>  ----------------------------<  121<H>  4.2   |  19<L>  |  3.30<H>    Ca    8.5      28 Oct 2019 04:30  Phos  5.6     10-28  Mg     1.8     10-28                              8.7    10.01 )-----------( 316      ( 28 Oct 2019 04:30 )             31.2               Imaging:  EXAM:  XR CHEST PORTABLE ROUTINE 1V      PROCEDURE DATE:  Oct 28 2019     INTERPRETATION:  CLINICAL INFORMATION: Left lobe opacity.    TECHNIQUE: AP view of the chest.    COMPARISON: Chest radiograph from 10/27/2019.    IMPRESSION:    Right IJ central venous catheter terminates in the SVC. Redemonstration   of left hemithorax complete opacification, mildly increased from   10/27/2019 with some associated volume loss suggestive of left lung   atelectasis. Right lung interstitial opacities may be due to pulmonary   edema, increased.

## 2019-10-28 NOTE — CONSULT NOTE ADULT - REASON FOR ADMISSION
AMS and Hypotension

## 2019-10-28 NOTE — PROGRESS NOTE ADULT - SUBJECTIVE AND OBJECTIVE BOX
CHIEF COMPLAINT: Patient is a 74y old  Female who presents with a chief complaint of AMS and Hypotension (27 Oct 2019 21:21)      Interval Events:    REVIEW OF SYSTEMS:  Constitutional:   Eyes:  ENT:  CV:  Resp:  GI:  :  MSK:  Integumentary:  Neurological:  Psychiatric:  Endocrine:  Hematologic/Lymphatic:  Allergic/Immunologic:  [ ] All other systems negative  [ ] Unable to assess ROS because ________    OBJECTIVE:  ICU Vital Signs Last 24 Hrs  T(C): 36.3 (28 Oct 2019 06:03), Max: 36.9 (27 Oct 2019 14:21)  T(F): 97.4 (28 Oct 2019 06:03), Max: 98.5 (27 Oct 2019 14:21)  HR: 110 (28 Oct 2019 07:00) (92 - 110)  BP: 127/69 (28 Oct 2019 06:03) (127/69 - 151/73)  BP(mean): --  ABP: --  ABP(mean): --  RR: 19 (28 Oct 2019 06:03) (18 - 26)  SpO2: 90% (28 Oct 2019 07:00) (88% - 95%)        10-27 @ 07:01  -  10-28 @ 07:00  --------------------------------------------------------  IN: 1325 mL / OUT: 4000 mL / NET: -2675 mL      CAPILLARY BLOOD GLUCOSE      POCT Blood Glucose.: 135 mg/dL (28 Oct 2019 07:46)      PHYSICAL EXAM:  General:   HEENT:   Lymph Nodes:  Neck:   Respiratory:   Cardiovascular:   Abdomen:   Extremities:   Skin:   Neurological:  Psychiatry:    HOSPITAL MEDICATIONS:  MEDICATIONS  (STANDING):  albuterol/ipratropium for Nebulization 3 milliLiter(s) Nebulizer every 6 hours  aspirin enteric coated 81 milliGRAM(s) Oral daily  buMETAnide Infusion 1.5 mG/Hr (7.5 mL/Hr) IV Continuous <Continuous>  calcium gluconate IVPB 1 Gram(s) IV Intermittent daily  chlorhexidine 4% Liquid 1 Application(s) Topical daily  dextrose 5%. 1000 milliLiter(s) (50 mL/Hr) IV Continuous <Continuous>  dextrose 50% Injectable 12.5 Gram(s) IV Push once  dextrose 50% Injectable 25 Gram(s) IV Push once  dextrose 50% Injectable 25 Gram(s) IV Push once  epoetin terence Injectable 96747 Unit(s) SubCutaneous <User Schedule>  ergocalciferol 63060 Unit(s) Oral <User Schedule>  insulin lispro (HumaLOG) corrective regimen sliding scale   SubCutaneous three times a day before meals  metolazone 5 milliGRAM(s) Oral <User Schedule>  nystatin Powder 1 Application(s) Topical two times a day  pantoprazole  Injectable 40 milliGRAM(s) IV Push daily  piperacillin/tazobactam IVPB.. 3.375 Gram(s) IV Intermittent every 12 hours  sevelamer carbonate 800 milliGRAM(s) Oral three times a day with meals  sodium chloride 3%  Inhalation 4 milliLiter(s) Inhalation three times a day    MEDICATIONS  (PRN):  dextrose 40% Gel 15 Gram(s) Oral once PRN Blood Glucose LESS THAN 70 milliGRAM(s)/deciliter  glucagon  Injectable 1 milliGRAM(s) IntraMuscular once PRN Glucose LESS THAN 70 milligrams/deciliter  sodium chloride 0.65% Nasal 2 Spray(s) Both Nostrils four times a day PRN Nasal Congestion  tetracaine/benzocaine/butamben Spray 1 Spray(s) Topical daily PRN AVF      LABS:                        8.7    10.01 )-----------( 316      ( 28 Oct 2019 04:30 )             31.2     10-28    139  |  98  |  58<H>  ----------------------------<  121<H>  4.2   |  19<L>  |  3.30<H>    Ca    8.5      28 Oct 2019 04:30  Phos  5.6     10-28  Mg     1.8     10-28                MICROBIOLOGY:     RADIOLOGY:  [ ] Reviewed and interpreted by me    PULMONARY FUNCTION TESTS:    EKG: CHIEF COMPLAINT: Patient is a 74y old  Female who presents with a chief complaint of AMS and Hypotension (27 Oct 2019 21:21)      Interval Events: Received UF last pm     REVIEW OF SYSTEMS:  Constitutional: No fever chills, gen body aches   CV: Denies   Resp: + sob   GI: Denies   MSK: Weakness   [ x] All other systems negative      OBJECTIVE:  ICU Vital Signs Last 24 Hrs  T(C): 36.3 (28 Oct 2019 06:03), Max: 36.9 (27 Oct 2019 14:21)  T(F): 97.4 (28 Oct 2019 06:03), Max: 98.5 (27 Oct 2019 14:21)  HR: 110 (28 Oct 2019 07:00) (92 - 110)  BP: 127/69 (28 Oct 2019 06:03) (127/69 - 151/73)  BP(mean): --  ABP: --  ABP(mean): --  RR: 19 (28 Oct 2019 06:03) (18 - 26)  SpO2: 90% (28 Oct 2019 07:00) (88% - 95%)        10-27 @ 07:01  -  10-28 @ 07:00  --------------------------------------------------------  IN: 1325 mL / OUT: 4000 mL / NET: -2675 mL      CAPILLARY BLOOD GLUCOSE      POCT Blood Glucose.: 135 mg/dL (28 Oct 2019 07:46)          HOSPITAL MEDICATIONS:  MEDICATIONS  (STANDING):  albuterol/ipratropium for Nebulization 3 milliLiter(s) Nebulizer every 6 hours  aspirin enteric coated 81 milliGRAM(s) Oral daily  buMETAnide Infusion 1.5 mG/Hr (7.5 mL/Hr) IV Continuous <Continuous>  calcium gluconate IVPB 1 Gram(s) IV Intermittent daily  chlorhexidine 4% Liquid 1 Application(s) Topical daily  dextrose 5%. 1000 milliLiter(s) (50 mL/Hr) IV Continuous <Continuous>  dextrose 50% Injectable 12.5 Gram(s) IV Push once  dextrose 50% Injectable 25 Gram(s) IV Push once  dextrose 50% Injectable 25 Gram(s) IV Push once  epoetin terence Injectable 69584 Unit(s) SubCutaneous <User Schedule>  ergocalciferol 81430 Unit(s) Oral <User Schedule>  insulin lispro (HumaLOG) corrective regimen sliding scale   SubCutaneous three times a day before meals  metolazone 5 milliGRAM(s) Oral <User Schedule>  nystatin Powder 1 Application(s) Topical two times a day  pantoprazole  Injectable 40 milliGRAM(s) IV Push daily  piperacillin/tazobactam IVPB.. 3.375 Gram(s) IV Intermittent every 12 hours  sevelamer carbonate 800 milliGRAM(s) Oral three times a day with meals  sodium chloride 3%  Inhalation 4 milliLiter(s) Inhalation three times a day    MEDICATIONS  (PRN):  dextrose 40% Gel 15 Gram(s) Oral once PRN Blood Glucose LESS THAN 70 milliGRAM(s)/deciliter  glucagon  Injectable 1 milliGRAM(s) IntraMuscular once PRN Glucose LESS THAN 70 milligrams/deciliter  sodium chloride 0.65% Nasal 2 Spray(s) Both Nostrils four times a day PRN Nasal Congestion  tetracaine/benzocaine/butamben Spray 1 Spray(s) Topical daily PRN AVF      LABS:                        8.7    10.01 )-----------( 316      ( 28 Oct 2019 04:30 )             31.2     10-28    139  |  98  |  58<H>  ----------------------------<  121<H>  4.2   |  19<L>  |  3.30<H>    Ca    8.5      28 Oct 2019 04:30  Phos  5.6     10-28  Mg     1.8     10-28                MICROBIOLOGY:     RADIOLOGY:  [ ] Reviewed and interpreted by me    PULMONARY FUNCTION TESTS:    EKG:

## 2019-10-28 NOTE — PROGRESS NOTE ADULT - SUBJECTIVE AND OBJECTIVE BOX
[facility]   MD CHARITY MCADAMS, DO FANI BROWN, JASSON CONNOLLY    TEL:  OFFICE: [phone]  DR ALCANTAR CELL: [phone]  DR. MORRIS CELL: [phone]  DR. BROWN CELL: [phone]  TENISHA BOATENG CELL: [phone]        Patient is a 74y old  Female who presents with a chief complaint of AMS and Hypotension (28 Oct 2019 13:28)      Patient seen and examined at bedside.     VITALS:  T(F): 97.4 (10-28-19 @ 06:03), Max: 98.2 (10-27-19 @ 20:00)  HR: 98 (10-28-19 @ 10:57)  BP: 127/69 (10-28-19 @ 06:03)  RR: 19 (10-28-19 @ 06:03)  SpO2: 93% (10-28-19 @ 10:57)  Wt(kg): --    10-27 @ 07:01  -  10-28 @ 07:00  --------------------------------------------------------  IN: 1325 mL / OUT: 4000 mL / NET: -2675 mL    10-28 @ 07:01  -  10-28 @ 14:56  --------------------------------------------------------  IN: 137.5 mL / OUT: 50 mL / NET: 87.5 mL          PHYSICAL EXAM:  Constitutional: NAD  Neck: No JVD  Respiratory: + rhonchi  Cardiovascular: S1, S2, RRR  Gastrointestinal: BS+, soft, NT/ND  Extremities: trace peripheral edema    Hospital Medications:   MEDICATIONS  (STANDING):  albuterol/ipratropium for Nebulization 3 milliLiter(s) Nebulizer every 6 hours  aspirin enteric coated 81 milliGRAM(s) Oral daily  buMETAnide Infusion 1.5 mG/Hr (7.5 mL/Hr) IV Continuous <Continuous>  calcium gluconate IVPB 1 Gram(s) IV Intermittent daily  chlorhexidine 4% Liquid 1 Application(s) Topical daily  dextrose 5%. 1000 milliLiter(s) (50 mL/Hr) IV Continuous <Continuous>  dextrose 50% Injectable 12.5 Gram(s) IV Push once  dextrose 50% Injectable 25 Gram(s) IV Push once  dextrose 50% Injectable 25 Gram(s) IV Push once  epoetin terence Injectable 97567 Unit(s) SubCutaneous <User Schedule>  ergocalciferol 19823 Unit(s) Oral <User Schedule>  insulin lispro (HumaLOG) corrective regimen sliding scale   SubCutaneous three times a day before meals  metolazone 5 milliGRAM(s) Oral <User Schedule>  nystatin Powder 1 Application(s) Topical two times a day  pantoprazole  Injectable 40 milliGRAM(s) IV Push daily  piperacillin/tazobactam IVPB.. 3.375 Gram(s) IV Intermittent every 12 hours  sevelamer carbonate 800 milliGRAM(s) Oral three times a day with meals  sodium chloride 3%  Inhalation 4 milliLiter(s) Inhalation three times a day      LABS:  10-28    139  |  98  |  58<H>  ----------------------------<  121<H>  4.2   |  19<L>  |  3.30<H>    Ca    8.5      28 Oct 2019 04:30  Phos  5.6     10-28  Mg     1.8     10-28      Creatinine Trend: 3.30 <--, 2.98 <--, 4.71 <--, 4.76 <--, 4.59 <--, 4.74 <--, 4.66 <--, 4.74 <--    Phosphorus Level, Serum: 5.6 mg/dL (10-28 @ 04:30)                              8.7    10.01 )-----------( 316      ( 28 Oct 2019 04:30 )             31.2     Urine Studies:  Urinalysis - [10-19-19 @ 03:30]      Color YELLOW / Appearance CLEAR / SG 1.017 / pH 5.5      Gluc NEGATIVE / Ketone NEGATIVE  / Bili NEGATIVE / Urobili NORMAL       Blood NEGATIVE / Protein 10 / Leuk Est NEGATIVE / Nitrite NEGATIVE      RBC  / WBC  / Hyaline  / Gran  / Sq Epi  / Non Sq Epi  / Bacteria       Iron 27, TIBC 125, %sat --      [10-20-19 @ 02:40]  Ferritin 283.7      [10-20-19 @ 02:40]  .9 (Ca --)      [10-20-19 @ 02:40]   --  PTH -- (Ca 8.7)      [10-31-18 @ 09:24]   269  Vitamin D (25OH) 10.5      [10-20-19 @ 02:40]  HbA1c 5.2      [10-20-19 @ 02:40]  TSH 1.11      [10-18-19 @ 23:20]    HBsAg NEGATIVE      [10-24-19 @ 02:30]  HCV 0.16, Nonreactive Hepatitis C AB  S/CO Ratio                        Interpretation  < 1.00                                   Non-Reactive  1.00 - 4.99                         Weakly-Reactive  >= 5.00                                Reactive  Non-Reactive: [place] with a non-reactive HCV antibody  result is considered uninfected.  No further action is  needed unless recent infection is suspected.  In these  cases, consider repeat testing later to detect  seroconversion..  Weakly-Reactive: HCV antibody test is abnormal, HCV RNA  Qualitative test will follow.  Reactive: HCV antibody test is abnormal, HCV RNA  Qualitative test will follow.  Note: HCV antibody testing is performed on the [employer] system.      [10-19-19 @ 12:30]      RADIOLOGY & ADDITIONAL STUDIES:

## 2019-10-28 NOTE — CONSULT NOTE ADULT - SUBJECTIVE AND OBJECTIVE BOX
HPI:  74F h/o HTN, HLD, IDDM2, COPD (former smoker, 2L/min PRN at home), CHF (EF 12/2018 45-50%), ESRD not on dialysis, p/w AMS and hypotension. Per daughter at bedside, pt has been having a "chest cold" for the past week associated with cough productive of brown sputum, rhinorrhea, and diarrhea. Her daughter notes poor PO intake recently but pt has been receiving usual dose of insulin. At baseline, pt is A&Ox3 and ambulates w/ a cane, but can only walk short distances without becoming short of breath (< 1 block). Last known normal mental status was 10/18 in the AM. In the afternoon on 10/18, daughter found pt unresponsive. EMS measured FS 20, gave 1 amp of D50 with FS >200 after. Her daughter recently had a cold last week. Per daughter, pt has not had fevers, chills, nausea, vomiting, oliguria or anuria, dysuria, CP, palpitations, SOB, hematochezia, hematemesis, melena, and recent travel.     Of note, pt has ESRD w/ fistula placed 12/2018, but has not been getting dialysis. She has not followed up with any doctors recently.    In the ED, T 91.1F, HR 48, BP 89/54, RR 18. Initially hypotensive, given 750cc bolus w/ improvement in BP and mental status. However, spontaneously became hypotensive and altered while eating. She has been bradycardic even when her mental status had improved briefly. Hgb was 6.4, received 2u pRBC. Also received ceftriaxone 1g x1, Solumedrol 40mg x1, Zofran 4mg x1, and duonebs x3. (19 Oct 2019 01:41)      Patient resting in bed with daughter at bedside. No distress noted, patient appears lethargic.     PERTINENT PM/SXH:   ESRD (end stage renal disease)  Hemorrhoids  GERD (gastroesophageal reflux disease)  GERD (gastroesophageal reflux disease)  Morbid obesity  Cataract  Diaphragmatic hernia  Cerebral infarction  CKD (chronic kidney disease)  Anemia  Diabetes mellitus  CHF (congestive heart failure)  Hyperlipidemia  Hypertension  COPD (chronic obstructive pulmonary disease)  Chronic obstructive pulmonary disease, unspecified COPD type  Adult Idiopathic Generalized Osteoporosis  CAD (Coronary Artery Disease)  DM (Diabetes Mellitus), Secondary  Hypertension    H/O coronary angiogram  No significant past surgical history  No significant past surgical history    FAMILY HISTORY:  Family history of breast cancer (Grandparent)      SOCIAL HISTORY:   Significant other/partner: Yes [ ]  No [ x] Children:  Yes [ x]  No [ ] Latter day/Spirituality:  Substance hx: Yes[ ]  No [x ]   Tobacco hx:  Yes [x ] No [ ]   Alcohol hx: Yes [ ] No [ x]   Home Opioid hx:  [ ] I-Stop Reference No:  Living Situation: [x ]Home  [ ]Long term care  [ ]Rehab [ ]Other    ADVANCE DIRECTIVES:    DNR  MOLST  [ ]  Living Will  [ ]   DECISION MAKER(s):  [ ] Health Care Proxy(s)  [ ] Surrogate(s)  [ ] Guardian           Name(s): Phone Number(s):    BASELINE (I)ADL(s) (prior to admission):  [place]: [ ]Total  [ ] Moderate [ ]Dependent    Allergies    calcium acetate (Vomiting; Nausea; Chills)  wool-rash (Other)    Intolerances    MEDICATIONS  (STANDING):  albuterol/ipratropium for Nebulization 3 milliLiter(s) Nebulizer every 6 hours  aspirin enteric coated 81 milliGRAM(s) Oral daily  buMETAnide Infusion 1.5 mG/Hr (7.5 mL/Hr) IV Continuous <Continuous>  calcium gluconate IVPB 1 Gram(s) IV Intermittent daily  chlorhexidine 4% Liquid 1 Application(s) Topical daily  dextrose 5%. 1000 milliLiter(s) (50 mL/Hr) IV Continuous <Continuous>  dextrose 50% Injectable 12.5 Gram(s) IV Push once  dextrose 50% Injectable 25 Gram(s) IV Push once  dextrose 50% Injectable 25 Gram(s) IV Push once  epoetin terence Injectable 92188 Unit(s) SubCutaneous <User Schedule>  ergocalciferol 86718 Unit(s) Oral <User Schedule>  insulin lispro (HumaLOG) corrective regimen sliding scale   SubCutaneous three times a day before meals  metolazone 5 milliGRAM(s) Oral <User Schedule>  nystatin Powder 1 Application(s) Topical two times a day  pantoprazole  Injectable 40 milliGRAM(s) IV Push daily  piperacillin/tazobactam IVPB.. 3.375 Gram(s) IV Intermittent every 12 hours  sevelamer carbonate 800 milliGRAM(s) Oral three times a day with meals  sodium chloride 3%  Inhalation 4 milliLiter(s) Inhalation three times a day    MEDICATIONS  (PRN):  dextrose 40% Gel 15 Gram(s) Oral once PRN Blood Glucose LESS THAN 70 milliGRAM(s)/deciliter  glucagon  Injectable 1 milliGRAM(s) IntraMuscular once PRN Glucose LESS THAN 70 milligrams/deciliter  sodium chloride 0.65% Nasal 2 Spray(s) Both Nostrils four times a day PRN Nasal Congestion  tetracaine/benzocaine/butamben Spray 1 Spray(s) Topical daily PRN AVF    PRESENT SYMPTOMS: [ ]Unable to obtain due to poor mentation   Source if other than patient:  [ ]Family   [ ]Team     Pain (Impact on QOL):    Location -   Severity -        Minimal acceptable level (0-10 scale):  Quality:   Onset:   Duration:                 Aggravating factors -  Relieving factors -  Radiation -    PAIN AD Score:     [email or web address] (press ctrl +  left click to view)    Dyspnea:  Yes [ ] No [ ] - [ ]Mild [ ]Moderate [ ]Severe  Anxiety:    Yes [ ] No [ ] - [ ]Mild [ ]Moderate [ ]Severe  Fatigue:    Yes [ ] No [ ] - [ ]Mild [ ]Moderate [ ]Severe  Nausea:    Yes [ ] No [ ] - [ ]Mild [ ]Moderate [ ]Severe                         Loss of appetite: Yes [ ] No [ ] - [ ]Mild [ ]Moderate [ ]Severe             Constipation:  Yes [ ] No [ ] - [ ]Mild [ ]Moderate [ ]Severe  Grief:  Yes [ ] No [ ]     Other Symptoms:  [ ]All other review of systems negative     Karnofsky Performance Score/Palliative Performance Status Version 2:         %    http://palliative.info/resource_material/[email or web address].pdf  PHYSICAL EXAM:  Vital Signs Last 24 Hrs  T(C): 36.6 (28 Oct 2019 15:09), Max: 36.8 (27 Oct 2019 20:00)  T(F): 97.8 (28 Oct 2019 15:09), Max: 98.2 (27 Oct 2019 20:00)  HR: 85 (28 Oct 2019 15:09) (85 - 110)  BP: 127/64 (28 Oct 2019 15:09) (127/64 - 151/73)  BP(mean): --  RR: 22 (28 Oct 2019 15:09) (18 - 22)  SpO2: 91% (28 Oct 2019 15:09) (88% - 95%) I&O's Summary    27 Oct 2019 07:01  -  28 Oct 2019 07:00  --------------------------------------------------------  IN: 1325 mL / OUT: 4000 mL / NET: -2675 mL    28 Oct 2019 07:01  -  28 Oct 2019 15:14  --------------------------------------------------------  IN: 137.5 mL / OUT: 50 mL / NET: 87.5 mL    GENERAL:  [ ]Alert  [ ]Oriented x   [ ]Lethargic  [ ]Cachexia  [ ]Unarousable  [ ]Verbal  [ ]Non-Verbal  Behavioral:   [ ] Anxiety  [ ] Delirium [ ] Agitation [ ] Other  HEENT:  [ ]Normal   [ ]Dry mouth   [ ]ET Tube/Trach  [ ]Oral lesions  PULMONARY:   [ ]Clear  [ ]Tachypnea  [ ]Audible excessive secretions   [ ]Rhonchi        [ ]Right [ ]Left [ ]Bilateral  [ ]Crackles        [ ]Right [ ]Left [ ]Bilateral  [ ]Wheezing     [ ]Right [ ]Left [ ]Bilateral  CARDIOVASCULAR:    [ ]Regular [ ]Irregular [ ]Tachy  [ ]Benny [ ]Murmur [ ]Other  GASTROINTESTINAL:  [ ]Soft  [ ]Distended   [ ]+BS  [ ]Non tender [ ]Tender  [ ]PEG [ ]OGT/ NGT  Last BM:     GENITOURINARY:  [ ]Normal [ ] Incontinent   [ ]Oliguria/Anuria   [ ]Gaines  MUSCULOSKELETAL:   [ ]Normal   [ ]Weakness  [ ]Bed/Wheelchair bound [ ]Edema  NEUROLOGIC:   [ ]No focal deficits  [ ] Cognitive impairment  [ ] Dysphagia [ ]Dysarthria [ ] Paresis [ ]Other   SKIN:   [ ]Normal   [ ]Pressure ulcer(s)  [ ]Rash    LABS:                        8.7    10.01 )-----------( 316      ( 28 Oct 2019 04:30 )             31.2   10-28    139  |  98  |  58<H>  ----------------------------<  121<H>  4.2   |  19<L>  |  3.30<H>    Ca    8.5      28 Oct 2019 04:30  Phos  5.6     10-28  Mg     1.8     10-28          RADIOLOGY & ADDITIONAL STUDIES:    PROTEIN CALORIE MALNUTRITION PRESENT: [ ] Yes [ ] No  [ ] PPSV2 < or = to 30% [ ] significant weight loss  [ ] poor nutritional intake [ ] catabolic state [ ] anasarca     Albumin, Serum: 2.4 g/dL (10-23-19 @ 12:10)      REFERRALS:   [ ]Chaplaincy  [ ] Hospice  [ ]Child Life  [ ]Social Work  [ ]Case management [ ]Holistic Therapy   Goals of Care Discussion Document: HPI:  74F h/o HTN, HLD, IDDM2, COPD (former smoker, 2L/min PRN at home), CHF (EF 12/2018 45-50%), ESRD not on dialysis, p/w AMS and hypotension. Per daughter at bedside, pt has been having a "chest cold" for the past week associated with cough productive of brown sputum, rhinorrhea, and diarrhea. Her daughter notes poor PO intake recently but pt has been receiving usual dose of insulin. At baseline, pt is A&Ox3 and ambulates w/ a cane, but can only walk short distances without becoming short of breath (< 1 block). Last known normal mental status was 10/18 in the AM. In the afternoon on 10/18, daughter found pt unresponsive. EMS measured FS 20, gave 1 amp of D50 with FS >200 after. Her daughter recently had a cold last week. Per daughter, pt has not had fevers, chills, nausea, vomiting, oliguria or anuria, dysuria, CP, palpitations, SOB, hematochezia, hematemesis, melena, and recent travel.     Of note, pt has ESRD w/ fistula placed 12/2018, but has not been getting dialysis. She has not followed up with any doctors recently.    In the ED, T 91.1F, HR 48, BP 89/54, RR 18. Initially hypotensive, given 750cc bolus w/ improvement in BP and mental status. However, spontaneously became hypotensive and altered while eating. She has been bradycardic even when her mental status had improved briefly. Hgb was 6.4, received 2u pRBC. Also received ceftriaxone 1g x1, Solumedrol 40mg x1, Zofran 4mg x1, and duonebs x3. (19 Oct 2019 01:41)      Patient resting in bed with daughter at bedside. Patient lethargic and on Hiflow. Daughter at bedside.     PERTINENT PM/SXH:   ESRD (end stage renal disease)  Hemorrhoids  GERD (gastroesophageal reflux disease)  GERD (gastroesophageal reflux disease)  Morbid obesity  Cataract  Diaphragmatic hernia  Cerebral infarction  CKD (chronic kidney disease)  Anemia  Diabetes mellitus  CHF (congestive heart failure)  Hyperlipidemia  Hypertension  COPD (chronic obstructive pulmonary disease)  Chronic obstructive pulmonary disease, unspecified COPD type  Adult Idiopathic Generalized Osteoporosis  CAD (Coronary Artery Disease)  DM (Diabetes Mellitus), Secondary  Hypertension    H/O coronary angiogram  No significant past surgical history  No significant past surgical history    FAMILY HISTORY:  Family history of breast cancer (Grandparent)      SOCIAL HISTORY:   Significant other/partner: Yes [ ]  No [ x] Children:  Yes [ x]  No [ ] Lutheran/Spirituality:  Substance hx: Yes[ ]  No [x ]   Tobacco hx:  Yes [x ] No [ ]   Alcohol hx: Yes [ ] No [ x]   Home Opioid hx:  [ ] I-Stop Reference No:  Living Situation: [x ]Home  [ ]Long term care  [ ]Rehab [ ]Other    ADVANCE DIRECTIVES:    DNR  MOLST  [ ]  Living Will  [ ]   DECISION MAKER(s):  [ ] Health Care Proxy(s)  [ x] Surrogate(s)  [ ] Guardian           Name(s): Phone Number(s): Haritha [phone] and Luz [phone]    BASELINE (I)ADL(s) (prior to admission):  [place]: [ ]Total  [ x] Moderate [ ]Dependent    Allergies    calcium acetate (Vomiting; Nausea; Chills)  wool-rash (Other)    Intolerances    MEDICATIONS  (STANDING):  albuterol/ipratropium for Nebulization 3 milliLiter(s) Nebulizer every 6 hours  aspirin enteric coated 81 milliGRAM(s) Oral daily  buMETAnide Infusion 1.5 mG/Hr (7.5 mL/Hr) IV Continuous <Continuous>  calcium gluconate IVPB 1 Gram(s) IV Intermittent daily  chlorhexidine 4% Liquid 1 Application(s) Topical daily  dextrose 5%. 1000 milliLiter(s) (50 mL/Hr) IV Continuous <Continuous>  dextrose 50% Injectable 12.5 Gram(s) IV Push once  dextrose 50% Injectable 25 Gram(s) IV Push once  dextrose 50% Injectable 25 Gram(s) IV Push once  epoetin terence Injectable 57151 Unit(s) SubCutaneous <User Schedule>  ergocalciferol 72401 Unit(s) Oral <User Schedule>  insulin lispro (HumaLOG) corrective regimen sliding scale   SubCutaneous three times a day before meals  metolazone 5 milliGRAM(s) Oral <User Schedule>  nystatin Powder 1 Application(s) Topical two times a day  pantoprazole  Injectable 40 milliGRAM(s) IV Push daily  piperacillin/tazobactam IVPB.. 3.375 Gram(s) IV Intermittent every 12 hours  sevelamer carbonate 800 milliGRAM(s) Oral three times a day with meals  sodium chloride 3%  Inhalation 4 milliLiter(s) Inhalation three times a day    MEDICATIONS  (PRN):  dextrose 40% Gel 15 Gram(s) Oral once PRN Blood Glucose LESS THAN 70 milliGRAM(s)/deciliter  glucagon  Injectable 1 milliGRAM(s) IntraMuscular once PRN Glucose LESS THAN 70 milligrams/deciliter  sodium chloride 0.65% Nasal 2 Spray(s) Both Nostrils four times a day PRN Nasal Congestion  tetracaine/benzocaine/butamben Spray 1 Spray(s) Topical daily PRN AVF    PRESENT SYMPTOMS: [x ]Unable to obtain due to poor mentation   Source if other than patient:  [ ]Family   [ ]Team     Pain (Impact on QOL):    Location -   Severity -        Minimal acceptable level (0-10 scale):  Quality:   Onset:   Duration:                 Aggravating factors -  Relieving factors -  Radiation -    PAIN AD Score:     [email or web address] (press ctrl +  left click to view)    Dyspnea:  Yes [ ] No [ ] - [ ]Mild [ ]Moderate [ ]Severe  Anxiety:    Yes [ ] No [ ] - [ ]Mild [ ]Moderate [ ]Severe  Fatigue:    Yes [ ] No [ ] - [ ]Mild [ ]Moderate [ ]Severe  Nausea:    Yes [ ] No [ ] - [ ]Mild [ ]Moderate [ ]Severe                         Loss of appetite: Yes [ ] No [ ] - [ ]Mild [ ]Moderate [ ]Severe             Constipation:  Yes [ ] No [ ] - [ ]Mild [ ]Moderate [ ]Severe  Grief:  Yes [ ] No [ ]     Other Symptoms:  [ ]All other review of systems negative     Karnofsky Performance Score/Palliative Performance Status Version 2:        30 %    http://palliative.info/resource_material/[email or web address].pdf  PHYSICAL EXAM:  Vital Signs Last 24 Hrs  T(C): 36.6 (28 Oct 2019 15:09), Max: 36.8 (27 Oct 2019 20:00)  T(F): 97.8 (28 Oct 2019 15:09), Max: 98.2 (27 Oct 2019 20:00)  HR: 85 (28 Oct 2019 15:09) (85 - 110)  BP: 127/64 (28 Oct 2019 15:09) (127/64 - 151/73)  BP(mean): --  RR: 22 (28 Oct 2019 15:09) (18 - 22)  SpO2: 91% (28 Oct 2019 15:09) (88% - 95%) I&O's Summary    27 Oct 2019 07:01  -  28 Oct 2019 07:00  --------------------------------------------------------  IN: 1325 mL / OUT: 4000 mL / NET: -2675 mL    28 Oct 2019 07:01  -  28 Oct 2019 15:14  --------------------------------------------------------  IN: 137.5 mL / OUT: 50 mL / NET: 87.5 mL    GENERAL:  [ ]Alert  [ ]Oriented x   [x ]Lethargic  [ ]Cachexia  [ ]Unarousable  [ ]Verbal  [ ]Non-Verbal  Behavioral:   [ ] Anxiety  [ ] Delirium [ ] Agitation [x ] Other  HEENT:  [ ]Normal   [ ]Dry mouth   [ ]ET Tube/Trach  [ ]Oral lesions [x] Hi flow   PULMONARY:   [ ]Clear  [ ]Tachypnea  [ ]Audible excessive secretions   [x ]Rhonchi        [ ]Right [ ]Left [s ]Bilateral  [ ]Crackles        [ ]Right [ ]Left [ ]Bilateral  [ ]Wheezing     [ ]Right [ ]Left [ ]Bilateral  CARDIOVASCULAR:    [x ]Regular [ ]Irregular [ ]Tachy  [ ]Benny [ ]Murmur [ ]Other  GASTROINTESTINAL:  [x ]Soft  [ ]Distended   [ ]+BS  [x ]Non tender [ ]Tender  [ ]PEG [ ]OGT/ NGT  Last BM:   GENITOURINARY:  [ ]Normal [x ] Incontinent   [ ]Oliguria/Anuria   [ ]Gaines  MUSCULOSKELETAL:   [ ]Normal   [x ]Weakness  [ ]Bed/Wheelchair bound [ ]Edema  NEUROLOGIC:   [ ]No focal deficits  [x ] Cognitive impairment  [ ] Dysphagia [ ]Dysarthria [ ] Paresis [ ]Other   SKIN:   [ x]Normal   [ ]Pressure ulcer(s)  [ ]Rash    LABS:                        8.7    10.01 )-----------( 316      ( 28 Oct 2019 04:30 )             31.2   10-28    139  |  98  |  58<H>  ----------------------------<  121<H>  4.2   |  19<L>  |  3.30<H>    Ca    8.5      28 Oct 2019 04:30  Phos  5.6     10-28  Mg     1.8     10-28      RADIOLOGY & ADDITIONAL STUDIES:  < from: Xray Chest 1 View- PORTABLE-Routine (10.28.19 @ 05:29) >  EXAM:  XR CHEST PORTABLE ROUTINE 1V        PROCEDURE DATE:  Oct 28 2019   IMPRESSION:    Right IJ central venous catheter terminates in the SVC. Redemonstration   of left hemithorax complete opacification, mildly increased from   10/27/2019 with some associated volume loss suggestive of left lung   atelectasis. Right lung interstitial opacities may be due to pulmonary   edema, increased.     < end of copied text >      PROTEIN CALORIE MALNUTRITION PRESENT: [ ] Yes [ ] No  [ ] PPSV2 < or = to 30% [ ] significant weight loss  [ ] poor nutritional intake [ ] catabolic state [ ] anasarca     Albumin, Serum: 2.4 g/dL (10-23-19 @ 12:10)      REFERRALS:   [ ]Chaplaincy  [ ] Hospice  [ ]Child Life  [ ]Social Work  [ ]Case management [ ]Holistic Therapy   Goals of Care Discussion Document: HPI:  74F h/o HTN, HLD, IDDM2, COPD (former smoker, 2L/min PRN at home), CHF (EF 12/2018 45-50%), ESRD not on dialysis, p/w AMS and hypotension. Per daughter at bedside, pt has been having a "chest cold" for the past week associated with cough productive of brown sputum, rhinorrhea, and diarrhea. Her daughter notes poor PO intake recently but pt has been receiving usual dose of insulin. At baseline, pt is A&Ox3 and ambulates w/ a cane, but can only walk short distances without becoming short of breath (< 1 block). Last known normal mental status was 10/18 in the AM. In the afternoon on 10/18, daughter found pt unresponsive. EMS measured FS 20, gave 1 amp of D50 with FS >200 after. Her daughter recently had a cold last week. Per daughter, pt has not had fevers, chills, nausea, vomiting, oliguria or anuria, dysuria, CP, palpitations, SOB, hematochezia, hematemesis, melena, and recent travel.     Of note, pt has ESRD w/ fistula placed 12/2018, but has not been getting dialysis. She has not followed up with any doctors recently.    In the ED, T 91.1F, HR 48, BP 89/54, RR 18. Initially hypotensive, given 750cc bolus w/ improvement in BP and mental status. However, spontaneously became hypotensive and altered while eating. She has been bradycardic even when her mental status had improved briefly. Hgb was 6.4, received 2u pRBC. Also received ceftriaxone 1g x1, Solumedrol 40mg x1, Zofran 4mg x1, and duonebs x3. (19 Oct 2019 01:41)    Patient resting in bed with daughter at bedside. Patient lethargic and on Hiflow. Daughter at bedside.     PERTINENT PM/SXH:   ESRD (end stage renal disease)  Hemorrhoids  GERD (gastroesophageal reflux disease)  GERD (gastroesophageal reflux disease)  Morbid obesity  Cataract  Diaphragmatic hernia  Cerebral infarction  CKD (chronic kidney disease)  Anemia  Diabetes mellitus  CHF (congestive heart failure)  Hyperlipidemia  Hypertension  COPD (chronic obstructive pulmonary disease)  Chronic obstructive pulmonary disease, unspecified COPD type  Adult Idiopathic Generalized Osteoporosis  CAD (Coronary Artery Disease)  DM (Diabetes Mellitus), Secondary  Hypertension    H/O coronary angiogram  No significant past surgical history  No significant past surgical history    FAMILY HISTORY:  Family history of breast cancer (Grandparent)    SOCIAL HISTORY:   Significant other/partner: Yes [ ]  No [ x] Children:  Yes [ x]  No [ ] Cheondoism/Spirituality:  Substance hx: Yes[ ]  No [x ]   Tobacco hx:  Yes [x ] No [ ]   Alcohol hx: Yes [ ] No [ x]   Home Opioid hx:  [ ] I-Stop Reference No:  Living Situation: [x ]Home  [ ]Long term care  [ ]Rehab [ ]Other    ADVANCE DIRECTIVES:    DNR  MOLST  [ ]  Living Will  [ ]   DECISION MAKER(s):  [ ] Health Care Proxy(s)  [ x] Surrogate(s)  [ ] Guardian           Name(s): Phone Number(s): Haritha [phone] and Luz [phone]    BASELINE (I)ADL(s) (prior to admission):  [place]: [ ]Total  [ x] Moderate [ ]Dependent    Allergies    calcium acetate (Vomiting; Nausea; Chills)  wool-rash (Other)    Intolerances    MEDICATIONS  (STANDING):  albuterol/ipratropium for Nebulization 3 milliLiter(s) Nebulizer every 6 hours  aspirin enteric coated 81 milliGRAM(s) Oral daily  buMETAnide Infusion 1.5 mG/Hr (7.5 mL/Hr) IV Continuous <Continuous>  calcium gluconate IVPB 1 Gram(s) IV Intermittent daily  chlorhexidine 4% Liquid 1 Application(s) Topical daily  dextrose 5%. 1000 milliLiter(s) (50 mL/Hr) IV Continuous <Continuous>  dextrose 50% Injectable 12.5 Gram(s) IV Push once  dextrose 50% Injectable 25 Gram(s) IV Push once  dextrose 50% Injectable 25 Gram(s) IV Push once  epoetin terence Injectable 35749 Unit(s) SubCutaneous <User Schedule>  ergocalciferol 99061 Unit(s) Oral <User Schedule>  insulin lispro (HumaLOG) corrective regimen sliding scale   SubCutaneous three times a day before meals  metolazone 5 milliGRAM(s) Oral <User Schedule>  nystatin Powder 1 Application(s) Topical two times a day  pantoprazole  Injectable 40 milliGRAM(s) IV Push daily  piperacillin/tazobactam IVPB.. 3.375 Gram(s) IV Intermittent every 12 hours  sevelamer carbonate 800 milliGRAM(s) Oral three times a day with meals  sodium chloride 3%  Inhalation 4 milliLiter(s) Inhalation three times a day    MEDICATIONS  (PRN):  dextrose 40% Gel 15 Gram(s) Oral once PRN Blood Glucose LESS THAN 70 milliGRAM(s)/deciliter  glucagon  Injectable 1 milliGRAM(s) IntraMuscular once PRN Glucose LESS THAN 70 milligrams/deciliter  sodium chloride 0.65% Nasal 2 Spray(s) Both Nostrils four times a day PRN Nasal Congestion  tetracaine/benzocaine/butamben Spray 1 Spray(s) Topical daily PRN AVF    PRESENT SYMPTOMS: [x ]Unable to obtain due to poor mentation   Source if other than patient:  [ ]Family   [ ]Team     Pain (Impact on QOL):    Location -   Severity -        Minimal acceptable level (0-10 scale):  Quality:   Onset:   Duration:                 Aggravating factors -  Relieving factors -  Radiation -    PAIN AD Score: 2    [email or web address] (press ctrl +  left click to view)    Dyspnea:  Yes [ ] No [ ] - [ ]Mild [ ]Moderate [ ]Severe  Anxiety:    Yes [ ] No [ ] - [ ]Mild [ ]Moderate [ ]Severe  Fatigue:    Yes [ ] No [ ] - [ ]Mild [ ]Moderate [ ]Severe  Nausea:    Yes [ ] No [ ] - [ ]Mild [ ]Moderate [ ]Severe                         Loss of appetite: Yes [ ] No [ ] - [ ]Mild [ ]Moderate [ ]Severe             Constipation:  Yes [ ] No [ ] - [ ]Mild [ ]Moderate [ ]Severe  Grief:  Yes [ ] No [ ]     Other Symptoms:  [ ]All other review of systems negative     Karnofsky Performance Score/Palliative Performance Status Version 2:        30 %    http://palliative.info/resource_material/[email or web address].pdf    PHYSICAL EXAM:  Vital Signs Last 24 Hrs  T(C): 36.6 (28 Oct 2019 15:09), Max: 36.8 (27 Oct 2019 20:00)  T(F): 97.8 (28 Oct 2019 15:09), Max: 98.2 (27 Oct 2019 20:00)  HR: 85 (28 Oct 2019 15:09) (85 - 110)  BP: 127/64 (28 Oct 2019 15:09) (127/64 - 151/73)  BP(mean): --  RR: 22 (28 Oct 2019 15:09) (18 - 22)  SpO2: 91% (28 Oct 2019 15:09) (88% - 95%) I&O's Summary    27 Oct 2019 07:01  -  28 Oct 2019 07:00  --------------------------------------------------------  IN: 1325 mL / OUT: 4000 mL / NET: -2675 mL    28 Oct 2019 07:01  -  28 Oct 2019 15:14  --------------------------------------------------------  IN: 137.5 mL / OUT: 50 mL / NET: 87.5 mL    GENERAL:  [ ]Alert  [ ]Oriented x   [x ]Lethargic  [ ]Cachexia  [ ]Unarousable  [ ]Verbal  [ ]Non-Verbal  Behavioral:   [ ] Anxiety  [ ] Delirium [ ] Agitation [x ] Other  HEENT:  [ ]Normal   [ ]Dry mouth   [ ]ET Tube/Trach  [ ]Oral lesions [x] Hi flow   PULMONARY:   [ ]Clear  [ ]Tachypnea  [ ]Audible excessive secretions   [x ]Rhonchi        [ ]Right [ ]Left [s ]Bilateral  [ ]Crackles        [ ]Right [ ]Left [ ]Bilateral  [ ]Wheezing     [ ]Right [ ]Left [ ]Bilateral  CARDIOVASCULAR:    [x ]Regular [ ]Irregular [ ]Tachy  [ ]Benny [ ]Murmur [ ]Other  GASTROINTESTINAL:  [x ]Soft  [ ]Distended   [ ]+BS  [x ]Non tender [ ]Tender  [ ]PEG [ ]OGT/ NGT  Last BM: 10/28  GENITOURINARY:  [ ]Normal [x ] Incontinent   [ ]Oliguria/Anuria   [ ]Gaines  MUSCULOSKELETAL:   [ ]Normal   [x ]Weakness  [ ]Bed/Wheelchair bound [ ]Edema  NEUROLOGIC:   [ ]No focal deficits  [x ] Cognitive impairment  [ ] Dysphagia [ ]Dysarthria [ ] Paresis [ ]Other   SKIN:   [ x]Normal   [ ]Pressure ulcer(s)  [ ]Rash    LABS:                        8.7    10.01 )-----------( 316      ( 28 Oct 2019 04:30 )             31.2   10-28    139  |  98  |  58<H>  ----------------------------<  121<H>  4.2   |  19<L>  |  3.30<H>    Ca    8.5      28 Oct 2019 04:30  Phos  5.6     10-28  Mg     1.8     10-28    RADIOLOGY & ADDITIONAL STUDIES:    < from: Xray Chest 1 View- PORTABLE-Routine (10.28.19 @ 05:29) >    EXAM:  XR CHEST PORTABLE ROUTINE 1V      PROCEDURE DATE:  Oct 28 2019     IMPRESSION:    Right IJ central venous catheter terminates in the SVC. Redemonstration   of left hemithorax complete opacification, mildly increased from   10/27/2019 with some associated volume loss suggestive of left lung   atelectasis. Right lung interstitial opacities may be due to pulmonary   edema, increased.     PROTEIN CALORIE MALNUTRITION PRESENT: [ ] Yes [ ] No  [x] PPSV2 < or = to 30% [ ] significant weight loss  [ ] poor nutritional intake [ ] catabolic state [ ] anasarca     Albumin, Serum: 2.4 g/dL (10-23-19 @ 12:10)    REFERRALS:   [ ]Chaplaincy  [ ] Hospice  [ ]Child Life  [ ]Social Work  [ ]Case management [ ]Holistic Therapy   Goals of Care Discussion Document:

## 2019-10-28 NOTE — CONSULT NOTE ADULT - PROBLEM SELECTOR RECOMMENDATION 3
Patient with underlying PNA, that exacerbated COPD. Is non compliant with medication and does not go to providers. Currently on Hiflow, continue care as per Primary team. Patient with underlying PNA, that exacerbated COPD. Is non compliant with medication and does not go to providers. Currently on High flow continue care as per Primary team.

## 2019-10-28 NOTE — CONSULT NOTE ADULT - CONSULT REASON
CKD stage 5 not on HD
Complex decision making related to goals of care
SOB, bipap
shiley placement
shiley placement
NSTEMI  AMS  Hypotension sepsis

## 2019-10-28 NOTE — PROGRESS NOTE ADULT - ASSESSMENT
73yo F with acute metabolic encephalopathy, volume overload and uremia, admitted to MICU for septic shock management. Vascular consulted for shiley placed and functioning.     Plan:  - Please obtain ultrasound of LUE AVF  - Vascular will follow 75yo F with acute metabolic encephalopathy, volume overload and uremia, admitted to MICU for septic shock management. Vascular consulted for shiley placed and functioning.     Plan:  - Ultrasound of LUE AVF (ordered 10/27) pending      - f/u result  - Vascular planning pending above study    page [phone] with questions or concerns

## 2019-10-28 NOTE — PROGRESS NOTE ADULT - ATTENDING COMMENTS
acute on chronic resp failure with hypercapnea and hypoxia  acute on chronic renal failure  Pseudomonas pna  O2 dependent COPD  Morbid obesity, poor compliance, overall declining functional status for past year      Daughter at bedside  Pt lying in bed, on HFNC, essentially immobile, complains at being touched or moved.  Weak , but wet, cough  LE wrinkling  On HD, bumex gtt, zarox  and day 9 zosyn for pseud pna    At baseline -- former heavy smoker, quit 1 year ago after an admission for copd  Lives with her 2 daughters, who also work  Pt with limited mobility at home, stays on 1 floor. Has home O2 but oftern doesn't wear it  And refuses to go to the doctor, even recently in the face of increasing LE edema, and once even after a fall and EMS arriving at the house.    Lung Us -- trace left effusion, but sig consolid/atelectasis    Chest PT, OOB!  complete 10 d abx  titrate O2  Nocutrnal bilevel -- increase to 16/10, pt with mallampati 4  Palliative care consutl

## 2019-10-28 NOTE — CHART NOTE - NSCHARTNOTEFT_GEN_A_CORE
: Shakir Medina    INDICATION: Hypoxic respiratory failure    PROCEDURE:  [ ] LIMITED ECHO  [x ] LIMITED CHEST  [ ] LIMITED RETROPERITONEAL  [ ] LIMITED ABDOMINAL  [ ] LIMITED DVT  [ ] NEEDLE GUIDANCE VASCULAR  [ ] NEEDLE GUIDANCE THORACENTESIS  [ ] NEEDLE GUIDANCE PARACENTESIS  [ ] NEEDLE GUIDANCE PERICARDIOCENTESIS  [ ] OTHER    FINDINGS: Left sided consolidation and small left sided pleural effusion.     INTERPRETATION: Left sided consolidation and small left sided pleural effusion consistent with known atelectasis and mucus plugging. Plan to continue aggressive chest PT      --------------------------------------------------------  Shakir Juares, PGY-4  Pulmonary/Critical Care Fellow  Pager: [phone] ([facility]), [phone] ([facility])

## 2019-10-29 LAB
ANION GAP SERPL CALC-SCNC: 22 MMO/L — HIGH (ref 7–14)
BASE EXCESS BLDA CALC-SCNC: -2.1 MMOL/L — SIGNIFICANT CHANGE UP
BASOPHILS # BLD AUTO: 0.03 K/UL — SIGNIFICANT CHANGE UP (ref 0–0.2)
BASOPHILS NFR BLD AUTO: 0.2 % — SIGNIFICANT CHANGE UP (ref 0–2)
BUN SERPL-MCNC: 46 MG/DL — HIGH (ref 7–23)
CALCIUM SERPL-MCNC: 8.7 MG/DL — SIGNIFICANT CHANGE UP (ref 8.4–10.5)
CHLORIDE SERPL-SCNC: 100 MMOL/L — SIGNIFICANT CHANGE UP (ref 98–107)
CO2 SERPL-SCNC: 20 MMOL/L — LOW (ref 22–31)
CREAT SERPL-MCNC: 3.05 MG/DL — HIGH (ref 0.5–1.3)
EOSINOPHIL # BLD AUTO: 0.11 K/UL — SIGNIFICANT CHANGE UP (ref 0–0.5)
EOSINOPHIL NFR BLD AUTO: 0.9 % — SIGNIFICANT CHANGE UP (ref 0–6)
GLUCOSE SERPL-MCNC: 133 MG/DL — HIGH (ref 70–99)
HBV CORE AB SER-ACNC: NONREACTIVE — SIGNIFICANT CHANGE UP
HBV SURFACE AB SER-ACNC: <3 MLU/ML — LOW
HCO3 BLDA-SCNC: 23 MMOL/L — SIGNIFICANT CHANGE UP (ref 22–26)
HCT VFR BLD CALC: 30.3 % — LOW (ref 34.5–45)
HGB BLD-MCNC: 8.6 G/DL — LOW (ref 11.5–15.5)
IMM GRANULOCYTES NFR BLD AUTO: 0.9 % — SIGNIFICANT CHANGE UP (ref 0–1.5)
LYMPHOCYTES # BLD AUTO: 0.58 K/UL — LOW (ref 1–3.3)
LYMPHOCYTES # BLD AUTO: 4.6 % — LOW (ref 13–44)
MAGNESIUM SERPL-MCNC: 1.8 MG/DL — SIGNIFICANT CHANGE UP (ref 1.6–2.6)
MCHC RBC-ENTMCNC: 27.1 PG — SIGNIFICANT CHANGE UP (ref 27–34)
MCHC RBC-ENTMCNC: 28.4 % — LOW (ref 32–36)
MCV RBC AUTO: 95.6 FL — SIGNIFICANT CHANGE UP (ref 80–100)
MONOCYTES # BLD AUTO: 0.82 K/UL — SIGNIFICANT CHANGE UP (ref 0–0.9)
MONOCYTES NFR BLD AUTO: 6.4 % — SIGNIFICANT CHANGE UP (ref 2–14)
NEUTROPHILS # BLD AUTO: 11.07 K/UL — HIGH (ref 1.8–7.4)
NEUTROPHILS NFR BLD AUTO: 87 % — HIGH (ref 43–77)
NRBC # FLD: 0.05 K/UL — SIGNIFICANT CHANGE UP (ref 0–0)
PCO2 BLDA: 37 MMHG — SIGNIFICANT CHANGE UP (ref 32–48)
PH BLDA: 7.4 PH — SIGNIFICANT CHANGE UP (ref 7.35–7.45)
PHOSPHATE SERPL-MCNC: 4.9 MG/DL — HIGH (ref 2.5–4.5)
PLATELET # BLD AUTO: 352 K/UL — SIGNIFICANT CHANGE UP (ref 150–400)
PMV BLD: 10.8 FL — SIGNIFICANT CHANGE UP (ref 7–13)
PO2 BLDA: 55 MMHG — LOW (ref 83–108)
POTASSIUM SERPL-MCNC: 3.8 MMOL/L — SIGNIFICANT CHANGE UP (ref 3.5–5.3)
POTASSIUM SERPL-SCNC: 3.8 MMOL/L — SIGNIFICANT CHANGE UP (ref 3.5–5.3)
RBC # BLD: 3.17 M/UL — LOW (ref 3.8–5.2)
RBC # FLD: 17.8 % — HIGH (ref 10.3–14.5)
SAO2 % BLDA: 84.3 % — LOW (ref 95–99)
SODIUM SERPL-SCNC: 142 MMOL/L — SIGNIFICANT CHANGE UP (ref 135–145)
SPECIMEN SOURCE: SIGNIFICANT CHANGE UP
WBC # BLD: 12.72 K/UL — HIGH (ref 3.8–10.5)
WBC # FLD AUTO: 12.72 K/UL — HIGH (ref 3.8–10.5)

## 2019-10-29 PROCEDURE — 99223 1ST HOSP IP/OBS HIGH 75: CPT | Mod: GC

## 2019-10-29 PROCEDURE — 99233 SBSQ HOSP IP/OBS HIGH 50: CPT | Mod: GC

## 2019-10-29 RX ORDER — ACETAMINOPHEN 500 MG
650 TABLET ORAL ONCE
Refills: 0 | Status: COMPLETED | OUTPATIENT
Start: 2019-10-29 | End: 2019-10-29

## 2019-10-29 RX ADMIN — CHLORHEXIDINE GLUCONATE 1 APPLICATION(S): 213 SOLUTION TOPICAL at 12:24

## 2019-10-29 RX ADMIN — Medication 1: at 12:25

## 2019-10-29 RX ADMIN — Medication 3 MILLILITER(S): at 03:53

## 2019-10-29 RX ADMIN — PIPERACILLIN AND TAZOBACTAM 25 GRAM(S): 4; .5 INJECTION, POWDER, LYOPHILIZED, FOR SOLUTION INTRAVENOUS at 18:19

## 2019-10-29 RX ADMIN — Medication 200 GRAM(S): at 12:23

## 2019-10-29 RX ADMIN — BUMETANIDE 7.5 MG/HR: 0.25 INJECTION INTRAMUSCULAR; INTRAVENOUS at 18:20

## 2019-10-29 RX ADMIN — PANTOPRAZOLE SODIUM 40 MILLIGRAM(S): 20 TABLET, DELAYED RELEASE ORAL at 12:24

## 2019-10-29 RX ADMIN — SODIUM CHLORIDE 4 MILLILITER(S): 9 INJECTION INTRAMUSCULAR; INTRAVENOUS; SUBCUTANEOUS at 16:49

## 2019-10-29 RX ADMIN — SODIUM CHLORIDE 4 MILLILITER(S): 9 INJECTION INTRAMUSCULAR; INTRAVENOUS; SUBCUTANEOUS at 22:07

## 2019-10-29 RX ADMIN — SODIUM CHLORIDE 4 MILLILITER(S): 9 INJECTION INTRAMUSCULAR; INTRAVENOUS; SUBCUTANEOUS at 09:36

## 2019-10-29 RX ADMIN — NYSTATIN CREAM 1 APPLICATION(S): 100000 CREAM TOPICAL at 18:20

## 2019-10-29 RX ADMIN — PIPERACILLIN AND TAZOBACTAM 25 GRAM(S): 4; .5 INJECTION, POWDER, LYOPHILIZED, FOR SOLUTION INTRAVENOUS at 06:35

## 2019-10-29 RX ADMIN — Medication 3 MILLILITER(S): at 09:37

## 2019-10-29 RX ADMIN — NYSTATIN CREAM 1 APPLICATION(S): 100000 CREAM TOPICAL at 06:35

## 2019-10-29 RX ADMIN — Medication 650 MILLIGRAM(S): at 23:41

## 2019-10-29 RX ADMIN — BUMETANIDE 7.5 MG/HR: 0.25 INJECTION INTRAMUSCULAR; INTRAVENOUS at 12:25

## 2019-10-29 RX ADMIN — Medication 3 MILLILITER(S): at 16:49

## 2019-10-29 RX ADMIN — Medication 3 MILLILITER(S): at 22:07

## 2019-10-29 RX ADMIN — BUMETANIDE 7.5 MG/HR: 0.25 INJECTION INTRAMUSCULAR; INTRAVENOUS at 23:42

## 2019-10-29 NOTE — PROGRESS NOTE ADULT - SUBJECTIVE AND OBJECTIVE BOX
[facility] [place] [facility]   MD CHARITY MCADAMS, DO FANI BROWN, JASSON CONNOLLY    TEL:  OFFICE: [phone]  DR ALCANTAR CELL: [phone]  DR. MORRIS CELL: [phone]  DR. BROWN CELL: [phone]  TENISHA BOATENG CELL: [phone]        Patient is a 74y old  Female who presents with a chief complaint of AMS and Hypotension (29 Oct 2019 14:34)      Patient seen and examined at bedside.     VITALS:  T(F): 98.5 (10-29-19 @ 14:15), Max: 98.5 (10-29-19 @ 13:10)  HR: 120 (10-29-19 @ 14:15)  BP: 129/93 (10-29-19 @ 14:15)  RR: 20 (10-29-19 @ 14:15)  SpO2: 90% (10-29-19 @ 12:12)  Wt(kg): --    10-28 @ 07:01  -  10-29 @ 07:00  --------------------------------------------------------  IN: 887.5 mL / OUT: 3800 mL / NET: -2912.5 mL    10-29 @ 07:01  -  10-29 @ 14:48  --------------------------------------------------------  IN: 400 mL / OUT: 587 mL / NET: -187 mL          PHYSICAL EXAM:  Constitutional: pt lethargic today  Neck: No JVD  Respiratory:+ rhonchi  Cardiovascular: S1, S2, RRR  Gastrointestinal: BS+, soft, NT/ND  Extremities: No peripheral edema    Hospital Medications:   MEDICATIONS  (STANDING):  albuterol/ipratropium for Nebulization 3 milliLiter(s) Nebulizer every 6 hours  aspirin enteric coated 81 milliGRAM(s) Oral daily  buMETAnide Infusion 1.5 mG/Hr (7.5 mL/Hr) IV Continuous <Continuous>  calcium gluconate IVPB 1 Gram(s) IV Intermittent daily  chlorhexidine 4% Liquid 1 Application(s) Topical daily  dextrose 5%. 1000 milliLiter(s) (50 mL/Hr) IV Continuous <Continuous>  dextrose 50% Injectable 12.5 Gram(s) IV Push once  dextrose 50% Injectable 25 Gram(s) IV Push once  dextrose 50% Injectable 25 Gram(s) IV Push once  epoetin terence Injectable 25103 Unit(s) SubCutaneous <User Schedule>  ergocalciferol 18048 Unit(s) Oral <User Schedule>  insulin lispro (HumaLOG) corrective regimen sliding scale   SubCutaneous three times a day before meals  metolazone 5 milliGRAM(s) Oral <User Schedule>  nystatin Powder 1 Application(s) Topical two times a day  pantoprazole  Injectable 40 milliGRAM(s) IV Push daily  piperacillin/tazobactam IVPB.. 3.375 Gram(s) IV Intermittent every 12 hours  sevelamer carbonate 800 milliGRAM(s) Oral three times a day with meals  sodium chloride 3%  Inhalation 4 milliLiter(s) Inhalation three times a day      LABS:  10-29    142  |  100  |  46<H>  ----------------------------<  133<H>  3.8   |  20<L>  |  3.05<H>    Ca    8.7      29 Oct 2019 05:45  Phos  4.9     10-29  Mg     1.8     10-29      Creatinine Trend: 3.05 <--, 3.30 <--, 2.98 <--, 4.71 <--, 4.76 <--, 4.59 <--, 4.74 <--, 4.66 <--    Phosphorus Level, Serum: 4.9 mg/dL (10-29 @ 05:45)                              8.6    12.72 )-----------( 352      ( 29 Oct 2019 05:45 )             30.3     Urine Studies:  Urinalysis - [10-19-19 @ 03:30]      Color YELLOW / Appearance CLEAR / SG 1.017 / pH 5.5      Gluc NEGATIVE / Ketone NEGATIVE  / Bili NEGATIVE / Urobili NORMAL       Blood NEGATIVE / Protein 10 / Leuk Est NEGATIVE / Nitrite NEGATIVE      RBC  / WBC  / Hyaline  / Gran  / Sq Epi  / Non Sq Epi  / Bacteria       Iron 27, TIBC 125, %sat --      [10-20-19 @ 02:40]  Ferritin 283.7      [10-20-19 @ 02:40]  .9 (Ca --)      [10-20-19 @ 02:40]   --  PTH -- (Ca 8.7)      [10-31-18 @ 09:24]   269  Vitamin D (25OH) 10.5      [10-20-19 @ 02:40]  HbA1c 5.2      [10-20-19 @ 02:40]  TSH 1.11      [10-18-19 @ 23:20]    HBsAb <3.0      [10-28-19 @ 19:05]  HBsAg NEGATIVE      [10-24-19 @ 02:30]  HBcAb Nonreactive      [10-28-19 @ 19:05]  HCV 0.16, Nonreactive Hepatitis C AB  S/CO Ratio                        Interpretation  < 1.00                                   Non-Reactive  1.00 - 4.99                         Weakly-Reactive  >= 5.00                                Reactive  Non-Reactive: Aperson with a non-reactive HCV antibody  result is considered uninfected.  No further action is  needed unless recent infection is suspected.  In these  cases, consider repeat testing later to detect  seroconversion..  Weakly-Reactive: HCV antibody test is abnormal, HCV RNA  Qualitative test will follow.  Reactive: HCV antibody test is abnormal, HCV RNA  Qualitative test will follow.  Note: HCV antibody testing is performed on the [employer]   system.      [10-19-19 @ 12:30]      RADIOLOGY & ADDITIONAL STUDIES:

## 2019-10-29 NOTE — PROGRESS NOTE ADULT - SUBJECTIVE AND OBJECTIVE BOX
INTERVAL HPI/OVERNIGHT EVENTS:    Patient resting in bed. No distress noted. Son at bedside.     Code Status: Full code  Allergies    calcium acetate (Vomiting; Nausea; Chills)  wool-rash (Other)    Intolerances    MEDICATIONS  (STANDING):  albuterol/ipratropium for Nebulization 3 milliLiter(s) Nebulizer every 6 hours  aspirin enteric coated 81 milliGRAM(s) Oral daily  buMETAnide Infusion 1.5 mG/Hr (7.5 mL/Hr) IV Continuous <Continuous>  calcium gluconate IVPB 1 Gram(s) IV Intermittent daily  chlorhexidine 4% Liquid 1 Application(s) Topical daily  dextrose 5%. 1000 milliLiter(s) (50 mL/Hr) IV Continuous <Continuous>  dextrose 50% Injectable 12.5 Gram(s) IV Push once  dextrose 50% Injectable 25 Gram(s) IV Push once  dextrose 50% Injectable 25 Gram(s) IV Push once  epoetin terence Injectable 14571 Unit(s) SubCutaneous <User Schedule>  ergocalciferol 12936 Unit(s) Oral <User Schedule>  insulin lispro (HumaLOG) corrective regimen sliding scale   SubCutaneous three times a day before meals  metolazone 5 milliGRAM(s) Oral <User Schedule>  nystatin Powder 1 Application(s) Topical two times a day  pantoprazole  Injectable 40 milliGRAM(s) IV Push daily  piperacillin/tazobactam IVPB.. 3.375 Gram(s) IV Intermittent every 12 hours  sevelamer carbonate 800 milliGRAM(s) Oral three times a day with meals  sodium chloride 3%  Inhalation 4 milliLiter(s) Inhalation three times a day    MEDICATIONS  (PRN):  dextrose 40% Gel 15 Gram(s) Oral once PRN Blood Glucose LESS THAN 70 milliGRAM(s)/deciliter  glucagon  Injectable 1 milliGRAM(s) IntraMuscular once PRN Glucose LESS THAN 70 milligrams/deciliter  sodium chloride 0.65% Nasal 2 Spray(s) Both Nostrils four times a day PRN Nasal Congestion  tetracaine/benzocaine/butamben Spray 1 Spray(s) Topical daily PRN AVF      PRESENT SYMPTOMS: [ x]Unable to obtain due to poor mentation   Source if other than patient:  [ ]Family   [ ]Team     Pain (Impact on QOL):    Location:  Severity:  Minimal acceptable level (0-10 scale):       Quality:       Onset:  Duration:  Aggravating factors:  Relieving Factors  Radiation:    Dyspnea:  Yes [ ] No [ ] - [ ]Mild [ ]Moderate [ ]Severe  Anxiety:    Yes [ ] No [ ] - [ ]Mild [ ]Moderate [ ]Severe  Fatigue:    Yes [ ] No [ ] - [ ]Mild [ ]Moderate [ ]Severe  Nausea:    Yes [ ] No [ ] - [ ]Mild [ ]Moderate [ ]Severe                         Loss of appetite: Yes [ ] No [ ] - [ ]Mild [ ]Moderate [ ]Severe             Constipation:  Yes [ ] No [ ] - [ ]Mild [ ]Moderate [ ]Severe  Grief:  Yes [ ] No [ ]     PAIN AD Score:	  [email or web address] (Ctrl + left click to view)    Other Symptoms:  [ ]All other review of systems negative     Karnofsky Performance Score/Palliative Performance Status Version 2:        20 %    http://palliative.info/resource_material/PPSv2.pdf    PHYSICAL EXAM:  Vital Signs Last 24 Hrs  T(C): 36.9 (29 Oct 2019 14:15), Max: 36.9 (29 Oct 2019 13:10)  T(F): 98.5 (29 Oct 2019 14:15), Max: 98.5 (29 Oct 2019 13:10)  HR: 120 (29 Oct 2019 14:15) (84 - 120)  BP: 129/93 (29 Oct 2019 14:15) (126/58 - 147/66)  BP(mean): --  RR: 20 (29 Oct 2019 14:15) (18 - 23)  SpO2: 90% (29 Oct 2019 12:12) (90% - 96%) I&O's Summary    28 Oct 2019 07:01  -  29 Oct 2019 07:00  --------------------------------------------------------  IN: 887.5 mL / OUT: 3800 mL / NET: -2912.5 mL    29 Oct 2019 07:01  -  29 Oct 2019 14:35  --------------------------------------------------------  IN: 400 mL / OUT: 587 mL / NET: -187 mL     GENERAL:  [ ]Alert  [ ]Oriented x   [x ]Lethargic  [ ]Cachexia  [ ]Unarousable  [ ]Verbal  [ ]Non-Verbal  Behavioral:   [ ] Anxiety  [ ] Delirium [ ] Agitation [x ] Other  HEENT:  [ ]Normal   [ ]Dry mouth   [ ]ET Tube/Trach  [ ]Oral lesions [x] Hi flow   PULMONARY:   [ ]Clear  [ ]Tachypnea  [ ]Audible excessive secretions   [x ]Rhonchi        [ ]Right [ ]Left [s ]Bilateral  [ ]Crackles        [ ]Right [ ]Left [ ]Bilateral  [ ]Wheezing     [ ]Right [ ]Left [ ]Bilateral  CARDIOVASCULAR:    [x ]Regular [ ]Irregular [ ]Tachy  [ ]Benny [ ]Murmur [ ]Other  GASTROINTESTINAL:  [x ]Soft  [ ]Distended   [ ]+BS  [x ]Non tender [ ]Tender  [ ]PEG [ ]OGT/ NGT  Last BM: 10/28  GENITOURINARY:  [ ]Normal [x ] Incontinent   [ ]Oliguria/Anuria   [ ]Gaines  MUSCULOSKELETAL:   [ ]Normal   [x ]Weakness  [ ]Bed/Wheelchair bound [ ]Edema  NEUROLOGIC:   [ ]No focal deficits  [x ] Cognitive impairment  [ ] Dysphagia [ ]Dysarthria [ ] Paresis [ ]Other   SKIN:   [ x]Normal   [ ]Pressure ulcer(s)  [ ]Rash    CRITICAL CARE:  [ ] Shock Present  [ ]Septic [ ]Cardiogenic [ ]Neurologic [ ]Hypovolemic  [ ]  Vasopressors [ ]  Inotropes   [ ] Respiratory failure present  [ ] Acute  [ ] Chronic [ ] Hypoxic  [ ] Hypercarbic [ ] Other  [ ] Other organ failure     LABS:                        8.6    12.72 )-----------( 352      ( 29 Oct 2019 05:45 )             30.3   10-29    142  |  100  |  46<H>  ----------------------------<  133<H>  3.8   |  20<L>  |  3.05<H>    Ca    8.7      29 Oct 2019 05:45  Phos  4.9     10-29  Mg     1.8     10-29          RADIOLOGY & ADDITIONAL STUDIES:    Protein Calorie Malnutrition Present: [ ] yes [ ] no  [ ] PPSV2 < or = 30%  [ ] significant weight loss [ ] poor nutritional intake [ ] anasarca [ ] catabolic state Albumin, Serum: 2.4 g/dL (10-23-19 @ 12:10)      REFERRALS:   [ ]Chaplaincy  [ ] Hospice  [ ]Child Life  [ ]Social Work  [ ]Case management [ ]Holistic Therapy   Goals of Care Document: INTERVAL HPI/OVERNIGHT EVENTS:  Patient resting in bed. No distress noted. Son at bedside.     Code Status: Full code  Allergies    calcium acetate (Vomiting; Nausea; Chills)  wool-rash (Other)    Intolerances    MEDICATIONS  (STANDING):  albuterol/ipratropium for Nebulization 3 milliLiter(s) Nebulizer every 6 hours  aspirin enteric coated 81 milliGRAM(s) Oral daily  buMETAnide Infusion 1.5 mG/Hr (7.5 mL/Hr) IV Continuous <Continuous>  calcium gluconate IVPB 1 Gram(s) IV Intermittent daily  chlorhexidine 4% Liquid 1 Application(s) Topical daily  dextrose 5%. 1000 milliLiter(s) (50 mL/Hr) IV Continuous <Continuous>  dextrose 50% Injectable 12.5 Gram(s) IV Push once  dextrose 50% Injectable 25 Gram(s) IV Push once  dextrose 50% Injectable 25 Gram(s) IV Push once  epoetin terence Injectable 76116 Unit(s) SubCutaneous <User Schedule>  ergocalciferol 96217 Unit(s) Oral <User Schedule>  insulin lispro (HumaLOG) corrective regimen sliding scale   SubCutaneous three times a day before meals  metolazone 5 milliGRAM(s) Oral <User Schedule>  nystatin Powder 1 Application(s) Topical two times a day  pantoprazole  Injectable 40 milliGRAM(s) IV Push daily  piperacillin/tazobactam IVPB.. 3.375 Gram(s) IV Intermittent every 12 hours  sevelamer carbonate 800 milliGRAM(s) Oral three times a day with meals  sodium chloride 3%  Inhalation 4 milliLiter(s) Inhalation three times a day    MEDICATIONS  (PRN):  dextrose 40% Gel 15 Gram(s) Oral once PRN Blood Glucose LESS THAN 70 milliGRAM(s)/deciliter  glucagon  Injectable 1 milliGRAM(s) IntraMuscular once PRN Glucose LESS THAN 70 milligrams/deciliter  sodium chloride 0.65% Nasal 2 Spray(s) Both Nostrils four times a day PRN Nasal Congestion  tetracaine/benzocaine/butamben Spray 1 Spray(s) Topical daily PRN AVF    PRESENT SYMPTOMS: [ x]Unable to obtain due to poor mentation   Source if other than patient:  [ ]Family   [ ]Team     Pain (Impact on QOL):    Location:  Severity:  Minimal acceptable level (0-10 scale):       Quality:       Onset:  Duration:  Aggravating factors:  Relieving Factors  Radiation:    Dyspnea:  Yes [ ] No [ ] - [ ]Mild [ ]Moderate [ ]Severe  Anxiety:    Yes [ ] No [ ] - [ ]Mild [ ]Moderate [ ]Severe  Fatigue:    Yes [ ] No [ ] - [ ]Mild [ ]Moderate [ ]Severe  Nausea:    Yes [ ] No [ ] - [ ]Mild [ ]Moderate [ ]Severe                         Loss of appetite: Yes [ ] No [ ] - [ ]Mild [ ]Moderate [ ]Severe             Constipation:  Yes [ ] No [ ] - [ ]Mild [ ]Moderate [ ]Severe  Grief:  Yes [ ] No [ ]     PAIN AD Score:	  [email or web address]/painad.pdf (Ctrl + left click to view)    Other Symptoms:  [ ]All other review of systems negative     Karnofsky Performance Score/Palliative Performance Status Version 2:        20 %    http://palliative.info/resource_material/PPSv2.pdf    PHYSICAL EXAM:  Vital Signs Last 24 Hrs  T(C): 36.9 (29 Oct 2019 14:15), Max: 36.9 (29 Oct 2019 13:10)  T(F): 98.5 (29 Oct 2019 14:15), Max: 98.5 (29 Oct 2019 13:10)  HR: 120 (29 Oct 2019 14:15) (84 - 120)  BP: 129/93 (29 Oct 2019 14:15) (126/58 - 147/66)  BP(mean): --  RR: 20 (29 Oct 2019 14:15) (18 - 23)  SpO2: 90% (29 Oct 2019 12:12) (90% - 96%) I&O's Summary    28 Oct 2019 07:01  -  29 Oct 2019 07:00  --------------------------------------------------------  IN: 887.5 mL / OUT: 3800 mL / NET: -2912.5 mL    29 Oct 2019 07:01  -  29 Oct 2019 14:35  --------------------------------------------------------  IN: 400 mL / OUT: 587 mL / NET: -187 mL    GENERAL:  [ ]Alert  [ ]Oriented x   [x ]Lethargic  [ ]Cachexia  [ ]Unarousable  [ ]Verbal  [x]Non-Verbal  Behavioral:   [ ] Anxiety  [ ] Delirium [ ] Agitation [x ] Other  HEENT:  [ ]Normal   [ ]Dry mouth   [ ]ET Tube/Trach  [ ]Oral lesions [x] High flow   PULMONARY:   [ ]Clear  [ ]Tachypnea  [ ]Audible excessive secretions   [x ]Rhonchi        [ ]Right [ ]Left [s ]Bilateral  [ ]Crackles        [ ]Right [ ]Left [ ]Bilateral  [ ]Wheezing     [ ]Right [ ]Left [ ]Bilateral  CARDIOVASCULAR:    [x ]Regular [ ]Irregular [ ]Tachy  [ ]Benny [ ]Murmur [ ]Other  GASTROINTESTINAL:  [x ]Soft  [ ]Distended   [ ]+BS  [x ]Non tender [ ]Tender  [ ]PEG [ ]OGT/ NGT  Last BM: 10/28  GENITOURINARY:  [ ]Normal [x ] Incontinent   [ ]Oliguria/Anuria   [ ]Gaines  MUSCULOSKELETAL:   [ ]Normal   [x ]Weakness  [ ]Bed/Wheelchair bound [ ]Edema  NEUROLOGIC:   [ ]No focal deficits  [x ] Cognitive impairment  [ ] Dysphagia [ ]Dysarthria [ ] Paresis [ ]Other   SKIN:   [ x]Normal   [ ]Pressure ulcer(s)  [ ]Rash    CRITICAL CARE:  [ ] Shock Present  [ ]Septic [ ]Cardiogenic [ ]Neurologic [ ]Hypovolemic  [ ]  Vasopressors [ ]  Inotropes   [ ] Respiratory failure present  [ ] Acute  [ ] Chronic [ ] Hypoxic  [ ] Hypercarbic [ ] Other  [ ] Other organ failure     LABS:                        8.6    12.72 )-----------( 352      ( 29 Oct 2019 05:45 )             30.3   10-29    142  |  100  |  46<H>  ----------------------------<  133<H>  3.8   |  20<L>  |  3.05<H>    Ca    8.7      29 Oct 2019 05:45  Phos  4.9     10-29  Mg     1.8     10-29    RADIOLOGY & ADDITIONAL STUDIES: reviewed.     Protein Calorie Malnutrition Present: [ ] yes [ ] no  [ x] PPSV2 < or = 30%  [ ] significant weight loss [ ] poor nutritional intake [ ] anasarca [ ] catabolic state Albumin, Serum: 2.4 g/dL (10-23-19 @ 12:10)    REFERRALS:   [ ]Chaplaincy  [ ] Hospice  [ ]Child Life  [ ]Social Work  [ ]Case management [ ]Holistic Therapy   Goals of Care Document:

## 2019-10-29 NOTE — PROGRESS NOTE ADULT - ATTENDING COMMENTS
still on HFNC, 80%, 50lpm  sleepy but responsive, everything painful.   could not be gotten out of bed  no hypercapnea on abg  many liters negative, well wrinkled  on HD through [facility], and still on bumex  Family meeting to be held tomorrow, with Palliative  Pt would not be able to receive HD if she lives at home, she cant get out of her house. Unclear if she even has benefits for long term care  And, what would be her goals of care????

## 2019-10-29 NOTE — PROGRESS NOTE ADULT - PROBLEM SELECTOR PLAN 2
- LUE fistula not working and dialysis needed sec to fluid overload   - Vascular called and GOKUL laura placed   - Duplex ordered for LUE  - Pt received urgent HD overnight and 10/26/and 10/27   - bumex gtt started and increased today to 1.5mg   - per nephrology poor urine output and UF done last pm   - xaroxlyn added 5mg bid   - epogen per nephrology   - To receive HD again today - LUE fistula not working and dialysis needed sec to fluid overload   - Vascular called and GOKUL laura placed   - Duplex ordered for LUE  - Pt received urgent HD overnight and 10/26/and 10/27 /and UF   - bumex gtt started and increased today to 1.5mg   - per nephrology poor urine output and UF done last pm   - xaroxlyn added 5mg bid   - epogen per nephrology   - To receive HD again today difficulty with valentín - will hold off on dc bumex /zaroxyln until access is checked

## 2019-10-29 NOTE — PROGRESS NOTE ADULT - ATTENDING COMMENTS
Pt seen with NP.  Agree with above plan.  Planned family meeting for Friday 11/1 at 1:30PM to further discuss GOC and advanced directives.

## 2019-10-29 NOTE — PROGRESS NOTE ADULT - ASSESSMENT
EKG SR     Echo : < from: Transthoracic Echocardiogram (10.19.19 @ 10:52) >  1. Mitral annular calcification. Tethered mitral valve  leaflets with normal opening. Moderate mitral  regurgitation.  2. Calcified trileaflet aortic valve with normal opening.  Minimal aortic regurgitation.  3. Moderately dilated left atrium.  LA volume index = 42  cc/m2.  4. Severe left ventricular enlargement.  5. Endocardium not well visualized; grossly low normal left  ventricular systolic function.  6. Moderate diastolic dysfunction (Stage II).  7. The right ventricle is not well visualized; grossly  normal right ventricular systolic function.  8. Normal tricuspid valve.  Moderate tricuspid  regurgitation.  9. Estimated pulmonary artery systolic pressure equals 53  mm Hg, assuming right atrial pressure equals 15  mm Hg,  consistent with moderate pulmonary hypertension.  10. Thickened pericardium inferior to the right ventricle.  Small pericardial effusion adjacent to the right atrium.  *** Compared with echocardiogram of 10/10/2016, results are  similar on today's study.    < end of copied text >      Assessment and Plan     1) Pulm edema: s/p HD now on Bumex drip/metolazone  , optiflow and PRN Biapap     2) PNA on Vanc and Zosyn     2) CAD s/p PCX PCI: ASA 81, monitor H/H     3) Anemia : no obvious GIB, stool occult + , monitor H/H  Plavix on hold , restarted asa     4) ESRD now on HD:  renal on board    5) Lt. Lung Collapse: t/t per pulm    Family at bedside updated (daughter and Brother )

## 2019-10-29 NOTE — PROVIDER CONTACT NOTE (OTHER) - ASSESSMENT
Pt is lethargic base line. HR:125 and malfunction of temporary catheter noted during HD.
Left arm very edematous. Unable to access AVF x3 HD nurses
n/a

## 2019-10-29 NOTE — PROGRESS NOTE ADULT - ASSESSMENT
73yo F with acute metabolic encephalopathy, volume overload and uremia, admitted to MICU for septic shock management. Vascular consulted for shiley placed and functioning.     Plan:  - Ultrasound of LUE AVF (ordered 10/27) pending      - f/u result  - Vascular planning pending above study    page [phone] with questions or concerns

## 2019-10-29 NOTE — PROGRESS NOTE ADULT - SUBJECTIVE AND OBJECTIVE BOX
CHIEF COMPLAINT:    Interval Events:    REVIEW OF SYSTEMS:  Constitutional:   Eyes:  ENT:  CV:  Resp:  GI:  :  MSK:  Integumentary:  Neurological:  Psychiatric:  Endocrine:  Hematologic/Lymphatic:  Allergic/Immunologic:  [ ] All other systems negative  [ ] Unable to assess ROS because ________    OBJECTIVE:  ICU Vital Signs Last 24 Hrs  T(C): 36.6 (29 Oct 2019 06:32), Max: 36.6 (28 Oct 2019 15:09)  T(F): 97.8 (29 Oct 2019 06:32), Max: 97.9 (28 Oct 2019 16:50)  HR: 97 (29 Oct 2019 06:51) (84 - 108)  BP: 140/85 (29 Oct 2019 06:32) (127/64 - 147/66)  BP(mean): --  ABP: --  ABP(mean): --  RR: 20 (29 Oct 2019 06:51) (18 - 23)  SpO2: 96% (29 Oct 2019 06:51) (90% - 96%)        10-28 @ 07:01  -  10-29 @ 07:00  --------------------------------------------------------  IN: 887.5 mL / OUT: 3800 mL / NET: -2912.5 mL      CAPILLARY BLOOD GLUCOSE      POCT Blood Glucose.: 149 mg/dL (29 Oct 2019 07:47)      PHYSICAL EXAM:  General:   HEENT:   Lymph Nodes:  Neck:   Respiratory:   Cardiovascular:   Abdomen:   Extremities:   Skin:   Neurological:  Psychiatry:    HOSPITAL MEDICATIONS:  MEDICATIONS  (STANDING):  albuterol/ipratropium for Nebulization 3 milliLiter(s) Nebulizer every 6 hours  aspirin enteric coated 81 milliGRAM(s) Oral daily  buMETAnide Infusion 1.5 mG/Hr (7.5 mL/Hr) IV Continuous <Continuous>  calcium gluconate IVPB 1 Gram(s) IV Intermittent daily  chlorhexidine 4% Liquid 1 Application(s) Topical daily  dextrose 5%. 1000 milliLiter(s) (50 mL/Hr) IV Continuous <Continuous>  dextrose 50% Injectable 12.5 Gram(s) IV Push once  dextrose 50% Injectable 25 Gram(s) IV Push once  dextrose 50% Injectable 25 Gram(s) IV Push once  epoetin terence Injectable 34990 Unit(s) SubCutaneous <User Schedule>  ergocalciferol 45645 Unit(s) Oral <User Schedule>  insulin lispro (HumaLOG) corrective regimen sliding scale   SubCutaneous three times a day before meals  metolazone 5 milliGRAM(s) Oral <User Schedule>  nystatin Powder 1 Application(s) Topical two times a day  pantoprazole  Injectable 40 milliGRAM(s) IV Push daily  piperacillin/tazobactam IVPB.. 3.375 Gram(s) IV Intermittent every 12 hours  sevelamer carbonate 800 milliGRAM(s) Oral three times a day with meals  sodium chloride 3%  Inhalation 4 milliLiter(s) Inhalation three times a day    MEDICATIONS  (PRN):  dextrose 40% Gel 15 Gram(s) Oral once PRN Blood Glucose LESS THAN 70 milliGRAM(s)/deciliter  glucagon  Injectable 1 milliGRAM(s) IntraMuscular once PRN Glucose LESS THAN 70 milligrams/deciliter  sodium chloride 0.65% Nasal 2 Spray(s) Both Nostrils four times a day PRN Nasal Congestion  tetracaine/benzocaine/butamben Spray 1 Spray(s) Topical daily PRN AVF      LABS:                        8.6    12.72 )-----------( 352      ( 29 Oct 2019 05:45 )             30.3     10-29    142  |  100  |  46<H>  ----------------------------<  133<H>  3.8   |  20<L>  |  3.05<H>    Ca    8.7      29 Oct 2019 05:45  Phos  4.9     10-29  Mg     1.8     10-29                MICROBIOLOGY:     RADIOLOGY:  [ ] Reviewed and interpreted by me    PULMONARY FUNCTION TESTS:    EKG: CHIEF COMPLAINT: Patient is a 74y old  Female who presents with a chief complaint of AMS and Hypotension (29 Oct 2019 14:47)      Interval Events: none overnight     REVIEW OF SYSTEMS:  [x ] Unable to assess ROS because AMS     OBJECTIVE:  ICU Vital Signs Last 24 Hrs  T(C): 36.6 (29 Oct 2019 06:32), Max: 36.6 (28 Oct 2019 15:09)  T(F): 97.8 (29 Oct 2019 06:32), Max: 97.9 (28 Oct 2019 16:50)  HR: 97 (29 Oct 2019 06:51) (84 - 108)  BP: 140/85 (29 Oct 2019 06:32) (127/64 - 147/66)  BP(mean): --  ABP: --  ABP(mean): --  RR: 20 (29 Oct 2019 06:51) (18 - 23)  SpO2: 96% (29 Oct 2019 06:51) (90% - 96%)        10-28 @ 07:01  -  10-29 @ 07:00  --------------------------------------------------------  IN: 887.5 mL / OUT: 3800 mL / NET: -2912.5 mL      CAPILLARY BLOOD GLUCOSE      POCT Blood Glucose.: 149 mg/dL (29 Oct 2019 07:47)        HOSPITAL MEDICATIONS:  MEDICATIONS  (STANDING):  albuterol/ipratropium for Nebulization 3 milliLiter(s) Nebulizer every 6 hours  aspirin enteric coated 81 milliGRAM(s) Oral daily  buMETAnide Infusion 1.5 mG/Hr (7.5 mL/Hr) IV Continuous <Continuous>  calcium gluconate IVPB 1 Gram(s) IV Intermittent daily  chlorhexidine 4% Liquid 1 Application(s) Topical daily  dextrose 5%. 1000 milliLiter(s) (50 mL/Hr) IV Continuous <Continuous>  dextrose 50% Injectable 12.5 Gram(s) IV Push once  dextrose 50% Injectable 25 Gram(s) IV Push once  dextrose 50% Injectable 25 Gram(s) IV Push once  epoetin terence Injectable 77124 Unit(s) SubCutaneous <User Schedule>  ergocalciferol 39980 Unit(s) Oral <User Schedule>  insulin lispro (HumaLOG) corrective regimen sliding scale   SubCutaneous three times a day before meals  metolazone 5 milliGRAM(s) Oral <User Schedule>  nystatin Powder 1 Application(s) Topical two times a day  pantoprazole  Injectable 40 milliGRAM(s) IV Push daily  piperacillin/tazobactam IVPB.. 3.375 Gram(s) IV Intermittent every 12 hours  sevelamer carbonate 800 milliGRAM(s) Oral three times a day with meals  sodium chloride 3%  Inhalation 4 milliLiter(s) Inhalation three times a day    MEDICATIONS  (PRN):  dextrose 40% Gel 15 Gram(s) Oral once PRN Blood Glucose LESS THAN 70 milliGRAM(s)/deciliter  glucagon  Injectable 1 milliGRAM(s) IntraMuscular once PRN Glucose LESS THAN 70 milligrams/deciliter  sodium chloride 0.65% Nasal 2 Spray(s) Both Nostrils four times a day PRN Nasal Congestion  tetracaine/benzocaine/butamben Spray 1 Spray(s) Topical daily PRN AVF      LABS:                        8.6    12.72 )-----------( 352      ( 29 Oct 2019 05:45 )             30.3     10-29    142  |  100  |  46<H>  ----------------------------<  133<H>  3.8   |  20<L>  |  3.05<H>    Ca    8.7      29 Oct 2019 05:45  Phos  4.9     10-29  Mg     1.8     10-29                MICROBIOLOGY:     RADIOLOGY:  [ ] Reviewed and interpreted by me    PULMONARY FUNCTION TESTS:    EKG:

## 2019-10-29 NOTE — PROGRESS NOTE ADULT - ASSESSMENT
74F h/o HTN, HLD, IDDM2, COPD (former smoker, 2L/min PRN at home), CHF (EF 12/2018 45-50%), ESRD not on dialysis admitted to [facility] for hypotensive, ams, hypoglycemia and hypothermia.     CKD stage 5 not yet on hd  s/p left avf 12/18, + thrill + bruit   consent for cvvh/ IHD in chart from daughter    initiated HD 10/26 sec to fluid overload  AVF not functioning f/u vascular   s/o shiley 10/26. received daily HD from 10/26 total UF 9L  on bumex drip and metolazone  had plan for PUF today, however call by RN, heart rate continue to increase during HD, only had ~ 10 min  will d/c PUF today and reassess.   - monitor I/O's closely    Hypocalcemia  pth elevated.  low vit d 25 level  start vit d 39771 weekly x8  start calcitriol 0.25 daily when phos is better   low albumin   continue renagel when eating  monitor    Hyperphosphatemia  npo right now  allergy to phoslo  start renagel 800mg tid with meal  monitor    Anemia  occult +  monitor hb  iron sat >20  epo [id number] tiw   transfuse as needed  consider Gi eval    Acidosis  likely sec to renal failure +diarrhea  improved, will continue to improve w/ HD  monitor    Hypotensive  ? sepsis   on  iv antibiotic  BP is normal now  - goal BP <130/80  monitor    Hypokalemia  supplemented  monitor

## 2019-10-29 NOTE — PROVIDER CONTACT NOTE (OTHER) - REASON
Clarification of HD Tx order; Dialysate ordered 3k 2.5ca; patient's latest potassium 4.2;
Unable to Access left AVF for HD treatment
positive culture
HR:125 and malfunction of temporary catheter.

## 2019-10-29 NOTE — PROGRESS NOTE ADULT - PROBLEM SELECTOR PLAN 4
Patient with underlying PNA, that exacerbated COPD. Is non compliant with medication and does not go to providers. Currently on High flow continue care as per Primary team.

## 2019-10-29 NOTE — PROGRESS NOTE ADULT - SUBJECTIVE AND OBJECTIVE BOX
Gregor Barrow MD  Interventional Cardiology / Endovascular Specialist  [place] Office : [phone] [place] [address] [place] [address]  Tel:   [place] Office : [phone] [facility] [place] [address]  Tel: [phone]  Cell : [phone]    HISTORY OF PRESENTING ILLNESS:    74F h/o HTN, HLD, IDDM2, COPD (former smoker, 2L/min PRN at home), CHF (EF 12/2018 45-50%), ESRD not on dialysis, p/w AMS and hypotension, intubated and sedated found to have a PNA , Extubated and transferred to floors, S/P RRT for resp distress and Hypoxia , CXR with Left Lung Collapse,  protecting airway on optiflow and PRN Bipap    	  MEDICATIONS:  aspirin enteric coated 81 milliGRAM(s) Oral daily  buMETAnide Infusion 1.5 mG/Hr IV Continuous <Continuous>  metolazone 5 milliGRAM(s) Oral <User Schedule>    piperacillin/tazobactam IVPB.. 3.375 Gram(s) IV Intermittent every 12 hours    albuterol/ipratropium for Nebulization 3 milliLiter(s) Nebulizer every 6 hours  sodium chloride 3%  Inhalation 4 milliLiter(s) Inhalation three times a day      pantoprazole  Injectable 40 milliGRAM(s) IV Push daily    dextrose 40% Gel 15 Gram(s) Oral once PRN  dextrose 50% Injectable 12.5 Gram(s) IV Push once  dextrose 50% Injectable 25 Gram(s) IV Push once  dextrose 50% Injectable 25 Gram(s) IV Push once  glucagon  Injectable 1 milliGRAM(s) IntraMuscular once PRN  insulin lispro (HumaLOG) corrective regimen sliding scale   SubCutaneous three times a day before meals    calcium gluconate IVPB 1 Gram(s) IV Intermittent daily  chlorhexidine 4% Liquid 1 Application(s) Topical daily  dextrose 5%. 1000 milliLiter(s) IV Continuous <Continuous>  epoetin terence Injectable 20355 Unit(s) SubCutaneous <User Schedule>  ergocalciferol 94543 Unit(s) Oral <User Schedule>  nystatin Powder 1 Application(s) Topical two times a day  sodium chloride 0.65% Nasal 2 Spray(s) Both Nostrils four times a day PRN  tetracaine/benzocaine/butamben Spray 1 Spray(s) Topical daily PRN      PAST MEDICAL/SURGICAL HISTORY  PAST MEDICAL & SURGICAL HISTORY:  ESRD (end stage renal disease): L arm AVF placed 12/2018  Hemorrhoids  GERD (gastroesophageal reflux disease)  Morbid obesity  Cataract  Diaphragmatic hernia  Cerebral infarction: 2011  CKD (chronic kidney disease)  Anemia  Diabetes mellitus: Insulin dependent. Type 2  CHF (congestive heart failure)  Hyperlipidemia  Hypertension  COPD (chronic obstructive pulmonary disease)  Chronic obstructive pulmonary disease, unspecified COPD type  Adult Idiopathic Generalized Osteoporosis  CAD (Coronary Artery Disease): 1 stent placed 11/7/18  DM (Diabetes Mellitus), Secondary  Hypertension  H/O coronary angiogram: [facility] 1 stent placed 11/7/2018      SOCIAL HISTORY: Substance Use (street drugs): ( x ) never used  (  ) other:    FAMILY HISTORY:  Family history of breast cancer (Grandparent)      REVIEW OF SYSTEMS:  unable to obtain     PHYSICAL EXAM:  T(C): 36.6 (10-29-19 @ 06:32), Max: 36.6 (10-28-19 @ 15:09)  HR: 104 (10-29-19 @ 09:37) (84 - 108)  BP: 140/85 (10-29-19 @ 06:32) (127/64 - 147/66)  RR: 20 (10-29-19 @ 06:51) (18 - 23)  SpO2: 96% (10-29-19 @ 06:51) (90% - 96%)  Wt(kg): --  I&O's Summary    28 Oct 2019 07:01  -  29 Oct 2019 07:00  --------------------------------------------------------  IN: 887.5 mL / OUT: 3800 mL / NET: -2912.5 mL          GENERAL: Obese  on BiPAP  EYES: EOMI, PERRLA, conjunctiva and sclera clear  ENMT: on Biapa   Cardiovascular: Normal S1 S2, No JVD, No murmurs, No edema  Respiratory: b/l basal creps 	  Gastrointestinal:  Soft, Non-tender, + BS	  Extremities: No clubbing, cyanosis or edema  LYMPH: No lymphadenopathy noted                                8.6    12.72 )-----------( 352      ( 29 Oct 2019 05:45 )             30.3     10-29    142  |  100  |  46<H>  ----------------------------<  133<H>  3.8   |  20<L>  |  3.05<H>    Ca    8.7      29 Oct 2019 05:45  Phos  4.9     10-29  Mg     1.8     10-29      proBNP:   Lipid Profile:   HgA1c:   TSH:     Consultant(s) Notes Reviewed:  [x ] YES  [ ] NO    Care Discussed with Consultants/Other Providers [ x] YES  [ ] NO    Imaging Personally Reviewed independently:  [x] YES  [ ] NO    All labs, radiologic studies, vitals, orders and medications list reviewed. Patient is seen and examined at bedside. Case discussed with medical team.

## 2019-10-29 NOTE — PROVIDER CONTACT NOTE (OTHER) - RECOMMENDATIONS
Discontinue HD
Reassess dialysate bath ordered for HD as per latest patient's blood potassium;
[facility] will notify Primary Provider for catheter access to be placed.

## 2019-10-29 NOTE — PROGRESS NOTE ADULT - PROBLEM SELECTOR PLAN 5
Attempted to called patients daughter but was unable to get a hold of her, voice message was left for call back.

## 2019-10-29 NOTE — PROVIDER CONTACT NOTE (OTHER) - BACKGROUND
Pt admitted with sepsis
Admit with Acute Metabolic Encephalopathy  PMH COPD, HTN, CHF,
Dialysate ordered for HD Tx is 3k 2.5ca; patient's latest potassium is 4.2;
Scheduled for 1st Dialysis treatment via left AVF.

## 2019-10-29 NOTE — PROVIDER CONTACT NOTE (OTHER) - ACTION/TREATMENT ORDERED:
No new orders thus far.  Darieln continued
Discontinue HD
Pt to have catheter placement for HD treatment.
Use 3k 2.5ca dialysate bath as ordered;

## 2019-10-29 NOTE — PROGRESS NOTE ADULT - PROBLEM SELECTOR PLAN 1
Secondary to COPD. Remains on High flow NC. Might benefit from low dose opioids to help with SOB once goals are established. Unable to get a hold of daughter to further discuss goals of care. Continue care as per primary team.

## 2019-10-29 NOTE — PROVIDER CONTACT NOTE (OTHER) - SITUATION
HR:125 and malfunction of temporary catheter noted during HD.
Dialysate ordered for HD Tx is 3k 2.5ca; patient's latest potassium is 4.2;
Pt with GIPCR positive Ecoli enteropathogenic
Pt. scheduled for 2 hour Dialysis treatment. Unable to access AVF left forearm.

## 2019-10-29 NOTE — PROGRESS NOTE ADULT - PROBLEM SELECTOR PLAN 1
-L hemithoracic opacity noted  - aggressive chest PT, saline nebs, chest vest, metanebs   - high flow day/bipap hs and prn  - Palliative consult -L hemithoracic opacity noted  - aggressive chest PT, saline nebs, unable to do chest vest, unable to use metanebs   - high flow day/bipap hs and prn  - Palliative consult

## 2019-10-30 LAB
BACTERIA NPH CULT: SIGNIFICANT CHANGE UP
SPECIMEN SOURCE: SIGNIFICANT CHANGE UP

## 2019-10-30 PROCEDURE — 99233 SBSQ HOSP IP/OBS HIGH 50: CPT | Mod: GC

## 2019-10-30 PROCEDURE — 93990 DOPPLER FLOW TESTING: CPT | Mod: 26

## 2019-10-30 RX ORDER — AMLODIPINE BESYLATE 2.5 MG/1
10 TABLET ORAL DAILY
Refills: 0 | Status: DISCONTINUED | OUTPATIENT
Start: 2019-10-30 | End: 2019-10-31

## 2019-10-30 RX ADMIN — Medication 3 MILLILITER(S): at 15:49

## 2019-10-30 RX ADMIN — Medication 2 SPRAY(S): at 20:26

## 2019-10-30 RX ADMIN — PANTOPRAZOLE SODIUM 40 MILLIGRAM(S): 20 TABLET, DELAYED RELEASE ORAL at 11:42

## 2019-10-30 RX ADMIN — SODIUM CHLORIDE 4 MILLILITER(S): 9 INJECTION INTRAMUSCULAR; INTRAVENOUS; SUBCUTANEOUS at 15:49

## 2019-10-30 RX ADMIN — SODIUM CHLORIDE 4 MILLILITER(S): 9 INJECTION INTRAMUSCULAR; INTRAVENOUS; SUBCUTANEOUS at 22:27

## 2019-10-30 RX ADMIN — Medication 200 GRAM(S): at 11:42

## 2019-10-30 RX ADMIN — Medication 1: at 08:47

## 2019-10-30 RX ADMIN — Medication 3 MILLILITER(S): at 22:24

## 2019-10-30 RX ADMIN — BUMETANIDE 7.5 MG/HR: 0.25 INJECTION INTRAMUSCULAR; INTRAVENOUS at 05:31

## 2019-10-30 RX ADMIN — CHLORHEXIDINE GLUCONATE 1 APPLICATION(S): 213 SOLUTION TOPICAL at 11:43

## 2019-10-30 RX ADMIN — SODIUM CHLORIDE 4 MILLILITER(S): 9 INJECTION INTRAMUSCULAR; INTRAVENOUS; SUBCUTANEOUS at 09:39

## 2019-10-30 RX ADMIN — PIPERACILLIN AND TAZOBACTAM 25 GRAM(S): 4; .5 INJECTION, POWDER, LYOPHILIZED, FOR SOLUTION INTRAVENOUS at 05:31

## 2019-10-30 RX ADMIN — NYSTATIN CREAM 1 APPLICATION(S): 100000 CREAM TOPICAL at 05:31

## 2019-10-30 RX ADMIN — Medication 3 MILLILITER(S): at 04:50

## 2019-10-30 RX ADMIN — NYSTATIN CREAM 1 APPLICATION(S): 100000 CREAM TOPICAL at 17:24

## 2019-10-30 RX ADMIN — Medication 3 MILLILITER(S): at 09:40

## 2019-10-30 RX ADMIN — Medication 1: at 12:32

## 2019-10-30 RX ADMIN — Medication 650 MILLIGRAM(S): at 01:25

## 2019-10-30 NOTE — PROGRESS NOTE ADULT - PROBLEM SELECTOR PLAN 7
DVT ppx: no AC due to concern of GIB. c/w SCD for now.  Diet: DASH/renal/DM  Dispo: need PT, likely rehab.
- SCD on
- SCD on
hypoglycemic on presentation in setting of sepsis  no further hypoglycemia  on ISS.   monitor fs.
- SCD on
- SCD on
hypoglycemic on presentation in setting of sepsis  no further hypoglycemia  on ISS.   monitor fs.
- SCD on

## 2019-10-30 NOTE — PROGRESS NOTE ADULT - PROBLEM SELECTOR PROBLEM 6
Hypertension
Hypertension
Gastrointestinal hemorrhage, unspecified gastrointestinal hemorrhage type
Hypertension
Gastrointestinal hemorrhage, unspecified gastrointestinal hemorrhage type
Hypertension
Type 2 diabetes mellitus with stage 5 chronic kidney disease not on chronic dialysis, with long-term current use of insulin
Hypertension

## 2019-10-30 NOTE — PROGRESS NOTE ADULT - ASSESSMENT
74F h/o HTN, HLD, IDDM2, COPD (former smoker, 2L/min PRN at home), CHF (EF 12/2018 45-50%), ESRD not on dialysis admitted to [facility] for hypotensive, ams, hypoglycemia and hypothermia.     CKD stage 5 not yet on hd  s/p left avf 12/18, + thrill + bruit   consent for cvvh/ IHD in chart from daughter    initiated HD 10/26 sec to fluid overload  AVF not functioning f/u vascular   s/p nataleeley 10/26. received daily HD from 10/26 total UF 9L  unable to tolerate HD 10/29 sec to tachycardia   fluid status seems to be better, with adequate urine production, will hold HD today,   bumex and metolazone also held 10/30  - monitor I/O's closely    Hypocalcemia  pth elevated.  low vit d 25 level  start vit d 82356 weekly x8  start calcitriol 0.25 daily when phos is better   low albumin   continue renagel when eating  monitor    Hyperphosphatemia  npo right now  allergy to phoslo  start renagel 800mg tid with meal  monitor    Anemia  occult +  monitor hb  iron sat >20  epo [id number] tiw   transfuse as needed  consider Gi eval    Acidosis  likely sec to renal failure +diarrhea  improved, will continue to improve w/ HD  monitor    Hypotensive  ? sepsis   on  iv antibiotic  BP is normal now  - goal BP <130/80  monitor    Hypokalemia  supplemented  monitor 74F h/o HTN, HLD, IDDM2, COPD (former smoker, 2L/min PRN at home), CHF (EF 12/2018 45-50%), ESRD not on dialysis admitted to [facility]u for hypotensive, ams, hypoglycemia and hypothermia.     CKD stage 5 not yet on hd  s/p left avf 12/18, + thrill + bruit   consent for cvvh/ IHD in chart from daughter    initiated HD 10/26 sec to fluid overload  AVF not functioning f/u vascular   s/p nataleeley 10/26. received daily HD from 10/26 total UF 9L  unable to tolerate HD 10/29 sec to tachycardia   fluid status seems to be better, decreased urine production, will hold HD today,   bumex and metolazone also held 10/30  - monitor I/O's closely    Hypocalcemia  pth elevated.  low vit d 25 level  start vit d 37983 weekly x8  start calcitriol 0.25 daily when phos is better   low albumin   continue renagel when eating  monitor    Hyperphosphatemia  npo right now  allergy to phoslo  start renagel 800mg tid with meal  monitor    Anemia  occult +  monitor hb  iron sat >20  epo [id number] tiw   transfuse as needed  consider Gi eval    Acidosis  likely sec to renal failure +diarrhea  improved, will continue to improve w/ HD  monitor    Hypotensive  ? sepsis   on  iv antibiotic  BP is normal now  - goal BP <130/80  monitor    Hypokalemia  supplemented  monitor 74F h/o HTN, HLD, IDDM2, COPD (former smoker, 2L/min PRN at home), CHF (EF 12/2018 45-50%), ESRD not on dialysis admitted to [facility] for hypotensive, ams, hypoglycemia and hypothermia.     CKD stage 5 now requiring renal replacement therapy   s/p left avf 12/18, + thrill + bruit   consent for cvvh/ IHD in chart from daughter    initiated HD 10/26 sec to fluid overload  AVF not functioning f/u vascular   s/p shiley 10/26. received daily HD from 10/26 total UF 9L  unable to tolerate HD 10/29 sec to tachycardia   fluid status improved, decreased urine production, will hold HD today,   bumex and metolazone also held 10/30  - monitor I/O's closely    Hypocalcemia  pth elevated.  low vit d 25 level  start vit d 06774 weekly x8  start calcitriol 0.25 daily when phos is better   low albumin   continue renagel when eating  monitor    Hyperphosphatemia  npo right now  allergy to phoslo  start renagel 800mg tid with meal  monitor    Anemia  occult +  monitor hb  iron sat >20  epo [id number] tiw   transfuse as needed  consider Gi eval    Acidosis  likely sec to renal failure +diarrhea  improved, will continue to improve w/ HD  monitor    Hypotensive  ? sepsis   on  iv antibiotic  BP is normal now  - goal BP <130/80  monitor    Hypokalemia  supplemented  monitor

## 2019-10-30 NOTE — PROGRESS NOTE ADULT - PROBLEM SELECTOR PROBLEM 3
Type 2 diabetes mellitus with stage 5 chronic kidney disease not on chronic dialysis, with long-term current use of insulin Pneumonia due to Pseudomonas species, unspecified laterality, unspecified part of lung

## 2019-10-30 NOTE — PROGRESS NOTE ADULT - ASSESSMENT
EKG SR     Echo : < from: Transthoracic Echocardiogram (10.19.19 @ 10:52) >  1. Mitral annular calcification. Tethered mitral valve  leaflets with normal opening. Moderate mitral  regurgitation.  2. Calcified trileaflet aortic valve with normal opening.  Minimal aortic regurgitation.  3. Moderately dilated left atrium.  LA volume index = 42  cc/m2.  4. Severe left ventricular enlargement.  5. Endocardium not well visualized; grossly low normal left  ventricular systolic function.  6. Moderate diastolic dysfunction (Stage II).  7. The right ventricle is not well visualized; grossly  normal right ventricular systolic function.  8. Normal tricuspid valve.  Moderate tricuspid  regurgitation.  9. Estimated pulmonary artery systolic pressure equals 53  mm Hg, assuming right atrial pressure equals 15  mm Hg,  consistent with moderate pulmonary hypertension.  10. Thickened pericardium inferior to the right ventricle.  Small pericardial effusion adjacent to the right atrium.  *** Compared with echocardiogram of 10/10/2016, results are  similar on today's study.    < end of copied text >      Assessment and Plan     1) Pulm edema: s/p HD now on Bumex drip/metolazone  , optiflow and PRN Biapap     2) PNA on Vanc and Zosyn     2) CAD s/p PCX PCI: ASA 81, monitor H/H     3) Anemia : no obvious GIB, stool occult + , monitor H/H  Plavix on hold , restarted asa     4) ESRD now on HD:  renal on board    5) Lt. Lung Collapse: t/t per pulm    Prognosis Guarded

## 2019-10-30 NOTE — PROGRESS NOTE ADULT - PROBLEM SELECTOR PROBLEM 1
Acute on chronic respiratory failure with hypoxia
SOB (shortness of breath)
Pneumonia due to Pseudomonas species, unspecified laterality, unspecified part of lung
Acute on chronic respiratory failure with hypoxia

## 2019-10-30 NOTE — PROGRESS NOTE ADULT - PROBLEM SELECTOR PROBLEM 4
Pneumonia due to Pseudomonas species, unspecified laterality, unspecified part of lung Type 2 diabetes mellitus with stage 5 chronic kidney disease not on chronic dialysis, with long-term current use of insulin

## 2019-10-30 NOTE — PROGRESS NOTE ADULT - SUBJECTIVE AND OBJECTIVE BOX
Vascular Surgery Progress Note     Subjective/24hour Events: No acute events overnight. Pain controlled. Tolerating soft diet. Patient on high flow nasal cannula. No N/V or CP.    Vital Signs:  Vital Signs Last 24 Hrs  T(C): 37 (30 Oct 2019 05:29), Max: 38.1 (29 Oct 2019 22:17)  T(F): 98.6 (30 Oct 2019 05:29), Max: 100.6 (29 Oct 2019 22:17)  HR: 80 (30 Oct 2019 09:42) (80 - 112)  BP: 147/69 (30 Oct 2019 05:29) (146/76 - 153/74)  BP(mean): --  RR: 18 (30 Oct 2019 07:02) (18 - 22)  SpO2: 96% (30 Oct 2019 09:42) (94% - 100%)    CAPILLARY BLOOD GLUCOSE      POCT Blood Glucose.: 155 mg/dL (30 Oct 2019 12:26)  POCT Blood Glucose.: 153 mg/dL (30 Oct 2019 07:49)  POCT Blood Glucose.: 139 mg/dL (29 Oct 2019 21:47)  POCT Blood Glucose.: 138 mg/dL (29 Oct 2019 17:15)      I&O's Detail    29 Oct 2019 07:01  -  30 Oct 2019 07:00  --------------------------------------------------------  IN:    bumetanide Infusion: 90 mL    Other: 400 mL  Total IN: 490 mL    OUT:    Other: 587 mL    Voided: 100 mL  Total OUT: 687 mL    Total NET: -197 mL            MEDICATIONS  (STANDING):  albuterol/ipratropium for Nebulization 3 milliLiter(s) Nebulizer every 6 hours  aspirin enteric coated 81 milliGRAM(s) Oral daily  calcium gluconate IVPB 1 Gram(s) IV Intermittent daily  chlorhexidine 4% Liquid 1 Application(s) Topical daily  dextrose 5%. 1000 milliLiter(s) (50 mL/Hr) IV Continuous <Continuous>  dextrose 50% Injectable 12.5 Gram(s) IV Push once  dextrose 50% Injectable 25 Gram(s) IV Push once  dextrose 50% Injectable 25 Gram(s) IV Push once  epoetin terence Injectable 88213 Unit(s) SubCutaneous <User Schedule>  ergocalciferol 19521 Unit(s) Oral <User Schedule>  insulin lispro (HumaLOG) corrective regimen sliding scale   SubCutaneous three times a day before meals  nystatin Powder 1 Application(s) Topical two times a day  pantoprazole  Injectable 40 milliGRAM(s) IV Push daily  sevelamer carbonate 800 milliGRAM(s) Oral three times a day with meals  sodium chloride 3%  Inhalation 4 milliLiter(s) Inhalation three times a day    MEDICATIONS  (PRN):  dextrose 40% Gel 15 Gram(s) Oral once PRN Blood Glucose LESS THAN 70 milliGRAM(s)/deciliter  glucagon  Injectable 1 milliGRAM(s) IntraMuscular once PRN Glucose LESS THAN 70 milligrams/deciliter  sodium chloride 0.65% Nasal 2 Spray(s) Both Nostrils four times a day PRN Nasal Congestion  tetracaine/benzocaine/butamben Spray 1 Spray(s) Topical daily PRN AVF        Physical Exam:  Gen: NAD  LS: nml respiratory effort  Card: pulse regularly present  GI: abd soft, nontender  Ext: warm, LUE edema slightly decreased from 10/28      Labs:    10-29    142  |  100  |  46<H>  ----------------------------<  133<H>  3.8   |  20<L>  |  3.05<H>    Ca    8.7      29 Oct 2019 05:45  Phos  4.9     10-29  Mg     1.8     10-29                              8.6    12.72 )-----------( 352      ( 29 Oct 2019 05:45 )             30.3               Imaging:  EXAM:  XR CHEST PORTABLE ROUTINE 1V      PROCEDURE DATE:  Oct 28 2019     INTERPRETATION:  CLINICAL INFORMATION: Left lobe opacity.    TECHNIQUE: AP view of the chest.    COMPARISON: Chest radiograph from 10/27/2019.    IMPRESSION:    Right IJ central venous catheter terminates in the SVC. Redemonstration   of left hemithorax complete opacification, mildly increased from   10/27/2019 with some associated volume loss suggestive of left lung   atelectasis. Right lung interstitial opacities may be due to pulmonary   edema, increased.

## 2019-10-30 NOTE — PROGRESS NOTE ADULT - PROBLEM SELECTOR PROBLEM 7
Prophylactic measure
Prophylactic measure
Type 2 diabetes mellitus with stage 5 chronic kidney disease not on chronic dialysis, with long-term current use of insulin
Prophylactic measure
Prophylactic measure
Type 2 diabetes mellitus with stage 5 chronic kidney disease not on chronic dialysis, with long-term current use of insulin
Prophylactic measure
Prophylactic measure

## 2019-10-30 NOTE — PROGRESS NOTE ADULT - SUBJECTIVE AND OBJECTIVE BOX
[facility]   MD CHARITY MCADAMS, DO FANI BROWN, JASSON CONNOLLY    TEL:  OFFICE: [phone]  DR ALCANTAR CELL: [phone]  DR. MORRIS CELL: [phone]  DR. BROWN CELL: [phone]  TENISHA BOATENG CELL: [phone]        Patient is a 74y old  Female who presents with a chief complaint of AMS and Hypotension (30 Oct 2019 12:39)      Patient seen and examined at bedside.     VITALS:  T(F): 98.6 (10-30-19 @ 05:29), Max: 100.6 (10-29-19 @ 22:17)  HR: 80 (10-30-19 @ 09:42)  BP: 147/69 (10-30-19 @ 05:29)  RR: 18 (10-30-19 @ 07:02)  SpO2: 96% (10-30-19 @ 09:42)  Wt(kg): --    10-29 @ 07:01  -  10-30 @ 07:00  --------------------------------------------------------  IN: 490 mL / OUT: 687 mL / NET: -197 mL          PHYSICAL EXAM:  Constitutional: on bipap, lethargic   Neck: No JVD  Respiratory: decrease bs, slight rhonchi  Cardiovascular: S1, S2, RRR  Gastrointestinal: BS+, soft, NT/ND  Extremities: No peripheral edema    Hospital Medications:   MEDICATIONS  (STANDING):  albuterol/ipratropium for Nebulization 3 milliLiter(s) Nebulizer every 6 hours  aspirin enteric coated 81 milliGRAM(s) Oral daily  calcium gluconate IVPB 1 Gram(s) IV Intermittent daily  chlorhexidine 4% Liquid 1 Application(s) Topical daily  dextrose 5%. 1000 milliLiter(s) (50 mL/Hr) IV Continuous <Continuous>  dextrose 50% Injectable 12.5 Gram(s) IV Push once  dextrose 50% Injectable 25 Gram(s) IV Push once  dextrose 50% Injectable 25 Gram(s) IV Push once  epoetin terence Injectable 34212 Unit(s) SubCutaneous <User Schedule>  ergocalciferol 45974 Unit(s) Oral <User Schedule>  insulin lispro (HumaLOG) corrective regimen sliding scale   SubCutaneous three times a day before meals  nystatin Powder 1 Application(s) Topical two times a day  pantoprazole  Injectable 40 milliGRAM(s) IV Push daily  sevelamer carbonate 800 milliGRAM(s) Oral three times a day with meals  sodium chloride 3%  Inhalation 4 milliLiter(s) Inhalation three times a day      LABS:  10-29    142  |  100  |  46<H>  ----------------------------<  133<H>  3.8   |  20<L>  |  3.05<H>    Ca    8.7      29 Oct 2019 05:45  Phos  4.9     10-29  Mg     1.8     10-29      Creatinine Trend: 3.05 <--, 3.30 <--, 2.98 <--, 4.71 <--, 4.76 <--, 4.59 <--                                8.6    12.72 )-----------( 352      ( 29 Oct 2019 05:45 )             30.3     Urine Studies:  Urinalysis - [10-19-19 @ 03:30]      Color YELLOW / Appearance CLEAR / SG 1.017 / pH 5.5      Gluc NEGATIVE / Ketone NEGATIVE  / Bili NEGATIVE / Urobili NORMAL       Blood NEGATIVE / Protein 10 / Leuk Est NEGATIVE / Nitrite NEGATIVE      RBC  / WBC  / Hyaline  / Gran  / Sq Epi  / Non Sq Epi  / Bacteria       Iron 27, TIBC 125, %sat --      [10-20-19 @ 02:40]  Ferritin 283.7      [10-20-19 @ 02:40]  .9 (Ca --)      [10-20-19 @ 02:40]   --  PTH -- (Ca 8.7)      [10-31-18 @ 09:24]   269  Vitamin D (25OH) 10.5      [10-20-19 @ 02:40]  HbA1c 5.2      [10-20-19 @ 02:40]  TSH 1.11      [10-18-19 @ 23:20]    HBsAb <3.0      [10-28-19 @ 19:05]  HBsAg NEGATIVE      [10-24-19 @ 02:30]  HBcAb Nonreactive      [10-28-19 @ 19:05]  HCV 0.16, Nonreactive Hepatitis C AB  S/CO Ratio                        Interpretation  < 1.00                                   Non-Reactive  1.00 - 4.99                         Weakly-Reactive  >= 5.00                                Reactive  Non-Reactive: Aperson with a non-reactive HCV antibody  result is considered uninfected.  No further action is  needed unless recent infection is suspected.  In these  cases, consider repeat testing later to detect  seroconversion..  Weakly-Reactive: HCV antibody test is abnormal, HCV RNA  Qualitative test will follow.  Reactive: HCV antibody test is abnormal, HCV RNA  Qualitative test will follow.  Note: HCV antibody testing is performed on the [employer]   system.      [10-19-19 @ 12:30]      RADIOLOGY & ADDITIONAL STUDIES:

## 2019-10-30 NOTE — PROGRESS NOTE ADULT - RS GEN PE MLT RESP DETAILS PC
airway patent/diminished breath sounds, L
diminished breath sounds, R/airway patent/diminished breath sounds, L
bilat rhonchi /coarse bs/airway patent/diminished breath sounds, L
diminished breath sounds, L/airway patent
diminished breath sounds, L/few rhonchi/airway patent

## 2019-10-30 NOTE — PROGRESS NOTE ADULT - GASTROINTESTINAL DETAILS
no distention/obese/soft
soft/no distention/nontender
obese/no distention/soft
no distention/soft
soft/no distention

## 2019-10-30 NOTE — PROGRESS NOTE ADULT - CVS HE PE MLT D E PC
regular rate and rhythm
regular rate and rhythm/no murmur/no rub
regular rate and rhythm
regular rate and rhythm

## 2019-10-30 NOTE — PROGRESS NOTE ADULT - MENTAL STATUS
asleep but arousable to verbal stimuli
Asleep , opens eyes to verbal stimuli, follows commands
alert - opens eyes and tracks but nonverbal does not follow commands currently
Lethargic , opens eyes to verbal stimuli , follows simple commands

## 2019-10-30 NOTE — PROGRESS NOTE ADULT - PROBLEM SELECTOR PROBLEM 5
CAD (Coronary Artery Disease)
CAD (Coronary Artery Disease)
ESRD (end stage renal disease)
CAD (Coronary Artery Disease)
CAD (Coronary Artery Disease)
ESRD (end stage renal disease)
Encounter for palliative care
Gastrointestinal hemorrhage, unspecified gastrointestinal hemorrhage type
CAD (Coronary Artery Disease)

## 2019-10-30 NOTE — PROGRESS NOTE ADULT - SUBJECTIVE AND OBJECTIVE BOX
Gregor Barrow MD  Interventional Cardiology / Endovascular Specialist  [place] Office : [phone] [place] [address] [place] [address]  Tel:   [place] Office : [phone] [facility] [place] [address]  Tel: [phone]  Cell : [phone]    HISTORY OF PRESENTING ILLNESS:      74F h/o HTN, HLD, IDDM2, COPD (former smoker, 2L/min PRN at home), CHF (EF 12/2018 45-50%), ESRD not on dialysis, p/w AMS and hypotension, intubated and sedated found to have a PNA , Extubated and transferred to floors, S/P RRT for resp distress and Hypoxia , CXR with Left Lung Collapse,  protecting airway on optiflow and PRN Bipap  	  MEDICATIONS:  aspirin enteric coated 81 milliGRAM(s) Oral daily  albuterol/ipratropium for Nebulization 3 milliLiter(s) Nebulizer every 6 hours  sodium chloride 3%  Inhalation 4 milliLiter(s) Inhalation three times a day  pantoprazole  Injectable 40 milliGRAM(s) IV Push daily  dextrose 40% Gel 15 Gram(s) Oral once PRN  dextrose 50% Injectable 12.5 Gram(s) IV Push once  dextrose 50% Injectable 25 Gram(s) IV Push once  dextrose 50% Injectable 25 Gram(s) IV Push once  glucagon  Injectable 1 milliGRAM(s) IntraMuscular once PRN  insulin lispro (HumaLOG) corrective regimen sliding scale   SubCutaneous three times a day before meals  calcium gluconate IVPB 1 Gram(s) IV Intermittent daily  chlorhexidine 4% Liquid 1 Application(s) Topical daily  dextrose 5%. 1000 milliLiter(s) IV Continuous <Continuous>  epoetin terence Injectable 34998 Unit(s) SubCutaneous <User Schedule>  ergocalciferol 02436 Unit(s) Oral <User Schedule>  nystatin Powder 1 Application(s) Topical two times a day  sodium chloride 0.65% Nasal 2 Spray(s) Both Nostrils four times a day PRN  tetracaine/benzocaine/butamben Spray 1 Spray(s) Topical daily PRN      PAST MEDICAL/SURGICAL HISTORY  PAST MEDICAL & SURGICAL HISTORY:  ESRD (end stage renal disease): L arm AVF placed 12/2018  Hemorrhoids  GERD (gastroesophageal reflux disease)  Morbid obesity  Cataract  Diaphragmatic hernia  Cerebral infarction: 2011  CKD (chronic kidney disease)  Anemia  Diabetes mellitus: Insulin dependent. Type 2  CHF (congestive heart failure)  Hyperlipidemia  Hypertension  COPD (chronic obstructive pulmonary disease)  Chronic obstructive pulmonary disease, unspecified COPD type  Adult Idiopathic Generalized Osteoporosis  CAD (Coronary Artery Disease): 1 stent placed 11/7/18  DM (Diabetes Mellitus), Secondary  Hypertension  H/O coronary angiogram: [facility] 1 stent placed 11/7/2018      SOCIAL HISTORY: Substance Use (street drugs): ( x ) never used  (  ) other:    FAMILY HISTORY:  Family history of breast cancer (Grandparent)      REVIEW OF SYSTEMS:  Unable to Obtain     PHYSICAL EXAM:  T(C): 37 (10-30-19 @ 05:29), Max: 38.1 (10-29-19 @ 22:17)  HR: 80 (10-30-19 @ 09:42) (80 - 120)  BP: 147/69 (10-30-19 @ 05:29) (126/58 - 153/74)  RR: 18 (10-30-19 @ 07:02) (18 - 22)  SpO2: 96% (10-30-19 @ 09:42) (94% - 100%)  Wt(kg): --  I&O's Summary    29 Oct 2019 07:01  -  30 Oct 2019 07:00  --------------------------------------------------------  IN: 490 mL / OUT: 687 mL / NET: -197 mL          GENERAL: Obese  on BiPAP  EYES: EOMI, PERRLA, conjunctiva and sclera clear  ENMT: on Biapa   Cardiovascular: Normal S1 S2, No JVD, No murmurs, No edema  Respiratory: b/l basal creps 	  Gastrointestinal:  Soft, Non-tender, + BS	  Extremities: No clubbing, cyanosis or edema  LYMPH: No lymphadenopathy noted                                  8.6    12.72 )-----------( 352      ( 29 Oct 2019 05:45 )             30.3     10-29    142  |  100  |  46<H>  ----------------------------<  133<H>  3.8   |  20<L>  |  3.05<H>    Ca    8.7      29 Oct 2019 05:45  Phos  4.9     10-29  Mg     1.8     10-29      proBNP:   Lipid Profile:   HgA1c:   TSH:     Consultant(s) Notes Reviewed:  [x ] YES  [ ] NO    Care Discussed with Consultants/Other Providers [ x] YES  [ ] NO    Imaging Personally Reviewed independently:  [x] YES  [ ] NO    All labs, radiologic studies, vitals, orders and medications list reviewed. Patient is seen and examined at bedside. Case discussed with medical team.

## 2019-10-30 NOTE — PROGRESS NOTE ADULT - ATTENDING COMMENTS
condition remains poor  lethargic, on HFNC, significant atelectasis  HD held due to tachycardia  UO is minimal, stop bumex and zarox  Zosyn completing today  Overall prognosis is very poor - awaiting palliative care meeting with family

## 2019-10-30 NOTE — PROGRESS NOTE ADULT - PROBLEM SELECTOR PLAN 2
- LUE fistula not working and dialysis needed sec to fluid overload   - Vascular called and GOKUL laura placed   - Duplex ordered for LUE  - Pt received urgent HD overnight and 10/26/and 10/27 /and UF   - bumex gtt started and increased today to 1.5mg   - per nephrology poor urine output and UF done last pm   - xaroxlyn added 5mg bid   - epogen per nephrology   - To receive HD again today difficulty with valentín - will hold off on dc bumex /zaroxyln until access is checked - LUE fistula placed a couple years ago, but not working currently  - pt received urgent HD 10/26/and 10/27 and UF   - getting HD via GOKUL laura as per renal   - epogen per nephrology   - will d/c bumex and metolozone now, pt with no urine output  - yesterday did not tolerate HD, also with nataleeley complications

## 2019-10-30 NOTE — PROGRESS NOTE ADULT - SUBJECTIVE AND OBJECTIVE BOX
CHIEF COMPLAINT: Patient is a 74y old  Female who presents with a chief complaint of AMS and Hypotension (29 Oct 2019 19:54)    Interval Events:      REVIEW OF SYSTEMS:  Constitutional:   Eyes:  ENT:  CV:  Resp:  GI:  :  MSK:  Integumentary:  Neurological:  Psychiatric:  Endocrine:  Hematologic/Lymphatic:  Allergic/Immunologic:  [ ] All other systems negative  [ ] Unable to assess ROS because ________      OBJECTIVE:  ICU Vital Signs Last 24 Hrs  T(C): 37 (30 Oct 2019 05:29), Max: 38.1 (29 Oct 2019 22:17)  T(F): 98.6 (30 Oct 2019 05:29), Max: 100.6 (29 Oct 2019 22:17)  HR: 86 (30 Oct 2019 07:02) (86 - 120)  BP: 147/69 (30 Oct 2019 05:29) (126/58 - 153/74)  BP(mean): --  ABP: --  ABP(mean): --  RR: 18 (30 Oct 2019 07:02) (18 - 22)  SpO2: 95% (30 Oct 2019 07:02) (90% - 100%)    10-29 @ 07:01  -  10-30 @ 07:00  --------------------------------------------------------  IN: 400 mL / OUT: 587 mL / NET: -187 mL    POCT Blood Glucose.: 153 mg/dL (30 Oct 2019 07:49)    HOSPITAL MEDICATIONS:  MEDICATIONS  (STANDING):  albuterol/ipratropium for Nebulization 3 milliLiter(s) Nebulizer every 6 hours  aspirin enteric coated 81 milliGRAM(s) Oral daily  buMETAnide Infusion 1.5 mG/Hr (7.5 mL/Hr) IV Continuous <Continuous>  calcium gluconate IVPB 1 Gram(s) IV Intermittent daily  chlorhexidine 4% Liquid 1 Application(s) Topical daily  dextrose 5%. 1000 milliLiter(s) (50 mL/Hr) IV Continuous <Continuous>  dextrose 50% Injectable 12.5 Gram(s) IV Push once  dextrose 50% Injectable 25 Gram(s) IV Push once  dextrose 50% Injectable 25 Gram(s) IV Push once  epoetin terence Injectable 95264 Unit(s) SubCutaneous <User Schedule>  ergocalciferol 00185 Unit(s) Oral <User Schedule>  insulin lispro (HumaLOG) corrective regimen sliding scale   SubCutaneous three times a day before meals  metolazone 5 milliGRAM(s) Oral <User Schedule>  nystatin Powder 1 Application(s) Topical two times a day  pantoprazole  Injectable 40 milliGRAM(s) IV Push daily  piperacillin/tazobactam IVPB.. 3.375 Gram(s) IV Intermittent every 12 hours  sevelamer carbonate 800 milliGRAM(s) Oral three times a day with meals  sodium chloride 3%  Inhalation 4 milliLiter(s) Inhalation three times a day    MEDICATIONS  (PRN):  dextrose 40% Gel 15 Gram(s) Oral once PRN Blood Glucose LESS THAN 70 milliGRAM(s)/deciliter  glucagon  Injectable 1 milliGRAM(s) IntraMuscular once PRN Glucose LESS THAN 70 milligrams/deciliter  sodium chloride 0.65% Nasal 2 Spray(s) Both Nostrils four times a day PRN Nasal Congestion  tetracaine/benzocaine/butamben Spray 1 Spray(s) Topical daily PRN AVF      LABS:                        8.6    12.72 )-----------( 352      ( 29 Oct 2019 05:45 )             30.3     10-29    142  |  100  |  46<H>  ----------------------------<  133<H>  3.8   |  20<L>  |  3.05<H>    Ca    8.7      29 Oct 2019 05:45  Phos  4.9     10-29  Mg     1.8     10-29          Arterial Blood Gas:  10-29 @ 13:00  7.40/37/55/23/84.3/-2.1  ABG lactate: --        MICROBIOLOGY:     RADIOLOGY:  [ ] Reviewed and interpreted by me    PULMONARY FUNCTION TESTS:    EKG: CHIEF COMPLAINT: Patient is a 74y old  Female who presents with a chief complaint of AMS and Hypotension (29 Oct 2019 19:54)    Interval Events: still on high flow and requiring BIPAP intermittently       REVIEW OF SYSTEMS:  [ ] All other systems negative  [x] Unable to assess ROS because: lethargy       OBJECTIVE:  ICU Vital Signs Last 24 Hrs  T(C): 37 (30 Oct 2019 05:29), Max: 38.1 (29 Oct 2019 22:17)  T(F): 98.6 (30 Oct 2019 05:29), Max: 100.6 (29 Oct 2019 22:17)  HR: 86 (30 Oct 2019 07:02) (86 - 120)  BP: 147/69 (30 Oct 2019 05:29) (126/58 - 153/74)  BP(mean): --  ABP: --  ABP(mean): --  RR: 18 (30 Oct 2019 07:02) (18 - 22)  SpO2: 95% (30 Oct 2019 07:02) (90% - 100%)    10-29 @ 07:01  -  10-30 @ 07:00  --------------------------------------------------------  IN: 400 mL / OUT: 587 mL / NET: -187 mL    POCT Blood Glucose.: 153 mg/dL (30 Oct 2019 07:49)    HOSPITAL MEDICATIONS:  MEDICATIONS  (STANDING):  albuterol/ipratropium for Nebulization 3 milliLiter(s) Nebulizer every 6 hours  aspirin enteric coated 81 milliGRAM(s) Oral daily  buMETAnide Infusion 1.5 mG/Hr (7.5 mL/Hr) IV Continuous <Continuous>  calcium gluconate IVPB 1 Gram(s) IV Intermittent daily  chlorhexidine 4% Liquid 1 Application(s) Topical daily  dextrose 5%. 1000 milliLiter(s) (50 mL/Hr) IV Continuous <Continuous>  dextrose 50% Injectable 12.5 Gram(s) IV Push once  dextrose 50% Injectable 25 Gram(s) IV Push once  dextrose 50% Injectable 25 Gram(s) IV Push once  epoetin terence Injectable 61768 Unit(s) SubCutaneous <User Schedule>  ergocalciferol 00708 Unit(s) Oral <User Schedule>  insulin lispro (HumaLOG) corrective regimen sliding scale   SubCutaneous three times a day before meals  metolazone 5 milliGRAM(s) Oral <User Schedule>  nystatin Powder 1 Application(s) Topical two times a day  pantoprazole  Injectable 40 milliGRAM(s) IV Push daily  piperacillin/tazobactam IVPB.. 3.375 Gram(s) IV Intermittent every 12 hours  sevelamer carbonate 800 milliGRAM(s) Oral three times a day with meals  sodium chloride 3%  Inhalation 4 milliLiter(s) Inhalation three times a day    MEDICATIONS  (PRN):  dextrose 40% Gel 15 Gram(s) Oral once PRN Blood Glucose LESS THAN 70 milliGRAM(s)/deciliter  glucagon  Injectable 1 milliGRAM(s) IntraMuscular once PRN Glucose LESS THAN 70 milligrams/deciliter  sodium chloride 0.65% Nasal 2 Spray(s) Both Nostrils four times a day PRN Nasal Congestion  tetracaine/benzocaine/butamben Spray 1 Spray(s) Topical daily PRN AVF      LABS:             Arterial Blood Gas:  10-29 @ 13:00  7.40/37/55/23/84.3/-2.1  ABG lactate: --

## 2019-10-30 NOTE — PROGRESS NOTE ADULT - ASSESSMENT
75yo F with acute metabolic encephalopathy, volume overload and uremia, admitted to MICU for septic shock management. Vascular consulted for shiley placed and functioning. LUE u/s of AV fistula on 10/30 showed anastomotic stenosis.    Plan:  - Ultrasound of LUE AVF showed anastomotic stenosis  - Vascular plan for fistula pending discussion with attending    page [phone] with questions or concerns

## 2019-10-30 NOTE — PROGRESS NOTE ADULT - PROBLEM SELECTOR PLAN 3
-Accuchecks 4x day ac and hs   - Lantus hs   - consistant carb renal diet - completed 10 days of zosyn  - cont chest vest and chest PT

## 2019-10-30 NOTE — PROGRESS NOTE ADULT - PROBLEM SELECTOR PLAN 1
-L hemithoracic opacity noted  - aggressive chest PT, saline nebs, unable to do chest vest, unable to use metanebs   - high flow day/bipap hs and prn  - Palliative consult - L hemithoracic opacity noted  - aggressive chest PT, saline nebs, unable to do chest vest, unable to use metanebs   - high flow day/bipap hs and prn

## 2019-10-30 NOTE — PROGRESS NOTE ADULT - PROBLEM SELECTOR PROBLEM 2
ESRD (end stage renal disease)
Acute on chronic combined systolic and diastolic heart failure
ESRD (end stage renal disease)
Functional quadriplegia
Acute on chronic respiratory failure with hypoxia
ESRD (end stage renal disease)
ESRD (end stage renal disease)
Acute on chronic combined systolic and diastolic heart failure
ESRD (end stage renal disease)

## 2019-10-31 VITALS
TEMPERATURE: 99 F | OXYGEN SATURATION: 95 % | RESPIRATION RATE: 20 BRPM | SYSTOLIC BLOOD PRESSURE: 100 MMHG | HEART RATE: 73 BPM | DIASTOLIC BLOOD PRESSURE: 46 MMHG

## 2019-10-31 LAB
ANION GAP SERPL CALC-SCNC: 25 MMO/L — HIGH (ref 7–14)
BUN SERPL-MCNC: 71 MG/DL — HIGH (ref 7–23)
CALCIUM SERPL-MCNC: 8.4 MG/DL — SIGNIFICANT CHANGE UP (ref 8.4–10.5)
CHLORIDE SERPL-SCNC: 102 MMOL/L — SIGNIFICANT CHANGE UP (ref 98–107)
CO2 SERPL-SCNC: 18 MMOL/L — LOW (ref 22–31)
CREAT SERPL-MCNC: 4.88 MG/DL — HIGH (ref 0.5–1.3)
GLUCOSE SERPL-MCNC: 150 MG/DL — HIGH (ref 70–99)
HCT VFR BLD CALC: 30.4 % — LOW (ref 34.5–45)
HGB BLD-MCNC: 8.7 G/DL — LOW (ref 11.5–15.5)
MAGNESIUM SERPL-MCNC: 2 MG/DL — SIGNIFICANT CHANGE UP (ref 1.6–2.6)
MCHC RBC-ENTMCNC: 28.1 PG — SIGNIFICANT CHANGE UP (ref 27–34)
MCHC RBC-ENTMCNC: 28.6 % — LOW (ref 32–36)
MCV RBC AUTO: 98.1 FL — SIGNIFICANT CHANGE UP (ref 80–100)
NRBC # FLD: 0.3 K/UL — SIGNIFICANT CHANGE UP (ref 0–0)
NRBC FLD-RTO: 1.4 — SIGNIFICANT CHANGE UP
PHOSPHATE SERPL-MCNC: 5.8 MG/DL — HIGH (ref 2.5–4.5)
PLATELET # BLD AUTO: 368 K/UL — SIGNIFICANT CHANGE UP (ref 150–400)
PMV BLD: 11.8 FL — SIGNIFICANT CHANGE UP (ref 7–13)
POTASSIUM SERPL-MCNC: 4 MMOL/L — SIGNIFICANT CHANGE UP (ref 3.5–5.3)
POTASSIUM SERPL-SCNC: 4 MMOL/L — SIGNIFICANT CHANGE UP (ref 3.5–5.3)
RBC # BLD: 3.1 M/UL — LOW (ref 3.8–5.2)
RBC # FLD: 18.6 % — HIGH (ref 10.3–14.5)
SODIUM SERPL-SCNC: 145 MMOL/L — SIGNIFICANT CHANGE UP (ref 135–145)
WBC # BLD: 20.82 K/UL — HIGH (ref 3.8–10.5)
WBC # FLD AUTO: 20.82 K/UL — HIGH (ref 3.8–10.5)

## 2019-10-31 PROCEDURE — 99233 SBSQ HOSP IP/OBS HIGH 50: CPT | Mod: GC

## 2019-10-31 RX ORDER — ALTEPLASE 100 MG
2 KIT INTRAVENOUS ONCE
Refills: 0 | Status: DISCONTINUED | OUTPATIENT
Start: 2019-10-31 | End: 2019-10-31

## 2019-10-31 RX ORDER — ACETAMINOPHEN 500 MG
650 TABLET ORAL ONCE
Refills: 0 | Status: COMPLETED | OUTPATIENT
Start: 2019-10-31 | End: 2019-10-31

## 2019-10-31 RX ORDER — MEROPENEM 1 G/30ML
INJECTION INTRAVENOUS
Refills: 0 | Status: DISCONTINUED | OUTPATIENT
Start: 2019-10-31 | End: 2019-10-31

## 2019-10-31 RX ORDER — VANCOMYCIN HCL 1 G
1000 VIAL (EA) INTRAVENOUS ONCE
Refills: 0 | Status: DISCONTINUED | OUTPATIENT
Start: 2019-10-31 | End: 2019-10-31

## 2019-10-31 RX ORDER — MEROPENEM 1 G/30ML
500 INJECTION INTRAVENOUS EVERY 24 HOURS
Refills: 0 | Status: DISCONTINUED | OUTPATIENT
Start: 2019-10-31 | End: 2019-10-31

## 2019-10-31 RX ADMIN — MEROPENEM 100 MILLIGRAM(S): 1 INJECTION INTRAVENOUS at 13:28

## 2019-10-31 RX ADMIN — PANTOPRAZOLE SODIUM 40 MILLIGRAM(S): 20 TABLET, DELAYED RELEASE ORAL at 12:27

## 2019-10-31 RX ADMIN — Medication 3 MILLILITER(S): at 04:04

## 2019-10-31 RX ADMIN — Medication 3 MILLILITER(S): at 09:15

## 2019-10-31 RX ADMIN — Medication 200 GRAM(S): at 12:26

## 2019-10-31 RX ADMIN — SODIUM CHLORIDE 4 MILLILITER(S): 9 INJECTION INTRAMUSCULAR; INTRAVENOUS; SUBCUTANEOUS at 09:15

## 2019-10-31 RX ADMIN — NYSTATIN CREAM 1 APPLICATION(S): 100000 CREAM TOPICAL at 06:34

## 2019-10-31 RX ADMIN — Medication 1: at 08:49

## 2019-10-31 RX ADMIN — CHLORHEXIDINE GLUCONATE 1 APPLICATION(S): 213 SOLUTION TOPICAL at 12:27

## 2019-10-31 RX ADMIN — Medication 650 MILLIGRAM(S): at 06:24

## 2019-10-31 NOTE — CHART NOTE - NSCHARTNOTEFT_GEN_A_CORE
Called by RN that pt is pulseless and blue.  Pt was seen at bedside with pulmonary fellow and RNs, pulseless, PEA arrest on cardiac monitor.  Full ACLS protocol followed, see code sheet.  Pt was pronounced at 1356 with son Andres at bedside, who stated he would tell the rest of his family.  Andres is requesting autopsy at this time.  PCP notified. Called by RN that pt is pulseless and blue.  Pt was seen at bedside with pulmonary fellow and RNs, pulseless, PEA arrest on cardiac monitor.  Full ACLS protocol followed, see code sheet.  Pt was pronounced at 1356 with son Andres at bedside, who stated he would tell the rest of his family.  Family declines autopsy.  PCP notified.

## 2019-10-31 NOTE — PROGRESS NOTE ADULT - SUBJECTIVE AND OBJECTIVE BOX
CHIEF COMPLAINT: Patient is a 74y old  Female who presents with a chief complaint of AMS and Hypotension (30 Oct 2019 15:54)    Interval Events:      REVIEW OF SYSTEMS:  Constitutional:   Eyes:  ENT:  CV:  Resp:  GI:  :  MSK:  Integumentary:  Neurological:  Psychiatric:  Endocrine:  Hematologic/Lymphatic:  Allergic/Immunologic:  [ ] All other systems negative  [ ] Unable to assess ROS because ________      OBJECTIVE:  ICU Vital Signs Last 24 Hrs  T(C): 38.1 (31 Oct 2019 06:00), Max: 38.1 (31 Oct 2019 06:00)  T(F): 100.6 (31 Oct 2019 06:00), Max: 100.6 (31 Oct 2019 06:00)  HR: 93 (31 Oct 2019 06:38) (69 - 93)  BP: 144/76 (31 Oct 2019 06:00) (119/57 - 150/68)  BP(mean): --  ABP: --  ABP(mean): --  RR: 24 (31 Oct 2019 06:00) (16 - 24)  SpO2: 95% (31 Oct 2019 06:38) (92% - 98%)    10-30 @ 07:01  -  10-31 @ 07:00  --------------------------------------------------------  IN: 0 mL / OUT: 200 mL / NET: -200 mL    POCT Blood Glucose.: 151 mg/dL (30 Oct 2019 21:36)    HOSPITAL MEDICATIONS:  MEDICATIONS  (STANDING):  albuterol/ipratropium for Nebulization 3 milliLiter(s) Nebulizer every 6 hours  amLODIPine   Tablet 10 milliGRAM(s) Oral daily  aspirin enteric coated 81 milliGRAM(s) Oral daily  calcium gluconate IVPB 1 Gram(s) IV Intermittent daily  chlorhexidine 4% Liquid 1 Application(s) Topical daily  dextrose 5%. 1000 milliLiter(s) (50 mL/Hr) IV Continuous <Continuous>  dextrose 50% Injectable 12.5 Gram(s) IV Push once  dextrose 50% Injectable 25 Gram(s) IV Push once  dextrose 50% Injectable 25 Gram(s) IV Push once  epoetin terence Injectable 43065 Unit(s) SubCutaneous <User Schedule>  ergocalciferol 31298 Unit(s) Oral <User Schedule>  insulin lispro (HumaLOG) corrective regimen sliding scale   SubCutaneous three times a day before meals  nystatin Powder 1 Application(s) Topical two times a day  pantoprazole  Injectable 40 milliGRAM(s) IV Push daily  sevelamer carbonate 800 milliGRAM(s) Oral three times a day with meals  sodium chloride 3%  Inhalation 4 milliLiter(s) Inhalation three times a day    MEDICATIONS  (PRN):  dextrose 40% Gel 15 Gram(s) Oral once PRN Blood Glucose LESS THAN 70 milliGRAM(s)/deciliter  glucagon  Injectable 1 milliGRAM(s) IntraMuscular once PRN Glucose LESS THAN 70 milligrams/deciliter  sodium chloride 0.65% Nasal 2 Spray(s) Both Nostrils four times a day PRN Nasal Congestion  tetracaine/benzocaine/butamben Spray 1 Spray(s) Topical daily PRN AVF      LABS:                        8.7    20.82 )-----------( 368      ( 31 Oct 2019 05:55 )             30.4     10-31    145  |  102  |  71<H>  ----------------------------<  150<H>  4.0   |  18<L>  |  4.88<H>    Ca    8.4      31 Oct 2019 05:55  Phos  5.8     10-31  Mg     2.0     10-31          Arterial Blood Gas:  10-29 @ 13:00  7.40/37/55/23/84.3/-2.1  ABG lactate: --        MICROBIOLOGY:     RADIOLOGY:  [ ] Reviewed and interpreted by me    PULMONARY FUNCTION TESTS:    EKG:

## 2019-10-31 NOTE — PROGRESS NOTE ADULT - REASON FOR ADMISSION
AMS and Hypotension

## 2019-10-31 NOTE — PROGRESS NOTE ADULT - ATTENDING COMMENTS
febrile, leukocytosis, minimally respsonsive, HFNC  abx empiric, will need to remove HD cath after dialysis  prognosis is very poor, family mtg schedule with pall tomorrow    Addendum:  Pt cardiac arrested, CPR initiated, ROSC not achieved  Pt   Son at bedside, aware and calm. Called his family  Palliative care team met with him as well

## 2019-10-31 NOTE — DISCHARGE NOTE FOR THE EXPIRED PATIENT - HOSPITAL COURSE
74 year old woman with history of HTN, HLD, T2DM on insulin, COPD 2ith 2 L NC at home, CHF with last EF 45-50%, ESRD not on HD (has left AVF not been used) presented to the ED on 10/18 after being found unresponsive by family. Patient was also found to be hypoglycemic and hypotensive in the ED. Patient was admitted to the MICU for septic shock.     In the MICU, patient received 2 u pRBC for anemia to Hb of 6.4. Patient was also started on broad spectrum antibiotic therapy with Vanc, Zosyn, Azithromycin. Urine legionella negative. Patient was started on Bicarb gtt to correct acidosis. Nephrology team consulted and patient not started on HD. Patient was extubated on 10/22. Antibiotic de-escalated to Zosyn (10/19-) after endobronchial culture positive for pseudomonas. Blood culture from 10/19 NGTD. Patient restarted on home dose of Lasix 40 mg BID.     Pt was tx to floors on 10/23 where pt has RRT for AMS and hypoxia, then tx to RCU on high flow and BIPAP PRN.  HD was started on 10/25 due to persistent fluid overload not responsive to lasix.   Pt has persistent lethargy although ABG was normal. Pt also did not tolerate HD on 10/29.  Palliative was consulted, pending family meeting, but on 10/31 pt with unwitnessed PEA arrest. Full ACLS, pt  on 10/31 [id number].

## 2019-10-31 NOTE — PROGRESS NOTE ADULT - SUBJECTIVE AND OBJECTIVE BOX
[facility]   MD CHARITY MCADAMS, DO FANI BROWN, JASSON CONNOLLY    TEL:  OFFICE: [phone]  DR ALCANTAR CELL: [phone]  DR. MORRIS CELL: [phone]  DR. BROWN CELL: [phone]  TENISHA BOATENG CELL: [phone]        Patient is a 74y old  Female who presents with a chief complaint of AMS and Hypotension (31 Oct 2019 07:51)      Patient seen and examined at bedside.     VITALS:  T(F): 99.2 (10-31-19 @ 13:32), Max: 100.6 (10-31-19 @ 06:00)  HR: 73 (10-31-19 @ 13:32)  BP: 100/46 (10-31-19 @ 13:32)  RR: 20 (10-31-19 @ 13:32)  SpO2: 95% (10-31-19 @ 13:32)  Wt(kg): --    10-30 @ 07:01  -  10-31 @ 07:00  --------------------------------------------------------  IN: 0 mL / OUT: 200 mL / NET: -200 mL          PHYSICAL EXAM:  Constitutional: lethargic, responsive to pain  Neck: No JVD  Respiratory: decrease bs at left  Cardiovascular: S1, S2, RRR  Gastrointestinal: BS+, soft, NT/ND  Extremities: No peripheral edema, b/l [facility] Medications:   MEDICATIONS  (STANDING):  albuterol/ipratropium for Nebulization 3 milliLiter(s) Nebulizer every 6 hours  alteplase for catheter clearance 2 milliGRAM(s) Catheter once  alteplase for catheter clearance 2 milliGRAM(s) Catheter once  aspirin enteric coated 81 milliGRAM(s) Oral daily  calcium gluconate IVPB 1 Gram(s) IV Intermittent daily  chlorhexidine 4% Liquid 1 Application(s) Topical daily  dextrose 5%. 1000 milliLiter(s) (50 mL/Hr) IV Continuous <Continuous>  dextrose 50% Injectable 12.5 Gram(s) IV Push once  dextrose 50% Injectable 25 Gram(s) IV Push once  dextrose 50% Injectable 25 Gram(s) IV Push once  epoetin terence Injectable 41101 Unit(s) SubCutaneous <User Schedule>  ergocalciferol 59365 Unit(s) Oral <User Schedule>  insulin lispro (HumaLOG) corrective regimen sliding scale   SubCutaneous three times a day before meals  meropenem  IVPB 500 milliGRAM(s) IV Intermittent every 24 hours  meropenem  IVPB      nystatin Powder 1 Application(s) Topical two times a day  pantoprazole  Injectable 40 milliGRAM(s) IV Push daily  sevelamer carbonate 800 milliGRAM(s) Oral three times a day with meals  sodium chloride 3%  Inhalation 4 milliLiter(s) Inhalation three times a day  vancomycin  IVPB 1000 milliGRAM(s) IV Intermittent once      LABS:  10-31    145  |  102  |  71<H>  ----------------------------<  150<H>  4.0   |  18<L>  |  4.88<H>    Ca    8.4      31 Oct 2019 05:55  Phos  5.8     10-31  Mg     2.0     10-31      Creatinine Trend: 4.88 <--, 3.05 <--, 3.30 <--, 2.98 <--, 4.71 <--, 4.76 <--    Phosphorus Level, Serum: 5.8 mg/dL (10-31 @ 05:55)                              8.7    20.82 )-----------( 368      ( 31 Oct 2019 05:55 )             30.4     Urine Studies:  Urinalysis - [10-19-19 @ 03:30]      Color YELLOW / Appearance CLEAR / SG 1.017 / pH 5.5      Gluc NEGATIVE / Ketone NEGATIVE  / Bili NEGATIVE / Urobili NORMAL       Blood NEGATIVE / Protein 10 / Leuk Est NEGATIVE / Nitrite NEGATIVE      RBC  / WBC  / Hyaline  / Gran  / Sq Epi  / Non Sq Epi  / Bacteria       Iron 27, TIBC 125, %sat --      [10-20-19 @ 02:40]  Ferritin 283.7      [10-20-19 @ 02:40]  .9 (Ca --)      [10-20-19 @ 02:40]   --  Vitamin D (25OH) 10.5      [10-20-19 @ 02:40]  HbA1c 5.2      [10-20-19 @ 02:40]  TSH 1.11      [10-18-19 @ 23:20]    HBsAb <3.0      [10-28-19 @ 19:05]  HBsAg NEGATIVE      [10-24-19 @ 02:30]  HBcAb Nonreactive      [10-28-19 @ 19:05]  HCV 0.16, Nonreactive Hepatitis C AB  S/CO Ratio                        Interpretation  < 1.00                                   Non-Reactive  1.00 - 4.99                         Weakly-Reactive  >= 5.00                                Reactive  Non-Reactive: Aperson with a non-reactive HCV antibody  result is considered uninfected.  No further action is  needed unless recent infection is suspected.  In these  cases, consider repeat testing later to detect  seroconversion..  Weakly-Reactive: HCV antibody test is abnormal, HCV RNA  Qualitative test will follow.  Reactive: HCV antibody test is abnormal, HCV RNA  Qualitative test will follow.  Note: HCV antibody testing is performed on the [employer]   system.      [10-19-19 @ 12:30]      RADIOLOGY & ADDITIONAL STUDIES:

## 2019-10-31 NOTE — PROGRESS NOTE ADULT - PROVIDER SPECIALTY LIST ADULT
Cardiology
Hospitalist
Hospitalist
MICU
MICU
Nephrology
Palliative Care
Pulmonology
Vascular Surgery
MICU
Nephrology
Pulmonology
MICU
Cardiology
Pulmonology
Hospitalist
Pulmonology

## 2019-10-31 NOTE — PROGRESS NOTE ADULT - ASSESSMENT
74F h/o HTN, HLD, IDDM2, COPD (former smoker, 2L/min PRN at home), CHF (EF 12/2018 45-50%), ESRD not on dialysis admitted to micu for hypotensive, ams, hypoglycemia and hypothermia.     CKD stage 5 now requiring renal replacement therapy   s/p left avf 12/18, + thrill + bruit   consent for cvvh/ IHD in chart from daughter    initiated HD 10/26 sec to fluid overload  AVF not functioning f/u vascular   s/p valentín 10/26. received daily HD from 10/26 total UF 9L  unable to tolerate HD 10/29 sec to tachycardia   bumex and metolazone also held 10/30    - patient still requiring high flow and bipap  - will plan for HD today with UF as tolerated  - monitor vitals   - monitor I/O's closely    Hypocalcemia  pth elevated.  low vit d 25 level  start vit d 34868 weekly x8  start calcitriol 0.25 daily when phos is better   low albumin   continue renagel when eating  monitor    Hyperphosphatemia  npo right now  allergy to phoslo  start renagel 800mg tid with meal  monitor    Anemia  occult +  monitor hb  iron sat >20  epo [id number] tiw   transfuse as needed  consider Gi eval    Acidosis  likely sec to renal failure +diarrhea  improved, will continue to improve w/ HD  monitor    Hypotensive  ? sepsis   on  iv antibiotic  BP is normal now  - goal BP <130/80  monitor    Hypokalemia  supplemented  improved   monitor    Fever  T max 100.6 today  consider iD  eval  D/C BRUNO laura after HD today

## 2019-11-01 NOTE — PROGRESS NOTE ADULT - ASSESSMENT
74F h/o HTN, HLD, IDDM2, COPD (former smoker, 2L/min PRN at home), CHF (EF 12/2018 45-50%), ESRD not on dialysis admitted to micu for hypotensive, ams, hypoglycemia and hypothermia.     CKD stage 5 not yet on hd  s/p left avf 12/18, + thrill + bruit (never been used)  patient critically ill  renal function has been stable, potassium is ok  seems hypervolemic however with hypotension on norepinephrine  s/p intubation  bush in place + urine production  consent for cvvh, IHD in chart from daughter  patient is not hemodynamically stable for IHD currently  would consider CVVH if needed for fluid status, elevated k, acidosis.   if need CVVH, will need shiley for access   please call if any question    hypocalcemia  check pth, vit d 25 level  low abd  consider supplement ca 2g iv x 1  monitor    hyperphosphatemia  npo right now  would start phoslo 1334 tid with meal when eating  monitor    anemia  occult +  monitor hb  transfuse as needed  consider Gi eval    acidosis  likely sec to renal failure +diarrhea  getting bicarb gtt  monitor    hypotensive  ? sepsis vs bb intoxication   on pressor and iv antibiotic  monitor 74F h/o HTN, HLD, IDDM2, COPD (former smoker, 2L/min PRN at home), CHF (EF 12/2018 45-50%), ESRD not on dialysis admitted to micu for hypotensive, ams, hypoglycemia and hypothermia.     CKD stage 5 not yet on hd  s/p left avf 12/18, + thrill + bruit (never been used)  patient critically ill  renal function has been stable, potassium is ok  seems hypervolemic  BP is better. On norepinephrine  s/p intubation  Consider Lasix 80 mg q12  bush in place + good urine production  consent for cvvh, IHD in chart from daughter  patient is not hemodynamically stable for IHD currently  would consider CVVH if needed for fluid status, elevated k, acidosis.   if need CVVH, will need shiley for access   please call if any question    hypocalcemia  pth elevated.  F/U vit d 25 level  low abd  Supplement ca 1g given  monitor    hyperphosphatemia  npo right now  would start phoslo 1334 tid with meal when eating  monitor    anemia  occult +  monitor hb  Iron low:27  transfuse as needed  consider Gi eval    acidosis  likely sec to renal failure +diarrhea  Slight improvement  Bicarb drip discontinued  monitor    hypotensive  ? sepsis vs bb intoxication   on pressor and iv antibiotic  BP is better  monitor 74F h/o HTN, HLD, IDDM2, COPD (former smoker, 2L/min PRN at home), CHF (EF 12/2018 45-50%), ESRD not on dialysis admitted to micu for hypotensive, ams, hypoglycemia and hypothermia.     CKD stage 5 not yet on hd  s/p left avf 12/18, + thrill + bruit (never been used)  patient critically ill  renal function has been stable, potassium is ok  seems hypervolemic  BP is better. On norepinephrine  s/p intubation  Consider Lasix 80 mg q12  bush in place + good urine production  consent for cvvh, IHD in chart from daughter  patient is not hemodynamically stable for IHD currently  would consider CVVH if needed for fluid status, elevated k or acidosis.   if need CVVH, will need shiley for access   please call if any question    hypocalcemia  pth elevated.  F/U vit d 25 level  low abd  Supplement ca 1g given  monitor    hyperphosphatemia  npo right now  would start phoslo 1334 tid with meal when eating  monitor    anemia  occult +  monitor hb  Iron low:27  transfuse as needed  consider Gi eval    acidosis  likely sec to renal failure +diarrhea  Slight improvement  Bicarb drip discontinued  monitor    hypotensive  ? sepsis vs bb intoxication   on pressor and iv antibiotic  BP is better  monitor none

## 2019-11-03 LAB — BACTERIA BLD CULT: SIGNIFICANT CHANGE UP

## 2021-03-02 NOTE — ED PROVIDER NOTE - CRTICAL CARE TIME SPENT (MIN)
Hide Include Location In Plan Question?: No 60 Include Location In Plan?: Yes Additional Note: Reassured benign in nature Detail Level: Zone

## 2021-07-14 NOTE — PATIENT PROFILE ADULT - BRADEN ACTIVITY
(1) bedfast Rest.  Avoid physical overexertion.  Follow up early next week with your own doctor: call office later Thursday for appointment.  You received 1 unit PRBC blood transfusion.  You refused stool guaiac exam to test for possible bleeding from the gut.  Follow up with hematologist Dr. Morrison.        Blood Transfusion, Adult, Care After      This sheet gives you information about how to care for yourself after your procedure. Your doctor may also give you more specific instructions. If you have problems or questions, contact your doctor.      What can I expect after the procedure?  After the procedure, it is common to have:  •Bruising and soreness at the IV site.      •A fever or chills on the day of the procedure. This may be your body's response to the new blood cells received.      •A headache.        Follow these instructions at home:      Insertion site care                 •Follow instructions from your doctor about how to take care of your insertion site. This is where an IV tube was put into your vein. Make sure you:  •Wash your hands with soap and water before and after you change your bandage (dressing). If you cannot use soap and water, use hand .      •Change your bandage as told by your doctor.      •Check your insertion site every day for signs of infection. Check for:  •Redness, swelling, or pain.      •Bleeding from the site.      •Warmth.      •Pus or a bad smell.        General instructions     •Take over-the-counter and prescription medicines only as told by your doctor.      •Rest as told by your doctor.      •Go back to your normal activities as told by your doctor.      •Keep all follow-up visits as told by your doctor. This is important.        Contact a doctor if:    •You have itching or red, swollen areas of skin (hives).      •You feel worried or nervous (anxious).      •You feel weak after doing your normal activities.      •You have redness, swelling, warmth, or pain around the insertion site.      •You have blood coming from the insertion site, and the blood does not stop with pressure.      •You have pus or a bad smell coming from the insertion site.        Get help right away if:  •You have signs of a serious reaction. This may be coming from an allergy or the body's defense system (immune system). Signs include:  •Trouble breathing or shortness of breath.      •Swelling of the face or feeling warm (flushed).      •Fever or chills.      •Head, chest, or back pain.      •Dark pee (urine) or blood in the pee.      •Widespread rash.      •Fast heartbeat.      •Feeling dizzy or light-headed.        You may receive your blood transfusion in an outpatient setting. If so, you will be told whom to contact to report any reactions.    These symptoms may be an emergency. Do not wait to see if the symptoms will go away. Get medical help right away. Call your local emergency services (911 in the [place] Do not drive yourself to the hospital.       Summary    •Bruising and soreness at the IV site are common.      •Check your insertion site every day for signs of infection.      •Rest as told by your doctor. Go back to your normal activities as told by your doctor.      •Get help right away if you have signs of a serious reaction.      This information is not intended to replace advice given to you by your health care provider. Make sure you discuss any questions you have with your health care provider.            Anemia    WHAT YOU NEED TO KNOW:    Anemia is a low number of red blood cells or a low amount of hemoglobin in your red blood cells. Hemoglobin is a protein that helps carry oxygen throughout your body. Red blood cells use iron to create hemoglobin. Anemia may develop if your body does not have enough iron. It may also develop if your body does not make enough red blood cells or they die faster than your body can make them.    DISCHARGE INSTRUCTIONS:    Call 911 or have someone call 911 for any of the following:   •You lose consciousness.      •You have severe chest pain.      Return to the emergency department if:   •You have dark or bloody bowel movements.          Contact your healthcare provider if:   •Your symptoms are worse, even after treatment.      •You have questions or concerns about your condition or care.      Medicines:   •Iron or folic acid supplements help increase your red blood cell and hemoglobin levels.      •Vitamin B12 injections may help boost your red blood cell level and decrease your symptoms. Ask your healthcare provider how to inject B12.      •Take your medicine as directed. Contact your healthcare provider if you think your medicine is not helping or if you have side effects. Tell him of her if you are allergic to any medicine. Keep a list of the medicines, vitamins, and herbs you take. Include the amounts, and when and why you take them. Bring the list or the pill bottles to follow-up visits. Carry your medicine list with you in case of an emergency.      Prevent anemia: Eat healthy foods rich in iron and vitamin C. Nuts, meat, dark leafy green vegetables, and beans are high in iron and protein. Vitamin C helps your body absorb iron. Foods rich in vitamin C include oranges and other citrus fruits. Ask your healthcare provider for a list of other foods that are high in iron or vitamin C. Ask if you need to be on a special diet.    Sources of Iron       Sources of Vitamin C         Follow up with your healthcare provider as directed: Write down your questions so you remember to ask them during your visits.

## 2021-10-08 NOTE — PROVIDER CONTACT NOTE (CRITICAL VALUE NOTIFICATION) - ACTION/TREATMENT ORDERED:
Medicine NP Su Malone notified and aware. Patient eating breakfast & apple juice given. Repeat fingerstick taken, glucose 98. RN will continue to monitor.
information could not be obtained

## 2021-10-26 NOTE — ED PROVIDER NOTE - ATTENDING CONTRIBUTION TO CARE
None
71F h/o COPD, CHF, HTN presents with SOB.  Over the last two days has had worsening SOB and cough.  Now has developed fever.  + increased sputum production.  + sick contacts at home.  Reports progressively worsening leg swelling for months, last week was put on increased dose of lasix.  On EMS arrival with hypoxia in 70s, improved with bipap.  On exam ill appearing, lungs scattered wheezing, rhonchi b/l bases, rrr, abd soft, no rash, 4+ edema b/l lower extremities, no focal neuro deficits, 2+ pulses.  Placed on bipap with significant improvement in respiratory status.  CXR concerning for pneumonia with mild pulm edema.  Seen and cleared by MICU, plan to admit to PCU.

## 2021-11-30 NOTE — H&P ADULT. - PMH
Adult Idiopathic Generalized Osteoporosis    CAD (Coronary Artery Disease)    Chronic obstructive pulmonary disease, unspecified COPD type    DM (Diabetes Mellitus), Secondary    Hypertension Glycopyrrolate Counseling:  I discussed with the patient the risks of glycopyrrolate including but not limited to skin rash, drowsiness, dry mouth, difficulty urinating, and blurred vision.

## 2022-01-26 NOTE — PROVIDER CONTACT NOTE (CRITICAL VALUE NOTIFICATION) - NAME OF MD/NP/PA/DO NOTIFIED:
Pt c/o RLQ pain with difficulty urinating for 3 days now. Pt requesting CT scan. States hx of kidney stone. Medicine NP Su Malone

## 2022-03-15 NOTE — H&P PST ADULT - PROBLEM/PLAN-6
follows: EKG, CBC, BMP per Dr. Ange Jin and cardiology  2. Change in medication regimen before surgery: Eliquis instructions per cardiology  3. Deep vein thrombosis prophylaxis: regimen to be chosen by surgical team  4. Will review preoperative testing prior to clearing for surgery. Cardiac clearance per cardiology    Addendum: 3/29/2022: reviewed preoperative lab results from 3/18/2022. Patient is medically cleared for the proposed surgery and anesthesia. Cardiac clearance per cardiology. Electronically signed by WONG Spann CNP on 3/29/2022 at 12:22 PM      Electronically signed by WONG Spann CNP on 3/29/2022 at 12:21 PM.     Please note that this chart was generated using voice recognition Dragon dictation software. Although every effort was made to ensure the accuracy of this automated transcription, some errors in transcription may have occurred. Subjective:   Patient presents to the office today for a preoperative examination at the request of surgeon, Dr. Ange Jin, who plans on performing left knee arthroplasty on 4/11/2022 at [facility]. Preoperative testing is scheduled on 3/18/2022. He has an appointment with cardiology for cardiac clearance on 3/31/2022.  Patient reports he has not been taking the Eliquis due to cost.    Known anesthesia problems: None   Bleeding risk: Anticoagulant therapy- Eliquis  Personal or FH of DVT/PE: No        BP Readings from Last 3 Encounters:   03/15/22 (!) 142/88   10/27/21 134/86   04/21/21 130/86        Pulse Readings from Last 3 Encounters:   03/15/22 52   10/27/21 80   04/21/21 105        Wt Readings from Last 3 Encounters:   03/15/22 211 lb 9.6 oz (96 kg)   10/27/21 212 lb 8 oz (96.4 kg)   04/21/21 211 lb 11.2 oz (96 kg)        Past Medical History:   Diagnosis Date    [facility] [facility]     Diabetes mellitus, type 2 ([facility] [address] [facility]     Hyperlipidemia     Hypertension     Hypokalemia 12/31/2018    Microalbuminuria 10/28/2020    Presence of right artificial knee joint 9/8/2017     Past Surgical History:   Procedure Laterality Date    COLONOSCOPY  09/2010    normal    CYSTOSCOPY  2002    with stent placement and removal    KNEE SURGERY Right 07/24/2017    partial right knee     Family History   Problem Relation Age of Onset    Arthritis Mother     Atrial Fibrillation Mother     Other Father         black lung     Diabetes Brother     Heart Attack Brother      Social History     Tobacco Use    Smoking status: Never Smoker    Smokeless tobacco: Never Used   Vaping Use    Vaping Use: Never used   Substance Use Topics    Alcohol use: Yes     Comment: weekends    Drug use: No       No Known Allergies  Outpatient Medications Marked as Taking for the 3/15/22 encounter (Office Visit) with WONG Albert CNP   Medication Sig Dispense Refill    potassium chloride (KLOR-CON) 10 MEQ extended release tablet TAKE 1 BY MOUTH ONCE DAILY      apixaban (ELIQUIS) 5 MG TABS tablet Take 1 tablet by mouth twice daily 60 tablet 0    hydroCHLOROthiazide (HYDRODIURIL) 25 MG tablet TAKE 1 TABLET BY MOUTH ONCE DAILY IN THE MORNING 90 tablet 3    atorvastatin (LIPITOR) 80 MG tablet TAKE 1 TABLET BY MOUTH AT BEDTIME 90 tablet 5    metFORMIN (GLUCOPHAGE) 1000 MG tablet TAKE 1 TABLET BY MOUTH TWICE DAILY WITH MEALS 60 tablet 5    losartan (COZAAR) 100 MG tablet Take 1 tablet by mouth once daily 30 tablet 2    dapagliflozin (FARXIGA) 10 MG tablet TAKE 1 TABLET BY MOUTH IN THE MORNING 30 tablet 5    SOLIQUA 100-33 UNT-MCG/ML SOPN INJECT 20 UNITS SUBCUTANEOSLY ONCE DAILY 15 mL 3    aspirin 81 MG tablet Take 81 mg by mouth daily       Review of Systems   Constitutional: Negative for chills, diaphoresis, fatigue and fever. HENT: Negative. Eyes: Negative. Respiratory: Negative for cough, shortness of breath and wheezing. Cardiovascular: Negative for chest pain, palpitations and leg swelling.    Gastrointestinal: Negative for abdominal pain, constipation, diarrhea and nausea. Endocrine: Negative for cold intolerance, heat intolerance, polydipsia, polyphagia and polyuria. Genitourinary: Negative. Musculoskeletal: Positive for arthralgias (left knee). Negative for myalgias. Skin: Negative. Allergic/Immunologic: Negative for environmental allergies. Neurological: Negative for dizziness, light-headedness and headaches. Psychiatric/Behavioral: Negative for agitation, decreased concentration, dysphoric mood, self-injury, sleep disturbance and suicidal ideas. The patient is not nervous/anxious. Objective:     Vitals:    03/15/22 1552 03/15/22 1603 03/15/22 1641   BP: (!) 180/100 (!) 164/98 (!) 142/88   Pulse: 52     SpO2: 97%     Weight: 211 lb 9.6 oz (96 kg)           Physical Exam   Constitutional: He is oriented to person, place, and time. He appears well-developed and well-nourished. No distress. HENT:   Head: Normocephalic and atraumatic. Mouth/Throat: Uvula is midline, oropharynx is clear and moist and mucous membranes are normal.   Eyes: Conjunctivae and EOM arenormal. Pupils are equal, round, and reactive to light. Neck: Trachea normal and normal range of motion. Neck supple. No JVD present. Carotid bruit is not present. No mass and no thyromegaly present. Cardiovascular: Normal rate, irregular rhythm, normal heart sounds and intact distal pulses. Exam reveals no gallop and no friction rub. No murmur heard. Pulmonary/Chest: Effort normal and breath sounds normal. No respiratory distress. He has no wheezes. He has no rales. Abdominal: Soft. Normal bowel sounds. He exhibits no distension and no mass. There is no hepatosplenomegaly. No tenderness. Musculoskeletal: He exhibits mild swelling and tenderness to the left knee. Neurological: He is alert and oriented to person, place, and time. He has normal strength. No cranial nerve deficit or sensory deficit. Antalgic gait. Skin: Skin is warm and dry. No rash noted.  No erythema. Psychiatric: He has a normal mood and affect.  His behavior is normal.     EKG Interpretation: per cardiology    Lab Review:  Lab Results   Component Value Date    WBC 6.1 12/10/2021    HGB 15.3 12/10/2021    HCT 44.5 12/10/2021    MCV 94.0 12/10/2021    .1 12/10/2021     Lab Results   Component Value Date     12/10/2021    K 3.2 (L) 12/10/2021     12/10/2021    CO2 30 12/10/2021    BUN 14 12/10/2021    CREATININE 0.9 12/10/2021    GLUCOSE 100 12/10/2021    CALCIUM 9.5 12/10/2021    PROT 7.9 01/31/2020    LABALBU 4.1 12/10/2021    BILITOT 1.5 (H) 12/10/2021    ALKPHOS 59 12/10/2021    AST 28 12/10/2021    ALT 22 12/10/2021    LABGLOM > 60.0 12/10/2021    GFRAA >60 01/31/2020    AGRATIO 1.3 12/24/2018    GLOB 3.1 12/10/2021     Lab Results   Component Value Date    LABA1C 6.3 10/27/2021    LABA1C 6.4 04/21/2021    LABA1C 6.5 10/21/2020     Lab Results   Component Value Date    GLUF 139 (H) 01/31/2020    LABMICR 54.3 (H) 12/10/2021    LDLCALC 50.6 12/10/2021    CREATININE 0.9 12/10/2021     No results found for: TSHFT4, TSH      Electronically signed by WONG Peoples CNP on 3/21/2022 at 2:23 PM. DISPLAY PLAN FREE TEXT

## 2022-11-20 NOTE — ASU PREOP CHECKLIST - SKIN PREP
Gen: Alert, NAD, well appearing  Head: NC, AT, PERRL, EOMI, normal lids/conjunctiva  ENT: normal hearing, patent oropharynx without erythema/exudate  Neck: +supple, no tenderness/meningismus,  Pulm: Bilateral BS, normal resp effort, no wheeze/stridor/retractions  CV: RRR  Abd: soft, NT/ND  Mskel: no edema/erythema/cyanosis  Skin: no rash, warm/dry  Neuro: AAOx3, no sensory/motor deficits done/at home

## 2023-06-06 NOTE — ED PROVIDER NOTE - NS_BEDUNITTYPES_ED_ALL_ED
Subjective   Reason for Visit: Isa Arciniega is an 68 y.o. female here for a Medicare Wellness visit.     Past Medical, Surgical, and Family History reviewed and updated in chart.    Reviewed all medications by prescribing practitioner or clinical pharmacist (such as prescriptions, OTCs, herbal therapies and supplements) and documented in the medical record.    HPI    She got the sleep study done in February but has not gotten any results.    Dental: Not regularly.  Vision: Glasses. Eye appointments regularly. Has upcoming appointment    Last Colonoscopy: Done a long time ago. Cologuard ordered.  Last Pap smear: Ob/Gyn last PAP 2 years and was normal. Was told she did not need any more.  Last Mammogram: One many years ago.  Last Dexa scan: Never done. Order placed.  AAA Screening: Not indicated  Low Dose Lung CT Screening: Not indicated.    Influenza: Defers. Recommended  Tetanus: Unknown. Recommended  Pneumonia: Recommended  COVID: First 2 vaccines. Recommended booster  Shingles: Recommended.       Patient Care Team:  Riccardo Vela DO as PCP - General (Family Medicine)  MONICO Gabriel as PCP - [facility] Attributed Provider     Review of Systems   Constitutional:  Negative for chills, fatigue and fever.   HENT:  Negative for congestion and rhinorrhea.    Eyes:  Negative for visual disturbance.   Respiratory:  Negative for cough and shortness of breath.    Cardiovascular:  Negative for chest pain and palpitations.   Gastrointestinal:  Negative for abdominal pain, constipation, diarrhea, nausea and vomiting.   Genitourinary:  Negative for dysuria and frequency.   Musculoskeletal:  Negative for arthralgias and myalgias.   Skin:  Negative for color change and rash.   Neurological:  Negative for dizziness, light-headedness and numbness.   Psychiatric/Behavioral:  Negative for dysphoric mood. The patient is not nervous/anxious.        Objective   Vitals:  /88   Pulse 61   Temp 36.4 °C (97.5 °F)        Physical Exam  Vitals and nursing note reviewed.   Constitutional:       General: She is not in acute distress.     Appearance: Normal appearance. She is obese. She is not ill-appearing or toxic-appearing.      Comments: Wheelchair bound   HENT:      Head: Normocephalic and atraumatic.      Right Ear: Tympanic membrane, ear canal and external ear normal.      Left Ear: Tympanic membrane, ear canal and external ear normal.      Nose: Nose normal.      Mouth/Throat:      Mouth: Mucous membranes are moist.   Eyes:      Extraocular Movements: Extraocular movements intact.      Conjunctiva/sclera: Conjunctivae normal.      Pupils: Pupils are equal, round, and reactive to light.   Cardiovascular:      Rate and Rhythm: Normal rate and regular rhythm.      Pulses: Normal pulses.      Heart sounds: Normal heart sounds.   Pulmonary:      Effort: Pulmonary effort is normal.      Breath sounds: Normal breath sounds.   Abdominal:      General: Abdomen is flat. Bowel sounds are normal.      Palpations: Abdomen is soft.   Musculoskeletal:         General: Normal range of motion.      Cervical back: Normal range of motion and neck supple.   Skin:     General: Skin is warm.   Neurological:      General: No focal deficit present.      Mental Status: She is alert and oriented to person, place, and time. Mental status is at baseline.      Cranial Nerves: No cranial nerve deficit.      Sensory: No sensory deficit.   Psychiatric:         Mood and Affect: Mood normal.         Behavior: Behavior normal.         Thought Content: Thought content normal.         Judgment: Judgment normal.         Assessment/Plan   Problem List Items Addressed This Visit          Nervous    Lumbar spinal stenosis       Circulatory    Benign essential hypertension    Relevant Medications    losartan (Cozaar) 50 mg tablet    triamterene-hydrochlorothiazid (Maxzide-25) 37.5-25 mg tablet    Other Relevant Orders    Thyroid Stimulating Hormone    Thyroxine,  Free    CBC and Auto Differential    Comprehensive Metabolic Panel    Lipid Panel       Musculoskeletal    Ankle swelling       Endocrine/Metabolic    Short stature due to endocrine disorder    Subclinical hyperthyroidism    Relevant Orders    Thyroid Stimulating Hormone    Thyroxine, Free    Obesity, morbid (CMS/HCC)    Relevant Orders    Lipid Panel       Other    Chronic low back pain    Relevant Orders    Referral to Physical Therapy    Hyperlipidemia    Relevant Medications    rosuvastatin (Crestor) 5 mg tablet    Uses powered wheelchair    Asymptomatic menopausal state    Relevant Orders    XR DEXA bone density    Hemoglobin A1C    Screening for colorectal cancer    Relevant Orders    Cologuard® colon cancer screening     Other Visit Diagnoses       Medicare annual wellness visit, subsequent    -  Primary    Advance directive discussed with patient        Cardiac risk counseling        Encounter for screening for other disorder        Healthcare maintenance        Relevant Orders    Thyroid Stimulating Hormone    Thyroxine, Free    CBC and Auto Differential    Comprehensive Metabolic Panel    Lipid Panel    Vitamin D, Total    Hemoglobin A1C    Urinalysis with Reflex Microscopic    Routine general medical examination at health care facility                    MICU

## 2023-08-01 NOTE — DISCHARGE NOTE ADULT - CARE PROVIDERS DIRECT ADDRESSES
No ,DirectAddress_[email or web address] Yes

## 2023-12-07 NOTE — PROGRESS NOTE ADULT - ASSESSMENT
72 y/o F COPD, CHF, HTN, HLD, DM2,Anemia ,CKD ,smoking x60+ years, presenting with shortness of breath worsening for one month. Patient was diagnosed with flu x2 weeks ago after her daughter had flu, and had significant shortness of breath with cough and congestion as well as shortness of breath at that time. She reports now that for the last week her shortness of breath is intermittent but has been worsening in severity. She states that she uses nebulizer once daily for her COPD, but yesterday had to use it multiple more times. Reports still feeling congested and intermittently coughing up mucous without any blood. She denies any fevers or chills. Went to her PMD today for increased shortness of breath and was told to come here concerned for fluid on the lungs. 98.6

## 2023-12-12 NOTE — ED ADULT NURSE NOTE - NSFALLRSKASSISTTYPE_ED_ALL_ED
-BP at goal during this visit while on coreg 3.125mg BID which was started during last visit in 10/2023  -Will continue medication for now  
-Continues to decline vaccination, colon cancer screening  
Walking/Standing/Toileting

## 2024-01-01 NOTE — PROGRESS NOTE ADULT - SUBJECTIVE AND OBJECTIVE BOX
Gregor Barrow MD  Interventional Cardiology / Endovascular Specialist  [place] Office : [phone] [place] [address] [place] [address]  Tel:   [place] Office : [phone] [facility] [place] [address]  Tel: [phone]  Cell : [phone]    HISTORY OF PRESENTING ILLNESS:    74F h/o HTN, HLD, IDDM2, COPD (former smoker, 2L/min PRN at home), CHF (EF 12/2018 45-50%), ESRD not on dialysis, p/w AMS and hypotension, intubated and sedated found to have a PNA , S/P RRT for resp. distress , Volume up on exam , protecting airway on Bipap       MEDICATIONS:  aspirin enteric coated 81 milliGRAM(s) Oral daily  furosemide   Injectable 80 milliGRAM(s) IV Push two times a day    piperacillin/tazobactam IVPB.. 3.375 Gram(s) IV Intermittent every 12 hours    acetylcysteine 20% for bronchoscopy 2 milliLiter(s) Nebulizer once  albuterol/ipratropium for Nebulization 3 milliLiter(s) Nebulizer every 6 hours      pantoprazole  Injectable 40 milliGRAM(s) IV Push daily    dextrose 40% Gel 15 Gram(s) Oral once PRN  dextrose 50% Injectable 12.5 Gram(s) IV Push once  dextrose 50% Injectable 25 Gram(s) IV Push once  dextrose 50% Injectable 25 Gram(s) IV Push once  glucagon  Injectable 1 milliGRAM(s) IntraMuscular once PRN  insulin lispro (HumaLOG) corrective regimen sliding scale   SubCutaneous three times a day before meals    calcium gluconate IVPB 1 Gram(s) IV Intermittent daily  dextrose 5%. 1000 milliLiter(s) IV Continuous <Continuous>  epoetin terence Injectable 01532 Unit(s) SubCutaneous <User Schedule>  ergocalciferol 03303 Unit(s) Oral <User Schedule>  nystatin Powder 1 Application(s) Topical two times a day      PAST MEDICAL/SURGICAL HISTORY  PAST MEDICAL & SURGICAL HISTORY:  ESRD (end stage renal disease): L arm AVF placed 12/2018  Hemorrhoids  GERD (gastroesophageal reflux disease)  Morbid obesity  Cataract  Diaphragmatic hernia  Cerebral infarction: 2011  CKD (chronic kidney disease)  Anemia  Diabetes mellitus: Insulin dependent. Type 2  CHF (congestive heart failure)  Hyperlipidemia  Hypertension  COPD (chronic obstructive pulmonary disease)  Chronic obstructive pulmonary disease, unspecified COPD type  Adult Idiopathic Generalized Osteoporosis  CAD (Coronary Artery Disease): 1 stent placed 11/7/18  DM (Diabetes Mellitus), Secondary  Hypertension  H/O coronary angiogram: [facility] 1 stent placed 11/7/2018      SOCIAL HISTORY: Substance Use (street drugs): ( x ) never used  (  ) other:    FAMILY HISTORY:  Family history of breast cancer (Grandparent)      REVIEW OF SYSTEMS:  CONSTITUTIONAL: No fever, weight loss, or fatigue  EYES: No eye pain, visual disturbances, or discharge  ENMT:  No difficulty hearing, tinnitus, vertigo; No sinus or throat pain  BREASTS: No pain, masses, or nipple discharge  GASTROINTESTINAL: No abdominal or epigastric pain. No nausea, vomiting, or hematemesis; No diarrhea or constipation. No melena or hematochezia.  GENITOURINARY: No dysuria, frequency, hematuria, or incontinence  NEUROLOGICAL: No headaches, memory loss, loss of strength, numbness, or tremors  ENDOCRINE: No heat or cold intolerance; No hair loss  MUSCULOSKELETAL: No joint pain or swelling; No muscle, back, or extremity pain  PSYCHIATRIC: No depression, anxiety, mood swings, or difficulty sleeping  HEME/LYMPH: No easy bruising, or bleeding gums  All others negative    PHYSICAL EXAM:  T(C): 37.1 (10-24-19 @ 06:00), Max: 37.1 (10-24-19 @ 06:00)  HR: 98 (10-24-19 @ 13:47) (68 - 98)  BP: 138/71 (10-24-19 @ 06:00) (119/56 - 148/50)  RR: 20 (10-24-19 @ 06:00) (20 - 24)  SpO2: 92% (10-24-19 @ 13:47) (91% - 98%)  Wt(kg): --  I&O's Summary    24 Oct 2019 07:01  -  24 Oct 2019 14:15  --------------------------------------------------------  IN: 0 mL / OUT: 1800        GENERAL: Obese  on BiPAP  EYES: EOMI, PERRLA, conjunctiva and sclera clear  ENMT: on Biapa   Cardiovascular: Normal S1 S2, No JVD, No murmurs, No edema  Respiratory: b/l basal creps 	  Gastrointestinal:  Soft, Non-tender, + BS	  Extremities: No clubbing, cyanosis or edema  LYMPH: No lymphadenopathy noted                            8.4    12.83 )-----------( 190      ( 24 Oct 2019 02:30 )             28.1     10-24    145  |  101  |  83<H>  ----------------------------<  89  3.3<L>   |  21<L>  |  4.59<H>    Ca    7.5<L>      24 Oct 2019 02:30  Phos  7.3     10-24  Mg     1.7     10-24    TPro  6.0  /  Alb  2.4<L>  /  TBili  0.4  /  DBili  x   /  AST  7   /  ALT  9   /  AlkPhos  91  10-23    proBNP:   Lipid Profile:   HgA1c:   TSH:     Consultant(s) Notes Reviewed:  [x ] YES  [ ] NO    Care Discussed with Consultants/Other Providers [ x] YES  [ ] NO    Imaging Personally Reviewed independently:  [x] YES  [ ] NO    All labs, radiologic studies, vitals, orders and medications list reviewed. Patient is seen and examined at bedside. Case discussed with medical team. Statement Selected

## 2024-05-28 NOTE — ED ADULT NURSE NOTE - CHIEF COMPLAINT QUOTE
pt arrives as notification for shortness of breath by EMS. brought directly to Trauma room A.
[Negative] : Heme/Lymph
